# Patient Record
Sex: FEMALE | Race: WHITE | NOT HISPANIC OR LATINO | Employment: FULL TIME | URBAN - METROPOLITAN AREA
[De-identification: names, ages, dates, MRNs, and addresses within clinical notes are randomized per-mention and may not be internally consistent; named-entity substitution may affect disease eponyms.]

---

## 2017-01-20 DIAGNOSIS — C50.411 BREAST CANCER OF UPPER-OUTER QUADRANT OF RIGHT FEMALE BREAST (HCC): ICD-10-CM

## 2017-01-20 DIAGNOSIS — M81.0 OSTEOPOROSIS: Primary | ICD-10-CM

## 2017-01-25 ENCOUNTER — INFUSION (OUTPATIENT)
Dept: ONCOLOGY | Facility: HOSPITAL | Age: 42
End: 2017-01-25

## 2017-01-25 VITALS
BODY MASS INDEX: 23.13 KG/M2 | HEART RATE: 102 BPM | DIASTOLIC BLOOD PRESSURE: 72 MMHG | WEIGHT: 147.4 LBS | SYSTOLIC BLOOD PRESSURE: 106 MMHG | TEMPERATURE: 98.2 F

## 2017-01-25 DIAGNOSIS — C50.411 BREAST CANCER OF UPPER-OUTER QUADRANT OF RIGHT FEMALE BREAST (HCC): ICD-10-CM

## 2017-01-25 DIAGNOSIS — M81.0 OSTEOPOROSIS: Primary | ICD-10-CM

## 2017-01-25 DIAGNOSIS — M81.0 OSTEOPOROSIS: ICD-10-CM

## 2017-01-25 LAB
ALBUMIN SERPL-MCNC: 4 G/DL (ref 3.5–5.2)
ALBUMIN/GLOB SERPL: 1.5 G/DL (ref 1.1–2.4)
ALP SERPL-CCNC: 50 U/L (ref 38–116)
ALT SERPL W P-5'-P-CCNC: 19 U/L (ref 0–33)
ANION GAP SERPL CALCULATED.3IONS-SCNC: 11.8 MMOL/L
AST SERPL-CCNC: 31 U/L (ref 0–32)
BASOPHILS # BLD AUTO: 0.03 10*3/MM3 (ref 0–0.1)
BASOPHILS NFR BLD AUTO: 0.3 % (ref 0–1.1)
BILIRUB SERPL-MCNC: 0.2 MG/DL (ref 0.1–1.2)
BUN BLD-MCNC: 22 MG/DL (ref 6–20)
BUN/CREAT SERPL: 36.1 (ref 7.3–30)
CALCIUM SPEC-SCNC: 9 MG/DL (ref 8.5–10.2)
CHLORIDE SERPL-SCNC: 102 MMOL/L (ref 98–107)
CO2 SERPL-SCNC: 26.2 MMOL/L (ref 22–29)
CREAT BLD-MCNC: 0.61 MG/DL (ref 0.6–1.1)
DEPRECATED RDW RBC AUTO: 39.8 FL (ref 37–49)
EOSINOPHIL # BLD AUTO: 0.11 10*3/MM3 (ref 0–0.36)
EOSINOPHIL NFR BLD AUTO: 1.2 % (ref 1–5)
ERYTHROCYTE [DISTWIDTH] IN BLOOD BY AUTOMATED COUNT: 12.1 % (ref 11.7–14.5)
GFR SERPL CREATININE-BSD FRML MDRD: 107 ML/MIN/1.73
GLOBULIN UR ELPH-MCNC: 2.6 GM/DL (ref 1.8–3.5)
GLUCOSE BLD-MCNC: 94 MG/DL (ref 74–124)
HCT VFR BLD AUTO: 36.3 % (ref 34–45)
HGB BLD-MCNC: 12.4 G/DL (ref 11.5–14.9)
IMM GRANULOCYTES # BLD: 0.03 10*3/MM3 (ref 0–0.03)
IMM GRANULOCYTES NFR BLD: 0.3 % (ref 0–0.5)
LYMPHOCYTES # BLD AUTO: 1.2 10*3/MM3 (ref 1–3.5)
LYMPHOCYTES NFR BLD AUTO: 13.3 % (ref 20–49)
MAGNESIUM SERPL-MCNC: 2 MG/DL (ref 1.8–2.5)
MCH RBC QN AUTO: 30.6 PG (ref 27–33)
MCHC RBC AUTO-ENTMCNC: 34.2 G/DL (ref 32–35)
MCV RBC AUTO: 89.6 FL (ref 83–97)
MONOCYTES # BLD AUTO: 0.58 10*3/MM3 (ref 0.25–0.8)
MONOCYTES NFR BLD AUTO: 6.4 % (ref 4–12)
NEUTROPHILS # BLD AUTO: 7.07 10*3/MM3 (ref 1.5–7)
NEUTROPHILS NFR BLD AUTO: 78.5 % (ref 39–75)
NRBC BLD MANUAL-RTO: 0 /100 WBC (ref 0–0)
PHOSPHATE SERPL-MCNC: 3.1 MG/DL (ref 2.5–4.5)
PLATELET # BLD AUTO: 197 10*3/MM3 (ref 150–375)
PMV BLD AUTO: 9.3 FL (ref 8.9–12.1)
POTASSIUM BLD-SCNC: 4 MMOL/L (ref 3.5–4.7)
PROT SERPL-MCNC: 6.6 G/DL (ref 6.3–8)
RBC # BLD AUTO: 4.05 10*6/MM3 (ref 3.9–5)
SODIUM BLD-SCNC: 140 MMOL/L (ref 134–145)
WBC NRBC COR # BLD: 9.02 10*3/MM3 (ref 4–10)

## 2017-01-25 PROCEDURE — 36415 COLL VENOUS BLD VENIPUNCTURE: CPT | Performed by: INTERNAL MEDICINE

## 2017-01-25 PROCEDURE — 84100 ASSAY OF PHOSPHORUS: CPT | Performed by: INTERNAL MEDICINE

## 2017-01-25 PROCEDURE — 25010000002 ZOLEDRONIC ACID 5 MG/100ML SOLUTION: Performed by: INTERNAL MEDICINE

## 2017-01-25 PROCEDURE — 96365 THER/PROPH/DIAG IV INF INIT: CPT | Performed by: INTERNAL MEDICINE

## 2017-01-25 PROCEDURE — 83735 ASSAY OF MAGNESIUM: CPT | Performed by: INTERNAL MEDICINE

## 2017-01-25 PROCEDURE — 85025 COMPLETE CBC W/AUTO DIFF WBC: CPT | Performed by: INTERNAL MEDICINE

## 2017-01-25 PROCEDURE — 80053 COMPREHEN METABOLIC PANEL: CPT | Performed by: INTERNAL MEDICINE

## 2017-01-25 RX ORDER — SODIUM CHLORIDE 9 MG/ML
250 INJECTION, SOLUTION INTRAVENOUS ONCE
Status: COMPLETED | OUTPATIENT
Start: 2017-01-25 | End: 2017-01-25

## 2017-01-25 RX ORDER — ZOLEDRONIC ACID 5 MG/100ML
5 INJECTION, SOLUTION INTRAVENOUS ONCE
Status: CANCELLED | OUTPATIENT
Start: 2018-01-11

## 2017-01-25 RX ORDER — SODIUM CHLORIDE 9 MG/ML
250 INJECTION, SOLUTION INTRAVENOUS ONCE
Status: CANCELLED | OUTPATIENT
Start: 2018-01-11

## 2017-01-25 RX ORDER — ZOLEDRONIC ACID 5 MG/100ML
5 INJECTION, SOLUTION INTRAVENOUS ONCE
Status: COMPLETED | OUTPATIENT
Start: 2017-01-25 | End: 2017-01-25

## 2017-01-25 RX ADMIN — SODIUM CHLORIDE 250 ML: 900 INJECTION, SOLUTION INTRAVENOUS at 14:14

## 2017-01-25 RX ADMIN — ZOLEDRONIC ACID 5 MG: 0.05 INJECTION, SOLUTION INTRAVENOUS at 14:13

## 2017-01-27 ENCOUNTER — OFFICE VISIT (OUTPATIENT)
Dept: FAMILY MEDICINE CLINIC | Facility: CLINIC | Age: 42
End: 2017-01-27

## 2017-01-27 VITALS
OXYGEN SATURATION: 99 % | HEIGHT: 67 IN | BODY MASS INDEX: 22.66 KG/M2 | TEMPERATURE: 97.6 F | HEART RATE: 84 BPM | DIASTOLIC BLOOD PRESSURE: 70 MMHG | SYSTOLIC BLOOD PRESSURE: 102 MMHG | WEIGHT: 144.4 LBS

## 2017-01-27 DIAGNOSIS — M79.671 RIGHT FOOT PAIN: Primary | ICD-10-CM

## 2017-01-27 PROCEDURE — 99214 OFFICE O/P EST MOD 30 MIN: CPT | Performed by: NURSE PRACTITIONER

## 2017-01-27 PROCEDURE — 73630 X-RAY EXAM OF FOOT: CPT | Performed by: NURSE PRACTITIONER

## 2017-01-27 RX ORDER — VENLAFAXINE HYDROCHLORIDE 37.5 MG/1
37.5 CAPSULE, EXTENDED RELEASE ORAL DAILY
Refills: 2 | COMMUNITY
Start: 2017-01-22 | End: 2020-03-06

## 2017-01-27 RX ORDER — TAMOXIFEN CITRATE 20 MG/1
20 TABLET ORAL DAILY
COMMUNITY
Start: 2017-01-22 | End: 2017-03-13

## 2017-01-27 NOTE — PROGRESS NOTES
Subjective   Diana Higgins is a 41 y.o. female who presents today for:    Foot Pain (No injury. Rt foot pain x 2 weeks that5 is now going up leg)    Lower Extremity Issue   This is a new (she is a patient of Dr. Mays and this is my first time seeing her.) problem. The current episode started 1 to 4 weeks ago (started 2 weeks ago, she denies any injury). The problem has been unchanged. Associated symptoms include arthralgias (Rt foot pain). Pertinent negatives include no joint swelling or numbness. The symptoms are aggravated by walking. She has tried position changes and NSAIDs for the symptoms. The treatment provided no relief.      I have reviewed the patient's medical history in detail and updated the computerized patient record.    Ms. Higgins  reports that she has never smoked. She has never used smokeless tobacco. She reports that she drinks alcohol. She reports that she does not use illicit drugs.         Current Outpatient Prescriptions:   •  BIOTIN PO, Take  by mouth., Disp: , Rfl:   •  MELATONIN PO, Take  by mouth daily., Disp: , Rfl:   •  Probiotic Product ([employer] ADULT PROBIOTIC PO), Take  by mouth daily., Disp: , Rfl:   •  tamoxifen (NOLVADEX) 20 MG chemo tablet, Take 20 mg by mouth Daily., Disp: , Rfl:   •  venlafaxine XR (EFFEXOR-XR) 37.5 MG 24 hr capsule, Take 37.5 mg by mouth Daily., Disp: , Rfl: 2  •  Zoledronic Acid (RECLAST IV), Infuse  into a venous catheter. Once a year, Disp: , Rfl:       The following portions of the patient's history were reviewed and updated as appropriate: allergies, current medications, past social history and problem list.    Review of Systems   Constitutional: Negative.    Respiratory: Negative.    Cardiovascular: Negative.    Musculoskeletal: Positive for arthralgias (Rt foot pain). Negative for joint swelling.   Skin: Negative.    Neurological: Negative.  Negative for numbness.         Objective   Vitals:    01/27/17 1018   BP: 102/70   BP Location: Left arm  "  Patient Position: Sitting   Cuff Size: Adult   Pulse: 84   Temp: 97.6 °F (36.4 °C)   TempSrc: Oral   SpO2: 99%   Weight: 144 lb 6.4 oz (65.5 kg)   Height: 66.93\" (170 cm)     Physical Exam   Constitutional: She is oriented to person, place, and time. She appears well-developed and well-nourished.   Cardiovascular: Normal rate, regular rhythm, normal heart sounds and intact distal pulses.    Pulmonary/Chest: Effort normal and breath sounds normal.   Musculoskeletal:        Right ankle: Normal. Achilles tendon normal.        Right foot: There is tenderness (on the top of her foot). There is normal range of motion, no bony tenderness, no swelling and normal capillary refill.        Left foot: Normal.   Neurological: She is alert and oriented to person, place, and time.   Skin: Skin is warm and dry. No rash noted. No erythema. No pallor.   Vitals reviewed.        Assessment/Plan   Diana was seen today for foot pain.    Diagnoses and all orders for this visit:    Right foot pain  -     XR Foot 3+ View Right (In Office)    1. I have reviewed the films of her right foot and ankle. I do not see any significant abnormalities. I have no other films to compare them to. I have advised her to take Aleve for the pain. She is to follow up if her symptoms do not resolve within the next week.  2. Follow up as needed.  "

## 2017-01-27 NOTE — MR AVS SNAPSHOT
Diana Higgins   1/27/2017 10:20 AM   Office Visit    Dept Phone:  [phone]   Encounter #:  [id number]    Provider:  HERMINIA Boykin   Department:  [facility] INTERNAL MEDICINE                Your Full Care Plan              Today's Medication Changes          These changes are accurate as of: 1/27/17 11:16 AM.  If you have any questions, ask your nurse or doctor.               Medication(s)that have changed:     tamoxifen 20 MG chemo tablet   Commonly known as:  NOLVADEX   Take 20 mg by mouth Daily.   What changed:  Another medication with the same name was removed. Continue taking this medication, and follow the directions you see here.   Changed by:  HERMINIA Boykin       venlafaxine XR 37.5 MG 24 hr capsule   Commonly known as:  EFFEXOR-XR   Take 37.5 mg by mouth Daily.   What changed:  Another medication with the same name was removed. Continue taking this medication, and follow the directions you see here.   Changed by:  HERMINIA Boykin         Stop taking medication(s)listed here:     ALPRAZolam 0.5 MG tablet   Commonly known as:  XANAX   Stopped by:  HERMINIA Boykin           Clobetasol Propionate Emulsion 0.05 % topical foam   Stopped by:  HERMINIA Boykin           DULCOLAX PO   Stopped by:  HERMINIA Boykin           FLEXERIL PO   Stopped by:  HERMINIA Boykin           TYLENOL PO   Stopped by:  HERMINIA Boykin                      Your Updated Medication List          This list is accurate as of: 1/27/17 11:16 AM.  Always use your most recent med list.                BIOTIN PO       CVS ADULT PROBIOTIC PO       MELATONIN PO       RECLAST IV       tamoxifen 20 MG chemo tablet   Commonly known as:  NOLVADEX       venlafaxine XR 37.5 MG 24 hr capsule   Commonly known as:  EFFEXOR-XR               We Performed the Following     XR Foot 3+ View Right (In Office)       You Were Diagnosed With        Codes Comments    Right  foot pain    -  Primary ICD-10-CM: M79.671  ICD-9-CM: 729.5       Instructions     None    Patient Instructions History      Upcoming Appointments     Visit Type Date Time Department    ACUTE           2017 10:20 AM [facility]    PORT FLUSH 3/8/2017  1:30 PM  [facility] OP INFU [facility]    FOLLOW UP 2 UNIT 3/8/2017  2:00 PM [facility] ONC [facility] ART      [facility] Signup     [facility] [employer] allows you to send messages to your doctor, view your test results, renew your prescriptions, schedule appointments, and more. To sign up, go to [employer] and click on the Sign Up Now link in the New User? box. Enter your [employer] Activation Code exactly as it appears below along with the last four digits of your Social Security Number and your Date of Birth () to complete the sign-up process. If you do not sign up before the expiration date, you must request a new code.    [employer] Activation Code: [email or web address]  Expires: 2/10/2017 11:16 AM    If you have questions, you can email [email or web address] or call [phone] to talk to our [employer] staff. Remember, [employer] is NOT to be used for urgent needs. For medical emergencies, dial 911.               Other Info from Your Visit           Your Appointments     Mar 08, 2017  1:30 PM EST   PORT FLUSH with INFU [facility] PORT CHAIR   [facility] ([place])    [address] [address] [phone]            Mar 08, 2017  2:00 PM EST   FOLLOW UP with Alf Hadley MD   [facility]: [facility]SULT[facility]S IN BLOOD DISORDERS AND CANCER ([facility])    [address] [facility] [address]  [facility] [address] [phone]              Allergies     No Known Allergies      Reason for Visit     Foot Pain No injury. Rt foot pain x 2 weeks that5 is now going up leg      Vital Signs     Blood Pressure Pulse Temperature Height Weight Oxygen Saturation    102/70 (BP Location: Left arm,  "Patient Position: Sitting, Cuff Size: Adult) 84 97.6 °F (36.4 °C) (Oral) 66.93\" (170 cm) 144 lb 6.4 oz (65.5 kg) 99%    Breastfeeding? Body Mass Index Smoking Status             No 22.66 kg/m2 Never Smoker         Problems and Diagnoses Noted     Right foot pain    -  Primary        "

## 2017-02-06 ENCOUNTER — OFFICE VISIT (OUTPATIENT)
Dept: FAMILY MEDICINE CLINIC | Facility: CLINIC | Age: 42
End: 2017-02-06

## 2017-02-06 VITALS
HEIGHT: 67 IN | OXYGEN SATURATION: 100 % | DIASTOLIC BLOOD PRESSURE: 74 MMHG | BODY MASS INDEX: 23.23 KG/M2 | WEIGHT: 148 LBS | HEART RATE: 120 BPM | SYSTOLIC BLOOD PRESSURE: 102 MMHG

## 2017-02-06 DIAGNOSIS — R06.02 SHORTNESS OF BREATH: ICD-10-CM

## 2017-02-06 DIAGNOSIS — R06.09 DOE (DYSPNEA ON EXERTION): ICD-10-CM

## 2017-02-06 DIAGNOSIS — C50.411 BREAST CANCER OF UPPER-OUTER QUADRANT OF RIGHT FEMALE BREAST (HCC): ICD-10-CM

## 2017-02-06 DIAGNOSIS — R07.2 PRECORDIAL PAIN: Primary | ICD-10-CM

## 2017-02-06 PROCEDURE — 99215 OFFICE O/P EST HI 40 MIN: CPT | Performed by: INTERNAL MEDICINE

## 2017-02-06 PROCEDURE — 93000 ELECTROCARDIOGRAM COMPLETE: CPT | Performed by: INTERNAL MEDICINE

## 2017-02-06 PROCEDURE — 71020 XR CHEST PA AND LATERAL: CPT | Performed by: INTERNAL MEDICINE

## 2017-02-06 NOTE — PROGRESS NOTES
Subjective   Diana Higgins is a 41 y.o. female who presents today for:    Chest Pain (Off and on for couple weeks/ Pains in center and left of chest)    History of Present Illness      This delightful 41-year-old female has a storied medical history.  She has undergone an ablation for SVT (2003).  Symptoms at that time included shortness of breath, but no chest pain; most significantly, she felt a fluttering in her chest during those episodes.  She has not felt this since her ablation.  She has had episodes of tachycardia, usually at times of increased anxiety, but not to the degree she had them before the ablation.    She also has history of breast cancer on the right (2014), status post bilateral mastectomies, treated with chemotherapy, currently maintained on tamoxifen, and followed by [facility].  She still has a port in the left subclavian area.    Starting last fall, she began a workout regimen.  Since then, she has noticed lightheadedness and palpitations while working out.  More recently, she has noticed episodes of palpitations and lightheadedness while at home, either engaged in very low level activity or at rest.  She's had some substernal and right-sided chest discomfort, usually very sharp, focal, brief, occasionally radiating through to the back.  Again, symptoms have occurred both with exertion and at rest.      Ms. Higgins  reports that she has never smoked. She has never used smokeless tobacco. She reports that she drinks alcohol. She reports that she does not use illicit drugs.         Current Outpatient Prescriptions:   •  BIOTIN PO, Take  by mouth., Disp: , Rfl:   •  MELATONIN PO, Take  by mouth daily., Disp: , Rfl:   •  Probiotic Product ([employer]S ADULT PROBIOTIC PO), Take  by mouth daily., Disp: , Rfl:   •  tamoxifen (NOLVADEX) 20 MG chemo tablet, Take 20 mg by mouth Daily., Disp: , Rfl:   •  venlafaxine XR (EFFEXOR-XR) 37.5 MG 24 hr capsule, Take 37.5 mg by mouth Daily., Disp: , Rfl: 2  •  Zoledronic Acid  "(RECLAST IV), Infuse  into a venous catheter. Once a year, Disp: , Rfl:       The following portions of the patient's history were reviewed and updated as appropriate: allergies, current medications, past social history and problem list.    Review of Systems   Constitutional: Negative for unexpected weight change.   Eyes: Negative for visual disturbance.   Respiratory: Positive for chest tightness and shortness of breath.    Cardiovascular: Positive for chest pain and palpitations. Negative for leg swelling.   Gastrointestinal: Negative for abdominal pain.   Endocrine: Positive for heat intolerance. Negative for cold intolerance.   Hematological: Negative for adenopathy. Does not bruise/bleed easily.        No h/o DVT   Psychiatric/Behavioral: Negative for dysphoric mood. The patient is nervous/anxious.        Objective   Vitals:    02/06/17 1513   BP: 102/74   BP Location: Left arm   Patient Position: Sitting   Cuff Size: Adult   Pulse: 120   SpO2: 100%   Weight: 148 lb (67.1 kg)   Height: 66.93\" (170 cm)     Physical Exam  Well-developed, well-nourished, in no acute distress.  She is anxious, alert, oriented, answers all questions appropriately.  Sclerae are anicteric and the conjunctivae are pink.  There is no periorbital edema.  No cervical, supraclavicular, or axillary lymphadenopathy.  No thyromegaly or masses appreciated.  Regular rate and rhythm, although tachycardic during parts of the exam, with normal S1 and S2, no murmur appreciated.  Clear to auscultation bilaterally with good breath sounds throughout all lung fields and normal respiratory effort.  Abdomen is soft, nondistended, nontender, with normoactive bowel sounds and no evidence of hepatosplenomegaly or mass.  No abdominal bruit is noted.  There is no lower extremity edema.  There is no upper extremity edema.  Radial and posterior tibial pulses are full bilaterally.      Assessment/Plan   Diana was seen today for chest pain.    Diagnoses and all " orders for this visit:    Precordial pain  -     XR Chest PA & Lateral  -     ECG 12 Lead  -     D-dimer, Quantitative    LEON (dyspnea on exertion)  -     XR Chest PA & Lateral  -     ECG 12 Lead  -     D-dimer, Quantitative    Shortness of breath  -     XR Chest PA & Lateral    Breast cancer of upper-outer quadrant of right female breast    With her history of SVT, status post ablation, the differential diagnosis includes recurrence of her SVT.  Today's ECG shows normal sinus rhythm with normal waveforms.  There is no evidence of preexcitation or ischemia.  I have no prior for comparison.  Because of her exertional symptoms and her past history, it may be prudent to do a stress echo on her if the rest of her evaluation is negative.    Chest x-ray was done today; it shows no evidence of active disease in the chest.  Port is present in the left subclavian area with the tip in the SVC.  Other than the port, this is a normal chest x-ray; I have no priors for comparison.    Because of her history of breast cancer, current tamoxifen use, and the presence of the port on the left, DVT/PE is also on the list of possibilities.  We have arranged for her to get blood drawn from her port (venapuncture failed today) through the ambulatory care unit at [facility] in order to check a d-dimer.  Obviously, if it is elevated, she will need a CTA of the chest.  If it is normal, I do not feel the CT scan is indicated.    The patient is most concerned about breast cancer metastatic to the lung as the etiology of her symptoms.  I have tried to reassure her in this regard, but she remains very anxious.  I reviewed the tumor marker levels done by CBC over the past year, pointing out to the patient that they have been on the decline over the last year, not rising.  Therefore, we will not check a CT of the chest unless her dimer is elevated.

## 2017-02-07 ENCOUNTER — HOSPITAL ENCOUNTER (OUTPATIENT)
Dept: INFUSION THERAPY | Facility: HOSPITAL | Age: 42
Discharge: HOME | End: 2017-02-07
Admitting: INTERNAL MEDICINE

## 2017-02-07 VITALS
OXYGEN SATURATION: 95 % | HEIGHT: 67 IN | BODY MASS INDEX: 22.76 KG/M2 | DIASTOLIC BLOOD PRESSURE: 83 MMHG | TEMPERATURE: 97.6 F | RESPIRATION RATE: 16 BRPM | HEART RATE: 88 BPM | SYSTOLIC BLOOD PRESSURE: 124 MMHG | WEIGHT: 145 LBS

## 2017-02-07 DIAGNOSIS — R07.2 PRECORDIAL PAIN: Primary | ICD-10-CM

## 2017-02-07 DIAGNOSIS — R06.09 DOE (DYSPNEA ON EXERTION): ICD-10-CM

## 2017-02-07 LAB — D DIMER PPP FEU-MCNC: <0.27 MCGFEU/ML (ref 0–0.49)

## 2017-02-07 PROCEDURE — 36591 DRAW BLOOD OFF VENOUS DEVICE: CPT

## 2017-02-07 PROCEDURE — 85379 FIBRIN DEGRADATION QUANT: CPT | Performed by: INTERNAL MEDICINE

## 2017-02-07 PROCEDURE — 25010000002 HEPARIN FLUSH (PORCINE) 100 UNIT/ML SOLUTION: Performed by: INTERNAL MEDICINE

## 2017-02-07 RX ADMIN — Medication 500 UNITS: at 15:46

## 2017-02-10 DIAGNOSIS — R07.2 PRECORDIAL PAIN: Primary | ICD-10-CM

## 2017-02-10 DIAGNOSIS — R06.09 DOE (DYSPNEA ON EXERTION): ICD-10-CM

## 2017-02-24 ENCOUNTER — OFFICE VISIT (OUTPATIENT)
Dept: CARDIOLOGY | Facility: CLINIC | Age: 42
End: 2017-02-24

## 2017-02-24 ENCOUNTER — HOSPITAL ENCOUNTER (OUTPATIENT)
Dept: CARDIOLOGY | Facility: HOSPITAL | Age: 42
Discharge: HOME | End: 2017-02-24
Attending: INTERNAL MEDICINE | Admitting: INTERNAL MEDICINE

## 2017-02-24 VITALS
SYSTOLIC BLOOD PRESSURE: 100 MMHG | HEIGHT: 67 IN | BODY MASS INDEX: 22.54 KG/M2 | HEART RATE: 79 BPM | DIASTOLIC BLOOD PRESSURE: 60 MMHG | WEIGHT: 143.6 LBS

## 2017-02-24 VITALS
WEIGHT: 143 LBS | SYSTOLIC BLOOD PRESSURE: 100 MMHG | DIASTOLIC BLOOD PRESSURE: 60 MMHG | BODY MASS INDEX: 22.44 KG/M2 | HEIGHT: 67 IN | HEART RATE: 94 BPM

## 2017-02-24 DIAGNOSIS — R68.89 DECREASED EXERCISE TOLERANCE: ICD-10-CM

## 2017-02-24 DIAGNOSIS — R06.09 DYSPNEA ON EXERTION: ICD-10-CM

## 2017-02-24 DIAGNOSIS — R07.2 PRECORDIAL PAIN: ICD-10-CM

## 2017-02-24 DIAGNOSIS — Z86.79 HISTORY OF PSVT (PAROXYSMAL SUPRAVENTRICULAR TACHYCARDIA): ICD-10-CM

## 2017-02-24 DIAGNOSIS — R07.2 PRECORDIAL PAIN: Primary | ICD-10-CM

## 2017-02-24 DIAGNOSIS — Z92.21 HISTORY OF CANCER CHEMOTHERAPY: ICD-10-CM

## 2017-02-24 DIAGNOSIS — R00.2 PALPITATIONS: ICD-10-CM

## 2017-02-24 LAB
BH CV ECHO MEAS - AO MAX PG: 4.1 MMHG
BH CV ECHO MEAS - AO V2 MAX: 101.8 CM/SEC
BH CV ECHO MEAS - BSA(HAYCOCK): 1.8 M^2
BH CV ECHO MEAS - BSA: 1.8 M^2
BH CV ECHO MEAS - BZI_BMI: 22.4 KILOGRAMS/M^2
BH CV ECHO MEAS - BZI_METRIC_HEIGHT: 170.2 CM
BH CV ECHO MEAS - BZI_METRIC_WEIGHT: 64.9 KG
BH CV ECHO MEAS - CONTRAST EF 4CH: 67.3 ML/M^2
BH CV ECHO MEAS - EDV(MOD-SP4): 55 ML
BH CV ECHO MEAS - EDV(TEICH): 52.1 ML
BH CV ECHO MEAS - EF(CUBED): 77.4 %
BH CV ECHO MEAS - EF(MOD-SP4): 67.3 %
BH CV ECHO MEAS - EF(TEICH): 70.6 %
BH CV ECHO MEAS - ESV(MOD-SP4): 18 ML
BH CV ECHO MEAS - ESV(TEICH): 15.3 ML
BH CV ECHO MEAS - FS: 39.1 %
BH CV ECHO MEAS - IVS/LVPW: 1.1
BH CV ECHO MEAS - IVSD: 0.83 CM
BH CV ECHO MEAS - LAT PEAK E' VEL: 14 CM/SEC
BH CV ECHO MEAS - LV DIASTOLIC VOL/BSA (35-75): 31.4 ML/M^2
BH CV ECHO MEAS - LV MASS(C)D: 76.3 GRAMS
BH CV ECHO MEAS - LV MASS(C)DI: 43.5 GRAMS/M^2
BH CV ECHO MEAS - LV SYSTOLIC VOL/BSA (12-30): 10.3 ML/M^2
BH CV ECHO MEAS - LVIDD: 3.5 CM
BH CV ECHO MEAS - LVIDS: 2.2 CM
BH CV ECHO MEAS - LVLD AP4: 6.1 CM
BH CV ECHO MEAS - LVLS AP4: 4.5 CM
BH CV ECHO MEAS - LVPWD: 0.77 CM
BH CV ECHO MEAS - MED PEAK E' VEL: 8 CM/SEC
BH CV ECHO MEAS - MV A DUR: 0.1 SEC
BH CV ECHO MEAS - MV A MAX VEL: 80.5 CM/SEC
BH CV ECHO MEAS - MV DEC SLOPE: 523.5 CM/SEC^2
BH CV ECHO MEAS - MV DEC TIME: 0.16 SEC
BH CV ECHO MEAS - MV E MAX VEL: 82 CM/SEC
BH CV ECHO MEAS - MV E/A: 1
BH CV ECHO MEAS - MV P1/2T MAX VEL: 82.9 CM/SEC
BH CV ECHO MEAS - MV P1/2T: 46.4 MSEC
BH CV ECHO MEAS - MVA P1/2T LCG: 2.7 CM^2
BH CV ECHO MEAS - MVA(P1/2T): 4.7 CM^2
BH CV ECHO MEAS - PULM A REVS DUR: 0.1 SEC
BH CV ECHO MEAS - PULM A REVS VEL: 31 CM/SEC
BH CV ECHO MEAS - PULM DIAS VEL: 42.7 CM/SEC
BH CV ECHO MEAS - PULM S/D: 1.4
BH CV ECHO MEAS - PULM SYS VEL: 61.1 CM/SEC
BH CV ECHO MEAS - SI(CUBED): 19.5 ML/M^2
BH CV ECHO MEAS - SI(MOD-SP4): 21.1 ML/M^2
BH CV ECHO MEAS - SI(TEICH): 21 ML/M^2
BH CV ECHO MEAS - SV(CUBED): 34.2 ML
BH CV ECHO MEAS - SV(MOD-SP4): 37 ML
BH CV ECHO MEAS - SV(TEICH): 36.8 ML
BH CV ECHO MEAS - TR MAX VEL: 225.3 CM/SEC
BH CV STRESS BP STAGE 1: NORMAL
BH CV STRESS BP STAGE 2: NORMAL
BH CV STRESS DURATION MIN STAGE 1: 3
BH CV STRESS DURATION MIN STAGE 2: 3
BH CV STRESS DURATION SEC STAGE 1: 0
BH CV STRESS DURATION SEC STAGE 2: 0
BH CV STRESS ECHO POST STRESS EJECTION FRACTION EF: 75 %
BH CV STRESS GRADE STAGE 1: 10
BH CV STRESS GRADE STAGE 2: 12
BH CV STRESS HR STAGE 1: 126
BH CV STRESS HR STAGE 2: 158
BH CV STRESS METS STAGE 1: 5
BH CV STRESS METS STAGE 2: 7.5
BH CV STRESS PROTOCOL 1: NORMAL
BH CV STRESS SPEED STAGE 1: 1.7
BH CV STRESS SPEED STAGE 2: 2.5
BH CV STRESS STAGE 1: 1
BH CV STRESS STAGE 2: 2
E/E' RATIO: 8.5
LEFT ATRIUM VOLUME INDEX: 8.5 ML/M2
LV EF 2D ECHO EST: 67 %
MAXIMAL PREDICTED HEART RATE: 177 BPM
PERCENT MAX PREDICTED HR: 89.27 %
STRESS BASELINE BP: NORMAL MMHG
STRESS BASELINE HR: 95 BPM
STRESS PERCENT HR: 105 %
STRESS POST ESTIMATED WORKLOAD: 7.5 METS
STRESS POST EXERCISE DUR MIN: 6 MIN
STRESS POST EXERCISE DUR SEC: 0 SEC
STRESS POST PEAK BP: NORMAL MMHG
STRESS POST PEAK HR: 158 BPM
STRESS TARGET HR: 150 BPM

## 2017-02-24 PROCEDURE — 99244 OFF/OP CNSLTJ NEW/EST MOD 40: CPT | Performed by: INTERNAL MEDICINE

## 2017-02-24 PROCEDURE — 93325 DOPPLER ECHO COLOR FLOW MAPG: CPT | Performed by: INTERNAL MEDICINE

## 2017-02-24 PROCEDURE — 93016 CV STRESS TEST SUPVJ ONLY: CPT | Performed by: INTERNAL MEDICINE

## 2017-02-24 PROCEDURE — 93000 ELECTROCARDIOGRAM COMPLETE: CPT | Performed by: INTERNAL MEDICINE

## 2017-02-24 PROCEDURE — 93352 ADMIN ECG CONTRAST AGENT: CPT | Performed by: INTERNAL MEDICINE

## 2017-02-24 PROCEDURE — 93017 CV STRESS TEST TRACING ONLY: CPT

## 2017-02-24 PROCEDURE — 93018 CV STRESS TEST I&R ONLY: CPT | Performed by: INTERNAL MEDICINE

## 2017-02-24 PROCEDURE — 93320 DOPPLER ECHO COMPLETE: CPT | Performed by: INTERNAL MEDICINE

## 2017-02-24 PROCEDURE — 93325 DOPPLER ECHO COLOR FLOW MAPG: CPT

## 2017-02-24 PROCEDURE — 93350 STRESS TTE ONLY: CPT | Performed by: INTERNAL MEDICINE

## 2017-02-24 PROCEDURE — C8928 TTE W OR W/O FOL W/CON,STRES: HCPCS

## 2017-02-24 PROCEDURE — 93320 DOPPLER ECHO COMPLETE: CPT

## 2017-02-24 PROCEDURE — 25010000002 PERFLUTREN (DEFINITY) 8.476 MG IN SODIUM CHLORIDE 10 ML INJECTION: Performed by: INTERNAL MEDICINE

## 2017-02-24 RX ORDER — OMEGA-3/DHA/EPA/FISH OIL 60 MG-90MG
CAPSULE ORAL DAILY
COMMUNITY
End: 2018-01-08

## 2017-02-24 RX ORDER — CHOLECALCIFEROL (VITAMIN D3) 25 MCG
1000 CAPSULE ORAL DAILY
COMMUNITY

## 2017-02-24 RX ADMIN — PERFLUTREN 3 ML: 6.52 INJECTION, SUSPENSION INTRAVENOUS at 11:30

## 2017-02-24 NOTE — PROGRESS NOTES
"Date of Office Visit: 2017  Encounter Provider: Ildefonso Davies MD  Place of Service: [facility] CARDIOLOGY  Patient Name: Diana Higgins  :1975    Chief Complaint   Patient presents with   • Shortness of Breath     x 2 months   :     HPI: Diana Higgins is a 41 y.o. female who presents today at the request of Dr. Mays for dyspnea and chest pain.  She was diagnosed with SVT in , and underwent an EP study which showed that she had an atrial tachycardia originating from the ostium of the coronary sinus.  This was successfully ablated, and she has not had recurrence.  In  she was evaluated for chest pain and had a normal echocardiogram and normal Cardiolite stress test.      In  she was diagnosed with breast cancer and underwent bilateral mastectomies and treatment with chemotherapy and radiation.  She is doing well from that standpoint; she still has a Port.    She has been trying to exercise, but she notes that she becomes very winded with cardio exercises (walking on the tread-climber, for examples).  She has a mildly uncomfortable sensation in the chest when it happens, and she feels like she can't take a deep breath.  If she keeps exercising, she'll \"see spots.\"  She denies syncope, and she denies episodes during low or medium levels of activity.  She denies edema, orthopnea, or lightheadedness.    She has not had any recurrent SVT symptoms, but she occasionally has a \"flip flop\" in her chest, at rest.  There are no other symptoms with those.      She is a non-smoker.  She does have a history of CAD in her mother, in her 60's.        Past Medical History   Diagnosis Date   • Breast cancer      stage IIA, s/p bilateral mastectomy, XRT, and chemotherapy, followed by Dr. Hadley   • Osteoporosis    • Paroxysmal supraventricular tachycardia      CS ostium atrial tachycardia, ablated in  by Dr. Garzon   • Sinus headache      But no history of migranes "   • Skin cancer    • Wears glasses        Past Surgical History   Procedure Laterality Date   • Endometrial ablation  03/2013     Dr. Young   • Cardiac ablation  2002     Dr. Garzon 11 years ago tachycardia   • Knee surgery Right      arthroscopy as a teenager   • Tonsillectomy     • Mastectomy with immediate reconstruction Bilateral    • Colonoscopy         Social History     Social History   • Marital status:      Spouse name: Misha   • Number of children: N/A   • Years of education: College     Occupational History   •  [employer]     Social History Main Topics   • Smoking status: Never Smoker   • Smokeless tobacco: Never Used   • Alcohol use Yes      Comment: Rare   • Drug use: No   • Sexual activity: Defer     Other Topics Concern   • Not on file     Social History Narrative       Family History   Problem Relation Age of Onset   • Coronary artery disease Mother    • Heart disease Mother    • Thrombophlebitis Mother      2016   • Heart attack Mother    • Hepatitis Father    • Cancer Father    • Crohn's disease Sister    • Drug abuse Brother      Drug overdose   • Cancer Maternal Grandmother 42     Breast   • Thyroid disease Sister    • Other Sister      kidney problems       Review of Systems   Cardiovascular: Positive for dyspnea on exertion.   Psychiatric/Behavioral: The patient is nervous/anxious.    All other systems reviewed and are negative.      No Known Allergies      Current Outpatient Prescriptions:   •  BIOTIN PO, Take  by mouth., Disp: , Rfl:   •  Cholecalciferol (VITAMIN D-3) 1000 UNITS capsule, Take  by mouth Daily., Disp: , Rfl:   •  MELATONIN PO, Take 3 mg by mouth Daily., Disp: , Rfl:   •  Omega-3 Fatty Acids (FISH OIL) 500 MG capsule, Take  by mouth Daily., Disp: , Rfl:   •  PHYTOSTEROLS PO, Take 1,000 mg by mouth Daily., Disp: , Rfl:   •  Probiotic Product ([employer]S ADULT PROBIOTIC PO), Take  by mouth daily., Disp: , Rfl:   •   "tamoxifen (NOLVADEX) 20 MG chemo tablet, Take 20 mg by mouth Daily., Disp: , Rfl:   •  Unable to find, 1 each 1 (One) Time. Med Name:   Cell wise, Disp: , Rfl:   •  venlafaxine XR (EFFEXOR-XR) 37.5 MG 24 hr capsule, Take 37.5 mg by mouth Daily., Disp: , Rfl: 2  •  Zoledronic Acid (RECLAST IV), Infuse  into a venous catheter. Once a year, Disp: , Rfl:   No current facility-administered medications for this visit.      Objective:     Vitals:    02/24/17 0952   BP: 100/60   Pulse: 79   Weight: 143 lb 9.6 oz (65.1 kg)   Height: 67\" (170.2 cm)     Body mass index is 22.49 kg/(m^2).    Physical Exam   Constitutional: She is oriented to person, place, and time. She appears well-developed and well-nourished.   HENT:   Head: Normocephalic.   Nose: Nose normal.   Mouth/Throat: Oropharynx is clear and moist.   Eyes: Conjunctivae and EOM are normal. Pupils are equal, round, and reactive to light.   Neck: Normal range of motion. No JVD present.   Cardiovascular: Normal rate, regular rhythm, normal heart sounds and intact distal pulses.    No murmur heard.  Pulmonary/Chest: Effort normal and breath sounds normal.   Abdominal: Soft. She exhibits no mass. There is no tenderness.   Musculoskeletal: Normal range of motion. She exhibits no edema.   Lymphadenopathy:     She has no cervical adenopathy.   Neurological: She is alert and oriented to person, place, and time. No cranial nerve deficit.   Skin: Skin is warm and dry. No rash noted.   Psychiatric: She has a normal mood and affect. Her behavior is normal. Judgment and thought content normal.   Vitals reviewed.        ECG 12 Lead  Date/Time: 2/24/2017 3:24 PM  Performed by: JESSICA MARKS  Authorized by: JESSICA MARSK   Comparison: compared with previous ECG   Similar to previous ECG  Rhythm: sinus rhythm  ST Segments: ST segments normal  T Waves: T waves normal  Other: no other findings  Clinical impression: normal ECG              Assessment:       Diagnosis Plan   1. Precordial " pain     2. Decreased exercise tolerance     3. History of cancer chemotherapy     4. Palpitations     5. History of PSVT (paroxysmal supraventricular tachycardia)            Plan:       Ms. Higgins is noted significant dyspnea with exercise and the inability to complete much in the way of aerobic exercise.  She had a d-dimer checked recently which was normal, and that's very reassuring.  I set her up for a stress echo today; her baseline echo was normal, and her stress EKG and stress echo were normal despite developing significant dyspnea while on the treadmill.  I do not think her symptoms are cardiac in nature.  I'm going to check PFT's for further evaluation.    She has mild, infrequent palpitations.  These sound like PVC's.  There's no evidence that she's had any recurrent SVT.  She doesn't need a beta blocker.    As always, it has been a pleasure to participate in your patient's care.      Sincerely,       Ildefonso Davies MD

## 2017-03-02 ENCOUNTER — HOSPITAL ENCOUNTER (OUTPATIENT)
Dept: RESPIRATORY THERAPY | Facility: HOSPITAL | Age: 42
Discharge: HOME | End: 2017-03-02
Attending: INTERNAL MEDICINE | Admitting: INTERNAL MEDICINE

## 2017-03-02 DIAGNOSIS — R06.09 DYSPNEA ON EXERTION: ICD-10-CM

## 2017-03-02 PROCEDURE — 94060 EVALUATION OF WHEEZING: CPT

## 2017-03-02 PROCEDURE — 94726 PLETHYSMOGRAPHY LUNG VOLUMES: CPT

## 2017-03-02 PROCEDURE — 94729 DIFFUSING CAPACITY: CPT

## 2017-03-02 RX ORDER — ALBUTEROL SULFATE 2.5 MG/3ML
2.5 SOLUTION RESPIRATORY (INHALATION) ONCE AS NEEDED
Status: COMPLETED | OUTPATIENT
Start: 2017-03-02 | End: 2017-03-02

## 2017-03-02 RX ADMIN — ALBUTEROL SULFATE 2.5 MG: 2.5 SOLUTION RESPIRATORY (INHALATION) at 08:12

## 2017-03-09 PROBLEM — Z45.2 FITTING AND ADJUSTMENT OF VASCULAR CATHETER: Status: ACTIVE | Noted: 2017-03-09

## 2017-03-13 ENCOUNTER — INFUSION (OUTPATIENT)
Dept: ONCOLOGY | Facility: HOSPITAL | Age: 42
End: 2017-03-13

## 2017-03-13 ENCOUNTER — OFFICE VISIT (OUTPATIENT)
Dept: ONCOLOGY | Facility: CLINIC | Age: 42
End: 2017-03-13

## 2017-03-13 VITALS
TEMPERATURE: 98.4 F | DIASTOLIC BLOOD PRESSURE: 68 MMHG | RESPIRATION RATE: 14 BRPM | BODY MASS INDEX: 22.76 KG/M2 | HEIGHT: 67 IN | HEART RATE: 70 BPM | SYSTOLIC BLOOD PRESSURE: 100 MMHG | WEIGHT: 145 LBS | OXYGEN SATURATION: 100 %

## 2017-03-13 DIAGNOSIS — C50.411 BREAST CANCER OF UPPER-OUTER QUADRANT OF RIGHT FEMALE BREAST (HCC): Primary | ICD-10-CM

## 2017-03-13 DIAGNOSIS — Z78.0 MENOPAUSE: ICD-10-CM

## 2017-03-13 DIAGNOSIS — Z45.2 FITTING AND ADJUSTMENT OF VASCULAR CATHETER: ICD-10-CM

## 2017-03-13 DIAGNOSIS — M81.0 OSTEOPOROSIS: ICD-10-CM

## 2017-03-13 LAB
ALBUMIN SERPL-MCNC: 4.2 G/DL (ref 3.5–5.2)
ALBUMIN/GLOB SERPL: 1.8 G/DL (ref 1.1–2.4)
ALP SERPL-CCNC: 49 U/L (ref 38–116)
ALT SERPL W P-5'-P-CCNC: 20 U/L (ref 0–33)
ANION GAP SERPL CALCULATED.3IONS-SCNC: 11.6 MMOL/L
AST SERPL-CCNC: 29 U/L (ref 0–32)
BASOPHILS # BLD AUTO: 0.02 10*3/MM3 (ref 0–0.1)
BASOPHILS NFR BLD AUTO: 0.3 % (ref 0–1.1)
BILIRUB SERPL-MCNC: 0.2 MG/DL (ref 0.1–1.2)
BUN BLD-MCNC: 13 MG/DL (ref 6–20)
BUN/CREAT SERPL: 20.3 (ref 7.3–30)
CALCIUM SPEC-SCNC: 8.6 MG/DL (ref 8.5–10.2)
CANCER AG15-3 SERPL-ACNC: 19.6 U/ML
CHLORIDE SERPL-SCNC: 103 MMOL/L (ref 98–107)
CO2 SERPL-SCNC: 26.4 MMOL/L (ref 22–29)
CREAT BLD-MCNC: 0.64 MG/DL (ref 0.6–1.1)
DEPRECATED RDW RBC AUTO: 39.3 FL (ref 37–49)
EOSINOPHIL # BLD AUTO: 0.1 10*3/MM3 (ref 0–0.36)
EOSINOPHIL NFR BLD AUTO: 1.6 % (ref 1–5)
ERYTHROCYTE [DISTWIDTH] IN BLOOD BY AUTOMATED COUNT: 11.9 % (ref 11.7–14.5)
FSH SERPL-ACNC: 28.65 MIU/ML
GFR SERPL CREATININE-BSD FRML MDRD: 101 ML/MIN/1.73
GLOBULIN UR ELPH-MCNC: 2.4 GM/DL (ref 1.8–3.5)
GLUCOSE BLD-MCNC: 101 MG/DL (ref 74–124)
HCT VFR BLD AUTO: 37.4 % (ref 34–45)
HGB BLD-MCNC: 12.7 G/DL (ref 11.5–14.9)
HOLD SPECIMEN: NORMAL
IMM GRANULOCYTES # BLD: 0.02 10*3/MM3 (ref 0–0.03)
IMM GRANULOCYTES NFR BLD: 0.3 % (ref 0–0.5)
LH SERPL-ACNC: 24.38 MIU/ML
LYMPHOCYTES # BLD AUTO: 1.16 10*3/MM3 (ref 1–3.5)
LYMPHOCYTES NFR BLD AUTO: 18.5 % (ref 20–49)
MCH RBC QN AUTO: 30.7 PG (ref 27–33)
MCHC RBC AUTO-ENTMCNC: 34 G/DL (ref 32–35)
MCV RBC AUTO: 90.3 FL (ref 83–97)
MONOCYTES # BLD AUTO: 0.54 10*3/MM3 (ref 0.25–0.8)
MONOCYTES NFR BLD AUTO: 8.6 % (ref 4–12)
NEUTROPHILS # BLD AUTO: 4.42 10*3/MM3 (ref 1.5–7)
NEUTROPHILS NFR BLD AUTO: 70.7 % (ref 39–75)
NRBC BLD MANUAL-RTO: 0 /100 WBC (ref 0–0)
PLATELET # BLD AUTO: 227 10*3/MM3 (ref 150–375)
PMV BLD AUTO: 9.1 FL (ref 8.9–12.1)
POTASSIUM BLD-SCNC: 4 MMOL/L (ref 3.5–4.7)
PROT SERPL-MCNC: 6.6 G/DL (ref 6.3–8)
RBC # BLD AUTO: 4.14 10*6/MM3 (ref 3.9–5)
SODIUM BLD-SCNC: 141 MMOL/L (ref 134–145)
WBC NRBC COR # BLD: 6.26 10*3/MM3 (ref 4–10)

## 2017-03-13 PROCEDURE — 36415 COLL VENOUS BLD VENIPUNCTURE: CPT | Performed by: INTERNAL MEDICINE

## 2017-03-13 PROCEDURE — 86300 IMMUNOASSAY TUMOR CA 15-3: CPT | Performed by: INTERNAL MEDICINE

## 2017-03-13 PROCEDURE — 80053 COMPREHEN METABOLIC PANEL: CPT | Performed by: INTERNAL MEDICINE

## 2017-03-13 PROCEDURE — 25010000002 HEPARIN FLUSH (PORCINE) 100 UNIT/ML SOLUTION: Performed by: INTERNAL MEDICINE

## 2017-03-13 PROCEDURE — 99213 OFFICE O/P EST LOW 20 MIN: CPT | Performed by: INTERNAL MEDICINE

## 2017-03-13 PROCEDURE — 96523 IRRIG DRUG DELIVERY DEVICE: CPT | Performed by: INTERNAL MEDICINE

## 2017-03-13 PROCEDURE — 83002 ASSAY OF GONADOTROPIN (LH): CPT | Performed by: INTERNAL MEDICINE

## 2017-03-13 PROCEDURE — 85025 COMPLETE CBC W/AUTO DIFF WBC: CPT | Performed by: INTERNAL MEDICINE

## 2017-03-13 PROCEDURE — 83001 ASSAY OF GONADOTROPIN (FSH): CPT | Performed by: INTERNAL MEDICINE

## 2017-03-13 RX ORDER — LETROZOLE 2.5 MG/1
2.5 TABLET, FILM COATED ORAL DAILY
Qty: 30 TABLET | Refills: 6 | Status: SHIPPED | OUTPATIENT
Start: 2017-03-13 | End: 2017-09-18 | Stop reason: SDUPTHER

## 2017-03-13 RX ADMIN — SODIUM CHLORIDE, PRESERVATIVE FREE 500 UNITS: 5 INJECTION INTRAVENOUS at 11:33

## 2017-03-13 NOTE — PROGRESS NOTES
Subjective   REASONS FOR FOLLOWUP:    1. History of stage IIA, [id number], ER weakly-positive, CT negative, HER2-negative breast cancer, status post bilateral mastectomies. The patient completed her adjuvant chemotherapy under NSABP B49 clinical trial.  On 12/14/2015 she has no clinical evidence of recurrence of breast cancer and she remains on Tamoxifen for the time being. She also has completed her breast reconstruction with bilateral implants and she is very satisfied in regard to the cosmetic result of this. She will remain on Tamoxifen for the time being. She receiving at this time Effexor 37.5 mg a day for hot flashes and anxiety.   2. Bone health. The patient has osteoporosis. She  required a new Reclast infusion in Jan 2017, and on yearly bases             History of Present Illness  This patient is here today for her regular checkup having no symptomatology related to her previous history of breast cancer. Specifically, she has not had any cough, sputum production, shortness of breath, alterations in her appetite or weight, normal bowel activity and normal urination. She has significant hot flashes with the use of tamoxifen and now that she is in menopause.  The patient has not had any alterations in her port. Her breast implants remain about the same, cosmetically pleasing to her.  She remains on tamoxifen with no difficulties with the medication besides the hot flashes. She remains on Effexor.  Now she has joined a gym and she feels dramatically better now that she has time and location to herself.  The depression, mood swings and her self-esteem have substantially improved. In regard to her bone health, the patient took IV Reclast  in January 2017.  Given the fact that she was almost passing out on a machine at her gym, she was seen by cardiology who on a stress echocardiogram and pulmonary function and evaluation of pulmonary pressure found no abnormalities. The patient has no difficulties on a regular  treadmill machine.  She denies any other new issues.             Past Medical History.   Past Medical History   Diagnosis Date   • Breast cancer      stage IIA, s/p bilateral mastectomy, XRT, and chemotherapy, followed by Dr. Hadley   • Osteoporosis    • Paroxysmal supraventricular tachycardia      CS ostium atrial tachycardia, ablated in 2002 by Dr. Garzon   • Sinus headache      But no history of migranes   • Skin cancer    • Wears glasses      Social History     Social History   • Marital status:      Spouse name: Misha   • Number of children: N/A   • Years of education: College     Occupational History   •  [employer]     Social History Main Topics   • Smoking status: Never Smoker   • Smokeless tobacco: Never Used   • Alcohol use Yes      Comment: Rare   • Drug use: No   • Sexual activity: Defer     Other Topics Concern   • Not on file     Social History Narrative     Family History   Problem Relation Age of Onset   • Coronary artery disease Mother    • Heart disease Mother    • Thrombophlebitis Mother      2016   • Heart attack Mother    • Hepatitis Father    • Cancer Father    • Crohn's disease Sister    • Drug abuse Brother      Drug overdose   • Cancer Maternal Grandmother 42     Breast   • Thyroid disease Sister    • Other Sister      kidney problems     :ONCOLOGIC/HEMATOLOGIC HISTORY     The patient was diagnosed with stage II breast cancer with the tumor located in the anterior axillary fold in the tail of the breast. She underwent bilateral mastectomies and she had bilateral expanders placed by Dr. MURPHY Aguilar in preparation for further reconstruction in the future.  The patient’s primary tumor was 1.6 cm in size.  Margins of resection were negative, grade 3, with no lymphovascular invasion. Two sentinel lymph nodes contain metastasis, one of them with macrometastasis that were not spilling out of the lymph node.  Circulating cancer cells  were negative.  CA 15-3 was negative, normal.  The patient had a CT scan of the brain, chest, abdomen and pelvis that disclose no distant metastasis at any level.  The radiologist questioned an internal mammary node on the right side that does not make any clinical sense given the primary location of the tumor in the right breast.  Her bone scan was negative.  Her MUGA ejection fraction was normal.  With all of this in mind we advised the patient to either participate in the NSABP B47 clinical trial or to consider chemotherapy in the standard regimen dose dense AC followed by Taxol followed by hormonal anti-estrogen therapy given the finding in the pathological specimen of the mastectomy that her tumor was weakly ER positive, OR negative, HER-2 negative by FISH.      Our research coordinator had the opportunity to interview the patient today to go through the explanation of participation in the clinical trial.      Patient seen 07/24/2014 for a second cycle of AC chemotherapy. Adjustments were made for her antinausea treatments then post chemotherapy to try to reduce insomnia and further nausea.     On 08/07/2014 the patient has encountered size effects of the treatment including nausea and vomiting on days 3, 4 and 5 after chemotherapy related to a continuous bitter metallic taste in her mouth.  She also has experienced a tremendous degree of skeletal pain induced by the Neulasta to the point that she surrendered to bed.  Under these circumstances we advised her to use some mint or crystalized kimberly for the taste in her mouth and she will take Aleve on a regular schedule on days 2, 3, 4 and 5 to minimize the bone pain induced by the Neulasta.  On the other hand she has not developed any mucositis.  She has not developed any infections, abnormal bleeding or significant hematological toxicity.    On 08/21/2014 the patient proceeded with her 4th cycle of Adriamycin/Cytoxan supported with Neulasta. Pepcid AC b.i.d.  was initiated at a standing dose to control heartburn. The patient also was advised to continue using mint and kimberly to minimize the taste in her mouth induced by chemicals in her chemotherapy treatment. She was scheduled to receive her next cycle of chemotherapy and at that point she will initiate Taxol on a weekly basis.     She was seen back in the office 09/04/2014 initiating treatment with Taxol given every week, x12.     On 09/18/14, the patient was undergoing therapy with Taxol and having less fatigue, no nausea, occasional tingling in her fingers but it was very transitory with no discomfort in her feet or toes.  She was still very sensitive, feeling her expanders in the chest wall, and we advised her to initiate a program of water therapy.  Her Taxol treatment was continued.  We also advised her to use massage therapy.     On 09/25/2014, the patient was still experiencing a grade 1 peripheral neuropathy of hands, none in feet, associated with Taxol use.  Otherwise she was feeling fine.   She still had a lot of sensitivity in the chest wall, in areas of breast expanders, and in the shoulders and scapular areas.  We have not been able to get her water therapy classes.   We advised her to continue her care plan in the same way and continue Prilosec on a daily basis to avoid reflux esophagitis that is very well controlled at this time.  Her chemotherapy treatment was continued and hematological parameters were stable and her physical exam was otherwise unremarkable.      The patient was seen back in the office on 10/16/2014, tolerating Taxol well. Plans are made to continue the same.     The patient was seen on 11/06/2014, referral was made to radiation oncology to see if the patient will benefit from radiation therapy since she had involvement of two sentinel lymph nodes with metastatic carcinoma.     On 12/04/2014 the patient had a discussion with us in regard plans for any other form of hormonal adjuvant  therapy.  Given the fact that her tumor was weakly estrogen receptor positive, we advised her at her age to initiate therapy with tamoxifen.  I asked her to take this medicine at least for the next 10 years.  I asked her to consider eventually whenever we fulfill the criteria for being post-menopausal to switch this to an aromatase inhibitor.      We advised her to initiate a program of exercise to relieve the pain and discomfort that she has in her upper body due to the expanders and her surgical sites.     We advised her to proceed with bone density test. We need to check this in consideration of the utilization of bisphosphonate either by mouth or IV, not only for keeping her bone health now that she is post-menopausal but also in consideration of prevention of bone metastasis.      We had an extensive discussion with her and her sister about sexual issues that she has encountered during the treatment for her disease and try to relieve her from the stressors of this at this time.  Her  was not present unfortunately during this discussion.      On 01/02/2015 we documented that the patient was tolerating her tamoxifen well with minimal hot flashes and she was still undergoing her radiation treatment to the site of her disease.  She was also documented to have osteoporosis in her bone density and we suggested for her to receive IV Reclast that will have a dual function of treatment of osteoporosis as well as prevention of bone metastasis.      In regard to her conjugal and personal life, the patient has very poor self-esteem.  She finds no one in her life that will be supportive to her and the relationships with her  are very minimal and just in a very low level of activity.  I asked her to bring her  for the next visit to have a conversation one on one, face to face with him and see if he can become a partner for her to help her to heal emotionally and to be supportive for her all the time.   Eventually the patient will require some counseling as well.    We did not recommend any antianxiety or antidepressant medication for her at this point.  The patient was not willing to take anything anyway.      On 01/14/2015, she was handling her tamoxifen well. She was advised to continue the medicine. Five more sessions of radiation therapy to complete her treatment.     On 02/25/2015, the patient has no symptoms related to her cancer with exception of the hot flashes induced by the tamoxifen. Physical activity has improved dramatically, her ability in regards to movement in her upper extremities and pain that she was experiencing before. She will have her expanders removed in the summer and she will have permanent implants then. In regards to her Reclast infusion, she had a reaction to the medicine including fevers and skeletal pain that was short lasting. She will require premedication with dexamethasone for the next infusion in 1 year.     In regards to her relationship with her , this situation has substantially improved and they are back together, not completely back to normal, but in a much better situation. Obviously, this closes the loop of her life and self-esteem that has substantially improved.     On 05/15/2015 the patient was feeling terrific.  She had minimal discomfort in her shoulders and she is ready to have her expanders removed and have her implants.  Otherwise tolerance to tamoxifen was excellent. She was not having any vaginal bleeding and she was not having any menstrual flow.  Her personal life and her marriage life have further improved and she feels satisfied.  We advised her to continue tamoxifen and keeping her port flushed every couple of months.     Her physical exam was unrevealing at this time.    On 09/14/2015 she had a complete 12 point review of system, having excellent level of energy, normal appetite, normal bowel function, normal urination, no vaginal bleeding,  tolerance to tamoxifen was appropriate, hot flashes were ongoing, no cardiorespiratory  or neurological or skeletal symptomatology.  Her physical exam was unrevealing.  Under these circumstances we advised her to remain on tamoxifen 20 mg a day.  She will be informed about her estradiol level, FSH and LH.  Otherwise, she will have her port flushed today and this will be done every 6 weeks with MD visit again in 3 months. She will be due to have a new Reclast infusion in January 2016 and on that occasion she will be pre-medicated with dexamethasone to avoid fever and achiness in the skeleton induced by the first one.     On 12/14/2015 Tamoxifen was continued. The patient had no clinical indications of breast cancer recurrence. She completed her reconstruction of her breasts with bilateral implants, looking cosmetically very pleasing to her.       Review of Systems   General: no fever, chills, fatigue, weight changes, or lack of appetite.  Eyes: no epiphora, xerophthalmia,conjunctivitis, pain, glaucoma, blurred vision, blindness, secretion, photophobia, proptosis, diplopia.  Ears: no otorrhea, tinnitus, otorrhagia, deafness, pain, vertigo.  Nose: no rhinorrhea, epistaxis, alteration in perception of odors, sinuses pressure.  Mouth: no alteration in gums or teeth,  ulcers, no difficulty with mastication or deglut ion, no odynophagia.  Neck: no masses or pain, no thyroid alterations, no pain in muscles or arteries, no carotid odynia, no crepitation.  Respiratory: no cough, sputum production, dyspnea, trepopnea, pleuritic pain, hemoptysis.  Heart: no syncope, irregularity, palpitations, angina, orthopnea, paroxysmal nocturnal dyspnea.  Vascular Venous: no tenderness,edema, palpable cords, postphlebitic syndrome, skin changes or ulcerations.  Vascular Arterial: no distal ischemia, claudication, gangrene, neuropathic ischemic pain, skin ulcers, paleness or cyanosis.  GI: no dysphagia, odynophagia, no regurgitation, no  "heartburn,no indigestion,no nausea,no vomiting,no hematemesis ,no melena,no jaundice,no distention, no obstipation,no enterorrhagia,no proctalgia,no anal  lesions, nochanges in bowel habits.  : no frequency, hesitancy, hematuria, discharge, pain.  Musculoskeletal: no muscle or tendon pain or inflammation, joint pain, edema, functional limitation, fasciculations, mass.  Neurologic: no headache, seizures, alterations on Craneal nerves, no motor or senssory deficit, normal coordination, no alteration in memory, orientation, calculation,writting, verbal or written language.  Skin: no rashes, pruritus or localized lesions.  Psychiatric: no anxiety, depression, agitation, delusions, proper insight.  A comprehensive 14 point review of systems was performed and was negative except as mentioned.    Medications:  The current medication list was reviewed in the EMR    ALLERGIES:  No Known Allergies    Objective      Vitals:    03/13/17 1140   BP: 100/68   Pulse: 70   Resp: 14   Temp: 98.4 °F (36.9 °C)   TempSrc: Oral   SpO2: 100%   Weight: 145 lb (65.8 kg)   Height: 66.93\" (170 cm)   PainSc: 0-No pain     Current Status 3/13/2017   ECOG score 0       Physical Exam   GENERAL:  Well-developed, well-nourished  Patient  in no acute distress.   SKIN:  Warm, dry without rashes, purpura or petechiae.Psoriais in sclap  HEENT:  Pupils were equal and reactive to light and accomodation, conjunctivas non injected, no pterigion, normal extraocular movements, normal visual acuity.   Mouth mucosa was moist, no exudates in oropharynx, normal gum line, normal roof of the mouth and pillars, normal papillations of the tongue.Ear canals were normal, as well tympanic membranes, normal hearing acuity.No pain in mastoid area or erythema.  NECK:  Supple with good range of motion; no thyromegaly or masses, no JVD or bruits, no cervical adenopathies.No carotid arteries pain, no carotid abnormal pulsation or arterial dance.  LYMPHATICS:  No cervical, " supraclavicular, axillary, epitrochlear or inguinal adenopathy.  CHEST:  Normal excursion of both adonis thoraces, normal voice fremitus, no subcutaneous emphysema, normal axillas, no rashes or acanthosis nigricans. Lungs clear to percussion and auscultation, normal breath sounds bilaterally, no wheezing, crackles or ronchi, no stridor, no rubs.  CARDIAC AND VASCULAR: PMI not displaced,no thrills, normal rate and regular rhythm, without murmurs, rubs or S3 or S4 right or left sided gallops. Normal femoral, popliteal, pedis, brachial and carotid pulses.  INSPECTION of both reconstructed breasts  Implants documented symmetry of the tissue per se and location and size of the nipples,no retractions or inversion of the nipples, normal skin without lesions, no erythema or nodules,no paud'orange, no prominence of superficial veins or chest wall collateral circulation.PALPATION of the breasts documented normal skin turgor, no induration, alteration in local temperature, or pain, no palpable masses or nodules, normal mobility of the tissues,no fixation of the tissue or parenchyma to the chest wall, no alteration at the tail of the breasts or axillas, no adenopathies. Surgical sites was well healed.No lymphedema in either extremity.  ABDOMEN:  Soft, nontender with no organomegaly or masses, no ascites, no collateral circulation,no distention,no Joe sign, no abdominal pain, no inguinal hernias,no umbilical hernias, no abdominal bruits. Normal bowel sounds.  GENITAL: Not  Performed.  EXTREMITIES  AND SPINE:  No clubbing, cyanosis or edema, no deformities or pain .No kyphosis, scoliosis, deformities or pain in spine, ribs or pelvic bone.  NEUROLOGICAL:  Patient was awake, alert, oriented to time, person and place,normal gait and coordination,    RECENT LABS:  Hematology WBC   Date Value Ref Range Status   03/13/2017 6.26 4.00 - 10.00 10*3/mm3 Final   06/22/2015 4.95 4.50 - 10.70 K/Cumm Final     RBC   Date Value Ref Range  Status   03/13/2017 4.14 3.90 - 5.00 10*6/mm3 Final   06/22/2015 4.60 3.90 - 5.20 Million Final     HEMOGLOBIN   Date Value Ref Range Status   03/13/2017 12.7 11.5 - 14.9 g/dL Final   06/22/2015 14.0 11.9 - 15.5 g/dL Final     HEMATOCRIT   Date Value Ref Range Status   03/13/2017 37.4 34.0 - 45.0 % Final   06/22/2015 41.7 35.6 - 45.5 % Final     PLATELETS   Date Value Ref Range Status   03/13/2017 227 150 - 375 10*3/mm3 Final   06/22/2015 211 140 - 500 K/Cumm Final          Assessment/Plan  1History of stage IIA, [id number], ER weakly-positive, IN negative, HER2-negative right upper outer quadrant breast cancer, status post bilateral mastectomies. The patient completed her adjuvant chemotherapy under NSABP B49 clinical trial.  Today she has no clinical evidence of recurrence of breast cancer . We discussed today the fact that the patient is in menopause; never-the-less, I have requested an estradiol, FSH and LH again today.  I do believe that the patient will be able to make the transition from tamoxifen to Femara and I have suggested with the 2 weeks left of tamoxifen that she has to complete this and thereafter move to Femara 2.5 mg a day.  I advised her in regard to Femara side effects including joint pain that is minimal and transitory and antagonized by physical activity.  I also advised her in regard to osteopenia and osteoporosis associated with Femara; she is receiving Reclast anyway and she will be due for a new bone density test this year.      The patient is willing to proceed with this change.  I think that in the long run it will be more beneficial for her to be on Femara than on tamoxifen.  Hopefully the hot flashes will decrease. She takes her Effexor for anxiety and insomnia and this medicine will remain ongoing.      From the point of view of her port, this has been flushed today with no difficulties and will remain being flushed every 6 weeks.      I will review her back in 3 months and see she how  she is doing at that time. She will have a CBC, CMP and CA 15-3 at that time as well. I find no need for any radiological assessment on her at this time.                               3/13/2017      CC:

## 2017-03-14 LAB — ESTRADIOL SERPL HS-MCNC: <5 PG/ML

## 2017-03-21 RX ORDER — TAMOXIFEN CITRATE 20 MG/1
TABLET ORAL
Qty: 30 TABLET | Refills: 2 | OUTPATIENT
Start: 2017-03-21

## 2017-03-21 NOTE — TELEPHONE ENCOUNTER
Script refill for two month supply denied as per dr mcconnell' note, pt was to transition to femara.

## 2017-04-18 RX ORDER — SODIUM CHLORIDE 0.9 % (FLUSH) 0.9 %
10 SYRINGE (ML) INJECTION AS NEEDED
Status: CANCELLED | OUTPATIENT
Start: 2017-04-24

## 2017-04-19 ENCOUNTER — INFUSION (OUTPATIENT)
Dept: ONCOLOGY | Facility: HOSPITAL | Age: 42
End: 2017-04-19

## 2017-04-19 DIAGNOSIS — Z45.2 FITTING AND ADJUSTMENT OF VASCULAR CATHETER: ICD-10-CM

## 2017-04-19 DIAGNOSIS — C50.411 BREAST CANCER OF UPPER-OUTER QUADRANT OF RIGHT FEMALE BREAST (HCC): Primary | ICD-10-CM

## 2017-04-19 PROCEDURE — 25010000002 HEPARIN FLUSH (PORCINE) 100 UNIT/ML SOLUTION: Performed by: INTERNAL MEDICINE

## 2017-04-19 PROCEDURE — 96523 IRRIG DRUG DELIVERY DEVICE: CPT | Performed by: INTERNAL MEDICINE

## 2017-04-19 RX ORDER — SODIUM CHLORIDE 0.9 % (FLUSH) 0.9 %
10 SYRINGE (ML) INJECTION AS NEEDED
Status: CANCELLED | OUTPATIENT
Start: 2017-04-19

## 2017-04-19 RX ORDER — SODIUM CHLORIDE 0.9 % (FLUSH) 0.9 %
10 SYRINGE (ML) INJECTION AS NEEDED
Status: DISCONTINUED | OUTPATIENT
Start: 2017-04-19 | End: 2017-04-19 | Stop reason: HOSPADM

## 2017-04-19 RX ADMIN — SODIUM CHLORIDE, PRESERVATIVE FREE 500 UNITS: 5 INJECTION INTRAVENOUS at 14:29

## 2017-04-19 RX ADMIN — Medication 10 ML: at 14:28

## 2017-04-24 ENCOUNTER — APPOINTMENT (OUTPATIENT)
Dept: ONCOLOGY | Facility: HOSPITAL | Age: 42
End: 2017-04-24

## 2017-06-05 ENCOUNTER — OFFICE VISIT (OUTPATIENT)
Dept: ONCOLOGY | Facility: CLINIC | Age: 42
End: 2017-06-05

## 2017-06-05 ENCOUNTER — TELEPHONE (OUTPATIENT)
Dept: ONCOLOGY | Facility: CLINIC | Age: 42
End: 2017-06-05

## 2017-06-05 ENCOUNTER — INFUSION (OUTPATIENT)
Dept: ONCOLOGY | Facility: HOSPITAL | Age: 42
End: 2017-06-05

## 2017-06-05 VITALS
BODY MASS INDEX: 22.03 KG/M2 | WEIGHT: 140.4 LBS | RESPIRATION RATE: 16 BRPM | HEART RATE: 72 BPM | HEIGHT: 67 IN | DIASTOLIC BLOOD PRESSURE: 70 MMHG | SYSTOLIC BLOOD PRESSURE: 110 MMHG | TEMPERATURE: 99 F

## 2017-06-05 DIAGNOSIS — Z45.2 FITTING AND ADJUSTMENT OF VASCULAR CATHETER: ICD-10-CM

## 2017-06-05 DIAGNOSIS — C50.411 BREAST CANCER OF UPPER-OUTER QUADRANT OF RIGHT FEMALE BREAST (HCC): Primary | ICD-10-CM

## 2017-06-05 DIAGNOSIS — M81.0 OSTEOPOROSIS: ICD-10-CM

## 2017-06-05 DIAGNOSIS — Z78.0 POST-MENOPAUSAL: ICD-10-CM

## 2017-06-05 LAB
ALBUMIN SERPL-MCNC: 4.1 G/DL (ref 3.5–5.2)
ALBUMIN/GLOB SERPL: 1.5 G/DL (ref 1.1–2.4)
ALP SERPL-CCNC: 49 U/L (ref 38–116)
ALT SERPL W P-5'-P-CCNC: 17 U/L (ref 0–33)
ANION GAP SERPL CALCULATED.3IONS-SCNC: 12.2 MMOL/L
AST SERPL-CCNC: 24 U/L (ref 0–32)
BASOPHILS # BLD AUTO: 0.04 10*3/MM3 (ref 0–0.1)
BASOPHILS NFR BLD AUTO: 0.8 % (ref 0–1.1)
BILIRUB SERPL-MCNC: 0.3 MG/DL (ref 0.1–1.2)
BUN BLD-MCNC: 10 MG/DL (ref 6–20)
BUN/CREAT SERPL: 14.9 (ref 7.3–30)
CALCIUM SPEC-SCNC: 8.9 MG/DL (ref 8.5–10.2)
CANCER AG15-3 SERPL-ACNC: 19.2 U/ML
CHLORIDE SERPL-SCNC: 103 MMOL/L (ref 98–107)
CO2 SERPL-SCNC: 26.8 MMOL/L (ref 22–29)
CREAT BLD-MCNC: 0.67 MG/DL (ref 0.6–1.1)
DEPRECATED RDW RBC AUTO: 39.3 FL (ref 37–49)
EOSINOPHIL # BLD AUTO: 0.12 10*3/MM3 (ref 0–0.36)
EOSINOPHIL NFR BLD AUTO: 2.3 % (ref 1–5)
ERYTHROCYTE [DISTWIDTH] IN BLOOD BY AUTOMATED COUNT: 11.6 % (ref 11.7–14.5)
GFR SERPL CREATININE-BSD FRML MDRD: 96 ML/MIN/1.73
GLOBULIN UR ELPH-MCNC: 2.7 GM/DL (ref 1.8–3.5)
GLUCOSE BLD-MCNC: 97 MG/DL (ref 74–124)
HCT VFR BLD AUTO: 40.2 % (ref 34–45)
HGB BLD-MCNC: 13.7 G/DL (ref 11.5–14.9)
IMM GRANULOCYTES # BLD: 0.02 10*3/MM3 (ref 0–0.03)
IMM GRANULOCYTES NFR BLD: 0.4 % (ref 0–0.5)
LYMPHOCYTES # BLD AUTO: 1.12 10*3/MM3 (ref 1–3.5)
LYMPHOCYTES NFR BLD AUTO: 21.7 % (ref 20–49)
MCH RBC QN AUTO: 31.5 PG (ref 27–33)
MCHC RBC AUTO-ENTMCNC: 34.1 G/DL (ref 32–35)
MCV RBC AUTO: 92.4 FL (ref 83–97)
MONOCYTES # BLD AUTO: 0.54 10*3/MM3 (ref 0.25–0.8)
MONOCYTES NFR BLD AUTO: 10.4 % (ref 4–12)
NEUTROPHILS # BLD AUTO: 3.33 10*3/MM3 (ref 1.5–7)
NEUTROPHILS NFR BLD AUTO: 64.4 % (ref 39–75)
NRBC BLD MANUAL-RTO: 0 /100 WBC (ref 0–0)
PLATELET # BLD AUTO: 228 10*3/MM3 (ref 150–375)
PMV BLD AUTO: 9.1 FL (ref 8.9–12.1)
POTASSIUM BLD-SCNC: 4.2 MMOL/L (ref 3.5–4.7)
PROT SERPL-MCNC: 6.8 G/DL (ref 6.3–8)
RBC # BLD AUTO: 4.35 10*6/MM3 (ref 3.9–5)
SODIUM BLD-SCNC: 142 MMOL/L (ref 134–145)
WBC NRBC COR # BLD: 5.17 10*3/MM3 (ref 4–10)

## 2017-06-05 PROCEDURE — 99214 OFFICE O/P EST MOD 30 MIN: CPT | Performed by: INTERNAL MEDICINE

## 2017-06-05 PROCEDURE — 96523 IRRIG DRUG DELIVERY DEVICE: CPT | Performed by: INTERNAL MEDICINE

## 2017-06-05 PROCEDURE — 85025 COMPLETE CBC W/AUTO DIFF WBC: CPT

## 2017-06-05 PROCEDURE — 86300 IMMUNOASSAY TUMOR CA 15-3: CPT

## 2017-06-05 PROCEDURE — 25010000002 HEPARIN FLUSH (PORCINE) 100 UNIT/ML SOLUTION: Performed by: INTERNAL MEDICINE

## 2017-06-05 PROCEDURE — 80053 COMPREHEN METABOLIC PANEL: CPT

## 2017-06-05 PROCEDURE — 36415 COLL VENOUS BLD VENIPUNCTURE: CPT

## 2017-06-05 RX ORDER — SODIUM CHLORIDE 0.9 % (FLUSH) 0.9 %
10 SYRINGE (ML) INJECTION AS NEEDED
Status: CANCELLED | OUTPATIENT
Start: 2017-06-05

## 2017-06-05 RX ORDER — SODIUM CHLORIDE 0.9 % (FLUSH) 0.9 %
10 SYRINGE (ML) INJECTION AS NEEDED
Status: DISCONTINUED | OUTPATIENT
Start: 2017-06-05 | End: 2017-06-05 | Stop reason: HOSPADM

## 2017-06-05 RX ORDER — CLOTRIMAZOLE AND BETAMETHASONE DIPROPIONATE 10; .64 MG/G; MG/G
CREAM TOPICAL
Refills: 0 | COMMUNITY
Start: 2017-05-04 | End: 2018-01-08

## 2017-06-05 RX ORDER — FLUCONAZOLE 150 MG/1
TABLET ORAL
Refills: 1 | COMMUNITY
Start: 2017-05-10 | End: 2018-01-08

## 2017-06-05 RX ADMIN — Medication 10 ML: at 10:57

## 2017-06-05 RX ADMIN — SODIUM CHLORIDE, PRESERVATIVE FREE 500 UNITS: 5 INJECTION INTRAVENOUS at 10:58

## 2017-06-05 NOTE — PROGRESS NOTES
Subjective   REASONS FOR FOLLOWUP:    1. History of stage IIA, [id number], ER weakly-positive, LA negative, HER2-negative breast cancer, status post bilateral mastectomies. The patient completed her adjuvant chemotherapy under NSABP B49 clinical trial.  On 12/14/2015 she has no clinical evidence of recurrence of breast cancer and she remains on Tamoxifen for the time being. She also has completed her breast reconstruction with bilateral implants and she is very satisfied in regard to the cosmetic result of this. She will remain on Tamoxifen for the time being. She receiving at this time Effexor 37.5 mg a day for hot flashes and anxiety.   2. Bone health. The patient has osteoporosis. She  required a new Reclast infusion in Jan 2017, and on yearly bases             History of Present Illness  This patient is here today for her regular checkup having no symptomatology related to her previous history of breast cancer. Specifically, she has not had any cough, sputum production, shortness of breath, alterations in her appetite or weight, normal bowel activity and normal urination. She has significant hot flashes  now that she is in menopause.  The patient has not had any alterations in her port. Her breast implants remain about the same, cosmetically pleasing to her.  . She remains on Effexor.  Now she has joined a gym and she feels dramatically better now that she has time and location to herself.  The depression, mood swings and her self-esteem have substantially improved. In regard to her bone health, the patient took IV Reclast  in January 2017.  Given the fact that she was almost passing out on a machine at her gym, she was seen by cardiology who on a stress echocardiogram and pulmonary function and evaluation of pulmonary pressure found no abnormalities. The patient has no difficulties on a regular treadmill machine.  She has had some intermittent pain in the sacroiliac joint on the right side and also in the  greater trochanter on the right side in absence of any trauma and that happens sporadically. It is not severe enough to keep her from doing anything that she wishes.  On the other hand it comes and goes. Anti-inflammatory medication makes her to feel better and she is wondering about the nature of this. There is no other site of bone or joint pain that she can tell.                  Past Medical History.   Past Medical History:   Diagnosis Date   • Breast cancer     stage IIA, s/p bilateral mastectomy, XRT, and chemotherapy, followed by Dr. Hadley   • Osteoporosis    • Paroxysmal supraventricular tachycardia     CS ostium atrial tachycardia, ablated in 2002 by Dr. Garzon   • Sinus headache     But no history of migranes   • Skin cancer    • Wears glasses      Social History     Social History   • Marital status:      Spouse name: Misha   • Number of children: N/A   • Years of education: College     Occupational History   •  [employer]     [place] [employer]     Social History Main Topics   • Smoking status: Never Smoker   • Smokeless tobacco: Never Used   • Alcohol use Yes      Comment: Rare   • Drug use: No   • Sexual activity: Defer     Other Topics Concern   • Not on file     Social History Narrative     Family History   Problem Relation Age of Onset   • Coronary artery disease Mother    • Heart disease Mother    • Thrombophlebitis Mother      2016   • Heart attack Mother    • Hepatitis Father    • Cancer Father    • Crohn's disease Sister    • Drug abuse Brother      Drug overdose   • Cancer Maternal Grandmother 42     Breast   • Thyroid disease Sister    • Other Sister      kidney problems     :ONCOLOGIC/HEMATOLOGIC HISTORY     The patient was diagnosed with stage II breast cancer with the tumor located in the anterior axillary fold in the tail of the breast. She underwent bilateral mastectomies and she had bilateral expanders placed by Dr. MURPHY Aguilar in preparation for  further reconstruction in the future.  The patient’s primary tumor was 1.6 cm in size.  Margins of resection were negative, grade 3, with no lymphovascular invasion. Two sentinel lymph nodes contain metastasis, one of them with macrometastasis that were not spilling out of the lymph node.  Circulating cancer cells were negative.  CA 15-3 was negative, normal.  The patient had a CT scan of the brain, chest, abdomen and pelvis that disclose no distant metastasis at any level.  The radiologist questioned an internal mammary node on the right side that does not make any clinical sense given the primary location of the tumor in the right breast.  Her bone scan was negative.  Her MUGA ejection fraction was normal.  With all of this in mind we advised the patient to either participate in the NSABP B47 clinical trial or to consider chemotherapy in the standard regimen dose dense AC followed by Taxol followed by hormonal anti-estrogen therapy given the finding in the pathological specimen of the mastectomy that her tumor was weakly ER positive, NM negative, HER-2 negative by FISH.      Our research coordinator had the opportunity to interview the patient today to go through the explanation of participation in the clinical trial.      Patient seen 07/24/2014 for a second cycle of AC chemotherapy. Adjustments were made for her antinausea treatments then post chemotherapy to try to reduce insomnia and further nausea.     On 08/07/2014 the patient has encountered size effects of the treatment including nausea and vomiting on days 3, 4 and 5 after chemotherapy related to a continuous bitter metallic taste in her mouth.  She also has experienced a tremendous degree of skeletal pain induced by the Neulasta to the point that she surrendered to bed.  Under these circumstances we advised her to use some mint or crystalized kimberly for the taste in her mouth and she will take Aleve on a regular schedule on days 2, 3, 4 and 5 to minimize  the bone pain induced by the Neulasta.  On the other hand she has not developed any mucositis.  She has not developed any infections, abnormal bleeding or significant hematological toxicity.    On 08/21/2014 the patient proceeded with her 4th cycle of Adriamycin/Cytoxan supported with Neulasta. Pepcid AC b.i.d. was initiated at a standing dose to control heartburn. The patient also was advised to continue using mint and kimberly to minimize the taste in her mouth induced by chemicals in her chemotherapy treatment. She was scheduled to receive her next cycle of chemotherapy and at that point she will initiate Taxol on a weekly basis.     She was seen back in the office 09/04/2014 initiating treatment with Taxol given every week, x12.     On 09/18/14, the patient was undergoing therapy with Taxol and having less fatigue, no nausea, occasional tingling in her fingers but it was very transitory with no discomfort in her feet or toes.  She was still very sensitive, feeling her expanders in the chest wall, and we advised her to initiate a program of water therapy.  Her Taxol treatment was continued.  We also advised her to use massage therapy.     On 09/25/2014, the patient was still experiencing a grade 1 peripheral neuropathy of hands, none in feet, associated with Taxol use.  Otherwise she was feeling fine.   She still had a lot of sensitivity in the chest wall, in areas of breast expanders, and in the shoulders and scapular areas.  We have not been able to get her water therapy classes.   We advised her to continue her care plan in the same way and continue Prilosec on a daily basis to avoid reflux esophagitis that is very well controlled at this time.  Her chemotherapy treatment was continued and hematological parameters were stable and her physical exam was otherwise unremarkable.      The patient was seen back in the office on 10/16/2014, tolerating Taxol well. Plans are made to continue the same.     The patient was  seen on 11/06/2014, referral was made to radiation oncology to see if the patient will benefit from radiation therapy since she had involvement of two sentinel lymph nodes with metastatic carcinoma.     On 12/04/2014 the patient had a discussion with us in regard plans for any other form of hormonal adjuvant therapy.  Given the fact that her tumor was weakly estrogen receptor positive, we advised her at her age to initiate therapy with tamoxifen.  I asked her to take this medicine at least for the next 10 years.  I asked her to consider eventually whenever we fulfill the criteria for being post-menopausal to switch this to an aromatase inhibitor.      We advised her to initiate a program of exercise to relieve the pain and discomfort that she has in her upper body due to the expanders and her surgical sites.     We advised her to proceed with bone density test. We need to check this in consideration of the utilization of bisphosphonate either by mouth or IV, not only for keeping her bone health now that she is post-menopausal but also in consideration of prevention of bone metastasis.      We had an extensive discussion with her and her sister about sexual issues that she has encountered during the treatment for her disease and try to relieve her from the stressors of this at this time.  Her  was not present unfortunately during this discussion.      On 01/02/2015 we documented that the patient was tolerating her tamoxifen well with minimal hot flashes and she was still undergoing her radiation treatment to the site of her disease.  She was also documented to have osteoporosis in her bone density and we suggested for her to receive IV Reclast that will have a dual function of treatment of osteoporosis as well as prevention of bone metastasis.      In regard to her conjugal and personal life, the patient has very poor self-esteem.  She finds no one in her life that will be supportive to her and the  relationships with her  are very minimal and just in a very low level of activity.  I asked her to bring her  for the next visit to have a conversation one on one, face to face with him and see if he can become a partner for her to help her to heal emotionally and to be supportive for her all the time.  Eventually the patient will require some counseling as well.    We did not recommend any antianxiety or antidepressant medication for her at this point.  The patient was not willing to take anything anyway.      On 01/14/2015, she was handling her tamoxifen well. She was advised to continue the medicine. Five more sessions of radiation therapy to complete her treatment.     On 02/25/2015, the patient has no symptoms related to her cancer with exception of the hot flashes induced by the tamoxifen. Physical activity has improved dramatically, her ability in regards to movement in her upper extremities and pain that she was experiencing before. She will have her expanders removed in the summer and she will have permanent implants then. In regards to her Reclast infusion, she had a reaction to the medicine including fevers and skeletal pain that was short lasting. She will require premedication with dexamethasone for the next infusion in 1 year.     In regards to her relationship with her , this situation has substantially improved and they are back together, not completely back to normal, but in a much better situation. Obviously, this closes the loop of her life and self-esteem that has substantially improved.     On 05/15/2015 the patient was feeling terrific.  She had minimal discomfort in her shoulders and she is ready to have her expanders removed and have her implants.  Otherwise tolerance to tamoxifen was excellent. She was not having any vaginal bleeding and she was not having any menstrual flow.  Her personal life and her marriage life have further improved and she feels satisfied.  We  advised her to continue tamoxifen and keeping her port flushed every couple of months.     Her physical exam was unrevealing at this time.    On 09/14/2015 she had a complete 12 point review of system, having excellent level of energy, normal appetite, normal bowel function, normal urination, no vaginal bleeding, tolerance to tamoxifen was appropriate, hot flashes were ongoing, no cardiorespiratory  or neurological or skeletal symptomatology.  Her physical exam was unrevealing.  Under these circumstances we advised her to remain on tamoxifen 20 mg a day.  She will be informed about her estradiol level, FSH and LH.  Otherwise, she will have her port flushed today and this will be done every 6 weeks with MD visit again in 3 months. She will be due to have a new Reclast infusion in January 2016 and on that occasion she will be pre-medicated with dexamethasone to avoid fever and achiness in the skeleton induced by the first one.     On 12/14/2015 Tamoxifen was continued. The patient had no clinical indications of breast cancer recurrence. She completed her reconstruction of her breasts with bilateral implants, looking cosmetically very pleasing to her.       Review of Systems   General: no fever, chills, fatigue, weight changes, or lack of appetite.  Eyes: no epiphora, xerophthalmia,conjunctivitis, pain, glaucoma, blurred vision, blindness, secretion, photophobia, proptosis, diplopia.  Ears: no otorrhea, tinnitus, otorrhagia, deafness, pain, vertigo.  Nose: no rhinorrhea, epistaxis, alteration in perception of odors, sinuses pressure.  Mouth: no alteration in gums or teeth,  ulcers, no difficulty with mastication or deglut ion, no odynophagia.  Neck: no masses or pain, no thyroid alterations, no pain in muscles or arteries, no carotid odynia, no crepitation.  Respiratory: no cough, sputum production, dyspnea, trepopnea, pleuritic pain, hemoptysis.  Heart: no syncope, irregularity, palpitations, angina, orthopnea,  "paroxysmal nocturnal dyspnea.  Vascular Venous: no tenderness,edema, palpable cords, postphlebitic syndrome, skin changes or ulcerations.  Vascular Arterial: no distal ischemia, claudication, gangrene, neuropathic ischemic pain, skin ulcers, paleness or cyanosis.  GI: no dysphagia, odynophagia, no regurgitation, no heartburn,no indigestion,no nausea,no vomiting,no hematemesis ,no melena,no jaundice,no distention, no obstipation,no enterorrhagia,no proctalgia,no anal  lesions, nochanges in bowel habits.  : no frequency, hesitancy, hematuria, discharge, pain.  Musculoskeletal: no muscle or tendon pain or inflammation, joint pain, edema, functional limitation, fasciculations, mass.  Neurologic: no headache, seizures, alterations on Craneal nerves, no motor or senssory deficit, normal coordination, no alteration in memory, orientation, calculation,writting, verbal or written language.  Skin: no rashes, pruritus or localized lesions.  Psychiatric: no anxiety, depression, agitation, delusions, proper insight.  A comprehensive 14 point review of systems was performed and was negative except as mentioned.    Medications:  The current medication list was reviewed in the EMR    ALLERGIES:  No Known Allergies    Objective      Vitals:    06/05/17 1105   BP: 110/70   Pulse: 72   Resp: 16   Temp: 99 °F (37.2 °C)   Weight: 140 lb 6.4 oz (63.7 kg)   Height: 66.93\" (170 cm)   PainSc: 0-No pain     Current Status 6/5/2017   ECOG score 0       Physical Exam   GENERAL:  Well-developed, well-nourished  Patient  in no acute distress.   SKIN:  Warm, dry without rashes, purpura or petechiae.Psoriais in sclap  HEENT:  Pupils were equal and reactive to light and accomodation, conjunctivas non injected, no pterigion, normal extraocular movements, normal visual acuity.   Mouth mucosa was moist, no exudates in oropharynx, normal gum line, normal roof of the mouth and pillars, normal papillations of the tongue.Ear canals were normal, as well " tympanic membranes, normal hearing acuity.No pain in mastoid area or erythema.  NECK:  Supple with good range of motion; no thyromegaly or masses, no JVD or bruits, no cervical adenopathies.No carotid arteries pain, no carotid abnormal pulsation or arterial dance.  LYMPHATICS:  No cervical, supraclavicular, axillary, epitrochlear or inguinal adenopathy.  CHEST:  Normal excursion of both adonis thoraces, normal voice fremitus, no subcutaneous emphysema, normal axillas, no rashes or acanthosis nigricans. Lungs clear to percussion and auscultation, normal breath sounds bilaterally, no wheezing, crackles or ronchi, no stridor, no rubs.  CARDIAC AND VASCULAR: PMI not displaced,no thrills, normal rate and regular rhythm, without murmurs, rubs or S3 or S4 right or left sided gallops. Normal femoral, popliteal, pedis, brachial and carotid pulses.  INSPECTION of both reconstructed breasts  Implants documented symmetry of the tissue per se and location and size of the nipples,no retractions or inversion of the nipples, normal skin without lesions, no erythema or nodules,no paud'orange, no prominence of superficial veins or chest wall collateral circulation.PALPATION of the breasts documented normal skin turgor, no induration, alteration in local temperature, or pain, no palpable masses or nodules, normal mobility of the tissues,no fixation of the tissue or parenchyma to the chest wall, no alteration at the tail of the breasts or axillas, no adenopathies. Surgical sites was well healed.No lymphedema in either extremity.  ABDOMEN:  Soft, nontender with no organomegaly or masses, no ascites, no collateral circulation,no distention,no Aurora sign, no abdominal pain, no inguinal hernias,no umbilical hernias, no abdominal bruits. Normal bowel sounds.  GENITAL: Not  Performed.  EXTREMITIES  AND SPINE:  No clubbing, cyanosis or edema, no deformities or pain .No kyphosis, scoliosis, deformities or pain in spine, ribs or pelvic bone. She  has tenderness in the right sacroiliac joint and also she has tenderness in the greater trochanter in the right hip. Range of motion for the hip was very much unremarkable, no crepitation.  She had no alterations in the iliac spine, pubis, sacrum or any others in the spine, rib cage or upper extremities.       NEUROLOGICAL:  Patient was awake, alert, oriented to time, person and place,normal gait and coordination,    RECENT LABS:  Hematology WBC   Date Value Ref Range Status   2017 5.17 4.00 - 10.00 10*3/mm3 Final     RBC   Date Value Ref Range Status   2017 4.35 3.90 - 5.00 10*6/mm3 Final     Hemoglobin   Date Value Ref Range Status   2017 13.7 11.5 - 14.9 g/dL Final     Hematocrit   Date Value Ref Range Status   2017 40.2 34.0 - 45.0 % Final     Platelets   Date Value Ref Range Status   2017 228 150 - 375 10*3/mm3 Final          Assessment/Plan  1History of stage IIA, [id number], ER weakly-positive, MT negative, HER2-negative right upper outer quadrant breast cancer, status post bilateral mastectomies. The patient completed her adjuvant chemotherapy under NSABP B49 clinical trial.  Today she has no clinical evidence of recurrence of breast cancer . We discussed today the fact that the patient is in menopause and now she is on Femara 2.5 mg a day. She has not encountered any side effects of Femara.  Today the patient has tenderness in the right sacroiliac joint and the greater trochanter on the right side.  This raises the question if she has bursitis and sacroiliitis. She has not had any trauma and anti-inflammatory medication makes her feel a little bit better.  The pain is very occasional, happening once a week and no limping and no other alterations otherwise. No other sites of bone or joint pain.  Under this premises we advise the followin.  I asked her to remain on Femara 2.5 mg a day.  2.  Her port has been flushed today.  3.  She will return in 4 months with a CBC, CMP and CA  15-3.   4.  She will proceed with a bone scan and a pelvic x-ray in a few days and once the report becomes available I will further discuss this with her.   5.  I asked her to take Aleve to take care of the pain in these areas if she has a lot of discomfort. Depending on what the x-ray shows maybe she will be a candidate to have local steroid injection in either site.    6.  Patient otherwise has no other new issues and she will be called at home with the report of her tumor markers and her chemistry profile.                             6/5/2017      CC:

## 2017-06-07 ENCOUNTER — HOSPITAL ENCOUNTER (OUTPATIENT)
Dept: NUCLEAR MEDICINE | Facility: HOSPITAL | Age: 42
Discharge: HOME | End: 2017-06-07
Attending: INTERNAL MEDICINE

## 2017-06-07 DIAGNOSIS — C50.411 BREAST CANCER OF UPPER-OUTER QUADRANT OF RIGHT FEMALE BREAST (HCC): ICD-10-CM

## 2017-06-07 DIAGNOSIS — Z78.0 POST-MENOPAUSAL: ICD-10-CM

## 2017-06-07 DIAGNOSIS — M81.0 OSTEOPOROSIS: ICD-10-CM

## 2017-06-07 PROCEDURE — 0 TECHNETIUM MEDRONATE KIT: Performed by: INTERNAL MEDICINE

## 2017-06-07 PROCEDURE — 78306 BONE IMAGING WHOLE BODY: CPT

## 2017-06-07 PROCEDURE — A9503 TC99M MEDRONATE: HCPCS | Performed by: INTERNAL MEDICINE

## 2017-06-07 RX ORDER — TC 99M MEDRONATE 20 MG/10ML
23 INJECTION, POWDER, LYOPHILIZED, FOR SOLUTION INTRAVENOUS
Status: COMPLETED | OUTPATIENT
Start: 2017-06-07 | End: 2017-06-07

## 2017-06-07 RX ADMIN — Medication 23 MILLICURIE: at 11:00

## 2017-06-07 NOTE — NURSING NOTE
Patient was brought to radiology for port access.  Patient had been stuck for an IV x 1 without success and requested that her port be accessed.  This was done, injection was given by Pablo in [place] and then port was flushed with Normal Saline and Heparin and then deaccessed.  Good blood return was noted and port site was satisfactory.

## 2017-06-08 ENCOUNTER — TELEPHONE (OUTPATIENT)
Dept: ONCOLOGY | Facility: CLINIC | Age: 42
End: 2017-06-08

## 2017-06-08 NOTE — TELEPHONE ENCOUNTER
PT CALLING FOR RESULTS OF BONE SCAN THAT WAS PERFORMED YESTERDAY.    RESULTS NOT UPLOADED YET, I CALLED AND INFORMED PT OF THIS.  I TOLD HER IF RESULTS SHOULD COME THROUGH TODAY I WOULD HAVE MARTINEZ REVIEW THEM AND THEN GIVE HER A CALL BACK. SHE V/U

## 2017-06-09 ENCOUNTER — TELEPHONE (OUTPATIENT)
Dept: ONCOLOGY | Facility: CLINIC | Age: 42
End: 2017-06-09

## 2017-06-09 NOTE — TELEPHONE ENCOUNTER
"Patient calling for bone scan results again.  Still \"in process\" and we are not able to see them.  She requested that I send a message to Dr. Hadley because she says she can't wait all weekend for results.  I sent him a message.   "

## 2017-06-09 NOTE — TELEPHONE ENCOUNTER
No report of bone scan  Picture barely visible in pacs, i called radiology nobody MD available for reading, to my eyes it looks normal, i called pt and notified her of this, if any abnormalities i will call her Monday, she agrees

## 2017-06-16 ENCOUNTER — TELEPHONE (OUTPATIENT)
Dept: ONCOLOGY | Facility: CLINIC | Age: 42
End: 2017-06-16

## 2017-06-16 NOTE — TELEPHONE ENCOUNTER
Pt calling to confirm that her bone scan was ok.  She said Dr. Hadley called her the other day and told her the images looked ok, but he did not have the final report.  The report findings were normal. No further questions.

## 2017-07-24 ENCOUNTER — INFUSION (OUTPATIENT)
Dept: ONCOLOGY | Facility: HOSPITAL | Age: 42
End: 2017-07-24

## 2017-07-24 DIAGNOSIS — Z45.2 FITTING AND ADJUSTMENT OF VASCULAR CATHETER: Primary | ICD-10-CM

## 2017-07-24 PROCEDURE — 96523 IRRIG DRUG DELIVERY DEVICE: CPT | Performed by: INTERNAL MEDICINE

## 2017-07-24 PROCEDURE — 25010000002 HEPARIN FLUSH (PORCINE) 100 UNIT/ML SOLUTION: Performed by: INTERNAL MEDICINE

## 2017-07-24 RX ORDER — SODIUM CHLORIDE 0.9 % (FLUSH) 0.9 %
10 SYRINGE (ML) INJECTION AS NEEDED
Status: DISCONTINUED | OUTPATIENT
Start: 2017-07-24 | End: 2017-07-24 | Stop reason: HOSPADM

## 2017-07-24 RX ORDER — SODIUM CHLORIDE 0.9 % (FLUSH) 0.9 %
10 SYRINGE (ML) INJECTION AS NEEDED
Status: CANCELLED | OUTPATIENT
Start: 2017-07-24

## 2017-07-24 RX ADMIN — Medication 10 ML: at 09:55

## 2017-07-24 RX ADMIN — SODIUM CHLORIDE, PRESERVATIVE FREE 500 UNITS: 5 INJECTION INTRAVENOUS at 09:55

## 2017-09-18 RX ORDER — LETROZOLE 2.5 MG/1
TABLET, FILM COATED ORAL
Qty: 30 TABLET | Refills: 6 | Status: SHIPPED | OUTPATIENT
Start: 2017-09-18 | End: 2018-04-01 | Stop reason: SDUPTHER

## 2017-10-11 ENCOUNTER — OFFICE VISIT (OUTPATIENT)
Dept: ONCOLOGY | Facility: CLINIC | Age: 42
End: 2017-10-11

## 2017-10-11 ENCOUNTER — INFUSION (OUTPATIENT)
Dept: ONCOLOGY | Facility: HOSPITAL | Age: 42
End: 2017-10-11

## 2017-10-11 VITALS
HEART RATE: 71 BPM | BODY MASS INDEX: 22.44 KG/M2 | HEIGHT: 67 IN | DIASTOLIC BLOOD PRESSURE: 62 MMHG | OXYGEN SATURATION: 100 % | RESPIRATION RATE: 12 BRPM | TEMPERATURE: 98.2 F | SYSTOLIC BLOOD PRESSURE: 98 MMHG | WEIGHT: 143 LBS

## 2017-10-11 DIAGNOSIS — Z78.0 POST-MENOPAUSAL: ICD-10-CM

## 2017-10-11 DIAGNOSIS — C50.411 MALIGNANT NEOPLASM OF UPPER-OUTER QUADRANT OF RIGHT BREAST IN FEMALE, ESTROGEN RECEPTOR POSITIVE (HCC): Primary | ICD-10-CM

## 2017-10-11 DIAGNOSIS — C50.411 BREAST CANCER OF UPPER-OUTER QUADRANT OF RIGHT FEMALE BREAST (HCC): Primary | ICD-10-CM

## 2017-10-11 DIAGNOSIS — M81.0 AGE-RELATED OSTEOPOROSIS WITHOUT CURRENT PATHOLOGICAL FRACTURE: ICD-10-CM

## 2017-10-11 DIAGNOSIS — Z17.0 MALIGNANT NEOPLASM OF UPPER-OUTER QUADRANT OF RIGHT BREAST IN FEMALE, ESTROGEN RECEPTOR POSITIVE (HCC): ICD-10-CM

## 2017-10-11 DIAGNOSIS — Z45.2 FITTING AND ADJUSTMENT OF VASCULAR CATHETER: ICD-10-CM

## 2017-10-11 DIAGNOSIS — M81.0 OSTEOPOROSIS: ICD-10-CM

## 2017-10-11 DIAGNOSIS — Z17.0 MALIGNANT NEOPLASM OF UPPER-OUTER QUADRANT OF RIGHT BREAST IN FEMALE, ESTROGEN RECEPTOR POSITIVE (HCC): Primary | ICD-10-CM

## 2017-10-11 DIAGNOSIS — C50.411 MALIGNANT NEOPLASM OF UPPER-OUTER QUADRANT OF RIGHT BREAST IN FEMALE, ESTROGEN RECEPTOR POSITIVE (HCC): ICD-10-CM

## 2017-10-11 LAB
ALBUMIN SERPL-MCNC: 4.4 G/DL (ref 3.5–5.2)
ALBUMIN/GLOB SERPL: 1.5 G/DL (ref 1.1–2.4)
ALP SERPL-CCNC: 63 U/L (ref 38–116)
ALT SERPL W P-5'-P-CCNC: 17 U/L (ref 0–33)
ANION GAP SERPL CALCULATED.3IONS-SCNC: 12.3 MMOL/L
AST SERPL-CCNC: 26 U/L (ref 0–32)
BASOPHILS # BLD AUTO: 0.03 10*3/MM3 (ref 0–0.1)
BASOPHILS NFR BLD AUTO: 0.5 % (ref 0–1.1)
BILIRUB SERPL-MCNC: 0.3 MG/DL (ref 0.1–1.2)
BUN BLD-MCNC: 14 MG/DL (ref 6–20)
BUN/CREAT SERPL: 22.6 (ref 7.3–30)
CALCIUM SPEC-SCNC: 9.8 MG/DL (ref 8.5–10.2)
CHLORIDE SERPL-SCNC: 103 MMOL/L (ref 98–107)
CO2 SERPL-SCNC: 25.7 MMOL/L (ref 22–29)
CREAT BLD-MCNC: 0.62 MG/DL (ref 0.6–1.1)
DEPRECATED RDW RBC AUTO: 39.7 FL (ref 37–49)
EOSINOPHIL # BLD AUTO: 0.16 10*3/MM3 (ref 0–0.36)
EOSINOPHIL NFR BLD AUTO: 2.7 % (ref 1–5)
ERYTHROCYTE [DISTWIDTH] IN BLOOD BY AUTOMATED COUNT: 11.7 % (ref 11.7–14.5)
GFR SERPL CREATININE-BSD FRML MDRD: 105 ML/MIN/1.73
GLOBULIN UR ELPH-MCNC: 2.9 GM/DL (ref 1.8–3.5)
GLUCOSE BLD-MCNC: 82 MG/DL (ref 74–124)
HCT VFR BLD AUTO: 40.5 % (ref 34–45)
HGB BLD-MCNC: 13.6 G/DL (ref 11.5–14.9)
IMM GRANULOCYTES # BLD: 0.04 10*3/MM3 (ref 0–0.03)
IMM GRANULOCYTES NFR BLD: 0.7 % (ref 0–0.5)
LYMPHOCYTES # BLD AUTO: 1.27 10*3/MM3 (ref 1–3.5)
LYMPHOCYTES NFR BLD AUTO: 21.2 % (ref 20–49)
MCH RBC QN AUTO: 30.8 PG (ref 27–33)
MCHC RBC AUTO-ENTMCNC: 33.6 G/DL (ref 32–35)
MCV RBC AUTO: 91.8 FL (ref 83–97)
MONOCYTES # BLD AUTO: 0.57 10*3/MM3 (ref 0.25–0.8)
MONOCYTES NFR BLD AUTO: 9.5 % (ref 4–12)
NEUTROPHILS # BLD AUTO: 3.92 10*3/MM3 (ref 1.5–7)
NEUTROPHILS NFR BLD AUTO: 65.4 % (ref 39–75)
NRBC BLD MANUAL-RTO: 0 /100 WBC (ref 0–0)
PLATELET # BLD AUTO: 272 10*3/MM3 (ref 150–375)
PMV BLD AUTO: 9 FL (ref 8.9–12.1)
POTASSIUM BLD-SCNC: 4.3 MMOL/L (ref 3.5–4.7)
PROT SERPL-MCNC: 7.3 G/DL (ref 6.3–8)
RBC # BLD AUTO: 4.41 10*6/MM3 (ref 3.9–5)
SODIUM BLD-SCNC: 141 MMOL/L (ref 134–145)
WBC NRBC COR # BLD: 5.99 10*3/MM3 (ref 4–10)

## 2017-10-11 PROCEDURE — 99213 OFFICE O/P EST LOW 20 MIN: CPT | Performed by: INTERNAL MEDICINE

## 2017-10-11 PROCEDURE — 80053 COMPREHEN METABOLIC PANEL: CPT | Performed by: INTERNAL MEDICINE

## 2017-10-11 PROCEDURE — 96523 IRRIG DRUG DELIVERY DEVICE: CPT | Performed by: INTERNAL MEDICINE

## 2017-10-11 PROCEDURE — 36415 COLL VENOUS BLD VENIPUNCTURE: CPT | Performed by: INTERNAL MEDICINE

## 2017-10-11 PROCEDURE — 25010000002 HEPARIN FLUSH (PORCINE) 100 UNIT/ML SOLUTION: Performed by: INTERNAL MEDICINE

## 2017-10-11 PROCEDURE — 85025 COMPLETE CBC W/AUTO DIFF WBC: CPT | Performed by: INTERNAL MEDICINE

## 2017-10-11 RX ORDER — SODIUM CHLORIDE 0.9 % (FLUSH) 0.9 %
10 SYRINGE (ML) INJECTION AS NEEDED
Status: DISCONTINUED | OUTPATIENT
Start: 2017-10-11 | End: 2017-10-11 | Stop reason: HOSPADM

## 2017-10-11 RX ORDER — SODIUM CHLORIDE 0.9 % (FLUSH) 0.9 %
10 SYRINGE (ML) INJECTION AS NEEDED
Status: CANCELLED | OUTPATIENT
Start: 2017-10-11

## 2017-10-11 RX ADMIN — Medication 10 ML: at 10:12

## 2017-10-11 RX ADMIN — SODIUM CHLORIDE, PRESERVATIVE FREE 500 UNITS: 5 INJECTION INTRAVENOUS at 10:12

## 2017-10-11 NOTE — PROGRESS NOTES
Subjective   REASONS FOR FOLLOWUP:    1. History of stage IIA, W3N0pC0, ER weakly-positive, TX negative, HER2-negative breast cancer, status post bilateral mastectomies. The patient completed her adjuvant chemotherapy under NSABP B49 clinical trial.  On 12/14/2015 she has no clinical evidence of recurrence of breast cancer and she remains on Tamoxifen for the time being. She also has completed her breast reconstruction with bilateral implants and she is very satisfied in regard to the cosmetic result of this. She will remain on Tamoxifen for the time being. She receiving at this time Effexor 37.5 mg a day for hot flashes and anxiety.   2. Bone health. The patient has osteoporosis. She  required a new Reclast infusion in Jan 2017, and on yearly bases             History of Present Illness  This patient is here today for her regular checkup having no symptomatology related to her previous history of breast cancer. Specifically, she has not had any cough, sputum production, shortness of breath, alterations in her appetite or weight, normal bowel activity and normal urination. She has significant hot flashes  now that she is in menopause.  The patient has not had any alterations in her port. Her breast implants remain about the same, cosmetically pleasing to her.  . She remains on Effexor.  Now she has joined a gym and she feels dramatically better now that she has time and location to herself.  The depression, mood swings and her self-esteem have substantially improved. In regard to her bone health, the patient took IV Reclast  in January 2017.  Given the fact that she was almost passing out on a machine at her gym, she was seen by cardiology who on a stress echocardiogram and pulmonary function and evaluation of pulmonary pressure found no abnormalities. The patient has no difficulties on a regular treadmill machine.              Past Medical History.   Past Medical History:   Diagnosis Date   • Breast cancer      stage IIA, s/p bilateral mastectomy, XRT, and chemotherapy, followed by Dr. Hadley   • Osteoporosis    • Paroxysmal supraventricular tachycardia     CS ostium atrial tachycardia, ablated in 2002 by Dr. Garzon   • Sinus headache     But no history of migranes   • Skin cancer    • Wears glasses      Social History     Social History   • Marital status:      Spouse name: Misha   • Number of children: N/A   • Years of education: College     Occupational History   •  [employer]     Social History Main Topics   • Smoking status: Never Smoker   • Smokeless tobacco: Never Used   • Alcohol use Yes      Comment: Rare   • Drug use: No   • Sexual activity: Defer     Other Topics Concern   • Not on file     Social History Narrative     Family History   Problem Relation Age of Onset   • Coronary artery disease Mother    • Heart disease Mother    • Thrombophlebitis Mother      2016   • Heart attack Mother    • Hepatitis Father    • Cancer Father    • Crohn's disease Sister    • Drug abuse Brother      Drug overdose   • Cancer Maternal Grandmother 42     Breast   • Thyroid disease Sister    • Other Sister      kidney problems     :ONCOLOGIC/HEMATOLOGIC HISTORY     The patient was diagnosed with stage II breast cancer with the tumor located in the anterior axillary fold in the tail of the breast. She underwent bilateral mastectomies and she had bilateral expanders placed by Dr. MURPHY Aguilar in preparation for further reconstruction in the future.  The patient’s primary tumor was 1.6 cm in size.  Margins of resection were negative, grade 3, with no lymphovascular invasion. Two sentinel lymph nodes contain metastasis, one of them with macrometastasis that were not spilling out of the lymph node.  Circulating cancer cells were negative.  CA 15-3 was negative, normal.  The patient had a CT scan of the brain, chest, abdomen and pelvis that disclose no distant metastasis at any  level.  The radiologist questioned an internal mammary node on the right side that does not make any clinical sense given the primary location of the tumor in the right breast.  Her bone scan was negative.  Her MUGA ejection fraction was normal.  With all of this in mind we advised the patient to either participate in the NSABP B47 clinical trial or to consider chemotherapy in the standard regimen dose dense AC followed by Taxol followed by hormonal anti-estrogen therapy given the finding in the pathological specimen of the mastectomy that her tumor was weakly ER positive, PA negative, HER-2 negative by FISH.      Our research coordinator had the opportunity to interview the patient today to go through the explanation of participation in the clinical trial.      Patient seen 07/24/2014 for a second cycle of AC chemotherapy. Adjustments were made for her antinausea treatments then post chemotherapy to try to reduce insomnia and further nausea.     On 08/07/2014 the patient has encountered size effects of the treatment including nausea and vomiting on days 3, 4 and 5 after chemotherapy related to a continuous bitter metallic taste in her mouth.  She also has experienced a tremendous degree of skeletal pain induced by the Neulasta to the point that she surrendered to bed.  Under these circumstances we advised her to use some mint or crystalized kimberly for the taste in her mouth and she will take Aleve on a regular schedule on days 2, 3, 4 and 5 to minimize the bone pain induced by the Neulasta.  On the other hand she has not developed any mucositis.  She has not developed any infections, abnormal bleeding or significant hematological toxicity.    On 08/21/2014 the patient proceeded with her 4th cycle of Adriamycin/Cytoxan supported with Neulasta. Pepcid AC b.i.d. was initiated at a standing dose to control heartburn. The patient also was advised to continue using mint and kimberly to minimize the taste in her mouth  induced by chemicals in her chemotherapy treatment. She was scheduled to receive her next cycle of chemotherapy and at that point she will initiate Taxol on a weekly basis.     She was seen back in the office 09/04/2014 initiating treatment with Taxol given every week, x12.     On 09/18/14, the patient was undergoing therapy with Taxol and having less fatigue, no nausea, occasional tingling in her fingers but it was very transitory with no discomfort in her feet or toes.  She was still very sensitive, feeling her expanders in the chest wall, and we advised her to initiate a program of water therapy.  Her Taxol treatment was continued.  We also advised her to use massage therapy.     On 09/25/2014, the patient was still experiencing a grade 1 peripheral neuropathy of hands, none in feet, associated with Taxol use.  Otherwise she was feeling fine.   She still had a lot of sensitivity in the chest wall, in areas of breast expanders, and in the shoulders and scapular areas.  We have not been able to get her water therapy classes.   We advised her to continue her care plan in the same way and continue Prilosec on a daily basis to avoid reflux esophagitis that is very well controlled at this time.  Her chemotherapy treatment was continued and hematological parameters were stable and her physical exam was otherwise unremarkable.      The patient was seen back in the office on 10/16/2014, tolerating Taxol well. Plans are made to continue the same.     The patient was seen on 11/06/2014, referral was made to radiation oncology to see if the patient will benefit from radiation therapy since she had involvement of two sentinel lymph nodes with metastatic carcinoma.     On 12/04/2014 the patient had a discussion with us in regard plans for any other form of hormonal adjuvant therapy.  Given the fact that her tumor was weakly estrogen receptor positive, we advised her at her age to initiate therapy with tamoxifen.  I asked her  to take this medicine at least for the next 10 years.  I asked her to consider eventually whenever we fulfill the criteria for being post-menopausal to switch this to an aromatase inhibitor.      We advised her to initiate a program of exercise to relieve the pain and discomfort that she has in her upper body due to the expanders and her surgical sites.     We advised her to proceed with bone density test. We need to check this in consideration of the utilization of bisphosphonate either by mouth or IV, not only for keeping her bone health now that she is post-menopausal but also in consideration of prevention of bone metastasis.      We had an extensive discussion with her and her sister about sexual issues that she has encountered during the treatment for her disease and try to relieve her from the stressors of this at this time.  Her  was not present unfortunately during this discussion.      On 01/02/2015 we documented that the patient was tolerating her tamoxifen well with minimal hot flashes and she was still undergoing her radiation treatment to the site of her disease.  She was also documented to have osteoporosis in her bone density and we suggested for her to receive IV Reclast that will have a dual function of treatment of osteoporosis as well as prevention of bone metastasis.      In regard to her conjugal and personal life, the patient has very poor self-esteem.  She finds no one in her life that will be supportive to her and the relationships with her  are very minimal and just in a very low level of activity.  I asked her to bring her  for the next visit to have a conversation one on one, face to face with him and see if he can become a partner for her to help her to heal emotionally and to be supportive for her all the time.  Eventually the patient will require some counseling as well.    We did not recommend any antianxiety or antidepressant medication for her at this point.   The patient was not willing to take anything anyway.      On 01/14/2015, she was handling her tamoxifen well. She was advised to continue the medicine. Five more sessions of radiation therapy to complete her treatment.     On 02/25/2015, the patient has no symptoms related to her cancer with exception of the hot flashes induced by the tamoxifen. Physical activity has improved dramatically, her ability in regards to movement in her upper extremities and pain that she was experiencing before. She will have her expanders removed in the summer and she will have permanent implants then. In regards to her Reclast infusion, she had a reaction to the medicine including fevers and skeletal pain that was short lasting. She will require premedication with dexamethasone for the next infusion in 1 year.     In regards to her relationship with her , this situation has substantially improved and they are back together, not completely back to normal, but in a much better situation. Obviously, this closes the loop of her life and self-esteem that has substantially improved.     On 05/15/2015 the patient was feeling terrific.  She had minimal discomfort in her shoulders and she is ready to have her expanders removed and have her implants.  Otherwise tolerance to tamoxifen was excellent. She was not having any vaginal bleeding and she was not having any menstrual flow.  Her personal life and her marriage life have further improved and she feels satisfied.  We advised her to continue tamoxifen and keeping her port flushed every couple of months.     Her physical exam was unrevealing at this time.    On 09/14/2015 she had a complete 12 point review of system, having excellent level of energy, normal appetite, normal bowel function, normal urination, no vaginal bleeding, tolerance to tamoxifen was appropriate, hot flashes were ongoing, no cardiorespiratory  or neurological or skeletal symptomatology.  Her physical exam was  unrevealing.  Under these circumstances we advised her to remain on tamoxifen 20 mg a day.  She will be informed about her estradiol level, FSH and LH.  Otherwise, she will have her port flushed today and this will be done every 6 weeks with MD visit again in 3 months. She will be due to have a new Reclast infusion in January 2016 and on that occasion she will be pre-medicated with dexamethasone to avoid fever and achiness in the skeleton induced by the first one.     On 12/14/2015 Tamoxifen was continued. The patient had no clinical indications of breast cancer recurrence. She completed her reconstruction of her breasts with bilateral implants, looking cosmetically very pleasing to her.       Review of Systems   General: no fever, chills, fatigue, weight changes, or lack of appetite.  Eyes: no epiphora, xerophthalmia,conjunctivitis, pain, glaucoma, blurred vision, blindness, secretion, photophobia, proptosis, diplopia.  Ears: no otorrhea, tinnitus, otorrhagia, deafness, pain, vertigo.  Nose: no rhinorrhea, epistaxis, alteration in perception of odors, sinuses pressure.  Mouth: no alteration in gums or teeth,  ulcers, no difficulty with mastication or deglut ion, no odynophagia.  Neck: no masses or pain, no thyroid alterations, no pain in muscles or arteries, no carotid odynia, no crepitation.  Respiratory: no cough, sputum production, dyspnea, trepopnea, pleuritic pain, hemoptysis.  Heart: no syncope, irregularity, palpitations, angina, orthopnea, paroxysmal nocturnal dyspnea.  Vascular Venous: no tenderness,edema, palpable cords, postphlebitic syndrome, skin changes or ulcerations.  Vascular Arterial: no distal ischemia, claudication, gangrene, neuropathic ischemic pain, skin ulcers, paleness or cyanosis.  GI: no dysphagia, odynophagia, no regurgitation, no heartburn,no indigestion,no nausea,no vomiting,no hematemesis ,no melena,no jaundice,no distention, no obstipation,no enterorrhagia,no proctalgia,no anal   "lesions, nochanges in bowel habits.  : no frequency, hesitancy, hematuria, discharge, pain.  Musculoskeletal: no muscle or tendon pain or inflammation, joint pain, edema, functional limitation, fasciculations, mass.  Neurologic: no headache, seizures, alterations on Craneal nerves, no motor or senssory deficit, normal coordination, no alteration in memory, orientation, calculation,writting, verbal or written language.  Skin: no rashes, pruritus or localized lesions.  Psychiatric: no anxiety, depression, agitation, delusions, proper insight.  A comprehensive 14 point review of systems was performed and was negative except as mentioned.    Medications:  The current medication list was reviewed in the EMR    ALLERGIES:  No Known Allergies    Objective      Vitals:    10/11/17 1027   BP: 98/62   Pulse: 71   Resp: 12   Temp: 98.2 °F (36.8 °C)   TempSrc: Oral   SpO2: 100%   Weight: 143 lb (64.9 kg)   Height: 66.73\" (169.5 cm)  Comment: new height   PainSc: 0-No pain     Current Status 10/11/2017   ECOG score 0       Physical Exam   GENERAL:  Well-developed, well-nourished  Patient  in no acute distress.   SKIN:  Warm, dry without rashes, purpura or petechiae.Psoriais in sclap  HEENT:  Pupils were equal and reactive to light and accomodation, conjunctivas non injected, no pterigion, normal extraocular movements, normal visual acuity.   Mouth mucosa was moist, no exudates in oropharynx, normal gum line, normal roof of the mouth and pillars, normal papillations of the tongue.Ear canals were normal, as well tympanic membranes, normal hearing acuity.No pain in mastoid area or erythema.  NECK:  Supple with good range of motion; no thyromegaly or masses, no JVD or bruits, no cervical adenopathies.No carotid arteries pain, no carotid abnormal pulsation or arterial dance.  LYMPHATICS:  No cervical, supraclavicular, axillary, epitrochlear or inguinal adenopathy.  CHEST:  Normal excursion of both adonis thoraces, normal voice " fremitus, no subcutaneous emphysema, normal axillas, no rashes or acanthosis nigricans. Lungs clear to percussion and auscultation, normal breath sounds bilaterally, no wheezing, crackles or ronchi, no stridor, no rubs.  CARDIAC AND VASCULAR: PMI not displaced,no thrills, normal rate and regular rhythm, without murmurs, rubs or S3 or S4 right or left sided gallops. Normal femoral, popliteal, pedis, brachial and carotid pulses.  INSPECTION of both reconstructed breasts  Implants documented symmetry of the tissue per se and location and size of the nipples,no retractions or inversion of the nipples, normal skin without lesions, no erythema or nodules,no paud'orange, no prominence of superficial veins or chest wall collateral circulation.PALPATION of the breasts documented normal skin turgor, no induration, alteration in local temperature, or pain, no palpable masses or nodules, normal mobility of the tissues,no fixation of the tissue or parenchyma to the chest wall, no alteration at the tail of the breasts or axillas, no adenopathies. Surgical sites was well healed.No lymphedema in either extremity.  ABDOMEN:  Soft, nontender with no organomegaly or masses, no ascites, no collateral circulation,no distention,no Taberg sign, no abdominal pain, no inguinal hernias,no umbilical hernias, no abdominal bruits. Normal bowel sounds.  GENITAL: Not  Performed.  EXTREMITIES  AND SPINE:  No clubbing, cyanosis or edema, no deformities or pain .No kyphosis, scoliosis, deformities or pain in spine, ribs or pelvic bone.  She had no alterations in the iliac spine, pubis, sacrum or any others in the spine, rib cage or upper extremities.       NEUROLOGICAL:  Patient was awake, alert, oriented to time, person and place,normal gait and coordination,    RECENT LABS:  Hematology WBC   Date Value Ref Range Status   10/11/2017 5.99 4.00 - 10.00 10*3/mm3 Final     RBC   Date Value Ref Range Status   10/11/2017 4.41 3.90 - 5.00 10*6/mm3 Final      Hemoglobin   Date Value Ref Range Status   10/11/2017 13.6 11.5 - 14.9 g/dL Final     Hematocrit   Date Value Ref Range Status   10/11/2017 40.5 34.0 - 45.0 % Final     Platelets   Date Value Ref Range Status   10/11/2017 272 150 - 375 10*3/mm3 Final          Assessment/Plan  1History of stage IIA, [id number], ER weakly-positive, CA negative, HER2-negative right upper outer quadrant breast cancer, status post bilateral mastectomies. The patient completed her adjuvant chemotherapy under NSABP B49 clinical trial.  Today she has no clinical evidence of recurrence of breast cancer . We discussed today the fact that the patient is in menopause and now she is on Femara 2.5 mg a day. She has not encountered any side effects of Femara.  1.  I asked her to remain on Femara 2.5 mg a day.  2.  Her port has been flushed today.  3.  She will return in 4 months with a CBC, CMP and CA 15-3.                          10/11/2017      CC:

## 2017-10-12 ENCOUNTER — TELEPHONE (OUTPATIENT)
Dept: ONCOLOGY | Facility: CLINIC | Age: 42
End: 2017-10-12

## 2017-10-12 LAB — CANCER AG15-3 SERPL-ACNC: 19.7 U/ML (ref 0–25)

## 2017-10-12 NOTE — TELEPHONE ENCOUNTER
Pt. Calling to get her labs that where done yesterday.  Saw dr. Hadley.  Informed pt. Her cmp was normal.   Informed pt. Her ca 15-3 was 19.7.  Pt. Voiced happiness! No further questions or concerns at this time.

## 2017-10-13 ENCOUNTER — TELEPHONE (OUTPATIENT)
Dept: ONCOLOGY | Facility: CLINIC | Age: 42
End: 2017-10-13

## 2017-12-07 ENCOUNTER — INFUSION (OUTPATIENT)
Dept: ONCOLOGY | Facility: HOSPITAL | Age: 42
End: 2017-12-07

## 2017-12-07 DIAGNOSIS — Z45.2 FITTING AND ADJUSTMENT OF VASCULAR CATHETER: Primary | ICD-10-CM

## 2017-12-07 PROCEDURE — 25010000002 HEPARIN FLUSH (PORCINE) 100 UNIT/ML SOLUTION: Performed by: INTERNAL MEDICINE

## 2017-12-07 PROCEDURE — 96523 IRRIG DRUG DELIVERY DEVICE: CPT | Performed by: INTERNAL MEDICINE

## 2017-12-07 RX ORDER — SODIUM CHLORIDE 0.9 % (FLUSH) 0.9 %
10 SYRINGE (ML) INJECTION AS NEEDED
Status: CANCELLED | OUTPATIENT
Start: 2017-12-07

## 2017-12-07 RX ORDER — SODIUM CHLORIDE 0.9 % (FLUSH) 0.9 %
10 SYRINGE (ML) INJECTION AS NEEDED
Status: DISCONTINUED | OUTPATIENT
Start: 2017-12-07 | End: 2017-12-07 | Stop reason: HOSPADM

## 2017-12-07 RX ADMIN — Medication 10 ML: at 14:54

## 2017-12-07 RX ADMIN — SODIUM CHLORIDE, PRESERVATIVE FREE 500 UNITS: 5 INJECTION INTRAVENOUS at 14:55

## 2018-01-08 ENCOUNTER — OFFICE VISIT (OUTPATIENT)
Dept: FAMILY MEDICINE CLINIC | Facility: CLINIC | Age: 43
End: 2018-01-08

## 2018-01-08 VITALS
BODY MASS INDEX: 22.24 KG/M2 | HEART RATE: 115 BPM | SYSTOLIC BLOOD PRESSURE: 102 MMHG | DIASTOLIC BLOOD PRESSURE: 62 MMHG | TEMPERATURE: 98.2 F | OXYGEN SATURATION: 98 % | HEIGHT: 67 IN | WEIGHT: 141.7 LBS

## 2018-01-08 DIAGNOSIS — R07.9 CHEST PAIN, UNSPECIFIED TYPE: ICD-10-CM

## 2018-01-08 DIAGNOSIS — J11.1 INFLUENZA-LIKE ILLNESS: Primary | ICD-10-CM

## 2018-01-08 LAB
EXPIRATION DATE: NORMAL
FLUAV AG NPH QL: NEGATIVE
FLUBV AG NPH QL: NEGATIVE
INTERNAL CONTROL: NORMAL
Lab: NORMAL

## 2018-01-08 PROCEDURE — 99213 OFFICE O/P EST LOW 20 MIN: CPT | Performed by: INTERNAL MEDICINE

## 2018-01-08 PROCEDURE — 87804 INFLUENZA ASSAY W/OPTIC: CPT | Performed by: INTERNAL MEDICINE

## 2018-01-08 NOTE — PROGRESS NOTES
Subjective   Diana Higgins is a 42 y.o. female who presents today for:    Influenza (symptoms started today) and Chest Pain    History of Present Illness   Cough for a week with no other symptoms.  Awoke at 0300 with diffuse myalgias, chills without fever, and sharp, left-sided chest wall pain.  Left-sided chest wall pain has been intermittent since its onset this morning.  It is not associated with deep inspiration or with coughing.    Some of her symptoms, including the chest wall pain have improved since taking Tylenol around 8:30 this morning.    She has a history of breast cancer, currently in remission.    Ms. Higgins  reports that she has never smoked. She has never used smokeless tobacco. She reports that she drinks alcohol. She reports that she does not use illicit drugs.     No Known Allergies    Current Outpatient Prescriptions:   •  BIOTIN PO, Take  by mouth., Disp: , Rfl:   •  Cholecalciferol (VITAMIN D-3) 1000 UNITS capsule, Take  by mouth Daily., Disp: , Rfl:   •  letrozole (FEMARA) 2.5 MG tablet, TAKE 1 TABLET BY MOUTH DAILY., Disp: 30 tablet, Rfl: 6  •  MELATONIN PO, Take 3 mg by mouth Daily., Disp: , Rfl:   •  Probiotic Product ([employer] ADULT PROBIOTIC PO), Take  by mouth daily., Disp: , Rfl:   •  venlafaxine XR (EFFEXOR-XR) 37.5 MG 24 hr capsule, Take 37.5 mg by mouth Daily., Disp: , Rfl: 2  •  Zoledronic Acid (RECLAST IV), Infuse  into a venous catheter. Once a year, Disp: , Rfl:       Review of Systems   Constitutional: Positive for chills and fatigue. Negative for fever and unexpected weight change.   HENT: Positive for congestion, postnasal drip, rhinorrhea, sinus pressure and sore throat.    Eyes: Negative for discharge and visual disturbance.   Respiratory: Positive for cough. Negative for shortness of breath.    Cardiovascular: Negative for chest pain.   Gastrointestinal: Negative for diarrhea, nausea and vomiting.   Musculoskeletal: Positive for myalgias.   Neurological: Positive for  "headaches.         Objective   Vitals:    01/08/18 1143   BP: 102/62   BP Location: Left arm   Patient Position: Sitting   Cuff Size: Adult   Pulse: 115   Temp: 98.2 °F (36.8 °C)   TempSrc: Oral   SpO2: 98%   Weight: 64.3 kg (141 lb 11.2 oz)   Height: 169.5 cm (66.75\")     Physical Exam    Thin female who appears ill but is in no acute distress.  Sclerae are anicteric and the drug for pink.  No conjunctival injection or discharge.  No cervical or supraclavicular lymphadenopathy appreciated.  Clear to auscultation bilaterally with good breath sounds throughout all lung fields.  Tympanic membranes are mildly erythematous, but no effusion appreciated.  Turbinates are boggy with erythema and only a small amount of clear discharge.  Oropharynx shows mild erythema and cobblestoning.  No exudates appreciated.  Regular but tachycardic.  No murmur.        Assessment/Plan   Diana was seen today for influenza and chest pain.    Diagnoses and all orders for this visit:    Influenza-like illness  -     POC Influenza A / B    Chest pain, unspecified type    Likely an influenza-like illness that should run its course with conservative management.  Fluids, rest, analgesics like Tylenol alternating with ibuprofen were discussed.  She should return if her symptoms worsen or if new symptoms develop.  "

## 2018-01-16 ENCOUNTER — TELEPHONE (OUTPATIENT)
Dept: ONCOLOGY | Facility: HOSPITAL | Age: 43
End: 2018-01-16

## 2018-01-16 NOTE — TELEPHONE ENCOUNTER
Patient called and asked if she could get her yearly reclast on Feb 2nd after she sees Dr. Hadley, message sent to Dr. Hadley to ask if ok and to [place] in case it needs approval.  Also sent message to appt desk to go ahead and add on for after MD visit just in case.

## 2018-01-31 DIAGNOSIS — M81.0 AGE-RELATED OSTEOPOROSIS WITHOUT CURRENT PATHOLOGICAL FRACTURE: ICD-10-CM

## 2018-01-31 DIAGNOSIS — C50.411 MALIGNANT NEOPLASM OF UPPER-OUTER QUADRANT OF RIGHT BREAST IN FEMALE, ESTROGEN RECEPTOR POSITIVE (HCC): Primary | ICD-10-CM

## 2018-01-31 DIAGNOSIS — Z17.0 MALIGNANT NEOPLASM OF UPPER-OUTER QUADRANT OF RIGHT BREAST IN FEMALE, ESTROGEN RECEPTOR POSITIVE (HCC): Primary | ICD-10-CM

## 2018-01-31 RX ORDER — ZOLEDRONIC ACID 5 MG/100ML
5 INJECTION, SOLUTION INTRAVENOUS ONCE
Status: CANCELLED | OUTPATIENT
Start: 2018-02-02

## 2018-01-31 RX ORDER — SODIUM CHLORIDE 9 MG/ML
250 INJECTION, SOLUTION INTRAVENOUS ONCE
Status: CANCELLED | OUTPATIENT
Start: 2018-02-02

## 2018-02-02 ENCOUNTER — OFFICE VISIT (OUTPATIENT)
Dept: ONCOLOGY | Facility: CLINIC | Age: 43
End: 2018-02-02

## 2018-02-02 ENCOUNTER — INFUSION (OUTPATIENT)
Dept: ONCOLOGY | Facility: HOSPITAL | Age: 43
End: 2018-02-02

## 2018-02-02 VITALS
DIASTOLIC BLOOD PRESSURE: 74 MMHG | RESPIRATION RATE: 14 BRPM | OXYGEN SATURATION: 100 % | HEART RATE: 95 BPM | SYSTOLIC BLOOD PRESSURE: 108 MMHG | WEIGHT: 142 LBS | TEMPERATURE: 98 F | HEIGHT: 67 IN | BODY MASS INDEX: 22.29 KG/M2

## 2018-02-02 DIAGNOSIS — C50.411 MALIGNANT NEOPLASM OF UPPER-OUTER QUADRANT OF RIGHT BREAST IN FEMALE, ESTROGEN RECEPTOR POSITIVE (HCC): Primary | ICD-10-CM

## 2018-02-02 DIAGNOSIS — Z45.2 FITTING AND ADJUSTMENT OF VASCULAR CATHETER: ICD-10-CM

## 2018-02-02 DIAGNOSIS — Z17.0 MALIGNANT NEOPLASM OF UPPER-OUTER QUADRANT OF RIGHT BREAST IN FEMALE, ESTROGEN RECEPTOR POSITIVE (HCC): ICD-10-CM

## 2018-02-02 DIAGNOSIS — M81.0 AGE-RELATED OSTEOPOROSIS WITHOUT CURRENT PATHOLOGICAL FRACTURE: ICD-10-CM

## 2018-02-02 DIAGNOSIS — Z78.0 POST-MENOPAUSAL: ICD-10-CM

## 2018-02-02 DIAGNOSIS — C50.411 MALIGNANT NEOPLASM OF UPPER-OUTER QUADRANT OF RIGHT BREAST IN FEMALE, ESTROGEN RECEPTOR POSITIVE (HCC): ICD-10-CM

## 2018-02-02 DIAGNOSIS — Z17.0 MALIGNANT NEOPLASM OF UPPER-OUTER QUADRANT OF RIGHT BREAST IN FEMALE, ESTROGEN RECEPTOR POSITIVE (HCC): Primary | ICD-10-CM

## 2018-02-02 DIAGNOSIS — M81.0 AGE-RELATED OSTEOPOROSIS WITHOUT CURRENT PATHOLOGICAL FRACTURE: Primary | ICD-10-CM

## 2018-02-02 LAB
ALBUMIN SERPL-MCNC: 4.3 G/DL (ref 3.5–5.2)
ALBUMIN/GLOB SERPL: 1.5 G/DL (ref 1.1–2.4)
ALP SERPL-CCNC: 65 U/L (ref 38–116)
ALT SERPL W P-5'-P-CCNC: 14 U/L (ref 0–33)
ANION GAP SERPL CALCULATED.3IONS-SCNC: 10.6 MMOL/L
AST SERPL-CCNC: 24 U/L (ref 0–32)
BASOPHILS # BLD AUTO: 0.02 10*3/MM3 (ref 0–0.1)
BASOPHILS NFR BLD AUTO: 0.3 % (ref 0–1.1)
BILIRUB SERPL-MCNC: 0.3 MG/DL (ref 0.1–1.2)
BUN BLD-MCNC: 17 MG/DL (ref 6–20)
BUN/CREAT SERPL: 26.2 (ref 7.3–30)
CALCIUM SPEC-SCNC: 9.4 MG/DL (ref 8.5–10.2)
CHLORIDE SERPL-SCNC: 101 MMOL/L (ref 98–107)
CO2 SERPL-SCNC: 28.4 MMOL/L (ref 22–29)
CREAT BLD-MCNC: 0.65 MG/DL (ref 0.6–1.1)
DEPRECATED RDW RBC AUTO: 39.1 FL (ref 37–49)
EOSINOPHIL # BLD AUTO: 0.15 10*3/MM3 (ref 0–0.36)
EOSINOPHIL NFR BLD AUTO: 2.3 % (ref 1–5)
ERYTHROCYTE [DISTWIDTH] IN BLOOD BY AUTOMATED COUNT: 11.9 % (ref 11.7–14.5)
GFR SERPL CREATININE-BSD FRML MDRD: 99 ML/MIN/1.73
GLOBULIN UR ELPH-MCNC: 2.9 GM/DL (ref 1.8–3.5)
GLUCOSE BLD-MCNC: 185 MG/DL (ref 74–124)
HCT VFR BLD AUTO: 40 % (ref 34–45)
HGB BLD-MCNC: 13.4 G/DL (ref 11.5–14.9)
IMM GRANULOCYTES # BLD: 0.03 10*3/MM3 (ref 0–0.03)
IMM GRANULOCYTES NFR BLD: 0.5 % (ref 0–0.5)
LYMPHOCYTES # BLD AUTO: 1.32 10*3/MM3 (ref 1–3.5)
LYMPHOCYTES NFR BLD AUTO: 20.5 % (ref 20–49)
MAGNESIUM SERPL-MCNC: 1.7 MG/DL (ref 1.8–2.5)
MCH RBC QN AUTO: 30.2 PG (ref 27–33)
MCHC RBC AUTO-ENTMCNC: 33.5 G/DL (ref 32–35)
MCV RBC AUTO: 90.1 FL (ref 83–97)
MONOCYTES # BLD AUTO: 0.5 10*3/MM3 (ref 0.25–0.8)
MONOCYTES NFR BLD AUTO: 7.8 % (ref 4–12)
NEUTROPHILS # BLD AUTO: 4.42 10*3/MM3 (ref 1.5–7)
NEUTROPHILS NFR BLD AUTO: 68.6 % (ref 39–75)
NRBC BLD MANUAL-RTO: 0 /100 WBC (ref 0–0)
PHOSPHATE SERPL-MCNC: 3.6 MG/DL (ref 2.5–4.5)
PLATELET # BLD AUTO: 250 10*3/MM3 (ref 150–375)
PMV BLD AUTO: 9.3 FL (ref 8.9–12.1)
POTASSIUM BLD-SCNC: 4 MMOL/L (ref 3.5–4.7)
PROT SERPL-MCNC: 7.2 G/DL (ref 6.3–8)
RBC # BLD AUTO: 4.44 10*6/MM3 (ref 3.9–5)
SODIUM BLD-SCNC: 140 MMOL/L (ref 134–145)
WBC NRBC COR # BLD: 6.44 10*3/MM3 (ref 4–10)

## 2018-02-02 PROCEDURE — 83735 ASSAY OF MAGNESIUM: CPT | Performed by: INTERNAL MEDICINE

## 2018-02-02 PROCEDURE — 96413 CHEMO IV INFUSION 1 HR: CPT | Performed by: INTERNAL MEDICINE

## 2018-02-02 PROCEDURE — 80053 COMPREHEN METABOLIC PANEL: CPT | Performed by: INTERNAL MEDICINE

## 2018-02-02 PROCEDURE — 85025 COMPLETE CBC W/AUTO DIFF WBC: CPT | Performed by: INTERNAL MEDICINE

## 2018-02-02 PROCEDURE — 25010000002 ZOLEDRONIC ACID 5 MG/100ML SOLUTION: Performed by: INTERNAL MEDICINE

## 2018-02-02 PROCEDURE — 99214 OFFICE O/P EST MOD 30 MIN: CPT | Performed by: INTERNAL MEDICINE

## 2018-02-02 PROCEDURE — 84100 ASSAY OF PHOSPHORUS: CPT | Performed by: INTERNAL MEDICINE

## 2018-02-02 PROCEDURE — 36415 COLL VENOUS BLD VENIPUNCTURE: CPT | Performed by: INTERNAL MEDICINE

## 2018-02-02 RX ORDER — SODIUM CHLORIDE 9 MG/ML
250 INJECTION, SOLUTION INTRAVENOUS ONCE
Status: COMPLETED | OUTPATIENT
Start: 2018-02-02 | End: 2018-02-02

## 2018-02-02 RX ORDER — SODIUM CHLORIDE 9 MG/ML
250 INJECTION, SOLUTION INTRAVENOUS ONCE
Status: CANCELLED | OUTPATIENT
Start: 2018-02-02

## 2018-02-02 RX ORDER — ZOLEDRONIC ACID 5 MG/100ML
5 INJECTION, SOLUTION INTRAVENOUS ONCE
Status: CANCELLED | OUTPATIENT
Start: 2018-02-02

## 2018-02-02 RX ORDER — ZOLEDRONIC ACID 5 MG/100ML
5 INJECTION, SOLUTION INTRAVENOUS ONCE
Status: COMPLETED | OUTPATIENT
Start: 2018-02-02 | End: 2018-02-02

## 2018-02-02 RX ADMIN — SODIUM CHLORIDE 250 ML: 900 INJECTION, SOLUTION INTRAVENOUS at 14:24

## 2018-02-02 RX ADMIN — ZOLEDRONIC ACID 5 MG: 5 INJECTION, SOLUTION INTRAVENOUS at 14:24

## 2018-02-02 NOTE — PROGRESS NOTES
Subjective   REASONS FOR FOLLOWUP:    1. History of stage IIA, [id number], ER weakly-positive, IA negative, HER2-negative breast cancer, status post bilateral mastectomies. The patient completed her adjuvant chemotherapy under NSABP B49 clinical trial.  On 12/14/2015 she has no clinical evidence of recurrence of breast cancer and she remains on Tamoxifen for the time being. She also has completed her breast reconstruction with bilateral implants and she is very satisfied in regard to the cosmetic result of this. She will remain on Tamoxifen for the time being. She receiving at this time Effexor 37.5 mg a day for hot flashes and anxiety.   2. Bone health. The patient has osteoporosis. She  required a new Reclast infusion in Jan 2017, and on yearly bases             History of Present Illness  This patient is here today for her regular checkup having no symptomatology related to her previous history of breast cancer;  She had a viral respiratory infection in 12/2017 and she still has minimal residual cough related to this but no hemoptysis, wheezing or purulent sputum, chills or fever. Her appetite has remained excellent. She has normal urination. She has been bloated because she is using a lot of fiber in her diet and fiber supplements and we suggested for her to modify this. She is also experiencing some pain in the right sacroiliac joint. This is the same pain she had years back. The patient has seen her gynecologist. A small cyst was documented in the right ovary and she will follow up with an ultrasound of the pelvis in the next few days. She continues taking her Femara with no difficulties with 100% compliance. The patient has no other new issues. She is due for Reclast today.            Past Medical History.   Past Medical History:   Diagnosis Date   • Breast cancer     stage IIA, s/p bilateral mastectomy, XRT, and chemotherapy, followed by Dr. Hadley   • Osteoporosis    • Paroxysmal supraventricular tachycardia      CS ostium atrial tachycardia, ablated in 2002 by Dr. Garzon   • Sinus headache     But no history of migranes   • Skin cancer    • Wears glasses      Social History     Social History   • Marital status:      Spouse name: Misha   • Number of children: N/A   • Years of education: College     Occupational History   • [employer]     Social History Main Topics   • Smoking status: Never Smoker   • Smokeless tobacco: Never Used   • Alcohol use Yes      Comment: Rare   • Drug use: No   • Sexual activity: Defer     Other Topics Concern   • Not on file     Social History Narrative     Family History   Problem Relation Age of Onset   • Coronary artery disease Mother    • Heart disease Mother    • Thrombophlebitis Mother      2016   • Heart attack Mother    • Hepatitis Father    • Cancer Father    • Crohn's disease Sister    • Drug abuse Brother      Drug overdose   • Cancer Maternal Grandmother 42     Breast   • Thyroid disease Sister    • Other Sister      kidney problems     :ONCOLOGIC/HEMATOLOGIC HISTORY     The patient was diagnosed with stage II breast cancer with the tumor located in the anterior axillary fold in the tail of the breast. She underwent bilateral mastectomies and she had bilateral expanders placed by Dr. MURPHY Aguilar in preparation for further reconstruction in the future.  The patient’s primary tumor was 1.6 cm in size.  Margins of resection were negative, grade 3, with no lymphovascular invasion. Two sentinel lymph nodes contain metastasis, one of them with macrometastasis that were not spilling out of the lymph node.  Circulating cancer cells were negative.  CA 15-3 was negative, normal.  The patient had a CT scan of the brain, chest, abdomen and pelvis that disclose no distant metastasis at any level.  The radiologist questioned an internal mammary node on the right side that does not make any clinical sense given the primary location of  the tumor in the right breast.  Her bone scan was negative.  Her MUGA ejection fraction was normal.  With all of this in mind we advised the patient to either participate in the NSABP B47 clinical trial or to consider chemotherapy in the standard regimen dose dense AC followed by Taxol followed by hormonal anti-estrogen therapy given the finding in the pathological specimen of the mastectomy that her tumor was weakly ER positive, KY negative, HER-2 negative by FISH.      Our research coordinator had the opportunity to interview the patient today to go through the explanation of participation in the clinical trial.      Patient seen 07/24/2014 for a second cycle of AC chemotherapy. Adjustments were made for her antinausea treatments then post chemotherapy to try to reduce insomnia and further nausea.     On 08/07/2014 the patient has encountered size effects of the treatment including nausea and vomiting on days 3, 4 and 5 after chemotherapy related to a continuous bitter metallic taste in her mouth.  She also has experienced a tremendous degree of skeletal pain induced by the Neulasta to the point that she surrendered to bed.  Under these circumstances we advised her to use some mint or crystalized kimberly for the taste in her mouth and she will take Aleve on a regular schedule on days 2, 3, 4 and 5 to minimize the bone pain induced by the Neulasta.  On the other hand she has not developed any mucositis.  She has not developed any infections, abnormal bleeding or significant hematological toxicity.    On 08/21/2014 the patient proceeded with her 4th cycle of Adriamycin/Cytoxan supported with Neulasta. Pepcid AC b.i.d. was initiated at a standing dose to control heartburn. The patient also was advised to continue using mint and kimberly to minimize the taste in her mouth induced by chemicals in her chemotherapy treatment. She was scheduled to receive her next cycle of chemotherapy and at that point she will initiate  Taxol on a weekly basis.     She was seen back in the office 09/04/2014 initiating treatment with Taxol given every week, x12.     On 09/18/14, the patient was undergoing therapy with Taxol and having less fatigue, no nausea, occasional tingling in her fingers but it was very transitory with no discomfort in her feet or toes.  She was still very sensitive, feeling her expanders in the chest wall, and we advised her to initiate a program of water therapy.  Her Taxol treatment was continued.  We also advised her to use massage therapy.     On 09/25/2014, the patient was still experiencing a grade 1 peripheral neuropathy of hands, none in feet, associated with Taxol use.  Otherwise she was feeling fine.   She still had a lot of sensitivity in the chest wall, in areas of breast expanders, and in the shoulders and scapular areas.  We have not been able to get her water therapy classes.   We advised her to continue her care plan in the same way and continue Prilosec on a daily basis to avoid reflux esophagitis that is very well controlled at this time.  Her chemotherapy treatment was continued and hematological parameters were stable and her physical exam was otherwise unremarkable.      The patient was seen back in the office on 10/16/2014, tolerating Taxol well. Plans are made to continue the same.     The patient was seen on 11/06/2014, referral was made to radiation oncology to see if the patient will benefit from radiation therapy since she had involvement of two sentinel lymph nodes with metastatic carcinoma.     On 12/04/2014 the patient had a discussion with us in regard plans for any other form of hormonal adjuvant therapy.  Given the fact that her tumor was weakly estrogen receptor positive, we advised her at her age to initiate therapy with tamoxifen.  I asked her to take this medicine at least for the next 10 years.  I asked her to consider eventually whenever we fulfill the criteria for being post-menopausal  to switch this to an aromatase inhibitor.      We advised her to initiate a program of exercise to relieve the pain and discomfort that she has in her upper body due to the expanders and her surgical sites.     We advised her to proceed with bone density test. We need to check this in consideration of the utilization of bisphosphonate either by mouth or IV, not only for keeping her bone health now that she is post-menopausal but also in consideration of prevention of bone metastasis.      We had an extensive discussion with her and her sister about sexual issues that she has encountered during the treatment for her disease and try to relieve her from the stressors of this at this time.  Her  was not present unfortunately during this discussion.      On 01/02/2015 we documented that the patient was tolerating her tamoxifen well with minimal hot flashes and she was still undergoing her radiation treatment to the site of her disease.  She was also documented to have osteoporosis in her bone density and we suggested for her to receive IV Reclast that will have a dual function of treatment of osteoporosis as well as prevention of bone metastasis.      In regard to her conjugal and personal life, the patient has very poor self-esteem.  She finds no one in her life that will be supportive to her and the relationships with her  are very minimal and just in a very low level of activity.  I asked her to bring her  for the next visit to have a conversation one on one, face to face with him and see if he can become a partner for her to help her to heal emotionally and to be supportive for her all the time.  Eventually the patient will require some counseling as well.    We did not recommend any antianxiety or antidepressant medication for her at this point.  The patient was not willing to take anything anyway.      On 01/14/2015, she was handling her tamoxifen well. She was advised to continue the medicine.  Five more sessions of radiation therapy to complete her treatment.     On 02/25/2015, the patient has no symptoms related to her cancer with exception of the hot flashes induced by the tamoxifen. Physical activity has improved dramatically, her ability in regards to movement in her upper extremities and pain that she was experiencing before. She will have her expanders removed in the summer and she will have permanent implants then. In regards to her Reclast infusion, she had a reaction to the medicine including fevers and skeletal pain that was short lasting. She will require premedication with dexamethasone for the next infusion in 1 year.     In regards to her relationship with her , this situation has substantially improved and they are back together, not completely back to normal, but in a much better situation. Obviously, this closes the loop of her life and self-esteem that has substantially improved.     On 05/15/2015 the patient was feeling terrific.  She had minimal discomfort in her shoulders and she is ready to have her expanders removed and have her implants.  Otherwise tolerance to tamoxifen was excellent. She was not having any vaginal bleeding and she was not having any menstrual flow.  Her personal life and her marriage life have further improved and she feels satisfied.  We advised her to continue tamoxifen and keeping her port flushed every couple of months.     Her physical exam was unrevealing at this time.    On 09/14/2015 she had a complete 12 point review of system, having excellent level of energy, normal appetite, normal bowel function, normal urination, no vaginal bleeding, tolerance to tamoxifen was appropriate, hot flashes were ongoing, no cardiorespiratory  or neurological or skeletal symptomatology.  Her physical exam was unrevealing.  Under these circumstances we advised her to remain on tamoxifen 20 mg a day.  She will be informed about her estradiol level, FSH and LH.   Otherwise, she will have her port flushed today and this will be done every 6 weeks with MD visit again in 3 months. She will be due to have a new Reclast infusion in January 2016 and on that occasion she will be pre-medicated with dexamethasone to avoid fever and achiness in the skeleton induced by the first one.     On 12/14/2015 Tamoxifen was continued. The patient had no clinical indications of breast cancer recurrence. She completed her reconstruction of her breasts with bilateral implants, looking cosmetically very pleasing to her.       Review of Systems   General: no fever, chills, fatigue, weight changes, or lack of appetite.  Eyes: no epiphora, xerophthalmia,conjunctivitis, pain, glaucoma, blurred vision, blindness, secretion, photophobia, proptosis, diplopia.  Ears: no otorrhea, tinnitus, otorrhagia, deafness, pain, vertigo.  Nose: no rhinorrhea, epistaxis, alteration in perception of odors, sinuses pressure.  Mouth: no alteration in gums or teeth,  ulcers, no difficulty with mastication or deglut ion, no odynophagia.  Neck: no masses or pain, no thyroid alterations, no pain in muscles or arteries, no carotid odynia, no crepitation.  Respiratory: no cough, sputum production, dyspnea, trepopnea, pleuritic pain, hemoptysis.  Heart: no syncope, irregularity, palpitations, angina, orthopnea, paroxysmal nocturnal dyspnea.  Vascular Venous: no tenderness,edema, palpable cords, postphlebitic syndrome, skin changes or ulcerations.  Vascular Arterial: no distal ischemia, claudication, gangrene, neuropathic ischemic pain, skin ulcers, paleness or cyanosis.  GI: no dysphagia, odynophagia, no regurgitation, no heartburn,no indigestion,no nausea,no vomiting,no hematemesis ,no melena,no jaundice,no distention, no obstipation,no enterorrhagia,no proctalgia,no anal  lesions, nochanges in bowel habits.  : no frequency, hesitancy, hematuria, discharge, pain.ovarian cyst found as above.  Musculoskeletal: no muscle or  "tendon pain or inflammation, joint pain, edema, functional limitation, fasciculations, mass.  Neurologic: no headache, seizures, alterations on Craneal nerves, no motor or senssory deficit, normal coordination, no alteration in memory, orientation, calculation,writting, verbal or written language.  Skin: no rashes, pruritus or localized lesions.  Psychiatric: no anxiety, depression, agitation, delusions, proper insight.      Medications:  The current medication list was reviewed in the EMR    ALLERGIES:  No Known Allergies    Objective      Vitals:    02/02/18 1307   BP: 108/74   Pulse: 95   Resp: 14   Temp: 98 °F (36.7 °C)   TempSrc: Oral   SpO2: 100%   Weight: 64.4 kg (142 lb)   Height: 169.5 cm (66.73\")   PainSc: 0-No pain     Current Status 2/2/2018   ECOG score 0       Physical Exam   GENERAL:  Well-developed, well-nourished  Patient  in no acute distress.   SKIN:  Warm, dry without rashes, purpura or petechiae.  HEENT:  Pupils were equal and reactive to light and accomodation, conjunctivas non injected, no pterigion, normal extraocular movements, normal visual acuity.   Mouth mucosa was moist, no exudates in oropharynx, normal gum line, normal roof of the mouth and pillars, normal papillations of the tongue.Ear canals were normal, as well tympanic membranes, normal hearing acuity.No pain in mastoid area or erythema.  NECK:  Supple with good range of motion; no thyromegaly or masses, no JVD or bruits, no cervical adenopathies.No carotid arteries pain, no carotid abnormal pulsation or arterial dance.  LYMPHATICS:  No cervical, supraclavicular, axillary, epitrochlear or inguinal adenopathy.  CHEST:  Normal excursion of both adonis thoraces, normal voice fremitus, no subcutaneous emphysema, normal axillas, no rashes or acanthosis nigricans. Lungs clear to percussion and auscultation, normal breath sounds bilaterally, no wheezing, crackles or ronchi, no stridor, no rubs.  CARDIAC AND VASCULAR: PMI not displaced,no " thrills, normal rate and regular rhythm, without murmurs, rubs or S3 or S4 right or left sided gallops. Normal femoral, popliteal, pedis, brachial and carotid pulses.  INSPECTION of both breasts documented symmetry of the tissue per se and location and size of the nipples,no retractions or inversion of the nipples, normal skin without lesions, no erythema or nodules,no paud'orange, no prominence of superficial veins or chest wall collateral circulation.PALPATION of the breasts documented normal skin turgor, no induration, alteration in local temperature, or pain, no palpable masses or nodules, normal mobility of the tissues,no fixation of the tissue or parenchyma to the chest wall, no alteration at the tail of the breasts or axillas, no adenopathies.bilateral implants Surgical sites were well healed.No lymphedema in either extremity.  ABDOMEN:  Soft, nontender with no organomegaly or masses, no ascites, no collateral circulation,no distention,no Joe sign, no abdominal pain, no inguinal hernias,no umbilical hernias, no abdominal bruits. Normal bowel sounds.  GENITAL: Not  Performed.  EXTREMITIES  AND SPINE:  No clubbing, cyanosis or edema, no deformities or pain .No kyphosis, scoliosis, deformities or pain in spine, ribs or pelvic bone.tender r si joint negative sle rle, no pain in bursas  NEUROLOGICAL:  Patient was awake, alert, oriented to time, person and place,normal gait and coordination.          RECENT LABS:  Hematology WBC   Date Value Ref Range Status   02/02/2018 6.44 4.00 - 10.00 10*3/mm3 Final     RBC   Date Value Ref Range Status   02/02/2018 4.44 3.90 - 5.00 10*6/mm3 Final     Hemoglobin   Date Value Ref Range Status   02/02/2018 13.4 11.5 - 14.9 g/dL Final     Hematocrit   Date Value Ref Range Status   02/02/2018 40.0 34.0 - 45.0 % Final     Platelets   Date Value Ref Range Status   02/02/2018 250 150 - 375 10*3/mm3 Final        Component      Latest Ref Rng & Units 3/14/2016 9/27/2016 12/21/2016  3/13/2017           3:14 PM 11:38 AM  8:39 AM 11:17 AM   CA 15-3      0.0 - 25.0 U/mL 20.4 18.5 18.3 19.6     Component      Latest Ref Rng & Units 6/5/2017 10/11/2017          10:31 AM 10:16 AM   CA 15-3      0.0 - 25.0 U/mL 19.2 19.7       Assessment/Plan  1History of stage IIA, [id number], ER weakly-positive, NH negative, HER2-negative right upper outer quadrant breast cancer, status post bilateral mastectomies. The patient completed her adjuvant chemotherapy under NSABP B49 clinical trial.The patient today looks terrific and she has no other issues. Her cough is improving from the previous respiratory infection. She has right sacroiliac joint pain that could be treated locally with just local heat and physical activity that I suggested. She will follow up with her gynecologist in regard to the small cyst that was found in the right ovary and with a new ultrasound in a few days. She is not experiencing any side effects of Femara. She has not had any difficulties with her port. She is due for her Reclast today, anniversary of the 1-year infusion of Reclast.     RECOMMENDATIONS:   1. She will continue her Femara 2.5 mg a day.   2. She will proceed with her Reclast today, 5 mg IV.   3. The patient will use physical activity to try to counteract the pain in sacroiliac joint.   4. She will follow up with her gynecologist in regard to the right ovarian cyst.   5. She will be called at home with the report of her laboratory parameters.   6. Her tumor markers have remained stable. A new one pending today will be discussed with her on the telephone.   7. I find no need for any radiological assessment at this time.   8. I would like to see a bone density test done in 11 weeks before her appointment in 12 weeks with me. She will have her port flushed in 6 weeks.                           2/2/2018      CC:

## 2018-02-03 LAB — CANCER AG15-3 SERPL-ACNC: 20.9 U/ML (ref 0–25)

## 2018-02-05 ENCOUNTER — TELEPHONE (OUTPATIENT)
Dept: ONCOLOGY | Facility: CLINIC | Age: 43
End: 2018-02-05

## 2018-03-06 ENCOUNTER — INFUSION (OUTPATIENT)
Dept: ONCOLOGY | Facility: HOSPITAL | Age: 43
End: 2018-03-06

## 2018-03-06 DIAGNOSIS — Z45.2 FITTING AND ADJUSTMENT OF VASCULAR CATHETER: Primary | ICD-10-CM

## 2018-03-06 PROCEDURE — 96523 IRRIG DRUG DELIVERY DEVICE: CPT | Performed by: INTERNAL MEDICINE

## 2018-03-06 PROCEDURE — 25010000002 HEPARIN FLUSH (PORCINE) 100 UNIT/ML SOLUTION: Performed by: INTERNAL MEDICINE

## 2018-03-06 RX ORDER — SODIUM CHLORIDE 0.9 % (FLUSH) 0.9 %
10 SYRINGE (ML) INJECTION AS NEEDED
Status: DISCONTINUED | OUTPATIENT
Start: 2018-03-06 | End: 2018-03-06 | Stop reason: HOSPADM

## 2018-03-06 RX ORDER — SODIUM CHLORIDE 0.9 % (FLUSH) 0.9 %
10 SYRINGE (ML) INJECTION AS NEEDED
Status: CANCELLED | OUTPATIENT
Start: 2018-03-06

## 2018-03-06 RX ADMIN — SODIUM CHLORIDE, PRESERVATIVE FREE 500 UNITS: 5 INJECTION INTRAVENOUS at 11:08

## 2018-03-06 RX ADMIN — Medication 10 ML: at 11:08

## 2018-03-09 ENCOUNTER — OFFICE VISIT (OUTPATIENT)
Dept: FAMILY MEDICINE CLINIC | Facility: CLINIC | Age: 43
End: 2018-03-09

## 2018-03-09 VITALS
HEART RATE: 77 BPM | OXYGEN SATURATION: 99 % | WEIGHT: 142 LBS | SYSTOLIC BLOOD PRESSURE: 100 MMHG | BODY MASS INDEX: 22.29 KG/M2 | DIASTOLIC BLOOD PRESSURE: 58 MMHG | HEIGHT: 67 IN

## 2018-03-09 DIAGNOSIS — G44.219 EPISODIC TENSION-TYPE HEADACHE, NOT INTRACTABLE: Primary | ICD-10-CM

## 2018-03-09 DIAGNOSIS — M54.81 BILATERAL OCCIPITAL NEURALGIA: ICD-10-CM

## 2018-03-09 PROCEDURE — 99213 OFFICE O/P EST LOW 20 MIN: CPT | Performed by: INTERNAL MEDICINE

## 2018-03-09 RX ORDER — CYCLOBENZAPRINE HCL 5 MG
5 TABLET ORAL 3 TIMES DAILY PRN
Qty: 30 TABLET | Refills: 0 | Status: SHIPPED | OUTPATIENT
Start: 2018-03-09 | End: 2018-04-24

## 2018-03-09 NOTE — PROGRESS NOTES
"Subjective   Diana Higgins is a 42 y.o. female who presents today for:    Headache (x several days) and Sinus Problem    History of Present Illness   Onset earlier this week of fleeting, recurrent, shooting pains in the back of the skull.  Pain escalated yesterday, causing severe pain radiating to the retro-orbital areas (throbbing) with associated nausea last night.      No h/o migraines, she was treated multiple times per year for \"recurrent sinusitis\" despite not having any rhinorrhea or significant head congestion.  She reports having had repeat sinus x-rays that showed opacification without the classic symptoms.       Ms. Higgins  reports that she has never smoked. She has never used smokeless tobacco. She reports that she drinks alcohol. She reports that she does not use illicit drugs.     No Known Allergies    Current Outpatient Prescriptions:   •  BIOTIN PO, Take  by mouth., Disp: , Rfl:   •  Cholecalciferol (VITAMIN D-3) 1000 UNITS capsule, Take  by mouth Daily., Disp: , Rfl:   •  letrozole (FEMARA) 2.5 MG tablet, TAKE 1 TABLET BY MOUTH DAILY., Disp: 30 tablet, Rfl: 6  •  MELATONIN PO, Take 3 mg by mouth Daily., Disp: , Rfl:   •  Probiotic Product ([employer] ADULT PROBIOTIC PO), Take  by mouth daily., Disp: , Rfl:   •  venlafaxine XR (EFFEXOR-XR) 37.5 MG 24 hr capsule, Take 37.5 mg by mouth Daily., Disp: , Rfl: 2  •  Zoledronic Acid (RECLAST IV), Infuse  into a venous catheter. Once a year, Disp: , Rfl:       Review of Systems   Eyes: Negative for visual disturbance.   Cardiovascular: Negative for chest pain and palpitations.   Gastrointestinal: Nausea: per HPI.   Neurological: Positive for headaches. Negative for syncope, weakness, light-headedness and numbness.         Objective   Vitals:    03/09/18 1121   BP: 100/58   BP Location: Left arm   Patient Position: Sitting   Cuff Size: Adult   Pulse: 77   SpO2: 99%   Weight: 64.4 kg (142 lb)   Height: 169.5 cm (66.75\")     Physical Exam  Well-developed, " well-nourished, in good spirits.  Full range of motion at the neck with discomfort opposite the direction of side bending.  Nontender to palpation over the spinous processes of the cervical spine.  Tender over the occipital notches bilaterally.  No overt neck muscle spasm appreciated.  EOMI; PERRLA; no RAPD; no periorbital edema and no exophthalmos.  Sclerae are clear and the conjunctivae are pink.  Turbinates are not boggy; there is scant clear discharge in both nares.  There is no erythema or significant edema.  Tympanic membranes are normal bilaterally.  No thyromegaly or mass.  No cervical or supraclavicular lymphadenopathy.  Station, gait, and coordination are normal.  She was able to descend from the exam table fluidly and without assistance.      Assessment/Plan   Diana was seen today for headache and sinus problem.    Diagnoses and all orders for this visit:    Episodic tension-type headache, not intractable    Bilateral occipital neuralgia  Comments:  Ice, stretches as demonstrated, Flexeril TID as needed; you may add ibuprofen to the regimen (3-4 times per day) iif needed in addition to the Flexeril.    Other orders  -     cyclobenzaprine (FLEXERIL) 5 MG tablet; Take 1 tablet by mouth 3 (Three) Times a Day As Needed for Muscle Spasms (headache).    We also discussed the likelihood that there is no tumor or aneurysm at play here.  She will of course contact us if symptoms escalate or do not resolve with the treatment noted above.

## 2018-03-12 ENCOUNTER — TELEPHONE (OUTPATIENT)
Dept: ONCOLOGY | Facility: HOSPITAL | Age: 43
End: 2018-03-12

## 2018-03-12 DIAGNOSIS — C50.411 MALIGNANT NEOPLASM OF UPPER-OUTER QUADRANT OF RIGHT BREAST IN FEMALE, ESTROGEN RECEPTOR POSITIVE (HCC): Primary | ICD-10-CM

## 2018-03-12 DIAGNOSIS — Z17.0 MALIGNANT NEOPLASM OF UPPER-OUTER QUADRANT OF RIGHT BREAST IN FEMALE, ESTROGEN RECEPTOR POSITIVE (HCC): Primary | ICD-10-CM

## 2018-03-12 NOTE — TELEPHONE ENCOUNTER
Pt called stating she developed severe sharp pains in the back of her head last Wednesday. She describes them as intermittent but that at night time they are worse. She states she saw her PCP last week and he thought maybe they were tension headaches. He asked her to do exercises he gave her and take flexeril along with ibuprofen. She has been doing that and still no relief. She called her PCP and he is out of the office until next Thursday. Pt states she can not sleep with these pains and can not wait until next week.    Reviewed with Dr. Hadley. He wants to have pt come in for eval. He is at [place] tomorrow so I will send scheduling a message to add to [place] NP schedule. Pt v/u

## 2018-03-13 ENCOUNTER — OFFICE VISIT (OUTPATIENT)
Dept: ONCOLOGY | Facility: CLINIC | Age: 43
End: 2018-03-13

## 2018-03-13 ENCOUNTER — LAB (OUTPATIENT)
Dept: OTHER | Facility: HOSPITAL | Age: 43
End: 2018-03-13

## 2018-03-13 VITALS
WEIGHT: 146.5 LBS | DIASTOLIC BLOOD PRESSURE: 68 MMHG | OXYGEN SATURATION: 99 % | RESPIRATION RATE: 16 BRPM | TEMPERATURE: 98.4 F | HEIGHT: 67 IN | HEART RATE: 79 BPM | BODY MASS INDEX: 22.99 KG/M2 | SYSTOLIC BLOOD PRESSURE: 114 MMHG

## 2018-03-13 DIAGNOSIS — Z17.0 MALIGNANT NEOPLASM OF UPPER-OUTER QUADRANT OF RIGHT BREAST IN FEMALE, ESTROGEN RECEPTOR POSITIVE (HCC): ICD-10-CM

## 2018-03-13 DIAGNOSIS — G44.89 OTHER HEADACHE SYNDROME: Primary | ICD-10-CM

## 2018-03-13 DIAGNOSIS — C50.411 MALIGNANT NEOPLASM OF UPPER-OUTER QUADRANT OF RIGHT BREAST IN FEMALE, ESTROGEN RECEPTOR POSITIVE (HCC): ICD-10-CM

## 2018-03-13 DIAGNOSIS — M79.2 NEURALGIA: ICD-10-CM

## 2018-03-13 LAB
BASOPHILS # BLD AUTO: 0.04 10*3/MM3 (ref 0–0.2)
BASOPHILS NFR BLD AUTO: 0.6 % (ref 0–1.5)
DEPRECATED RDW RBC AUTO: 40.1 FL (ref 37–54)
EOSINOPHIL # BLD AUTO: 0.21 10*3/MM3 (ref 0–0.7)
EOSINOPHIL NFR BLD AUTO: 3.2 % (ref 0.3–6.2)
ERYTHROCYTE [DISTWIDTH] IN BLOOD BY AUTOMATED COUNT: 12.2 % (ref 11.7–13)
HCT VFR BLD AUTO: 38.3 % (ref 35.6–45.5)
HGB BLD-MCNC: 13.2 G/DL (ref 11.9–15.5)
IMM GRANULOCYTES # BLD: 0.02 10*3/MM3 (ref 0–0.03)
IMM GRANULOCYTES NFR BLD: 0.3 % (ref 0–0.5)
LYMPHOCYTES # BLD AUTO: 1.51 10*3/MM3 (ref 0.9–4.8)
LYMPHOCYTES NFR BLD AUTO: 23.3 % (ref 19.6–45.3)
MCH RBC QN AUTO: 30.3 PG (ref 26.9–32)
MCHC RBC AUTO-ENTMCNC: 34.5 G/DL (ref 32.4–36.3)
MCV RBC AUTO: 88 FL (ref 80.5–98.2)
MONOCYTES # BLD AUTO: 0.62 10*3/MM3 (ref 0.2–1.2)
MONOCYTES NFR BLD AUTO: 9.6 % (ref 5–12)
NEUTROPHILS # BLD AUTO: 4.07 10*3/MM3 (ref 1.9–8.1)
NEUTROPHILS NFR BLD AUTO: 63 % (ref 42.7–76)
NRBC BLD MANUAL-RTO: 0 /100 WBC (ref 0–0)
PLATELET # BLD AUTO: 254 10*3/MM3 (ref 140–500)
PMV BLD AUTO: 9.4 FL (ref 6–12)
RBC # BLD AUTO: 4.35 10*6/MM3 (ref 3.9–5.2)
WBC NRBC COR # BLD: 6.47 10*3/MM3 (ref 4.5–10.7)

## 2018-03-13 PROCEDURE — 85025 COMPLETE CBC W/AUTO DIFF WBC: CPT | Performed by: INTERNAL MEDICINE

## 2018-03-13 PROCEDURE — 36415 COLL VENOUS BLD VENIPUNCTURE: CPT

## 2018-03-13 PROCEDURE — 99214 OFFICE O/P EST MOD 30 MIN: CPT | Performed by: NURSE PRACTITIONER

## 2018-03-13 RX ORDER — GABAPENTIN 300 MG/1
300 CAPSULE ORAL
Qty: 30 CAPSULE | Refills: 1 | Status: SHIPPED | OUTPATIENT
Start: 2018-03-13 | End: 2018-04-24

## 2018-03-14 ENCOUNTER — APPOINTMENT (OUTPATIENT)
Dept: ONCOLOGY | Facility: HOSPITAL | Age: 43
End: 2018-03-14

## 2018-03-15 ENCOUNTER — HOSPITAL ENCOUNTER (OUTPATIENT)
Dept: MRI IMAGING | Facility: HOSPITAL | Age: 43
Discharge: HOME | End: 2018-03-15
Attending: INTERNAL MEDICINE | Admitting: INTERNAL MEDICINE

## 2018-03-15 DIAGNOSIS — G44.89 OTHER HEADACHE SYNDROME: ICD-10-CM

## 2018-03-15 DIAGNOSIS — Z17.0 MALIGNANT NEOPLASM OF UPPER-OUTER QUADRANT OF RIGHT BREAST IN FEMALE, ESTROGEN RECEPTOR POSITIVE (HCC): ICD-10-CM

## 2018-03-15 DIAGNOSIS — C50.411 MALIGNANT NEOPLASM OF UPPER-OUTER QUADRANT OF RIGHT BREAST IN FEMALE, ESTROGEN RECEPTOR POSITIVE (HCC): ICD-10-CM

## 2018-03-15 DIAGNOSIS — M79.2 NEURALGIA: ICD-10-CM

## 2018-03-15 LAB — CREAT BLDA-MCNC: 0.7 MG/DL (ref 0.6–1.3)

## 2018-03-15 PROCEDURE — 70553 MRI BRAIN STEM W/O & W/DYE: CPT

## 2018-03-15 PROCEDURE — 82565 ASSAY OF CREATININE: CPT

## 2018-03-15 PROCEDURE — 0 GADOBENATE DIMEGLUMINE 529 MG/ML SOLUTION: Performed by: INTERNAL MEDICINE

## 2018-03-15 PROCEDURE — A9577 INJ MULTIHANCE: HCPCS | Performed by: INTERNAL MEDICINE

## 2018-03-15 RX ADMIN — GADOBENATE DIMEGLUMINE 12 ML: 529 INJECTION, SOLUTION INTRAVENOUS at 16:51

## 2018-03-16 ENCOUNTER — TELEPHONE (OUTPATIENT)
Dept: FAMILY MEDICINE CLINIC | Facility: CLINIC | Age: 43
End: 2018-03-16

## 2018-03-16 ENCOUNTER — TELEPHONE (OUTPATIENT)
Dept: ONCOLOGY | Facility: HOSPITAL | Age: 43
End: 2018-03-16

## 2018-03-16 NOTE — TELEPHONE ENCOUNTER
PT CALLING FOR MRI RESULTS FROM YESTERDAY. RESULTS ARE NOT BACK YET. ALSO SUGGESTED PT TRY LOOKING ON [employer]. PT V/U.

## 2018-03-16 NOTE — PROGRESS NOTES
Subjective       REASONS FOR FOLLOWUP:    1. History of stage IIA, [id number], ER weakly-positive, TX negative, HER2-negative breast cancer, status post bilateral mastectomies. The patient completed her adjuvant chemotherapy under NSABP B49 clinical trial.  On 12/14/2015 she has no clinical evidence of recurrence of breast cancer and she remains on Tamoxifen for the time being. She also has completed her breast reconstruction with bilateral implants and she is very satisfied in regard to the cosmetic result of this. She will remain on Tamoxifen for the time being. She receiving at this time Effexor 37.5 mg a day for hot flashes and anxiety.   2. Bone health. The patient has osteoporosis. She  required a new Reclast infusion in Jan 2017, and on yearly bases     History of Present Illness  The patient is a 42-year-old female with the above-mentioned history who is here today with complaints of headaches.  She states the pain started on Wednesday of last week.  She states that she is having intermittent very sharp shooting pains in the back of her head.  They last only seconds.  He blurred vision or double vision.  She did see her primary care provider recently because of this complaint.  He started her on Flexeril.  She states that this has not improved her symptoms at all.  She denies nausea or vomiting.  She denies fevers or chills.  She does continue on Femara.    Past Medical History.   Past Medical History:   Diagnosis Date   • Breast cancer     stage IIA, s/p bilateral mastectomy, XRT, and chemotherapy, followed by Dr. Hadley   • Osteoporosis    • Paroxysmal supraventricular tachycardia     CS ostium atrial tachycardia, ablated in 2002 by Dr. Garzon   • Sinus headache     But no history of migranes   • Skin cancer    • Wears glasses      Social History     Social History   • Marital status:      Spouse name: Misha   • Number of children: N/A   • Years of education: College     Occupational History   •  [employer]     Social History Main Topics   • Smoking status: Never Smoker   • Smokeless tobacco: Never Used   • Alcohol use Yes      Comment: Rare   • Drug use: No   • Sexual activity: Defer     Other Topics Concern   • Not on file     Social History Narrative   • No narrative on file     Family History   Problem Relation Age of Onset   • Coronary artery disease Mother    • Heart disease Mother    • Thrombophlebitis Mother      2016   • Heart attack Mother    • Hepatitis Father    • Cancer Father    • Crohn's disease Sister    • Drug abuse Brother      Drug overdose   • Cancer Maternal Grandmother 42     Breast   • Thyroid disease Sister    • Other Sister      kidney problems     ONCOLOGIC/HEMATOLOGIC HISTORY   The patient was diagnosed with stage II breast cancer with the tumor located in the anterior axillary fold in the tail of the breast. She underwent bilateral mastectomies and she had bilateral expanders placed by Dr. MURPHY Aguilar in preparation for further reconstruction in the future.  The patient’s primary tumor was 1.6 cm in size.  Margins of resection were negative, grade 3, with no lymphovascular invasion. Two sentinel lymph nodes contain metastasis, one of them with macrometastasis that were not spilling out of the lymph node.  Circulating cancer cells were negative.  CA 15-3 was negative, normal.  The patient had a CT scan of the brain, chest, abdomen and pelvis that disclose no distant metastasis at any level.  The radiologist questioned an internal mammary node on the right side that does not make any clinical sense given the primary location of the tumor in the right breast.  Her bone scan was negative.  Her MUGA ejection fraction was normal.  With all of this in mind we advised the patient to either participate in the NSABP B47 clinical trial or to consider chemotherapy in the standard regimen dose dense AC followed by Taxol followed by hormonal  anti-estrogen therapy given the finding in the pathological specimen of the mastectomy that her tumor was weakly ER positive, WA negative, HER-2 negative by FISH.      Our research coordinator had the opportunity to interview the patient today to go through the explanation of participation in the clinical trial.      Patient seen 07/24/2014 for a second cycle of AC chemotherapy. Adjustments were made for her antinausea treatments then post chemotherapy to try to reduce insomnia and further nausea.     On 08/07/2014 the patient has encountered size effects of the treatment including nausea and vomiting on days 3, 4 and 5 after chemotherapy related to a continuous bitter metallic taste in her mouth.  She also has experienced a tremendous degree of skeletal pain induced by the Neulasta to the point that she surrendered to bed.  Under these circumstances we advised her to use some mint or crystalized kimberly for the taste in her mouth and she will take Aleve on a regular schedule on days 2, 3, 4 and 5 to minimize the bone pain induced by the Neulasta.  On the other hand she has not developed any mucositis.  She has not developed any infections, abnormal bleeding or significant hematological toxicity.    On 08/21/2014 the patient proceeded with her 4th cycle of Adriamycin/Cytoxan supported with Neulasta. Pepcid AC b.i.d. was initiated at a standing dose to control heartburn. The patient also was advised to continue using mint and kimberly to minimize the taste in her mouth induced by chemicals in her chemotherapy treatment. She was scheduled to receive her next cycle of chemotherapy and at that point she will initiate Taxol on a weekly basis.     She was seen back in the office 09/04/2014 initiating treatment with Taxol given every week, x12.     On 09/18/14, the patient was undergoing therapy with Taxol and having less fatigue, no nausea, occasional tingling in her fingers but it was very transitory with no discomfort in  her feet or toes.  She was still very sensitive, feeling her expanders in the chest wall, and we advised her to initiate a program of water therapy.  Her Taxol treatment was continued.  We also advised her to use massage therapy.     On 09/25/2014, the patient was still experiencing a grade 1 peripheral neuropathy of hands, none in feet, associated with Taxol use.  Otherwise she was feeling fine.   She still had a lot of sensitivity in the chest wall, in areas of breast expanders, and in the shoulders and scapular areas.  We have not been able to get her water therapy classes.   We advised her to continue her care plan in the same way and continue Prilosec on a daily basis to avoid reflux esophagitis that is very well controlled at this time.  Her chemotherapy treatment was continued and hematological parameters were stable and her physical exam was otherwise unremarkable.      The patient was seen back in the office on 10/16/2014, tolerating Taxol well. Plans are made to continue the same.     The patient was seen on 11/06/2014, referral was made to radiation oncology to see if the patient will benefit from radiation therapy since she had involvement of two sentinel lymph nodes with metastatic carcinoma.     On 12/04/2014 the patient had a discussion with us in regard plans for any other form of hormonal adjuvant therapy.  Given the fact that her tumor was weakly estrogen receptor positive, we advised her at her age to initiate therapy with tamoxifen.  I asked her to take this medicine at least for the next 10 years.  I asked her to consider eventually whenever we fulfill the criteria for being post-menopausal to switch this to an aromatase inhibitor.      We advised her to initiate a program of exercise to relieve the pain and discomfort that she has in her upper body due to the expanders and her surgical sites.     We advised her to proceed with bone density test. We need to check this in consideration of the  utilization of bisphosphonate either by mouth or IV, not only for keeping her bone health now that she is post-menopausal but also in consideration of prevention of bone metastasis.      We had an extensive discussion with her and her sister about sexual issues that she has encountered during the treatment for her disease and try to relieve her from the stressors of this at this time.  Her  was not present unfortunately during this discussion.      On 01/02/2015 we documented that the patient was tolerating her tamoxifen well with minimal hot flashes and she was still undergoing her radiation treatment to the site of her disease.  She was also documented to have osteoporosis in her bone density and we suggested for her to receive IV Reclast that will have a dual function of treatment of osteoporosis as well as prevention of bone metastasis.      In regard to her conjugal and personal life, the patient has very poor self-esteem.  She finds no one in her life that will be supportive to her and the relationships with her  are very minimal and just in a very low level of activity.  I asked her to bring her  for the next visit to have a conversation one on one, face to face with him and see if he can become a partner for her to help her to heal emotionally and to be supportive for her all the time.  Eventually the patient will require some counseling as well.    We did not recommend any antianxiety or antidepressant medication for her at this point.  The patient was not willing to take anything anyway.      On 01/14/2015, she was handling her tamoxifen well. She was advised to continue the medicine. Five more sessions of radiation therapy to complete her treatment.     On 02/25/2015, the patient has no symptoms related to her cancer with exception of the hot flashes induced by the tamoxifen. Physical activity has improved dramatically, her ability in regards to movement in her upper extremities and  pain that she was experiencing before. She will have her expanders removed in the summer and she will have permanent implants then. In regards to her Reclast infusion, she had a reaction to the medicine including fevers and skeletal pain that was short lasting. She will require premedication with dexamethasone for the next infusion in 1 year.     In regards to her relationship with her , this situation has substantially improved and they are back together, not completely back to normal, but in a much better situation. Obviously, this closes the loop of her life and self-esteem that has substantially improved.     On 05/15/2015 the patient was feeling terrific.  She had minimal discomfort in her shoulders and she is ready to have her expanders removed and have her implants.  Otherwise tolerance to tamoxifen was excellent. She was not having any vaginal bleeding and she was not having any menstrual flow.  Her personal life and her marriage life have further improved and she feels satisfied.  We advised her to continue tamoxifen and keeping her port flushed every couple of months.     Her physical exam was unrevealing at this time.    On 09/14/2015 she had a complete 12 point review of system, having excellent level of energy, normal appetite, normal bowel function, normal urination, no vaginal bleeding, tolerance to tamoxifen was appropriate, hot flashes were ongoing, no cardiorespiratory  or neurological or skeletal symptomatology.  Her physical exam was unrevealing.  Under these circumstances we advised her to remain on tamoxifen 20 mg a day.  She will be informed about her estradiol level, FSH and LH.  Otherwise, she will have her port flushed today and this will be done every 6 weeks with MD visit again in 3 months. She will be due to have a new Reclast infusion in January 2016 and on that occasion she will be pre-medicated with dexamethasone to avoid fever and achiness in the skeleton induced by the first  "one.     On 12/14/2015 Tamoxifen was continued. The patient had no clinical indications of breast cancer recurrence. She completed her reconstruction of her breasts with bilateral implants, looking cosmetically very pleasing to her.       Review of Systems   Constitutional: Negative.  Negative for activity change, appetite change, chills, fatigue and fever.   HENT: Negative for mouth sores, nosebleeds and trouble swallowing.    Eyes: Negative for visual disturbance.   Respiratory: Negative.  Negative for cough and shortness of breath.    Cardiovascular: Negative.  Negative for chest pain and leg swelling.   Gastrointestinal: Negative.  Negative for abdominal pain, constipation, diarrhea, nausea and vomiting.   Genitourinary: Negative.  Negative for difficulty urinating.   Musculoskeletal: Negative.    Skin: Negative.  Negative for rash.   Neurological: Positive for headaches. Negative for dizziness, weakness and numbness.   Hematological: Negative.  Negative for adenopathy. Does not bruise/bleed easily.   Psychiatric/Behavioral: Negative.  Negative for sleep disturbance.      Medications:  The current medication list was reviewed in the EMR    ALLERGIES:  No Known Allergies    Objective      Vitals:    03/13/18 1429   BP: 114/68   Pulse: 79   Resp: 16   Temp: 98.4 °F (36.9 °C)   TempSrc: Oral   SpO2: 99%   Weight: 66.5 kg (146 lb 8 oz)   Height: 169.5 cm (66.73\")   PainSc:   5   PainLoc: Head  Comment: back of head     Current Status 3/13/2018   ECOG score 0       Physical Exam   Constitutional: She is oriented to person, place, and time. She appears well-developed and well-nourished.   HENT:   Head: Normocephalic and atraumatic.   Nose: Nose normal.   Mouth/Throat: Oropharynx is clear and moist and mucous membranes are normal. No oropharyngeal exudate.   Eyes: EOM are normal. Pupils are equal, round, and reactive to light.   Neck: Normal range of motion. Neck supple.   Cardiovascular: Normal rate, regular rhythm " and normal heart sounds.    Pulmonary/Chest: Effort normal and breath sounds normal. No respiratory distress. She has no wheezes. She has no rhonchi. She has no rales.   Abdominal: Soft. Normal appearance and bowel sounds are normal. She exhibits no distension. There is no hepatosplenomegaly. There is no tenderness.   Musculoskeletal: Normal range of motion. She exhibits no edema.   Lymphadenopathy:     She has no cervical adenopathy.        Right: No supraclavicular adenopathy present.        Left: No supraclavicular adenopathy present.   Neurological: She is alert and oriented to person, place, and time. No cranial nerve deficit. Coordination normal.   Skin: Skin is warm and dry.   Psychiatric: She has a normal mood and affect. Her behavior is normal.   Nursing note and vitals reviewed.     RECENT LABS:  Hematology WBC   Date Value Ref Range Status   03/13/2018 6.47 4.50 - 10.70 10*3/mm3 Final     RBC   Date Value Ref Range Status   03/13/2018 4.35 3.90 - 5.20 10*6/mm3 Final     Hemoglobin   Date Value Ref Range Status   03/13/2018 13.2 11.9 - 15.5 g/dL Final     Hematocrit   Date Value Ref Range Status   03/13/2018 38.3 35.6 - 45.5 % Final     Platelets   Date Value Ref Range Status   03/13/2018 254 140 - 500 10*3/mm3 Final        Component      Latest Ref Rng & Units 3/14/2016 9/27/2016 12/21/2016 3/13/2017           3:14 PM 11:38 AM  8:39 AM 11:17 AM   CA 15-3      0.0 - 25.0 U/mL 20.4 18.5 18.3 19.6     Component      Latest Ref Rng & Units 6/5/2017 10/11/2017          10:31 AM 10:16 AM   CA 15-3      0.0 - 25.0 U/mL 19.2 19.7       Assessment/Plan    1.  History of stage IIA, [id number], ER weakly-positive, MS negative, HER2-negative right upper outer quadrant breast cancer, status post bilateral mastectomies. The patient completed her adjuvant chemotherapy under NSABP B49 clinical trial.T She is now on Femara and tolerating it well.    Last dose of Reclast  2/2/2018.     2. Headaches: Patient is having sharp  intermittent shooting pains in the back of her scalp.  She has no neuro deficits on exam.  We will start her on gabapentin 300 mg at bedtime.  We'll also glad and check an MRI of the brain.    RECOMMENDATIONS:   1.  Gabapentin 300 mg at bedtime  2.  MRI of the brain  3.  Follow-up with Dr. Hadley 4/24/2018 for regularly scheduled follow-up.  4.  Patient also scheduled to have a DEXA scan April 2017  5.  Continue Femara 2.5 mg per day.                         3/16/2018      CC:

## 2018-03-16 NOTE — TELEPHONE ENCOUNTER
----- Message from Radha Schmitt MA sent at 3/12/2018  9:14 AM EDT -----  Regarding: FW: Patient Appt follow up question   Contact: [phone]  Please advise    ----- Message -----  From: Cherrie Briggs  Sent: 3/12/2018   8:33 AM  To: Radha Schmitt MA  Subject: Patient Appt follow up question                  Patient called stating at night her throbbing her in head is not going away. This is keeping her up at night. She has been getting up and putting an ice pack on, with relief. Patient states the muscle relaxer is not working and she is getting concerned. She states she is having pressure on the left side in the back of her head. She does have mild pressure behind both eyes.     Please advise  Thank you.

## 2018-03-19 ENCOUNTER — TELEPHONE (OUTPATIENT)
Dept: ONCOLOGY | Facility: CLINIC | Age: 43
End: 2018-03-19

## 2018-03-19 NOTE — TELEPHONE ENCOUNTER
PHONE WITH PT: PAIN IS MUCH BETTER ALMOST TO NONE, I ASKED HER TO TAKE THE NEURONTIN EVERY OTHER NIGHT, IF THE PAIN COMES BACK WE WILL NEED TO REDUCE THE DOSE, SHE WILL CALL THIS WEEK AND LET US KNOW, THE MRI WAS NORMAL.

## 2018-04-02 RX ORDER — LETROZOLE 2.5 MG/1
TABLET, FILM COATED ORAL
Qty: 30 TABLET | Refills: 6 | Status: SHIPPED | OUTPATIENT
Start: 2018-04-02 | End: 2018-10-15 | Stop reason: SDUPTHER

## 2018-04-18 ENCOUNTER — HOSPITAL ENCOUNTER (OUTPATIENT)
Dept: BONE DENSITY | Facility: HOSPITAL | Age: 43
Discharge: HOME | End: 2018-04-18
Attending: INTERNAL MEDICINE | Admitting: INTERNAL MEDICINE

## 2018-04-18 DIAGNOSIS — M81.0 AGE-RELATED OSTEOPOROSIS WITHOUT CURRENT PATHOLOGICAL FRACTURE: ICD-10-CM

## 2018-04-18 DIAGNOSIS — C50.411 MALIGNANT NEOPLASM OF UPPER-OUTER QUADRANT OF RIGHT BREAST IN FEMALE, ESTROGEN RECEPTOR POSITIVE (HCC): ICD-10-CM

## 2018-04-18 DIAGNOSIS — Z17.0 MALIGNANT NEOPLASM OF UPPER-OUTER QUADRANT OF RIGHT BREAST IN FEMALE, ESTROGEN RECEPTOR POSITIVE (HCC): ICD-10-CM

## 2018-04-18 DIAGNOSIS — Z78.0 POST-MENOPAUSAL: ICD-10-CM

## 2018-04-18 PROCEDURE — 77080 DXA BONE DENSITY AXIAL: CPT

## 2018-04-19 RX ORDER — SODIUM CHLORIDE 0.9 % (FLUSH) 0.9 %
10 SYRINGE (ML) INJECTION AS NEEDED
Status: CANCELLED | OUTPATIENT
Start: 2018-04-24

## 2018-04-24 ENCOUNTER — TELEPHONE (OUTPATIENT)
Dept: ONCOLOGY | Facility: CLINIC | Age: 43
End: 2018-04-24

## 2018-04-24 ENCOUNTER — INFUSION (OUTPATIENT)
Dept: ONCOLOGY | Facility: HOSPITAL | Age: 43
End: 2018-04-24

## 2018-04-24 ENCOUNTER — OFFICE VISIT (OUTPATIENT)
Dept: ONCOLOGY | Facility: CLINIC | Age: 43
End: 2018-04-24

## 2018-04-24 VITALS
HEART RATE: 82 BPM | TEMPERATURE: 98.6 F | HEIGHT: 67 IN | OXYGEN SATURATION: 99 % | DIASTOLIC BLOOD PRESSURE: 68 MMHG | BODY MASS INDEX: 22.3 KG/M2 | SYSTOLIC BLOOD PRESSURE: 100 MMHG | WEIGHT: 142.1 LBS | RESPIRATION RATE: 16 BRPM

## 2018-04-24 DIAGNOSIS — Z17.0 MALIGNANT NEOPLASM OF UPPER-OUTER QUADRANT OF RIGHT BREAST IN FEMALE, ESTROGEN RECEPTOR POSITIVE (HCC): Primary | ICD-10-CM

## 2018-04-24 DIAGNOSIS — Z78.0 POST-MENOPAUSAL: ICD-10-CM

## 2018-04-24 DIAGNOSIS — C50.411 MALIGNANT NEOPLASM OF UPPER-OUTER QUADRANT OF RIGHT BREAST IN FEMALE, ESTROGEN RECEPTOR POSITIVE (HCC): Primary | ICD-10-CM

## 2018-04-24 DIAGNOSIS — M81.0 AGE-RELATED OSTEOPOROSIS WITHOUT CURRENT PATHOLOGICAL FRACTURE: ICD-10-CM

## 2018-04-24 DIAGNOSIS — Z45.2 FITTING AND ADJUSTMENT OF VASCULAR CATHETER: ICD-10-CM

## 2018-04-24 LAB
ALBUMIN SERPL-MCNC: 4.3 G/DL (ref 3.5–5.2)
ALBUMIN/GLOB SERPL: 1.5 G/DL
ALP SERPL-CCNC: 56 U/L (ref 39–117)
ALT SERPL W P-5'-P-CCNC: 17 U/L (ref 1–33)
ANION GAP SERPL CALCULATED.3IONS-SCNC: 11.6 MMOL/L
AST SERPL-CCNC: 25 U/L (ref 1–32)
BASOPHILS # BLD AUTO: 0.04 10*3/MM3 (ref 0–0.2)
BASOPHILS NFR BLD AUTO: 0.6 % (ref 0–1.5)
BILIRUB SERPL-MCNC: 0.3 MG/DL (ref 0.1–1.2)
BUN BLD-MCNC: 18 MG/DL (ref 6–20)
BUN/CREAT SERPL: 29 (ref 7–25)
CALCIUM SPEC-SCNC: 9.2 MG/DL (ref 8.6–10.5)
CANCER AG15-3 SERPL-ACNC: 18.5 U/ML
CHLORIDE SERPL-SCNC: 102 MMOL/L (ref 98–107)
CO2 SERPL-SCNC: 26.4 MMOL/L (ref 22–29)
CREAT BLD-MCNC: 0.62 MG/DL (ref 0.57–1)
DEPRECATED RDW RBC AUTO: 39.1 FL (ref 37–54)
EOSINOPHIL # BLD AUTO: 0.17 10*3/MM3 (ref 0–0.7)
EOSINOPHIL NFR BLD AUTO: 2.7 % (ref 0.3–6.2)
ERYTHROCYTE [DISTWIDTH] IN BLOOD BY AUTOMATED COUNT: 12 % (ref 11.7–13)
GFR SERPL CREATININE-BSD FRML MDRD: 105 ML/MIN/1.73
GLOBULIN UR ELPH-MCNC: 2.9 GM/DL
GLUCOSE BLD-MCNC: 93 MG/DL (ref 65–99)
HCT VFR BLD AUTO: 37.5 % (ref 35.6–45.5)
HGB BLD-MCNC: 12.9 G/DL (ref 11.9–15.5)
IMM GRANULOCYTES # BLD: 0.02 10*3/MM3 (ref 0–0.03)
IMM GRANULOCYTES NFR BLD: 0.3 % (ref 0–0.5)
LYMPHOCYTES # BLD AUTO: 1.3 10*3/MM3 (ref 0.9–4.8)
LYMPHOCYTES NFR BLD AUTO: 20.3 % (ref 19.6–45.3)
MCH RBC QN AUTO: 30.8 PG (ref 26.9–32)
MCHC RBC AUTO-ENTMCNC: 34.4 G/DL (ref 32.4–36.3)
MCV RBC AUTO: 89.5 FL (ref 80.5–98.2)
MONOCYTES # BLD AUTO: 0.59 10*3/MM3 (ref 0.2–1.2)
MONOCYTES NFR BLD AUTO: 9.2 % (ref 5–12)
NEUTROPHILS # BLD AUTO: 4.28 10*3/MM3 (ref 1.9–8.1)
NEUTROPHILS NFR BLD AUTO: 66.9 % (ref 42.7–76)
NRBC BLD MANUAL-RTO: 0 /100 WBC (ref 0–0)
PLATELET # BLD AUTO: 263 10*3/MM3 (ref 140–500)
PMV BLD AUTO: 8.9 FL (ref 6–12)
POTASSIUM BLD-SCNC: 4.1 MMOL/L (ref 3.5–5.2)
PROT SERPL-MCNC: 7.2 G/DL (ref 6–8.5)
RBC # BLD AUTO: 4.19 10*6/MM3 (ref 3.9–5.2)
SODIUM BLD-SCNC: 140 MMOL/L (ref 136–145)
WBC NRBC COR # BLD: 6.4 10*3/MM3 (ref 4.5–10.7)

## 2018-04-24 PROCEDURE — 99214 OFFICE O/P EST MOD 30 MIN: CPT | Performed by: INTERNAL MEDICINE

## 2018-04-24 PROCEDURE — 80053 COMPREHEN METABOLIC PANEL: CPT | Performed by: INTERNAL MEDICINE

## 2018-04-24 PROCEDURE — 86300 IMMUNOASSAY TUMOR CA 15-3: CPT | Performed by: INTERNAL MEDICINE

## 2018-04-24 PROCEDURE — 85025 COMPLETE CBC W/AUTO DIFF WBC: CPT | Performed by: INTERNAL MEDICINE

## 2018-04-24 PROCEDURE — 25010000002 HEPARIN FLUSH (PORCINE) 100 UNIT/ML SOLUTION: Performed by: INTERNAL MEDICINE

## 2018-04-24 PROCEDURE — 96523 IRRIG DRUG DELIVERY DEVICE: CPT | Performed by: INTERNAL MEDICINE

## 2018-04-24 RX ORDER — SODIUM CHLORIDE 0.9 % (FLUSH) 0.9 %
10 SYRINGE (ML) INJECTION AS NEEDED
Status: CANCELLED | OUTPATIENT
Start: 2018-04-24

## 2018-04-24 RX ORDER — SODIUM CHLORIDE 0.9 % (FLUSH) 0.9 %
10 SYRINGE (ML) INJECTION AS NEEDED
Status: DISCONTINUED | OUTPATIENT
Start: 2018-04-24 | End: 2018-04-24 | Stop reason: HOSPADM

## 2018-04-24 RX ADMIN — Medication 10 ML: at 12:42

## 2018-04-24 RX ADMIN — SODIUM CHLORIDE, PRESERVATIVE FREE 500 UNITS: 5 INJECTION INTRAVENOUS at 12:42

## 2018-04-24 NOTE — PROGRESS NOTES
Subjective   REASONS FOR FOLLOWUP:    1. History of stage IIA, [id number], ER weakly-positive, UT negative, HER2-negative breast cancer, status post bilateral mastectomies. The patient completed her adjuvant chemotherapy under NSABP B49 clinical trial.  On 12/14/2015 she has no clinical evidence of recurrence of breast cancer and she remains on Tamoxifen for the time being. She also has completed her breast reconstruction with bilateral implants and she is very satisfied in regard to the cosmetic result of this. She will remain on Tamoxifen for the time being. She receiving at this time Effexor 37.5 mg a day for hot flashes and anxiety.   2. Bone health. The patient has osteoporosis. She  required a new Reclast infusion in Jan 2017, and on yearly bases             History of Present Illness  This patient returns today to the office for followup. She is here today complaining of significant constipation no matter what she drinks or eats, keeps going in the same direction to the point where she needed to use a fleet enema a few days ago. She had a lot of relief. She is not having any passage of blood in the stool. The patient is not taking any medicines that could be producing constipation. She has not had any nausea, vomiting, abdominal distention or jaundice. Urination is normal. No hot flashes. No cough, sputum production or shortness of breath. She has had resolution of her neck pain. She has not had any skeletal pain or any other neurological symptomatology. Very much under distress because of issues in regard to her teenage daughter. She is able to handle the situation as far as she can tell.            Past Medical History.   Past Medical History:   Diagnosis Date   • Breast cancer     stage IIA, s/p bilateral mastectomy, XRT, and chemotherapy, followed by Dr. Hadley   • Osteoporosis    • Paroxysmal supraventricular tachycardia     CS ostium atrial tachycardia, ablated in 2002 by Dr. Garzon   • Sinus headache      But no history of migranes   • Skin cancer    • Wears glasses      Social History     Social History   • Marital status:      Spouse name: Misha   • Number of children: N/A   • Years of education: College     Occupational History   •  [employer]     Social History Main Topics   • Smoking status: Never Smoker   • Smokeless tobacco: Never Used   • Alcohol use Yes      Comment: Rare   • Drug use: No   • Sexual activity: Defer     Other Topics Concern   • Not on file     Social History Narrative   • No narrative on file     Family History   Problem Relation Age of Onset   • Coronary artery disease Mother    • Heart disease Mother    • Thrombophlebitis Mother      2016   • Heart attack Mother    • Hepatitis Father    • Cancer Father    • Crohn's disease Sister    • Drug abuse Brother      Drug overdose   • Cancer Maternal Grandmother 42     Breast   • Thyroid disease Sister    • Other Sister      kidney problems     :ONCOLOGIC/HEMATOLOGIC HISTORY     The patient was diagnosed with stage II breast cancer with the tumor located in the anterior axillary fold in the tail of the breast. She underwent bilateral mastectomies and she had bilateral expanders placed by Dr. MURPHY Aguilar in preparation for further reconstruction in the future.  The patient’s primary tumor was 1.6 cm in size.  Margins of resection were negative, grade 3, with no lymphovascular invasion. Two sentinel lymph nodes contain metastasis, one of them with macrometastasis that were not spilling out of the lymph node.  Circulating cancer cells were negative.  CA 15-3 was negative, normal.  The patient had a CT scan of the brain, chest, abdomen and pelvis that disclose no distant metastasis at any level.  The radiologist questioned an internal mammary node on the right side that does not make any clinical sense given the primary location of the tumor in the right breast.  Her bone scan was negative.   Her MUGA ejection fraction was normal.  With all of this in mind we advised the patient to either participate in the NSABP B47 clinical trial or to consider chemotherapy in the standard regimen dose dense AC followed by Taxol followed by hormonal anti-estrogen therapy given the finding in the pathological specimen of the mastectomy that her tumor was weakly ER positive, ND negative, HER-2 negative by FISH.      Our research coordinator had the opportunity to interview the patient today to go through the explanation of participation in the clinical trial.      Patient seen 07/24/2014 for a second cycle of AC chemotherapy. Adjustments were made for her antinausea treatments then post chemotherapy to try to reduce insomnia and further nausea.     On 08/07/2014 the patient has encountered size effects of the treatment including nausea and vomiting on days 3, 4 and 5 after chemotherapy related to a continuous bitter metallic taste in her mouth.  She also has experienced a tremendous degree of skeletal pain induced by the Neulasta to the point that she surrendered to bed.  Under these circumstances we advised her to use some mint or crystalized kimberly for the taste in her mouth and she will take Aleve on a regular schedule on days 2, 3, 4 and 5 to minimize the bone pain induced by the Neulasta.  On the other hand she has not developed any mucositis.  She has not developed any infections, abnormal bleeding or significant hematological toxicity.    On 08/21/2014 the patient proceeded with her 4th cycle of Adriamycin/Cytoxan supported with Neulasta. Pepcid AC b.i.d. was initiated at a standing dose to control heartburn. The patient also was advised to continue using mint and kimberly to minimize the taste in her mouth induced by chemicals in her chemotherapy treatment. She was scheduled to receive her next cycle of chemotherapy and at that point she will initiate Taxol on a weekly basis.     She was seen back in the office  09/04/2014 initiating treatment with Taxol given every week, x12.     On 09/18/14, the patient was undergoing therapy with Taxol and having less fatigue, no nausea, occasional tingling in her fingers but it was very transitory with no discomfort in her feet or toes.  She was still very sensitive, feeling her expanders in the chest wall, and we advised her to initiate a program of water therapy.  Her Taxol treatment was continued.  We also advised her to use massage therapy.     On 09/25/2014, the patient was still experiencing a grade 1 peripheral neuropathy of hands, none in feet, associated with Taxol use.  Otherwise she was feeling fine.   She still had a lot of sensitivity in the chest wall, in areas of breast expanders, and in the shoulders and scapular areas.  We have not been able to get her water therapy classes.   We advised her to continue her care plan in the same way and continue Prilosec on a daily basis to avoid reflux esophagitis that is very well controlled at this time.  Her chemotherapy treatment was continued and hematological parameters were stable and her physical exam was otherwise unremarkable.      The patient was seen back in the office on 10/16/2014, tolerating Taxol well. Plans are made to continue the same.     The patient was seen on 11/06/2014, referral was made to radiation oncology to see if the patient will benefit from radiation therapy since she had involvement of two sentinel lymph nodes with metastatic carcinoma.     On 12/04/2014 the patient had a discussion with us in regard plans for any other form of hormonal adjuvant therapy.  Given the fact that her tumor was weakly estrogen receptor positive, we advised her at her age to initiate therapy with tamoxifen.  I asked her to take this medicine at least for the next 10 years.  I asked her to consider eventually whenever we fulfill the criteria for being post-menopausal to switch this to an aromatase inhibitor.      We advised her  to initiate a program of exercise to relieve the pain and discomfort that she has in her upper body due to the expanders and her surgical sites.     We advised her to proceed with bone density test. We need to check this in consideration of the utilization of bisphosphonate either by mouth or IV, not only for keeping her bone health now that she is post-menopausal but also in consideration of prevention of bone metastasis.      We had an extensive discussion with her and her sister about sexual issues that she has encountered during the treatment for her disease and try to relieve her from the stressors of this at this time.  Her  was not present unfortunately during this discussion.      On 01/02/2015 we documented that the patient was tolerating her tamoxifen well with minimal hot flashes and she was still undergoing her radiation treatment to the site of her disease.  She was also documented to have osteoporosis in her bone density and we suggested for her to receive IV Reclast that will have a dual function of treatment of osteoporosis as well as prevention of bone metastasis.      In regard to her conjugal and personal life, the patient has very poor self-esteem.  She finds no one in her life that will be supportive to her and the relationships with her  are very minimal and just in a very low level of activity.  I asked her to bring her  for the next visit to have a conversation one on one, face to face with him and see if he can become a partner for her to help her to heal emotionally and to be supportive for her all the time.  Eventually the patient will require some counseling as well.    We did not recommend any antianxiety or antidepressant medication for her at this point.  The patient was not willing to take anything anyway.      On 01/14/2015, she was handling her tamoxifen well. She was advised to continue the medicine. Five more sessions of radiation therapy to complete her  treatment.     On 02/25/2015, the patient has no symptoms related to her cancer with exception of the hot flashes induced by the tamoxifen. Physical activity has improved dramatically, her ability in regards to movement in her upper extremities and pain that she was experiencing before. She will have her expanders removed in the summer and she will have permanent implants then. In regards to her Reclast infusion, she had a reaction to the medicine including fevers and skeletal pain that was short lasting. She will require premedication with dexamethasone for the next infusion in 1 year.     In regards to her relationship with her , this situation has substantially improved and they are back together, not completely back to normal, but in a much better situation. Obviously, this closes the loop of her life and self-esteem that has substantially improved.     On 05/15/2015 the patient was feeling terrific.  She had minimal discomfort in her shoulders and she is ready to have her expanders removed and have her implants.  Otherwise tolerance to tamoxifen was excellent. She was not having any vaginal bleeding and she was not having any menstrual flow.  Her personal life and her marriage life have further improved and she feels satisfied.  We advised her to continue tamoxifen and keeping her port flushed every couple of months.     Her physical exam was unrevealing at this time.    On 09/14/2015 she had a complete 12 point review of system, having excellent level of energy, normal appetite, normal bowel function, normal urination, no vaginal bleeding, tolerance to tamoxifen was appropriate, hot flashes were ongoing, no cardiorespiratory  or neurological or skeletal symptomatology.  Her physical exam was unrevealing.  Under these circumstances we advised her to remain on tamoxifen 20 mg a day.  She will be informed about her estradiol level, FSH and LH.  Otherwise, she will have her port flushed today and this will  be done every 6 weeks with MD visit again in 3 months. She will be due to have a new Reclast infusion in January 2016 and on that occasion she will be pre-medicated with dexamethasone to avoid fever and achiness in the skeleton induced by the first one.     On 12/14/2015 Tamoxifen was continued. The patient had no clinical indications of breast cancer recurrence. She completed her reconstruction of her breasts with bilateral implants, looking cosmetically very pleasing to her.       Review of Systems   General: no fever, chills, fatigue, weight changes, or lack of appetite.  Eyes: no epiphora, xerophthalmia,conjunctivitis, pain, glaucoma, blurred vision, blindness, secretion, photophobia, proptosis, diplopia.  Ears: no otorrhea, tinnitus, otorrhagia, deafness, pain, vertigo.  Nose: no rhinorrhea, epistaxis, alteration in perception of odors, sinuses pressure.  Mouth: no alteration in gums or teeth,  ulcers, no difficulty with mastication or deglut ion, no odynophagia.  Neck: no masses or pain, no thyroid alterations, no pain in muscles or arteries, no carotid odynia, no crepitation.  Respiratory: no cough, sputum production, dyspnea, trepopnea, pleuritic pain, hemoptysis.  Heart: no syncope, irregularity, palpitations, angina, orthopnea, paroxysmal nocturnal dyspnea.  Vascular Venous: no tenderness,edema, palpable cords, postphlebitic syndrome, skin changes or ulcerations.  Vascular Arterial: no distal ischemia, claudication, gangrene, neuropathic ischemic pain, skin ulcers, paleness or cyanosis.  GI: no dysphagia, odynophagia, no regurgitation, no heartburn,no indigestion,no nausea,no vomiting,no hematemesis ,no melena,no jaundice,no distention, no obstipation,no enterorrhagia,no proctalgia,no anal  lesions, STATED changes in bowel habits.  : no frequency, hesitancy, hematuria, discharge, pain.  Musculoskeletal: no muscle or tendon pain or inflammation, joint pain, edema, functional limitation, fasciculations,  "mass.  Neurologic: no headache, seizures, alterations on Craneal nerves, no motor or senssory deficit, normal coordination, no alteration in memory, orientation, calculation,writting, verbal or written language.  Skin: no rashes, pruritus or localized lesions.  Psychiatric: no anxiety, depression, agitation, delusions, proper insight.        Medications:  The current medication list was reviewed in the EMR    ALLERGIES:  No Known Allergies    Objective      Vitals:    04/24/18 1250   BP: 100/68   Pulse: 82   Resp: 16   Temp: 98.6 °F (37 °C)   TempSrc: Oral   SpO2: 99%   Weight: 64.5 kg (142 lb 1.6 oz)   Height: 169.5 cm (66.73\")   PainSc: 0-No pain     Current Status 4/24/2018   ECOG score 0       Physical Exam   GENERAL:  Well-developed, well-nourished  Patient  in no acute distress.   SKIN:  Warm, dry without rashes, purpura or petechiae.  HEENT:  Pupils were equal and reactive to light and accomodation, conjunctivas non injected, no pterigion, normal extraocular movements, normal visual acuity.   Mouth mucosa was moist, no exudates in oropharynx, normal gum line, normal roof of the mouth and pillars, normal papillations of the tongue  NECK:  Supple with good range of motion; no thyromegaly or masses, no JVD or bruits, no cervical adenopathies.No carotid arteries pain, no carotid abnormal pulsation or arterial dance.  LYMPHATICS:  No cervical, supraclavicular, axillary, epitrochlear or inguinal adenopathy.  CHEST:  Normal excursion of both adonis thoraces, normal voice fremitus, no subcutaneous emphysema, normal axillas, no rashes or acanthosis nigricans. Lungs clear to percussion and auscultation, normal breath sounds bilaterally, no wheezing, crackles or ronchi, no stridor, no rubs.  CARDIAC AND VASCULAR: normal rate and regular rhythm, without murmurs, rubs or S3 or S4 right or left sided gallops. Normal femoral, popliteal, pedis, brachial and carotid pulses.  ABDOMEN:  Soft, nontender with no organomegaly or " masses, no ascites, no collateral circulation,no distention,no [place] sign, no abdominal pain, no inguinal hernias,no umbilical hernias, no abdominal bruits. Normal bowel sounds.  GENITAL: Not  Performed.  EXTREMITIES  AND SPINE:  No clubbing, cyanosis or edema, no deformities or pain .No kyphosis, scoliosis, deformities or pain in spine, ribs or pelvic bone.  NEUROLOGICAL:  Patient was awake, alert, oriented to time, person and place,normal gait and coordination          RECENT LABS:  Hematology WBC   Date Value Ref Range Status   04/24/2018 6.40 4.50 - 10.70 10*3/mm3 Final     RBC   Date Value Ref Range Status   04/24/2018 4.19 3.90 - 5.20 10*6/mm3 Final     Hemoglobin   Date Value Ref Range Status   04/24/2018 12.9 11.9 - 15.5 g/dL Final     Hematocrit   Date Value Ref Range Status   04/24/2018 37.5 35.6 - 45.5 % Final     Platelets   Date Value Ref Range Status   04/24/2018 263 140 - 500 10*3/mm3 Final        LUMBAR SPINE AND LEFT HIP BONE DENSITOMETRY     HISTORY: This is a 42-year-old female who is postmenopausal. She has  previous bone density measurement on 12/23/2014 demonstrating mild  osteopenia in the lumbar spine and moderately severe osteopenia in the  left femoral neck.     TECHNIQUE: The T score compares the patient's bone mineral density with  the peak bone mass of young normal patients. Patients with T-scores  between 1.0 and 2.5 standard deviations below the mean are osteopenic.  Patients with T-scores greater than 2.5 standard deviation below the  mean are osteoporotic.     FINDINGS: The bone mineral density in the lumbar spine has a T value of  -1.2 representing mild osteopenia that shows improvement of 2.9%. The  bone mineral density in the left femoral neck has a T value of -1.6  representing mild-to-moderate osteopenia showing improvement of 4.6%.     This report was finalized on 4/19/2018 8:42 AM by Dr. George Jacques MD.           Assessment/Plan  1History of stage IIA, [id number], ER  weakly-positive, SC negative, HER2-negative right upper outer quadrant breast cancer, status post bilateral mastectomies. The patient completed her adjuvant chemotherapy under NSABP B49 clinical trial.The patient today looks terrific and she has no other issues. She is not experiencing any side effects of Femara. She has not had any difficulties with her port.1. As far as I can tell on clinical grounds the patient remains free of recurrent disease or breast cancer. Her axillary examination, her lung examination, heart and so forth remains very much negative normal with a complete review of systems that is negative with the exception of her constipation.   2. Bone health. The patient has had Reclast in February of this year. She is due for another one in February 2019. Bone density has improved including documentation of improving the lumbar spine 2.9% and improving in the left hip 4.9%. This is very encouraging.     RECOMMENDATIONS:  1. The patient has been advised to remain on Femara 2.5 mg a day.  2. She will remain on her vitamin D supplement once a day.  3. I asked her to synchronize her steps with her telephone and count steps and at least try to have 50,000 steps in a week.  4. I encouraged her to have another Reclast infusion in February 2019.  5. I encouraged her to have a smoothie with flack seed to see if this will help her in the long run to have easier bowel activity. I encouraged her to remain on her active diet full of fruit and water  6. The patient will be called at home with a report of her tumor markers.  7. Her port has been flushed today. It will be flushed in 6 and 12 weeks.   8. Doctor appointment in 3 months with CBC, CMP, .        RECOMMENDATIONS:                        4/24/2018      CC:

## 2018-04-26 ENCOUNTER — CLINICAL SUPPORT (OUTPATIENT)
Dept: FAMILY MEDICINE CLINIC | Facility: CLINIC | Age: 43
End: 2018-04-26

## 2018-04-26 DIAGNOSIS — Z23 NEED FOR HEPATITIS A VACCINATION: Primary | ICD-10-CM

## 2018-04-26 PROCEDURE — 90471 IMMUNIZATION ADMIN: CPT | Performed by: INTERNAL MEDICINE

## 2018-04-26 PROCEDURE — 90632 HEPA VACCINE ADULT IM: CPT | Performed by: INTERNAL MEDICINE

## 2018-06-05 ENCOUNTER — APPOINTMENT (OUTPATIENT)
Dept: ONCOLOGY | Facility: HOSPITAL | Age: 43
End: 2018-06-05

## 2018-06-05 ENCOUNTER — INFUSION (OUTPATIENT)
Dept: ONCOLOGY | Facility: HOSPITAL | Age: 43
End: 2018-06-05

## 2018-06-05 DIAGNOSIS — Z45.2 FITTING AND ADJUSTMENT OF VASCULAR CATHETER: Primary | ICD-10-CM

## 2018-06-05 PROCEDURE — 25010000002 HEPARIN FLUSH (PORCINE) 100 UNIT/ML SOLUTION: Performed by: INTERNAL MEDICINE

## 2018-06-05 PROCEDURE — 96523 IRRIG DRUG DELIVERY DEVICE: CPT | Performed by: INTERNAL MEDICINE

## 2018-06-05 RX ORDER — SODIUM CHLORIDE 0.9 % (FLUSH) 0.9 %
10 SYRINGE (ML) INJECTION AS NEEDED
Status: DISCONTINUED | OUTPATIENT
Start: 2018-06-05 | End: 2018-06-05 | Stop reason: HOSPADM

## 2018-06-05 RX ORDER — SODIUM CHLORIDE 0.9 % (FLUSH) 0.9 %
10 SYRINGE (ML) INJECTION AS NEEDED
Status: CANCELLED | OUTPATIENT
Start: 2018-06-05

## 2018-06-05 RX ADMIN — SODIUM CHLORIDE, PRESERVATIVE FREE 500 UNITS: 5 INJECTION INTRAVENOUS at 10:24

## 2018-06-05 RX ADMIN — Medication 10 ML: at 10:24

## 2018-07-17 ENCOUNTER — OFFICE VISIT (OUTPATIENT)
Dept: FAMILY MEDICINE CLINIC | Facility: CLINIC | Age: 43
End: 2018-07-17

## 2018-07-17 VITALS
DIASTOLIC BLOOD PRESSURE: 62 MMHG | TEMPERATURE: 98.1 F | HEART RATE: 87 BPM | OXYGEN SATURATION: 99 % | HEIGHT: 67 IN | WEIGHT: 142.2 LBS | SYSTOLIC BLOOD PRESSURE: 94 MMHG | BODY MASS INDEX: 22.32 KG/M2

## 2018-07-17 DIAGNOSIS — H10.11 ALLERGIC CONJUNCTIVITIS OF RIGHT EYE: Primary | ICD-10-CM

## 2018-07-17 PROCEDURE — 99213 OFFICE O/P EST LOW 20 MIN: CPT | Performed by: NURSE PRACTITIONER

## 2018-07-17 NOTE — PROGRESS NOTES
"Subjective   Diana Higgins is a 42 y.o. female who presents today for:    Eye Burn (Rt eye itching and burning)    Ms. Higgins presents today with complaints of right eye redness, itching and burning since yesterday. She denies drainage and did not wake up with her eyes matted or crusted. She states she was sent home from work and can not return until she is seen to rule out pink eye.      I have reviewed the patient's medical history in detail and updated the computerized patient record.      Ms. Higgins  reports that she has never smoked. She has never used smokeless tobacco. She reports that she drinks alcohol. She reports that she does not use drugs.     No Known Allergies    Current Outpatient Prescriptions:   •  BIOTIN PO, Take  by mouth., Disp: , Rfl:   •  Cholecalciferol (VITAMIN D-3) 1000 UNITS capsule, Take  by mouth Daily., Disp: , Rfl:   •  letrozole (FEMARA) 2.5 MG tablet, TAKE 1 TABLET BY MOUTH DAILY., Disp: 30 tablet, Rfl: 6  •  MELATONIN PO, Take 3 mg by mouth Daily., Disp: , Rfl:   •  Probiotic Product ([employer] ADULT PROBIOTIC PO), Take  by mouth daily., Disp: , Rfl:   •  venlafaxine XR (EFFEXOR-XR) 37.5 MG 24 hr capsule, Take 37.5 mg by mouth Daily., Disp: , Rfl: 2  •  Zoledronic Acid (RECLAST IV), Infuse  into a venous catheter. Once a year, Disp: , Rfl:       Review of Systems   Constitutional: Negative.    HENT: Negative.    Eyes: Positive for redness and itching. Negative for photophobia, pain, discharge and visual disturbance.         Objective   Vitals:    07/17/18 0917   BP: 94/62   BP Location: Left arm   Patient Position: Sitting   Cuff Size: Adult   Pulse: 87   Temp: 98.1 °F (36.7 °C)   TempSrc: Oral   SpO2: 99%   Weight: 64.5 kg (142 lb 3.2 oz)   Height: 169.5 cm (66.73\")     Physical Exam   Constitutional: She is oriented to person, place, and time. She appears well-developed and well-nourished.   Eyes: EOM are normal. Pupils are equal, round, and reactive to light. Right eye exhibits no " discharge. Left eye exhibits no discharge.   Right eye is injected   Neurological: She is alert and oriented to person, place, and time.   Skin: Skin is warm and dry.   Psychiatric:   No acute distress   Vitals reviewed.            Diana was seen today for eye burn.    Diagnoses and all orders for this visit:    Allergic conjunctivitis of right eye    1. She has allergic conjunctivitis of the right eye. She has been instructed to use Zaditor 0.025% twice daily for allergy symptoms.   2. She can return to work immediately without restrictions.  3. Follow up as needed.

## 2018-07-18 ENCOUNTER — OFFICE VISIT (OUTPATIENT)
Dept: ONCOLOGY | Facility: CLINIC | Age: 43
End: 2018-07-18

## 2018-07-18 ENCOUNTER — INFUSION (OUTPATIENT)
Dept: ONCOLOGY | Facility: HOSPITAL | Age: 43
End: 2018-07-18

## 2018-07-18 VITALS
HEART RATE: 90 BPM | WEIGHT: 143 LBS | SYSTOLIC BLOOD PRESSURE: 110 MMHG | TEMPERATURE: 98.7 F | DIASTOLIC BLOOD PRESSURE: 60 MMHG | HEIGHT: 67 IN | OXYGEN SATURATION: 99 % | RESPIRATION RATE: 16 BRPM | BODY MASS INDEX: 22.44 KG/M2

## 2018-07-18 DIAGNOSIS — C50.411 MALIGNANT NEOPLASM OF UPPER-OUTER QUADRANT OF RIGHT BREAST IN FEMALE, ESTROGEN RECEPTOR POSITIVE (HCC): ICD-10-CM

## 2018-07-18 DIAGNOSIS — C50.411 MALIGNANT NEOPLASM OF UPPER-OUTER QUADRANT OF RIGHT BREAST IN FEMALE, ESTROGEN RECEPTOR POSITIVE (HCC): Primary | ICD-10-CM

## 2018-07-18 DIAGNOSIS — Z45.2 FITTING AND ADJUSTMENT OF VASCULAR CATHETER: ICD-10-CM

## 2018-07-18 DIAGNOSIS — Z17.0 MALIGNANT NEOPLASM OF UPPER-OUTER QUADRANT OF RIGHT BREAST IN FEMALE, ESTROGEN RECEPTOR POSITIVE (HCC): Primary | ICD-10-CM

## 2018-07-18 DIAGNOSIS — Z78.0 POST-MENOPAUSAL: ICD-10-CM

## 2018-07-18 DIAGNOSIS — Z17.0 MALIGNANT NEOPLASM OF UPPER-OUTER QUADRANT OF RIGHT BREAST IN FEMALE, ESTROGEN RECEPTOR POSITIVE (HCC): ICD-10-CM

## 2018-07-18 DIAGNOSIS — Z45.2 FITTING AND ADJUSTMENT OF VASCULAR CATHETER: Primary | ICD-10-CM

## 2018-07-18 DIAGNOSIS — M81.0 AGE-RELATED OSTEOPOROSIS WITHOUT CURRENT PATHOLOGICAL FRACTURE: ICD-10-CM

## 2018-07-18 DIAGNOSIS — IMO0001 OTHER COMPLICATION DUE TO VENOUS ACCESS DEVICE, SUBSEQUENT ENCOUNTER: ICD-10-CM

## 2018-07-18 LAB
ALBUMIN SERPL-MCNC: 4 G/DL (ref 3.5–5.2)
ALBUMIN/GLOB SERPL: 1.3 G/DL (ref 1.1–2.4)
ALP SERPL-CCNC: 56 U/L (ref 38–116)
ALT SERPL W P-5'-P-CCNC: 15 U/L (ref 0–33)
ANION GAP SERPL CALCULATED.3IONS-SCNC: 9.2 MMOL/L
AST SERPL-CCNC: 22 U/L (ref 0–32)
BASOPHILS # BLD AUTO: 0.04 10*3/MM3 (ref 0–0.1)
BASOPHILS NFR BLD AUTO: 0.7 % (ref 0–1.1)
BILIRUB SERPL-MCNC: 0.2 MG/DL (ref 0.1–1.2)
BUN BLD-MCNC: 20 MG/DL (ref 6–20)
BUN/CREAT SERPL: 25.3 (ref 7.3–30)
CALCIUM SPEC-SCNC: 9.3 MG/DL (ref 8.5–10.2)
CANCER AG15-3 SERPL-ACNC: 17.7 U/ML
CHLORIDE SERPL-SCNC: 103 MMOL/L (ref 98–107)
CO2 SERPL-SCNC: 26.8 MMOL/L (ref 22–29)
CREAT BLD-MCNC: 0.79 MG/DL (ref 0.6–1.1)
DEPRECATED RDW RBC AUTO: 38 FL (ref 37–49)
EOSINOPHIL # BLD AUTO: 0.16 10*3/MM3 (ref 0–0.36)
EOSINOPHIL NFR BLD AUTO: 2.6 % (ref 1–5)
ERYTHROCYTE [DISTWIDTH] IN BLOOD BY AUTOMATED COUNT: 11.8 % (ref 11.7–14.5)
GFR SERPL CREATININE-BSD FRML MDRD: 79 ML/MIN/1.73
GLOBULIN UR ELPH-MCNC: 3 GM/DL (ref 1.8–3.5)
GLUCOSE BLD-MCNC: 96 MG/DL (ref 74–124)
HCT VFR BLD AUTO: 38 % (ref 34–45)
HGB BLD-MCNC: 13.1 G/DL (ref 11.5–14.9)
IMM GRANULOCYTES # BLD: 0.02 10*3/MM3 (ref 0–0.03)
IMM GRANULOCYTES NFR BLD: 0.3 % (ref 0–0.5)
LYMPHOCYTES # BLD AUTO: 1.3 10*3/MM3 (ref 1–3.5)
LYMPHOCYTES NFR BLD AUTO: 21.2 % (ref 20–49)
MCH RBC QN AUTO: 30.3 PG (ref 27–33)
MCHC RBC AUTO-ENTMCNC: 34.5 G/DL (ref 32–35)
MCV RBC AUTO: 88 FL (ref 83–97)
MONOCYTES # BLD AUTO: 0.64 10*3/MM3 (ref 0.25–0.8)
MONOCYTES NFR BLD AUTO: 10.4 % (ref 4–12)
NEUTROPHILS # BLD AUTO: 3.97 10*3/MM3 (ref 1.5–7)
NEUTROPHILS NFR BLD AUTO: 64.8 % (ref 39–75)
NRBC BLD MANUAL-RTO: 0 /100 WBC (ref 0–0)
PLATELET # BLD AUTO: 250 10*3/MM3 (ref 150–375)
PMV BLD AUTO: 9.2 FL (ref 8.9–12.1)
POTASSIUM BLD-SCNC: 3.9 MMOL/L (ref 3.5–4.7)
PROT SERPL-MCNC: 7 G/DL (ref 6.3–8)
RBC # BLD AUTO: 4.32 10*6/MM3 (ref 3.9–5)
SODIUM BLD-SCNC: 139 MMOL/L (ref 134–145)
WBC NRBC COR # BLD: 6.13 10*3/MM3 (ref 4–10)

## 2018-07-18 PROCEDURE — 36593 DECLOT VASCULAR DEVICE: CPT

## 2018-07-18 PROCEDURE — 85025 COMPLETE CBC W/AUTO DIFF WBC: CPT

## 2018-07-18 PROCEDURE — 80053 COMPREHEN METABOLIC PANEL: CPT

## 2018-07-18 PROCEDURE — 86300 IMMUNOASSAY TUMOR CA 15-3: CPT | Performed by: INTERNAL MEDICINE

## 2018-07-18 PROCEDURE — 99214 OFFICE O/P EST MOD 30 MIN: CPT | Performed by: INTERNAL MEDICINE

## 2018-07-18 PROCEDURE — 25010000002 ALTEPLASE 2 MG RECONSTITUTED SOLUTION: Performed by: INTERNAL MEDICINE

## 2018-07-18 RX ORDER — SODIUM CHLORIDE 0.9 % (FLUSH) 0.9 %
10 SYRINGE (ML) INJECTION AS NEEDED
Status: CANCELLED | OUTPATIENT
Start: 2018-07-18

## 2018-07-18 RX ORDER — SODIUM CHLORIDE 0.9 % (FLUSH) 0.9 %
10 SYRINGE (ML) INJECTION AS NEEDED
Status: DISCONTINUED | OUTPATIENT
Start: 2018-07-18 | End: 2018-07-18 | Stop reason: HOSPADM

## 2018-07-18 RX ADMIN — ALTEPLASE 2 MG: 2.2 INJECTION, POWDER, LYOPHILIZED, FOR SOLUTION INTRAVENOUS at 14:44

## 2018-07-18 RX ADMIN — Medication 10 ML: at 15:25

## 2018-07-18 NOTE — PROGRESS NOTES
Unable to get BR through port. Activase instilled @ 1444 and left to dwell while pt seeing MD. Pt returned at 1513 +BR but still sluggish. Left to dwell until 1523. +BR with labs Drawn. Port Flushed and needle D/C'd.

## 2018-07-18 NOTE — PROGRESS NOTES
Subjective   REASONS FOR FOLLOWUP:    1. History of stage IIA, [id number], ER weakly-positive, KY negative, HER2-negative breast cancer, status post bilateral mastectomies. The patient completed her adjuvant chemotherapy under NSABP B49 clinical trial.  On 12/14/2015 she has no clinical evidence of recurrence of breast cancer and she remains on Tamoxifen for the time being. She also has completed her breast reconstruction with bilateral implants and she is very satisfied in regard to the cosmetic result of this. She will remain on Tamoxifen for the time being. She receiving at this time Effexor 37.5 mg a day for hot flashes and anxiety.   2. Bone health. The patient has osteoporosis. She  required a new Reclast infusion in Jan 2017, and on yearly bases             History of Present Illness This patient returns today to the office for followup in regard to her previous history of breast cancer on the right side. The only symptom that she has at this time is occasional nausea in the morning without any heartburn or indigestion. No headache. No distention in her abdomen. No lack of appetite. Normal bowel activity. Normal urination. She has not had any cardiovascular or respiratory issues. No skeletal pain. No neurological symptomatology. She feels perfectly fine. She has had some upper respiratory allergies controlled with medication.                Past Medical History.   Past Medical History:   Diagnosis Date   • Breast cancer (CMS/HCC)     stage IIA, s/p bilateral mastectomy, XRT, and chemotherapy, followed by Dr. Hadley   • Osteoporosis    • Paroxysmal supraventricular tachycardia (CMS/HCC)     CS ostium atrial tachycardia, ablated in 2002 by Dr. Garzon   • Sinus headache     But no history of migranes   • Skin cancer    • Wears glasses      Social History     Social History   • Marital status:      Spouse name: Misha   • Number of children: N/A   • Years of education: College     Occupational History    • [employer]     Social History Main Topics   • Smoking status: Never Smoker   • Smokeless tobacco: Never Used   • Alcohol use Yes      Comment: Rare   • Drug use: No   • Sexual activity: Defer     Other Topics Concern   • Not on file     Social History Narrative   • No narrative on file     Family History   Problem Relation Age of Onset   • Coronary artery disease Mother    • Heart disease Mother    • Thrombophlebitis Mother         2016   • Heart attack Mother    • Hepatitis Father    • Cancer Father    • Crohn's disease Sister    • Drug abuse Brother         Drug overdose   • Cancer Maternal Grandmother 42        Breast   • Thyroid disease Sister    • Other Sister         kidney problems     :ONCOLOGIC/HEMATOLOGIC HISTORY     The patient was diagnosed with stage II breast cancer with the tumor located in the anterior axillary fold in the tail of the breast. She underwent bilateral mastectomies and she had bilateral expanders placed by Dr. MURPHY Aguilar in preparation for further reconstruction in the future.  The patient’s primary tumor was 1.6 cm in size.  Margins of resection were negative, grade 3, with no lymphovascular invasion. Two sentinel lymph nodes contain metastasis, one of them with macrometastasis that were not spilling out of the lymph node.  Circulating cancer cells were negative.  CA 15-3 was negative, normal.  The patient had a CT scan of the brain, chest, abdomen and pelvis that disclose no distant metastasis at any level.  The radiologist questioned an internal mammary node on the right side that does not make any clinical sense given the primary location of the tumor in the right breast.  Her bone scan was negative.  Her MUGA ejection fraction was normal.  With all of this in mind we advised the patient to either participate in the NSABP B47 clinical trial or to consider chemotherapy in the standard regimen dose dense AC followed by Taxol  followed by hormonal anti-estrogen therapy given the finding in the pathological specimen of the mastectomy that her tumor was weakly ER positive, KS negative, HER-2 negative by FISH.      Our research coordinator had the opportunity to interview the patient today to go through the explanation of participation in the clinical trial.      Patient seen 07/24/2014 for a second cycle of AC chemotherapy. Adjustments were made for her antinausea treatments then post chemotherapy to try to reduce insomnia and further nausea.     On 08/07/2014 the patient has encountered size effects of the treatment including nausea and vomiting on days 3, 4 and 5 after chemotherapy related to a continuous bitter metallic taste in her mouth.  She also has experienced a tremendous degree of skeletal pain induced by the Neulasta to the point that she surrendered to bed.  Under these circumstances we advised her to use some mint or crystalized kimberly for the taste in her mouth and she will take Aleve on a regular schedule on days 2, 3, 4 and 5 to minimize the bone pain induced by the Neulasta.  On the other hand she has not developed any mucositis.  She has not developed any infections, abnormal bleeding or significant hematological toxicity.    On 08/21/2014 the patient proceeded with her 4th cycle of Adriamycin/Cytoxan supported with Neulasta. Pepcid AC b.i.d. was initiated at a standing dose to control heartburn. The patient also was advised to continue using mint and kimberly to minimize the taste in her mouth induced by chemicals in her chemotherapy treatment. She was scheduled to receive her next cycle of chemotherapy and at that point she will initiate Taxol on a weekly basis.     She was seen back in the office 09/04/2014 initiating treatment with Taxol given every week, x12.     On 09/18/14, the patient was undergoing therapy with Taxol and having less fatigue, no nausea, occasional tingling in her fingers but it was very transitory  with no discomfort in her feet or toes.  She was still very sensitive, feeling her expanders in the chest wall, and we advised her to initiate a program of water therapy.  Her Taxol treatment was continued.  We also advised her to use massage therapy.     On 09/25/2014, the patient was still experiencing a grade 1 peripheral neuropathy of hands, none in feet, associated with Taxol use.  Otherwise she was feeling fine.   She still had a lot of sensitivity in the chest wall, in areas of breast expanders, and in the shoulders and scapular areas.  We have not been able to get her water therapy classes.   We advised her to continue her care plan in the same way and continue Prilosec on a daily basis to avoid reflux esophagitis that is very well controlled at this time.  Her chemotherapy treatment was continued and hematological parameters were stable and her physical exam was otherwise unremarkable.      The patient was seen back in the office on 10/16/2014, tolerating Taxol well. Plans are made to continue the same.     The patient was seen on 11/06/2014, referral was made to radiation oncology to see if the patient will benefit from radiation therapy since she had involvement of two sentinel lymph nodes with metastatic carcinoma.     On 12/04/2014 the patient had a discussion with us in regard plans for any other form of hormonal adjuvant therapy.  Given the fact that her tumor was weakly estrogen receptor positive, we advised her at her age to initiate therapy with tamoxifen.  I asked her to take this medicine at least for the next 10 years.  I asked her to consider eventually whenever we fulfill the criteria for being post-menopausal to switch this to an aromatase inhibitor.      We advised her to initiate a program of exercise to relieve the pain and discomfort that she has in her upper body due to the expanders and her surgical sites.     We advised her to proceed with bone density test. We need to check this in  consideration of the utilization of bisphosphonate either by mouth or IV, not only for keeping her bone health now that she is post-menopausal but also in consideration of prevention of bone metastasis.      We had an extensive discussion with her and her sister about sexual issues that she has encountered during the treatment for her disease and try to relieve her from the stressors of this at this time.  Her  was not present unfortunately during this discussion.      On 01/02/2015 we documented that the patient was tolerating her tamoxifen well with minimal hot flashes and she was still undergoing her radiation treatment to the site of her disease.  She was also documented to have osteoporosis in her bone density and we suggested for her to receive IV Reclast that will have a dual function of treatment of osteoporosis as well as prevention of bone metastasis.      In regard to her conjugal and personal life, the patient has very poor self-esteem.  She finds no one in her life that will be supportive to her and the relationships with her  are very minimal and just in a very low level of activity.  I asked her to bring her  for the next visit to have a conversation one on one, face to face with him and see if he can become a partner for her to help her to heal emotionally and to be supportive for her all the time.  Eventually the patient will require some counseling as well.    We did not recommend any antianxiety or antidepressant medication for her at this point.  The patient was not willing to take anything anyway.      On 01/14/2015, she was handling her tamoxifen well. She was advised to continue the medicine. Five more sessions of radiation therapy to complete her treatment.     On 02/25/2015, the patient has no symptoms related to her cancer with exception of the hot flashes induced by the tamoxifen. Physical activity has improved dramatically, her ability in regards to movement in her  upper extremities and pain that she was experiencing before. She will have her expanders removed in the summer and she will have permanent implants then. In regards to her Reclast infusion, she had a reaction to the medicine including fevers and skeletal pain that was short lasting. She will require premedication with dexamethasone for the next infusion in 1 year.     In regards to her relationship with her , this situation has substantially improved and they are back together, not completely back to normal, but in a much better situation. Obviously, this closes the loop of her life and self-esteem that has substantially improved.     On 05/15/2015 the patient was feeling terrific.  She had minimal discomfort in her shoulders and she is ready to have her expanders removed and have her implants.  Otherwise tolerance to tamoxifen was excellent. She was not having any vaginal bleeding and she was not having any menstrual flow.  Her personal life and her marriage life have further improved and she feels satisfied.  We advised her to continue tamoxifen and keeping her port flushed every couple of months.     Her physical exam was unrevealing at this time.    On 09/14/2015 she had a complete 12 point review of system, having excellent level of energy, normal appetite, normal bowel function, normal urination, no vaginal bleeding, tolerance to tamoxifen was appropriate, hot flashes were ongoing, no cardiorespiratory  or neurological or skeletal symptomatology.  Her physical exam was unrevealing.  Under these circumstances we advised her to remain on tamoxifen 20 mg a day.  She will be informed about her estradiol level, FSH and LH.  Otherwise, she will have her port flushed today and this will be done every 6 weeks with MD visit again in 3 months. She will be due to have a new Reclast infusion in January 2016 and on that occasion she will be pre-medicated with dexamethasone to avoid fever and achiness in the skeleton  induced by the first one.     On 12/14/2015 Tamoxifen was continued. The patient had no clinical indications of breast cancer recurrence. She completed her reconstruction of her breasts with bilateral implants, looking cosmetically very pleasing to her.       Review of Systems   General: no fever, chills, fatigue, weight changes, or lack of appetite.  Eyes: no epiphora, xerophthalmia,conjunctivitis, pain, glaucoma, blurred vision, blindness, secretion, photophobia, proptosis, diplopia.  Ears: no otorrhea, tinnitus, otorrhagia, deafness, pain, vertigo.  Nose: no rhinorrhea, epistaxis, alteration in perception of odors, sinuses pressure.  Mouth: no alteration in gums or teeth,  ulcers, no difficulty with mastication or deglut ion, no odynophagia.  Neck: no masses or pain, no thyroid alterations, no pain in muscles or arteries, no carotid odynia, no crepitation.  Respiratory: no cough, sputum production, dyspnea, trepopnea, pleuritic pain, hemoptysis.  Heart: no syncope, irregularity, palpitations, angina, orthopnea, paroxysmal nocturnal dyspnea.  Vascular Venous: no tenderness,edema, palpable cords, postphlebitic syndrome, skin changes or ulcerations.  Vascular Arterial: no distal ischemia, claudication, gangrene, neuropathic ischemic pain, skin ulcers, paleness or cyanosis.  GI: no dysphagia, odynophagia, no regurgitation, no heartburn,no indigestion,STATED nausea,no vomiting,no hematemesis ,no melena,no jaundice,no distention, no obstipation,no enterorrhagia,no proctalgia,no anal  lesions, nochanges in bowel habits.  : no frequency, hesitancy, hematuria, discharge, pain.  Musculoskeletal: no muscle or tendon pain or inflammation, joint pain, edema, functional limitation, fasciculations, mass.  Neurologic: no headache, seizures, alterations on Craneal nerves, no motor or senssory deficit, normal coordination, no alteration in memory, orientation, calculation,writting, verbal or written language.  Skin: no rashes,  "pruritus or localized lesions.  Psychiatric: no anxiety, depression, agitation, delusions, proper insight.        Medications:  The current medication list was reviewed in the EMR    ALLERGIES:  No Known Allergies    Objective      Vitals:    07/18/18 1448   BP: 110/60   Pulse: 90   Resp: 16   Temp: 98.7 °F (37.1 °C)   SpO2: 99%  Comment: at rest   Weight: 64.9 kg (143 lb)   Height: 169.5 cm (66.73\")   PainSc: 0-No pain     Current Status 7/18/2018   ECOG score 0       Physical Exam   GENERAL:  Well-developed, well-nourished  Patient  in no acute distress.   SKIN:  Warm, dry without rashes, purpura or petechiae.  HEENT:  Pupils were equal and reactive to light and accomodation, conjunctivas non injected, no pterigion, normal extraocular movements, normal visual acuity.   Mouth mucosa was moist, no exudates in oropharynx, normal gum line, normal roof of the mouth and pillars, normal papillations of the tongue  NECK:  Supple with good range of motion; no thyromegaly or masses, no JVD or bruits, no cervical adenopathies.No carotid arteries pain, no carotid abnormal pulsation or arterial dance.  LYMPHATICS:  No cervical, supraclavicular, axillary, epitrochlear or inguinal adenopathy.  CHEST:  Normal excursion of both adonis thoraces, normal voice fremitus, no subcutaneous emphysema, normal axillas, no rashes or acanthosis nigricans. Lungs clear to percussion and auscultation, normal breath sounds bilaterally, no wheezing, crackles or ronchi, no stridor, no rubs.BILATERAL BREAST IMPLANTS ARE FINE NO MASSES OR NODULES IN CHEST WALL OR AXILLAS  CARDIAC AND VASCULAR: normal rate and regular rhythm, without murmurs, rubs or S3 or S4 right or left sided gallops. Normal femoral, popliteal, pedis, brachial and carotid pulses.  ABDOMEN:  Soft, nontender with no organomegaly or masses, no ascites, no collateral circulation,no distention,no Felda sign, no abdominal pain, no inguinal hernias,no umbilical hernias, no abdominal " bruits. Normal bowel sounds.  GENITAL: Not  Performed.  EXTREMITIES  AND SPINE:  No clubbing, cyanosis or edema, no deformities or pain .No kyphosis, scoliosis, deformities or pain in spine, ribs or pelvic bone.  NEUROLOGICAL:  Patient was awake, alert, oriented to time, person and place            RECENT LABS:  Hematology WBC   Date Value Ref Range Status   07/18/2018 6.13 4.00 - 10.00 10*3/mm3 Final     RBC   Date Value Ref Range Status   07/18/2018 4.32 3.90 - 5.00 10*6/mm3 Final     Hemoglobin   Date Value Ref Range Status   07/18/2018 13.1 11.5 - 14.9 g/dL Final     Hematocrit   Date Value Ref Range Status   07/18/2018 38.0 34.0 - 45.0 % Final     Platelets   Date Value Ref Range Status   07/18/2018 250 150 - 375 10*3/mm3 Final              Assessment/Plan  1History of stage IIA, [id number], ER weakly-positive, [place] negative, HER2-negative right upper outer quadrant breast cancer, status post bilateral mastectomies. The patient completed her adjuvant chemotherapy under NSABP B49 clinical trial.The patient today looks terrific and she has no other issues. She is not experiencing any side effects of Femara.It is difficult to interpret her nausea symptomatology. It may be related to the Effexor and I asked her to split her medications, take the Femara in the morning and the Effexor in the evening to see if this makes any difference. If that does not make any difference, I would like for her to stop the Effexor for a few days and see how things evolve. If that does not solve the problem, will need to have further investigation and maybe she will need to be placed on Pepcid or something of this nature.     In regard to her port, she had problem flushing today and Activase was placed in the system. Hopefully this will remain functional.     Her laboratory parameters including white count, hemoglobin and platelets remain normal. The chemistry profile, the CA 15-3 remain pending. She will be informed about the reports of  this once they become available.     I find no need for radiological assessment on her at this time besides the continuation of the plan of care like stated. Her Femara will remain ongoing 2.5 mg a day. She will have her port flushed in 6 weeks and 3 months; doctor visit in 3 months with similar laboratory parameters as done today. If the nausea symptoms get worse, she probably will require further analysis including a CT scan of the abdomen.                                 7/18/2018      CC:

## 2018-07-19 ENCOUNTER — TELEPHONE (OUTPATIENT)
Dept: ONCOLOGY | Facility: CLINIC | Age: 43
End: 2018-07-19

## 2018-07-19 ENCOUNTER — TELEPHONE (OUTPATIENT)
Dept: ONCOLOGY | Facility: HOSPITAL | Age: 43
End: 2018-07-19

## 2018-07-19 NOTE — RESEARCH
NICOLE 45 -LM to call Research @[phone] concerning questioniarre completion.      Subject called back. Medical conditions and lifestyle questionnaire completed per study protocol.

## 2018-08-28 DIAGNOSIS — Z17.0 MALIGNANT NEOPLASM OF UPPER-OUTER QUADRANT OF RIGHT BREAST IN FEMALE, ESTROGEN RECEPTOR POSITIVE (HCC): Primary | ICD-10-CM

## 2018-08-28 DIAGNOSIS — C50.411 MALIGNANT NEOPLASM OF UPPER-OUTER QUADRANT OF RIGHT BREAST IN FEMALE, ESTROGEN RECEPTOR POSITIVE (HCC): Primary | ICD-10-CM

## 2018-08-29 ENCOUNTER — INFUSION (OUTPATIENT)
Dept: ONCOLOGY | Facility: HOSPITAL | Age: 43
End: 2018-08-29

## 2018-08-29 DIAGNOSIS — Z45.2 FITTING AND ADJUSTMENT OF VASCULAR CATHETER: Primary | ICD-10-CM

## 2018-08-29 PROCEDURE — 25010000002 HEPARIN FLUSH (PORCINE) 100 UNIT/ML SOLUTION: Performed by: INTERNAL MEDICINE

## 2018-08-29 PROCEDURE — 96523 IRRIG DRUG DELIVERY DEVICE: CPT | Performed by: INTERNAL MEDICINE

## 2018-08-29 RX ORDER — SODIUM CHLORIDE 0.9 % (FLUSH) 0.9 %
10 SYRINGE (ML) INJECTION AS NEEDED
Status: DISCONTINUED | OUTPATIENT
Start: 2018-08-29 | End: 2018-08-29 | Stop reason: HOSPADM

## 2018-08-29 RX ORDER — SODIUM CHLORIDE 0.9 % (FLUSH) 0.9 %
10 SYRINGE (ML) INJECTION AS NEEDED
Status: CANCELLED | OUTPATIENT
Start: 2018-08-29

## 2018-08-29 RX ADMIN — Medication 10 ML: at 10:55

## 2018-08-29 RX ADMIN — SODIUM CHLORIDE, PRESERVATIVE FREE 500 UNITS: 5 INJECTION INTRAVENOUS at 10:55

## 2018-10-05 ENCOUNTER — APPOINTMENT (OUTPATIENT)
Dept: ONCOLOGY | Facility: CLINIC | Age: 43
End: 2018-10-05

## 2018-10-05 ENCOUNTER — INFUSION (OUTPATIENT)
Dept: ONCOLOGY | Facility: HOSPITAL | Age: 43
End: 2018-10-05

## 2018-10-05 ENCOUNTER — OFFICE VISIT (OUTPATIENT)
Dept: ONCOLOGY | Facility: CLINIC | Age: 43
End: 2018-10-05

## 2018-10-05 ENCOUNTER — APPOINTMENT (OUTPATIENT)
Dept: ONCOLOGY | Facility: HOSPITAL | Age: 43
End: 2018-10-05

## 2018-10-05 VITALS
BODY MASS INDEX: 22.82 KG/M2 | TEMPERATURE: 98.6 F | WEIGHT: 145.4 LBS | HEART RATE: 86 BPM | OXYGEN SATURATION: 100 % | HEIGHT: 67 IN | RESPIRATION RATE: 12 BRPM | DIASTOLIC BLOOD PRESSURE: 64 MMHG | SYSTOLIC BLOOD PRESSURE: 106 MMHG

## 2018-10-05 DIAGNOSIS — Z17.0 MALIGNANT NEOPLASM OF UPPER-OUTER QUADRANT OF RIGHT BREAST IN FEMALE, ESTROGEN RECEPTOR POSITIVE (HCC): Primary | ICD-10-CM

## 2018-10-05 DIAGNOSIS — K59.04 CHRONIC IDIOPATHIC CONSTIPATION: ICD-10-CM

## 2018-10-05 DIAGNOSIS — F41.9 ANXIETY: ICD-10-CM

## 2018-10-05 DIAGNOSIS — M81.0 AGE-RELATED OSTEOPOROSIS WITHOUT CURRENT PATHOLOGICAL FRACTURE: ICD-10-CM

## 2018-10-05 DIAGNOSIS — C50.411 MALIGNANT NEOPLASM OF UPPER-OUTER QUADRANT OF RIGHT BREAST IN FEMALE, ESTROGEN RECEPTOR POSITIVE (HCC): Primary | ICD-10-CM

## 2018-10-05 DIAGNOSIS — Z45.2 FITTING AND ADJUSTMENT OF VASCULAR CATHETER: ICD-10-CM

## 2018-10-05 DIAGNOSIS — Z78.0 POST-MENOPAUSAL: ICD-10-CM

## 2018-10-05 LAB
ALBUMIN SERPL-MCNC: 3.9 G/DL (ref 3.5–5.2)
ALBUMIN/GLOB SERPL: 1.6 G/DL (ref 1.1–2.4)
ALP SERPL-CCNC: 59 U/L (ref 38–116)
ALT SERPL W P-5'-P-CCNC: 14 U/L (ref 0–33)
ANION GAP SERPL CALCULATED.3IONS-SCNC: 10.3 MMOL/L
AST SERPL-CCNC: 23 U/L (ref 0–32)
BASOPHILS # BLD AUTO: 0.02 10*3/MM3 (ref 0–0.1)
BASOPHILS NFR BLD AUTO: 0.4 % (ref 0–1.1)
BILIRUB SERPL-MCNC: 0.2 MG/DL (ref 0.1–1.2)
BUN BLD-MCNC: 14 MG/DL (ref 6–20)
BUN/CREAT SERPL: 20.3 (ref 7.3–30)
CALCIUM SPEC-SCNC: 8.6 MG/DL (ref 8.5–10.2)
CANCER AG15-3 SERPL-ACNC: 16.3 U/ML
CHLORIDE SERPL-SCNC: 103 MMOL/L (ref 98–107)
CO2 SERPL-SCNC: 26.7 MMOL/L (ref 22–29)
CREAT BLD-MCNC: 0.69 MG/DL (ref 0.6–1.1)
DEPRECATED RDW RBC AUTO: 39.3 FL (ref 37–49)
EOSINOPHIL # BLD AUTO: 0.15 10*3/MM3 (ref 0–0.36)
EOSINOPHIL NFR BLD AUTO: 2.8 % (ref 1–5)
ERYTHROCYTE [DISTWIDTH] IN BLOOD BY AUTOMATED COUNT: 11.8 % (ref 11.7–14.5)
GFR SERPL CREATININE-BSD FRML MDRD: 92 ML/MIN/1.73
GLOBULIN UR ELPH-MCNC: 2.5 GM/DL (ref 1.8–3.5)
GLUCOSE BLD-MCNC: 125 MG/DL (ref 74–124)
HCT VFR BLD AUTO: 36.9 % (ref 34–45)
HGB BLD-MCNC: 12.4 G/DL (ref 11.5–14.9)
IMM GRANULOCYTES # BLD: 0.03 10*3/MM3 (ref 0–0.03)
IMM GRANULOCYTES NFR BLD: 0.6 % (ref 0–0.5)
LYMPHOCYTES # BLD AUTO: 1.23 10*3/MM3 (ref 1–3.5)
LYMPHOCYTES NFR BLD AUTO: 22.8 % (ref 20–49)
MCH RBC QN AUTO: 30.5 PG (ref 27–33)
MCHC RBC AUTO-ENTMCNC: 33.6 G/DL (ref 32–35)
MCV RBC AUTO: 90.9 FL (ref 83–97)
MONOCYTES # BLD AUTO: 0.58 10*3/MM3 (ref 0.25–0.8)
MONOCYTES NFR BLD AUTO: 10.7 % (ref 4–12)
NEUTROPHILS # BLD AUTO: 3.39 10*3/MM3 (ref 1.5–7)
NEUTROPHILS NFR BLD AUTO: 62.7 % (ref 39–75)
NRBC BLD MANUAL-RTO: 0 /100 WBC (ref 0–0)
PLATELET # BLD AUTO: 255 10*3/MM3 (ref 150–375)
PMV BLD AUTO: 8.9 FL (ref 8.9–12.1)
POTASSIUM BLD-SCNC: 4 MMOL/L (ref 3.5–4.7)
PROT SERPL-MCNC: 6.4 G/DL (ref 6.3–8)
RBC # BLD AUTO: 4.06 10*6/MM3 (ref 3.9–5)
SODIUM BLD-SCNC: 140 MMOL/L (ref 134–145)
WBC NRBC COR # BLD: 5.4 10*3/MM3 (ref 4–10)

## 2018-10-05 PROCEDURE — 80053 COMPREHEN METABOLIC PANEL: CPT | Performed by: INTERNAL MEDICINE

## 2018-10-05 PROCEDURE — 99214 OFFICE O/P EST MOD 30 MIN: CPT | Performed by: INTERNAL MEDICINE

## 2018-10-05 PROCEDURE — 96523 IRRIG DRUG DELIVERY DEVICE: CPT | Performed by: INTERNAL MEDICINE

## 2018-10-05 PROCEDURE — 86300 IMMUNOASSAY TUMOR CA 15-3: CPT | Performed by: INTERNAL MEDICINE

## 2018-10-05 PROCEDURE — 85025 COMPLETE CBC W/AUTO DIFF WBC: CPT | Performed by: INTERNAL MEDICINE

## 2018-10-05 RX ORDER — ALPRAZOLAM 0.5 MG/1
0.5 TABLET ORAL DAILY PRN
Qty: 10 TABLET | Refills: 0 | Status: SHIPPED | OUTPATIENT
Start: 2018-10-05 | End: 2019-01-11

## 2018-10-05 RX ORDER — SODIUM CHLORIDE 0.9 % (FLUSH) 0.9 %
10 SYRINGE (ML) INJECTION AS NEEDED
Status: DISCONTINUED | OUTPATIENT
Start: 2018-10-05 | End: 2018-10-05 | Stop reason: HOSPADM

## 2018-10-05 RX ORDER — SODIUM CHLORIDE 0.9 % (FLUSH) 0.9 %
10 SYRINGE (ML) INJECTION AS NEEDED
Status: CANCELLED | OUTPATIENT
Start: 2018-10-05

## 2018-10-05 RX ADMIN — Medication 10 ML: at 15:01

## 2018-10-05 RX ADMIN — SODIUM CHLORIDE, PRESERVATIVE FREE 500 UNITS: 5 INJECTION INTRAVENOUS at 15:01

## 2018-10-05 NOTE — PROGRESS NOTES
Subjective   REASONS FOR FOLLOWUP:    1. History of stage IIA, C2V3eL0, ER weakly-positive, VT negative, HER2-negative breast cancer, status post bilateral mastectomies. The patient completed her adjuvant chemotherapy under NSABP B49 clinical trial.  On 12/14/2015 she has no clinical evidence of recurrence of breast cancer and she remains on Tamoxifen for the time being. She also has completed her breast reconstruction with bilateral implants and she is very satisfied in regard to the cosmetic result of this. She will remain on Tamoxifen for the time being. She receiving at this time Effexor 37.5 mg a day for hot flashes and anxiety.   2. Bone health. The patient has osteoporosis. She  required a new Reclast infusion in Jan 2017, and on yearly bases             History of Present Illness   This patient returns today to the office with 2 issues; one is significant anxiety upon flying in an airplane and she is requiring some Xanax for this and the 2nd is significant constipation. She has taken MiraLAX, modified her diet, drinking plenty of liquids and still with this, she probably has chronic idiopathic constipation. This was present on her even before her diagnosis of breast cancer. Other than that, she has no skeletal pain, no cardiorespiratory symptomatology, stable appetite and weight, normal bowels, normal urination. Her breast implants have not given her any difficulties. Her port is functioning perfectly fine. She has no difficulties with the Femara, minimal if any hot flashes. Otherwise no other psychiatric problems at this time. She is sleeping well.              Past Medical History.   Past Medical History:   Diagnosis Date   • Breast cancer (CMS/HCC)     stage IIA, s/p bilateral mastectomy, XRT, and chemotherapy, followed by Dr. Hadley   • Osteoporosis    • Paroxysmal supraventricular tachycardia (CMS/HCC)     CS ostium atrial tachycardia, ablated in 2002 by Dr. Garzon   • Sinus headache     But no  history of migranes   • Skin cancer    • Wears glasses      Social History     Social History   • Marital status:      Spouse name: Misha   • Number of children: N/A   • Years of education: College     Occupational History   •  [employer]     Social History Main Topics   • Smoking status: Never Smoker   • Smokeless tobacco: Never Used   • Alcohol use Yes      Comment: Rare   • Drug use: No   • Sexual activity: Defer     Other Topics Concern   • Not on file     Social History Narrative   • No narrative on file     Family History   Problem Relation Age of Onset   • Coronary artery disease Mother    • Heart disease Mother    • Thrombophlebitis Mother         2016   • Heart attack Mother    • Hepatitis Father    • Cancer Father    • Crohn's disease Sister    • Drug abuse Brother         Drug overdose   • Cancer Maternal Grandmother 42        Breast   • Thyroid disease Sister    • Other Sister         kidney problems   • Breast cancer Maternal Aunt      :ONCOLOGIC/HEMATOLOGIC HISTORY     The patient was diagnosed with stage II breast cancer with the tumor located in the anterior axillary fold in the tail of the breast. She underwent bilateral mastectomies and she had bilateral expanders placed by Dr. MURPHY Aguilar in preparation for further reconstruction in the future.  The patient’s primary tumor was 1.6 cm in size.  Margins of resection were negative, grade 3, with no lymphovascular invasion. Two sentinel lymph nodes contain metastasis, one of them with macrometastasis that were not spilling out of the lymph node.  Circulating cancer cells were negative.  CA 15-3 was negative, normal.  The patient had a CT scan of the brain, chest, abdomen and pelvis that disclose no distant metastasis at any level.  The radiologist questioned an internal mammary node on the right side that does not make any clinical sense given the primary location of the tumor in the right  breast.  Her bone scan was negative.  Her MUGA ejection fraction was normal.  With all of this in mind we advised the patient to either participate in the NSABP B47 clinical trial or to consider chemotherapy in the standard regimen dose dense AC followed by Taxol followed by hormonal anti-estrogen therapy given the finding in the pathological specimen of the mastectomy that her tumor was weakly ER positive, UT negative, HER-2 negative by FISH.      Our research coordinator had the opportunity to interview the patient today to go through the explanation of participation in the clinical trial.      Patient seen 07/24/2014 for a second cycle of AC chemotherapy. Adjustments were made for her antinausea treatments then post chemotherapy to try to reduce insomnia and further nausea.     On 08/07/2014 the patient has encountered size effects of the treatment including nausea and vomiting on days 3, 4 and 5 after chemotherapy related to a continuous bitter metallic taste in her mouth.  She also has experienced a tremendous degree of skeletal pain induced by the Neulasta to the point that she surrendered to bed.  Under these circumstances we advised her to use some mint or crystalized kimberly for the taste in her mouth and she will take Aleve on a regular schedule on days 2, 3, 4 and 5 to minimize the bone pain induced by the Neulasta.  On the other hand she has not developed any mucositis.  She has not developed any infections, abnormal bleeding or significant hematological toxicity.    On 08/21/2014 the patient proceeded with her 4th cycle of Adriamycin/Cytoxan supported with Neulasta. Pepcid AC b.i.d. was initiated at a standing dose to control heartburn. The patient also was advised to continue using mint and kimberly to minimize the taste in her mouth induced by chemicals in her chemotherapy treatment. She was scheduled to receive her next cycle of chemotherapy and at that point she will initiate Taxol on a weekly basis.      She was seen back in the office 09/04/2014 initiating treatment with Taxol given every week, x12.     On 09/18/14, the patient was undergoing therapy with Taxol and having less fatigue, no nausea, occasional tingling in her fingers but it was very transitory with no discomfort in her feet or toes.  She was still very sensitive, feeling her expanders in the chest wall, and we advised her to initiate a program of water therapy.  Her Taxol treatment was continued.  We also advised her to use massage therapy.     On 09/25/2014, the patient was still experiencing a grade 1 peripheral neuropathy of hands, none in feet, associated with Taxol use.  Otherwise she was feeling fine.   She still had a lot of sensitivity in the chest wall, in areas of breast expanders, and in the shoulders and scapular areas.  We have not been able to get her water therapy classes.   We advised her to continue her care plan in the same way and continue Prilosec on a daily basis to avoid reflux esophagitis that is very well controlled at this time.  Her chemotherapy treatment was continued and hematological parameters were stable and her physical exam was otherwise unremarkable.      The patient was seen back in the office on 10/16/2014, tolerating Taxol well. Plans are made to continue the same.     The patient was seen on 11/06/2014, referral was made to radiation oncology to see if the patient will benefit from radiation therapy since she had involvement of two sentinel lymph nodes with metastatic carcinoma.     On 12/04/2014 the patient had a discussion with us in regard plans for any other form of hormonal adjuvant therapy.  Given the fact that her tumor was weakly estrogen receptor positive, we advised her at her age to initiate therapy with tamoxifen.  I asked her to take this medicine at least for the next 10 years.  I asked her to consider eventually whenever we fulfill the criteria for being post-menopausal to switch this to an  aromatase inhibitor.      We advised her to initiate a program of exercise to relieve the pain and discomfort that she has in her upper body due to the expanders and her surgical sites.     We advised her to proceed with bone density test. We need to check this in consideration of the utilization of bisphosphonate either by mouth or IV, not only for keeping her bone health now that she is post-menopausal but also in consideration of prevention of bone metastasis.      We had an extensive discussion with her and her sister about sexual issues that she has encountered during the treatment for her disease and try to relieve her from the stressors of this at this time.  Her  was not present unfortunately during this discussion.      On 01/02/2015 we documented that the patient was tolerating her tamoxifen well with minimal hot flashes and she was still undergoing her radiation treatment to the site of her disease.  She was also documented to have osteoporosis in her bone density and we suggested for her to receive IV Reclast that will have a dual function of treatment of osteoporosis as well as prevention of bone metastasis.      In regard to her conjugal and personal life, the patient has very poor self-esteem.  She finds no one in her life that will be supportive to her and the relationships with her  are very minimal and just in a very low level of activity.  I asked her to bring her  for the next visit to have a conversation one on one, face to face with him and see if he can become a partner for her to help her to heal emotionally and to be supportive for her all the time.  Eventually the patient will require some counseling as well.    We did not recommend any antianxiety or antidepressant medication for her at this point.  The patient was not willing to take anything anyway.      On 01/14/2015, she was handling her tamoxifen well. She was advised to continue the medicine. Five more sessions  of radiation therapy to complete her treatment.     On 02/25/2015, the patient has no symptoms related to her cancer with exception of the hot flashes induced by the tamoxifen. Physical activity has improved dramatically, her ability in regards to movement in her upper extremities and pain that she was experiencing before. She will have her expanders removed in the summer and she will have permanent implants then. In regards to her Reclast infusion, she had a reaction to the medicine including fevers and skeletal pain that was short lasting. She will require premedication with dexamethasone for the next infusion in 1 year.     In regards to her relationship with her , this situation has substantially improved and they are back together, not completely back to normal, but in a much better situation. Obviously, this closes the loop of her life and self-esteem that has substantially improved.     On 05/15/2015 the patient was feeling terrific.  She had minimal discomfort in her shoulders and she is ready to have her expanders removed and have her implants.  Otherwise tolerance to tamoxifen was excellent. She was not having any vaginal bleeding and she was not having any menstrual flow.  Her personal life and her marriage life have further improved and she feels satisfied.  We advised her to continue tamoxifen and keeping her port flushed every couple of months.     Her physical exam was unrevealing at this time.    On 09/14/2015 she had a complete 12 point review of system, having excellent level of energy, normal appetite, normal bowel function, normal urination, no vaginal bleeding, tolerance to tamoxifen was appropriate, hot flashes were ongoing, no cardiorespiratory  or neurological or skeletal symptomatology.  Her physical exam was unrevealing.  Under these circumstances we advised her to remain on tamoxifen 20 mg a day.  She will be informed about her estradiol level, FSH and LH.  Otherwise, she will have  her port flushed today and this will be done every 6 weeks with MD visit again in 3 months. She will be due to have a new Reclast infusion in January 2016 and on that occasion she will be pre-medicated with dexamethasone to avoid fever and achiness in the skeleton induced by the first one.     On 12/14/2015 Tamoxifen was continued. The patient had no clinical indications of breast cancer recurrence. She completed her reconstruction of her breasts with bilateral implants, looking cosmetically very pleasing to her.       Review of Systems         General: no fever, no chills, no fatigue,no weight changes, no lack of appetite.  Eyes: no epiphora, xerophthalmia,conjunctivitis, pain, glaucoma, blurred vision, blindness, secretion, photophobia, proptosis, diplopia.  Ears: no otorrhea, tinnitus, otorrhagia, deafness, pain, vertigo.  Nose: no rhinorrhea, no epistaxis, no alteration in perception of odors, no sinuses pressure.  Mouth: no alteration in gums or teeth,  No ulcers, no difficulty with mastication or deglut ion, no odynophagia.  Neck: no masses or pain, no thyroid alterations, no pain in muscles or arteries, no carotid odynia, no crepitation.  Respiratory: no cough, no sputum production,no dyspnea,no trepopnea, no pleuritic pain,no hemoptysis.  Heart: no syncope, no irregularity, no palpitations, no angina,no orthopnea,no paroxysmal nocturnal dyspnea.  Vascular Venous: no tenderness,no edema,no palpable cords,no postphlebitic syndrome, no skin changes no ulcerations.  Vascular Arterial: no distal ischemia, noclaudication, no gangrene, no neuropathic ischemic pain, no skin ulcers, no paleness no cyanosis.  GI: no dysphagia, no odynophagia, no regurgitation, no heartburn,no indigestion,no nausea,no vomiting,no hematemesis ,no melena,no jaundice,no distention, no obstipation,no enterorrhagia,no proctalgia,no anal  lesions, no changes in bowel habits.  : no frequency, no hesitancy, no hematuria, no discharge,no   "pain.  Musculoskeletal: no muscle or tendon pain or inflammation,no  joint pain, no edema, no functional limitation,no fasciculations, no mass.  Neurologic: no headache, no seizures, noalterations on Craneal nerves, no motor deficit, no sensory deficit, normal coordination, no alteration in memory,normal orientation, calculation,normal writting, verbal and written language.  Skin: no rashes,no pruritus no localized lesions.  Psychiatric: no anxiety, no depression,no agitation, no delusions, proper insight.    Medications:  The current medication list was reviewed in the EMR    ALLERGIES:  No Known Allergies    Objective      Vitals:    10/05/18 1512   BP: 106/64   Pulse: 86   Resp: 12   Temp: 98.6 °F (37 °C)   TempSrc: Oral   SpO2: 100%   Weight: 66 kg (145 lb 6.4 oz)   Height: 169.5 cm (66.73\")   PainSc: 0-No pain     Current Status 10/5/2018   ECOG score 0       Physical Exam       GENERAL:  Well-developed, well-nourished  Patient  in no acute distress.   SKIN:  Warm, dry ,NO rashes,NO purpura ,NO petechiae.  HEENT:  Pupils were equal and reactive to light and accomodation, conjunctivas non injected, no pterigion, normal extraocular movements, normal visual acuity.   Mouth mucosa was moist, no exudates in oropharynx, normal gum line, normal roof of the mouth and pillars, normal papillations of the tongue.  NECK:  Supple with good range of motion; no thyromegaly or masses, no JVD or bruits, no cervical adenopathies.No carotid arteries pain, no carotid abnormal pulsation , NO arterial dance.  LYMPHATICS:  No cervical, NO supraclavicular, NO axillary,NO epitrochlear , NO inguinal adenopathy.  CHEST:  Normal excursion of both adonis thoraces, normal voice fremitus, no subcutaneous emphysema, normal axillas, no rashes or acanthosis nigricans. Lungs clear to percussion and auscultation, normal breath sounds bilaterally, no wheezing,NO crackles NO ronchi, NO stridor, NO rubs.  CARDIAC AND VASCULAR:  normal rate and regular " rhythm, without murmurs,NO rubs NO S3 NO S4 right or left . Normal femoral, popliteal, pedis, brachial and carotid pulses.  INSPECTION of  breast documented symmetry of the tissue per se and location and size of the nipple,no retractions or inversion of the nipple, normal skin without lesions, no erythema or nodules,no paud'orange, no prominence of superficial veins or chest wall collateral circulation.PALPATION of the breast documented normal skin turgor, no induration, alteration in local temperature, or pain, no palpable masses or nodules, normal mobility of the tissues,no fixation of the tissue or parenchyma to the chest wall, no alteration at the tail of the breasts or axillas, no adenopathies. Bilateral breast implants in placeSurgical site was well healed.No lymphedema in either extremity.  ABDOMEN:  Soft, nontender with no organomegaly or masses, no ascites, no collateral circulation,no distention,no [place] sign, no abdominal pain, no inguinal hernias,no umbilical hernia, no abdominal bruits. Normal bowel sounds.  GENITAL: Not  Performed.  EXTREMITIES  AND SPINE:  No clubbing, cyanosis or edema, no deformities or pain .No kyphosis, scoliosis, deformities or pain in spine, ribs or pelvic bone.  NEUROLOGICAL:  Patient was awake, alert, oriented to time, person and place.              RECENT LABS:  Hematology WBC   Date Value Ref Range Status   10/05/2018 5.40 4.00 - 10.00 10*3/mm3 Final     RBC   Date Value Ref Range Status   10/05/2018 4.06 3.90 - 5.00 10*6/mm3 Final     Hemoglobin   Date Value Ref Range Status   10/05/2018 12.4 11.5 - 14.9 g/dL Final     Hematocrit   Date Value Ref Range Status   10/05/2018 36.9 34.0 - 45.0 % Final     Platelets   Date Value Ref Range Status   10/05/2018 255 150 - 375 10*3/mm3 Final              Assessment/Plan  1History of stage IIA, [id number], ER weakly-positive, [place] negative, HER2-negative right upper outer quadrant breast cancer, status post bilateral mastectomies. The  patient completed her adjuvant chemotherapy under NSABP B49 clinical trial.The patient today looks terrific and she has no other issues. She is not experiencing any side effects of Femara  Today the patient feels terrific. Her physical examination is normal. Her breast implants are not changing and her white count, hemoglobin and platelet count are normal. The tolerance to Femara is excellent. Therefore under these premises, I advised her to remain on her Femara 2.5 mg a day and continue having her port flushed every 6 weeks.     In regard to anxiety associated with flying in an airplane, I have sent a prescription for Xanax to use this on p.r.n. basis.     In regard to chronic idiopathic constipation, I have advised her to take Linzess at a dose of 72 mcg every other day. I asked her to try this medicine first when she is at home to be sure that she is not going to have excessive amount of bowel activity. Otherwise, I will review her back in 3 months. I find no need for any other radiological assessment at this time.                                   10/5/2018      CC:

## 2018-10-08 ENCOUNTER — TELEPHONE (OUTPATIENT)
Dept: ONCOLOGY | Facility: HOSPITAL | Age: 43
End: 2018-10-08

## 2018-10-15 RX ORDER — LETROZOLE 2.5 MG/1
TABLET, FILM COATED ORAL
Qty: 30 TABLET | Refills: 3 | Status: SHIPPED | OUTPATIENT
Start: 2018-10-15 | End: 2019-02-05 | Stop reason: SDUPTHER

## 2018-10-30 ENCOUNTER — CLINICAL SUPPORT (OUTPATIENT)
Dept: FAMILY MEDICINE CLINIC | Facility: CLINIC | Age: 43
End: 2018-10-30

## 2018-10-30 DIAGNOSIS — Z23 NEED FOR HEPATITIS A VACCINATION: Primary | ICD-10-CM

## 2018-10-30 PROCEDURE — 90632 HEPA VACCINE ADULT IM: CPT | Performed by: INTERNAL MEDICINE

## 2018-10-30 PROCEDURE — 90471 IMMUNIZATION ADMIN: CPT | Performed by: INTERNAL MEDICINE

## 2018-11-14 ENCOUNTER — INFUSION (OUTPATIENT)
Dept: ONCOLOGY | Facility: HOSPITAL | Age: 43
End: 2018-11-14

## 2018-11-14 DIAGNOSIS — Z45.2 FITTING AND ADJUSTMENT OF VASCULAR CATHETER: Primary | ICD-10-CM

## 2018-11-14 PROCEDURE — 96523 IRRIG DRUG DELIVERY DEVICE: CPT | Performed by: INTERNAL MEDICINE

## 2018-11-14 RX ORDER — SODIUM CHLORIDE 0.9 % (FLUSH) 0.9 %
10 SYRINGE (ML) INJECTION AS NEEDED
Status: CANCELLED | OUTPATIENT
Start: 2018-11-14

## 2018-11-14 RX ORDER — SODIUM CHLORIDE 0.9 % (FLUSH) 0.9 %
10 SYRINGE (ML) INJECTION AS NEEDED
Status: DISCONTINUED | OUTPATIENT
Start: 2018-11-14 | End: 2018-11-14 | Stop reason: HOSPADM

## 2018-11-14 RX ADMIN — SODIUM CHLORIDE, PRESERVATIVE FREE 500 UNITS: 5 INJECTION INTRAVENOUS at 09:39

## 2018-11-14 RX ADMIN — Medication 10 ML: at 09:39

## 2018-11-15 ENCOUNTER — APPOINTMENT (OUTPATIENT)
Dept: ONCOLOGY | Facility: HOSPITAL | Age: 43
End: 2018-11-15

## 2018-12-26 ENCOUNTER — INFUSION (OUTPATIENT)
Dept: ONCOLOGY | Facility: HOSPITAL | Age: 43
End: 2018-12-26

## 2018-12-26 ENCOUNTER — OFFICE VISIT (OUTPATIENT)
Dept: ONCOLOGY | Facility: CLINIC | Age: 43
End: 2018-12-26

## 2018-12-26 VITALS
WEIGHT: 143.2 LBS | SYSTOLIC BLOOD PRESSURE: 104 MMHG | RESPIRATION RATE: 12 BRPM | TEMPERATURE: 98.5 F | DIASTOLIC BLOOD PRESSURE: 69 MMHG | HEIGHT: 67 IN | HEART RATE: 67 BPM | OXYGEN SATURATION: 99 % | BODY MASS INDEX: 22.47 KG/M2

## 2018-12-26 DIAGNOSIS — C50.411 MALIGNANT NEOPLASM OF UPPER-OUTER QUADRANT OF RIGHT BREAST IN FEMALE, ESTROGEN RECEPTOR POSITIVE (HCC): Primary | ICD-10-CM

## 2018-12-26 DIAGNOSIS — Z45.2 FITTING AND ADJUSTMENT OF VASCULAR CATHETER: ICD-10-CM

## 2018-12-26 DIAGNOSIS — M81.0 AGE-RELATED OSTEOPOROSIS WITHOUT CURRENT PATHOLOGICAL FRACTURE: ICD-10-CM

## 2018-12-26 DIAGNOSIS — T82.898A OTHER COMPLICATION OF VASCULAR DEVICE, INITIAL ENCOUNTER (HCC): ICD-10-CM

## 2018-12-26 DIAGNOSIS — Z17.0 MALIGNANT NEOPLASM OF UPPER-OUTER QUADRANT OF RIGHT BREAST IN FEMALE, ESTROGEN RECEPTOR POSITIVE (HCC): Primary | ICD-10-CM

## 2018-12-26 LAB
ALBUMIN SERPL-MCNC: 4.3 G/DL (ref 3.5–5.2)
ALBUMIN/GLOB SERPL: 1.6 G/DL (ref 1.1–2.4)
ALP SERPL-CCNC: 63 U/L (ref 38–116)
ALT SERPL W P-5'-P-CCNC: 21 U/L (ref 0–33)
ANION GAP SERPL CALCULATED.3IONS-SCNC: 11.7 MMOL/L
AST SERPL-CCNC: 27 U/L (ref 0–32)
BASOPHILS # BLD AUTO: 0.03 10*3/MM3 (ref 0–0.1)
BASOPHILS NFR BLD AUTO: 0.5 % (ref 0–1.1)
BILIRUB SERPL-MCNC: 0.3 MG/DL (ref 0.1–1.2)
BUN BLD-MCNC: 17 MG/DL (ref 6–20)
BUN/CREAT SERPL: 24.6 (ref 7.3–30)
CALCIUM SPEC-SCNC: 9.2 MG/DL (ref 8.5–10.2)
CANCER AG15-3 SERPL-ACNC: 18.9 U/ML
CHLORIDE SERPL-SCNC: 103 MMOL/L (ref 98–107)
CO2 SERPL-SCNC: 25.3 MMOL/L (ref 22–29)
CREAT BLD-MCNC: 0.69 MG/DL (ref 0.6–1.1)
DEPRECATED RDW RBC AUTO: 38.4 FL (ref 37–49)
EOSINOPHIL # BLD AUTO: 0.15 10*3/MM3 (ref 0–0.36)
EOSINOPHIL NFR BLD AUTO: 2.4 % (ref 1–5)
ERYTHROCYTE [DISTWIDTH] IN BLOOD BY AUTOMATED COUNT: 11.7 % (ref 11.7–14.5)
GFR SERPL CREATININE-BSD FRML MDRD: 92 ML/MIN/1.73
GLOBULIN UR ELPH-MCNC: 2.7 GM/DL (ref 1.8–3.5)
GLUCOSE BLD-MCNC: 97 MG/DL (ref 74–124)
HCT VFR BLD AUTO: 38.9 % (ref 34–45)
HGB BLD-MCNC: 13.2 G/DL (ref 11.5–14.9)
IMM GRANULOCYTES # BLD AUTO: 0.02 10*3/MM3 (ref 0–0.03)
IMM GRANULOCYTES NFR BLD AUTO: 0.3 % (ref 0–0.5)
LYMPHOCYTES # BLD AUTO: 1.28 10*3/MM3 (ref 1–3.5)
LYMPHOCYTES NFR BLD AUTO: 20.1 % (ref 20–49)
MCH RBC QN AUTO: 30.2 PG (ref 27–33)
MCHC RBC AUTO-ENTMCNC: 33.9 G/DL (ref 32–35)
MCV RBC AUTO: 89 FL (ref 83–97)
MONOCYTES # BLD AUTO: 0.75 10*3/MM3 (ref 0.25–0.8)
MONOCYTES NFR BLD AUTO: 11.8 % (ref 4–12)
NEUTROPHILS # BLD AUTO: 4.14 10*3/MM3 (ref 1.5–7)
NEUTROPHILS NFR BLD AUTO: 64.9 % (ref 39–75)
NRBC BLD AUTO-RTO: 0 /100 WBC (ref 0–0)
PLATELET # BLD AUTO: 275 10*3/MM3 (ref 150–375)
PMV BLD AUTO: 9.7 FL (ref 8.9–12.1)
POTASSIUM BLD-SCNC: 3.9 MMOL/L (ref 3.5–4.7)
PROT SERPL-MCNC: 7 G/DL (ref 6.3–8)
RBC # BLD AUTO: 4.37 10*6/MM3 (ref 3.9–5)
SODIUM BLD-SCNC: 140 MMOL/L (ref 134–145)
WBC NRBC COR # BLD: 6.37 10*3/MM3 (ref 4–10)

## 2018-12-26 PROCEDURE — 99214 OFFICE O/P EST MOD 30 MIN: CPT | Performed by: INTERNAL MEDICINE

## 2018-12-26 PROCEDURE — 86300 IMMUNOASSAY TUMOR CA 15-3: CPT | Performed by: INTERNAL MEDICINE

## 2018-12-26 PROCEDURE — 36593 DECLOT VASCULAR DEVICE: CPT

## 2018-12-26 PROCEDURE — 25010000002 ALTEPLASE 2 MG RECONSTITUTED SOLUTION: Performed by: INTERNAL MEDICINE

## 2018-12-26 PROCEDURE — 80053 COMPREHEN METABOLIC PANEL: CPT

## 2018-12-26 PROCEDURE — 85025 COMPLETE CBC W/AUTO DIFF WBC: CPT

## 2018-12-26 RX ORDER — SODIUM CHLORIDE 0.9 % (FLUSH) 0.9 %
10 SYRINGE (ML) INJECTION AS NEEDED
Status: CANCELLED | OUTPATIENT
Start: 2018-12-26

## 2018-12-26 RX ORDER — SODIUM CHLORIDE 0.9 % (FLUSH) 0.9 %
10 SYRINGE (ML) INJECTION AS NEEDED
Status: DISCONTINUED | OUTPATIENT
Start: 2018-12-26 | End: 2018-12-26 | Stop reason: HOSPADM

## 2018-12-26 RX ADMIN — SODIUM CHLORIDE, PRESERVATIVE FREE 500 UNITS: 5 INJECTION INTRAVENOUS at 14:38

## 2018-12-26 RX ADMIN — SODIUM CHLORIDE, PRESERVATIVE FREE 10 ML: 5 INJECTION INTRAVENOUS at 14:38

## 2018-12-26 RX ADMIN — ALTEPLASE: 2.2 INJECTION, POWDER, LYOPHILIZED, FOR SOLUTION INTRAVENOUS at 13:46

## 2018-12-26 NOTE — PROGRESS NOTES
Subjective   REASONS FOR FOLLOWUP:    1. History of stage IIA, [id number], ER weakly-positive, [place] negative, HER2-negative breast cancer, status post bilateral mastectomies. The patient completed her adjuvant chemotherapy under NSABP B49 clinical trial.  On 12/14/2015 she has no clinical evidence of recurrence of breast cancer and she remains on Tamoxifen for the time being. She also has completed her breast reconstruction with bilateral implants and she is very satisfied in regard to the cosmetic result of this. She will remain on Tamoxifen for the time being. She receiving at this time Effexor 37.5 mg a day for hot flashes and anxiety.   2. Bone health. The patient has osteoporosis. She  required a new Reclast infusion in Jan 2017, and on yearly bases             History of Present Illness     This patient returns today to the office for followup. She is here today with no specific symptomatology besides a tremendous degree of stress because of new job and she is in the same position, working on the computer for many hours with occasional discomfort in the neck muscles. Other than that, she has not had any infections of any nature. Occasional sinus pressure but no nasal discharge. Normal bowel activity. Normal urination. No skeletal pain. No neurological or cardiopulmonary symptomatology. She has been told by her gynecologist that she has a new ovarian cyst and she is going to have a Doppler of the pelvis on 01/11/2019. The patient is not having any other new issues or difficulties. ECOG performance status is 0 and she has no cancer-related pain. She remains on letrozole.              Past Medical History.   Past Medical History:   Diagnosis Date   • Breast cancer (CMS/HCC)     stage IIA, s/p bilateral mastectomy, XRT, and chemotherapy, followed by Dr. Hadley   • Osteoporosis    • Paroxysmal supraventricular tachycardia (CMS/HCC)     CS ostium atrial tachycardia, ablated in 2002 by Dr. Garzon   • Sinus headache      But no history of migranes   • Skin cancer    • Wears glasses      Social History     Socioeconomic History   • Marital status:      Spouse name: Misha   • Number of children: Not on file   • Years of education: College   • Highest education level: Not on file   Social Needs   • Financial resource strain: Not on file   • Food insecurity - worry: Not on file   • Food insecurity - inability: Not on file   • Transportation needs - medical: Not on file   • Transportation needs - non-medical: Not on file   Occupational History   • Occupation:      Employer: [employer]     Comment: [employer]   Tobacco Use   • Smoking status: Never Smoker   • Smokeless tobacco: Never Used   Substance and Sexual Activity   • Alcohol use: Yes     Comment: Rare   • Drug use: No   • Sexual activity: Defer   Other Topics Concern   • Not on file   Social History Narrative   • Not on file     Family History   Problem Relation Age of Onset   • Coronary artery disease Mother    • Heart disease Mother    • Thrombophlebitis Mother         2016   • Heart attack Mother    • Hepatitis Father    • Cancer Father    • Crohn's disease Sister    • Drug abuse Brother         Drug overdose   • Cancer Maternal Grandmother 42        Breast   • Thyroid disease Sister    • Other Sister         kidney problems   • Breast cancer Maternal Aunt      :ONCOLOGIC/HEMATOLOGIC HISTORY     The patient was diagnosed with stage II breast cancer with the tumor located in the anterior axillary fold in the tail of the breast. She underwent bilateral mastectomies and she had bilateral expanders placed by Dr. MURPHY Aguilar in preparation for further reconstruction in the future.  The patient’s primary tumor was 1.6 cm in size.  Margins of resection were negative, grade 3, with no lymphovascular invasion. Two sentinel lymph nodes contain metastasis, one of them with macrometastasis that were not spilling out of the lymph node.   Circulating cancer cells were negative.  CA 15-3 was negative, normal.  The patient had a CT scan of the brain, chest, abdomen and pelvis that disclose no distant metastasis at any level.  The radiologist questioned an internal mammary node on the right side that does not make any clinical sense given the primary location of the tumor in the right breast.  Her bone scan was negative.  Her MUGA ejection fraction was normal.  With all of this in mind we advised the patient to either participate in the NSABP B47 clinical trial or to consider chemotherapy in the standard regimen dose dense AC followed by Taxol followed by hormonal anti-estrogen therapy given the finding in the pathological specimen of the mastectomy that her tumor was weakly ER positive, OR negative, HER-2 negative by FISH.      Our research coordinator had the opportunity to interview the patient today to go through the explanation of participation in the clinical trial.      Patient seen 07/24/2014 for a second cycle of AC chemotherapy. Adjustments were made for her antinausea treatments then post chemotherapy to try to reduce insomnia and further nausea.     On 08/07/2014 the patient has encountered size effects of the treatment including nausea and vomiting on days 3, 4 and 5 after chemotherapy related to a continuous bitter metallic taste in her mouth.  She also has experienced a tremendous degree of skeletal pain induced by the Neulasta to the point that she surrendered to bed.  Under these circumstances we advised her to use some mint or crystalized kimberly for the taste in her mouth and she will take Aleve on a regular schedule on days 2, 3, 4 and 5 to minimize the bone pain induced by the Neulasta.  On the other hand she has not developed any mucositis.  She has not developed any infections, abnormal bleeding or significant hematological toxicity.    On 08/21/2014 the patient proceeded with her 4th cycle of Adriamycin/Cytoxan supported with  Neulasta. Pepcid AC b.i.d. was initiated at a standing dose to control heartburn. The patient also was advised to continue using mint and kimberly to minimize the taste in her mouth induced by chemicals in her chemotherapy treatment. She was scheduled to receive her next cycle of chemotherapy and at that point she will initiate Taxol on a weekly basis.     She was seen back in the office 09/04/2014 initiating treatment with Taxol given every week, x12.     On 09/18/14, the patient was undergoing therapy with Taxol and having less fatigue, no nausea, occasional tingling in her fingers but it was very transitory with no discomfort in her feet or toes.  She was still very sensitive, feeling her expanders in the chest wall, and we advised her to initiate a program of water therapy.  Her Taxol treatment was continued.  We also advised her to use massage therapy.     On 09/25/2014, the patient was still experiencing a grade 1 peripheral neuropathy of hands, none in feet, associated with Taxol use.  Otherwise she was feeling fine.   She still had a lot of sensitivity in the chest wall, in areas of breast expanders, and in the shoulders and scapular areas.  We have not been able to get her water therapy classes.   We advised her to continue her care plan in the same way and continue Prilosec on a daily basis to avoid reflux esophagitis that is very well controlled at this time.  Her chemotherapy treatment was continued and hematological parameters were stable and her physical exam was otherwise unremarkable.      The patient was seen back in the office on 10/16/2014, tolerating Taxol well. Plans are made to continue the same.     The patient was seen on 11/06/2014, referral was made to radiation oncology to see if the patient will benefit from radiation therapy since she had involvement of two sentinel lymph nodes with metastatic carcinoma.     On 12/04/2014 the patient had a discussion with us in regard plans for any other  form of hormonal adjuvant therapy.  Given the fact that her tumor was weakly estrogen receptor positive, we advised her at her age to initiate therapy with tamoxifen.  I asked her to take this medicine at least for the next 10 years.  I asked her to consider eventually whenever we fulfill the criteria for being post-menopausal to switch this to an aromatase inhibitor.      We advised her to initiate a program of exercise to relieve the pain and discomfort that she has in her upper body due to the expanders and her surgical sites.     We advised her to proceed with bone density test. We need to check this in consideration of the utilization of bisphosphonate either by mouth or IV, not only for keeping her bone health now that she is post-menopausal but also in consideration of prevention of bone metastasis.      We had an extensive discussion with her and her sister about sexual issues that she has encountered during the treatment for her disease and try to relieve her from the stressors of this at this time.  Her  was not present unfortunately during this discussion.      On 01/02/2015 we documented that the patient was tolerating her tamoxifen well with minimal hot flashes and she was still undergoing her radiation treatment to the site of her disease.  She was also documented to have osteoporosis in her bone density and we suggested for her to receive IV Reclast that will have a dual function of treatment of osteoporosis as well as prevention of bone metastasis.      In regard to her conjugal and personal life, the patient has very poor self-esteem.  She finds no one in her life that will be supportive to her and the relationships with her  are very minimal and just in a very low level of activity.  I asked her to bring her  for the next visit to have a conversation one on one, face to face with him and see if he can become a partner for her to help her to heal emotionally and to be supportive  for her all the time.  Eventually the patient will require some counseling as well.    We did not recommend any antianxiety or antidepressant medication for her at this point.  The patient was not willing to take anything anyway.      On 01/14/2015, she was handling her tamoxifen well. She was advised to continue the medicine. Five more sessions of radiation therapy to complete her treatment.     On 02/25/2015, the patient has no symptoms related to her cancer with exception of the hot flashes induced by the tamoxifen. Physical activity has improved dramatically, her ability in regards to movement in her upper extremities and pain that she was experiencing before. She will have her expanders removed in the summer and she will have permanent implants then. In regards to her Reclast infusion, she had a reaction to the medicine including fevers and skeletal pain that was short lasting. She will require premedication with dexamethasone for the next infusion in 1 year.     In regards to her relationship with her , this situation has substantially improved and they are back together, not completely back to normal, but in a much better situation. Obviously, this closes the loop of her life and self-esteem that has substantially improved.     On 05/15/2015 the patient was feeling terrific.  She had minimal discomfort in her shoulders and she is ready to have her expanders removed and have her implants.  Otherwise tolerance to tamoxifen was excellent. She was not having any vaginal bleeding and she was not having any menstrual flow.  Her personal life and her marriage life have further improved and she feels satisfied.  We advised her to continue tamoxifen and keeping her port flushed every couple of months.     Her physical exam was unrevealing at this time.    On 09/14/2015 she had a complete 12 point review of system, having excellent level of energy, normal appetite, normal bowel function, normal urination, no  vaginal bleeding, tolerance to tamoxifen was appropriate, hot flashes were ongoing, no cardiorespiratory  or neurological or skeletal symptomatology.  Her physical exam was unrevealing.  Under these circumstances we advised her to remain on tamoxifen 20 mg a day.  She will be informed about her estradiol level, FSH and LH.  Otherwise, she will have her port flushed today and this will be done every 6 weeks with MD visit again in 3 months. She will be due to have a new Reclast infusion in January 2016 and on that occasion she will be pre-medicated with dexamethasone to avoid fever and achiness in the skeleton induced by the first one.     On 12/14/2015 Tamoxifen was continued. The patient had no clinical indications of breast cancer recurrence. She completed her reconstruction of her breasts with bilateral implants, looking cosmetically very pleasing to her.       Review of Systems             General: no fever, no chills, no fatigue,no weight changes, no lack of appetite.  Eyes: no epiphora, xerophthalmia,conjunctivitis, pain, glaucoma, blurred vision, blindness, secretion, photophobia, proptosis, diplopia.  Ears: no otorrhea, tinnitus, otorrhagia, deafness, pain, vertigo.  Nose: no rhinorrhea, no epistaxis, no alteration in perception of odors, no sinuses pressure.  Mouth: no alteration in gums or teeth,  No ulcers, no difficulty with mastication or deglut ion, no odynophagia.  Neck: no masses or pain, no thyroid alterations, no pain in muscles or arteries, no carotid odynia, no crepitation.  Respiratory: no cough, no sputum production,no dyspnea,no trepopnea, no pleuritic pain,no hemoptysis.  Heart: no syncope, no irregularity, no palpitations, no angina,no orthopnea,no paroxysmal nocturnal dyspnea.  Vascular Venous: no tenderness,no edema,no palpable cords,no postphlebitic syndrome, no skin changes no ulcerations.  Vascular Arterial: no distal ischemia, noclaudication, no gangrene, no neuropathic ischemic pain,  "no skin ulcers, no paleness no cyanosis.  GI: no dysphagia, no odynophagia, no regurgitation, no heartburn,no indigestion,no nausea,no vomiting,no hematemesis ,no melena,no jaundice,no distention, no obstipation,no enterorrhagia,no proctalgia,no anal  lesions, no changes in bowel habits.  : no frequency, no hesitancy, no hematuria, no discharge,no  pain.  Musculoskeletal: stated muscle or tendon pain or inflammation,no  joint pain, no edema, no functional limitation,no fasciculations, no mass.  Neurologic: no headache, no seizures, noalterations on Craneal nerves, no motor deficit, no sensory deficit, normal coordination, no alteration in memory,normal orientation, calculation,normal writting, verbal and written language.  Skin: no rashes,no pruritus no localized lesions.  Psychiatric: no anxiety, no depression,no agitation, no delusions, proper insight.    Medications:  The current medication list was reviewed in the EMR    ALLERGIES:  No Known Allergies    Objective      Vitals:    12/26/18 1336   BP: 104/69   Pulse: 67   Resp: 12   Temp: 98.5 °F (36.9 °C)   TempSrc: Oral   SpO2: 99%   Weight: 65 kg (143 lb 3.2 oz)   Height: 169.5 cm (66.73\")   PainSc: 0-No pain     Current Status 12/26/2018   ECOG score 0       Physical Exam           GENERAL:  Well-developed, well-nourished  Patient  in no acute distress.   SKIN:  Warm, dry ,NO rashes,NO purpura ,NO petechiae.  HEENT:  Pupils were equal and reactive to light and accomodation, conjunctivas non injected, no pterigion, normal extraocular movements, normal visual acuity.   Mouth mucosa was moist, no exudates in oropharynx, normal gum line, normal roof of the mouth and pillars, normal papillations of the tongue.  NECK:  Supple with good range of motion; no thyromegaly or masses, no JVD or bruits, no cervical adenopathies.No carotid arteries pain, no carotid abnormal pulsation , NO arterial dance.  LYMPHATICS:  No cervical, NO supraclavicular, NO axillary,NO " epitrochlear , NO inguinal adenopathy.  CHEST:  Normal excursion of both adonis thoraces, normal voice fremitus, no subcutaneous emphysema, normal axillas, no rashes or acanthosis nigricans. Lungs clear to percussion and auscultation, normal breath sounds bilaterally, no wheezing,NO crackles NO ronchi, NO stridor, NO rubs.  CARDIAC AND VASCULAR:  normal rate and regular rhythm, without murmurs,NO rubs NO S3 NO S4 right or left . Normal femoral, popliteal, pedis, brachial and carotid pulses.INSPECTION of  breast documented symmetry of the tissue per se and location and size of the nipple,no retractions or inversion of the nipple, normal skin without lesions, no erythema or nodules,no paud'orange, no prominence of superficial veins or chest wall collateral circulation.PALPATION of the breast documented normal skin turgor, no induration, alteration in local temperature, or pain, no palpable masses or nodules, normal mobility of the tissues,no fixation of the tissue or parenchyma to the chest wall, no alteration at the tail of the breasts or axillas, no adenopathies. Bilateral Surgical implants site was well healed.No lymphedema in either extremity.  ABDOMEN:  Soft, nontender with no organomegaly or masses, no ascites, no collateral circulation,no distention,no Joe sign, no abdominal pain, no inguinal hernias,no umbilical hernia, no abdominal bruits. Normal bowel sounds.  GENITAL: Not  Performed.  EXTREMITIES  AND SPINE:  No clubbing, cyanosis or edema, no deformities or pain .No kyphosis, scoliosis, deformities or pain in spine, ribs or pelvic bone.  NEUROLOGICAL:  Patient was awake, alert, oriented to time, person and place.              RECENT LABS:  Hematology WBC   Date Value Ref Range Status   12/26/2018 6.37 4.00 - 10.00 10*3/mm3 Final     RBC   Date Value Ref Range Status   12/26/2018 4.37 3.90 - 5.00 10*6/mm3 Final     Hemoglobin   Date Value Ref Range Status   12/26/2018 13.2 11.5 - 14.9 g/dL Final      Hematocrit   Date Value Ref Range Status   12/26/2018 38.9 34.0 - 45.0 % Final     Platelets   Date Value Ref Range Status   12/26/2018 275 150 - 375 10*3/mm3 Final              Assessment/Plan  1History of stage IIA, [id number], ER weakly-positive, [place] negative, HER2-negative right upper outer quadrant breast cancer, status post bilateral mastectomies. The patient completed her adjuvant chemotherapy under NSABP B49 clinical trial.The patient today looks terrific and she has no other issues. She is not experiencing any side effects of Femara. Her physical examination is normal. Her breast implants are not changing and her white count, hemoglobin and platelet count are normal. The tolerance to Femara is excellent. Therefore under these premises, I advised her to remain on her Femara 2.5 mg a day and continue having her port flushed every 6 weeks.             The patient is due for her 2nd dose of Reclast in 02/2019 and it will be okay to postpone this until we see her in 03/2019.     The patient will have Alteplase placed today in her port catheter because it is not flushing correctly. This will be repeated if necessary in 6 weeks. So far, the patient does not want and neither myself, to remove the port at this time.     I discussed all these facts with the patient and she was ready to proceed. She will be called at home with the report of the rest of her laboratory parameters.                                   12/26/2018      CC:

## 2018-12-27 ENCOUNTER — TELEPHONE (OUTPATIENT)
Dept: ONCOLOGY | Facility: CLINIC | Age: 43
End: 2018-12-27

## 2019-01-11 ENCOUNTER — OFFICE VISIT (OUTPATIENT)
Dept: RETAIL CLINIC | Facility: CLINIC | Age: 44
End: 2019-01-11

## 2019-01-11 VITALS
OXYGEN SATURATION: 98 % | TEMPERATURE: 97.9 F | DIASTOLIC BLOOD PRESSURE: 62 MMHG | RESPIRATION RATE: 16 BRPM | SYSTOLIC BLOOD PRESSURE: 114 MMHG | HEART RATE: 77 BPM

## 2019-01-11 DIAGNOSIS — J32.0 MAXILLARY SINUSITIS, UNSPECIFIED CHRONICITY: ICD-10-CM

## 2019-01-11 DIAGNOSIS — H66.93 BILATERAL OTITIS MEDIA, UNSPECIFIED OTITIS MEDIA TYPE: Primary | ICD-10-CM

## 2019-01-11 PROCEDURE — 99213 OFFICE O/P EST LOW 20 MIN: CPT | Performed by: NURSE PRACTITIONER

## 2019-01-11 RX ORDER — AMOXICILLIN AND CLAVULANATE POTASSIUM 875; 125 MG/1; MG/1
1 TABLET, FILM COATED ORAL 2 TIMES DAILY
Qty: 20 TABLET | Refills: 0 | Status: SHIPPED | OUTPATIENT
Start: 2019-01-11 | End: 2019-01-21

## 2019-01-11 RX ORDER — FLUTICASONE PROPIONATE 50 MCG
2 SPRAY, SUSPENSION (ML) NASAL DAILY
Qty: 1 BOTTLE | Refills: 0 | Status: SHIPPED | OUTPATIENT
Start: 2019-01-11 | End: 2019-02-10

## 2019-01-11 RX ORDER — FLUCONAZOLE 150 MG/1
150 TABLET ORAL ONCE
Qty: 1 TABLET | Refills: 0 | Status: SHIPPED | OUTPATIENT
Start: 2019-01-11 | End: 2019-01-11

## 2019-01-11 NOTE — PROGRESS NOTES
Subjective:     Diana Higgins is a 43 y.o.     Sinusitis   This is a new problem. The current episode started in the past 7 days. The problem has been rapidly worsening since onset. There has been no fever. The pain is severe. Associated symptoms include ear pain, headaches and sinus pressure. Past treatments include acetaminophen and oral decongestants. The treatment provided mild relief.         The following portions of the patient's history were reviewed and updated as appropriate: allergies, current medications, past family history, past medical history, past social history, past surgical history and problem list.      Review of Systems   Constitutional: Negative.    HENT: Positive for ear pain and sinus pressure.    Respiratory: Negative.    Cardiovascular: Negative.    Neurological: Positive for headaches.         Objective:    Vitals:    01/11/19 1708   BP: 114/62   BP Location: Right arm   Patient Position: Sitting   Cuff Size: Adult   Pulse: 77   Resp: 16   Temp: 97.9 °F (36.6 °C)   SpO2: 98%       Physical Exam   Constitutional: She is oriented to person, place, and time. She appears well-developed and well-nourished.   HENT:   Head: Normocephalic and atraumatic.   Right Ear: There is tenderness. Tympanic membrane is erythematous and bulging.   Left Ear: There is tenderness. Tympanic membrane is erythematous and bulging.   Nose: Right sinus exhibits maxillary sinus tenderness. Left sinus exhibits maxillary sinus tenderness.   Mouth/Throat: Oropharynx is clear and moist and mucous membranes are normal.   Patient has skin crusting at opening of ear canal that she reports started about a month ago.   Eyes: Conjunctivae are normal.   Cardiovascular: Normal rate, regular rhythm and normal heart sounds.   Pulmonary/Chest: Effort normal and breath sounds normal.   Neurological: She is alert and oriented to person, place, and time.   Skin: Skin is warm and dry.   Psychiatric: She has a normal mood and affect.  Her behavior is normal. Judgment and thought content normal.   Vitals reviewed.        Diana was seen today for sinusitis.    Diagnoses and all orders for this visit:    Bilateral otitis media, unspecified otitis media type    Maxillary sinusitis, unspecified chronicity    Other orders  -     amoxicillin-clavulanate (AUGMENTIN) 875-125 MG per tablet; Take 1 tablet by mouth 2 (Two) Times a Day for 10 days.  -     fluticasone (FLONASE) 50 MCG/ACT nasal spray; 2 sprays into the nostril(s) as directed by provider Daily for 30 days. Administer 2 sprays in each nostril for each dose.  -     fluconazole (DIFLUCAN) 150 MG tablet; Take 1 tablet by mouth 1 (One) Time for 1 dose.  -     triamcinolone (KENALOG) 0.1 % ointment; Apply  topically to the appropriate area as directed 2 (Two) Times a Day. Apply to skin at ear canal opening

## 2019-01-11 NOTE — PATIENT INSTRUCTIONS
Sinusitis, Adult  Sinusitis is soreness and inflammation of your sinuses. Sinuses are hollow spaces in the bones around your face. Your sinuses are located:  · Around your eyes.  · In the middle of your forehead.  · Behind your nose.  · In your cheekbones.    Your sinuses and nasal passages are lined with a stringy fluid (mucus). Mucus normally drains out of your sinuses. When your nasal tissues become inflamed or swollen, the mucus can become trapped or blocked so air cannot flow through your sinuses. This allows bacteria, viruses, and funguses to grow, which leads to infection.  Sinusitis can develop quickly and last for 7?10 days (acute) or for more than 12 weeks (chronic). Sinusitis often develops after a cold.  What are the causes?  This condition is caused by anything that creates swelling in the sinuses or stops mucus from draining, including:  · Allergies.  · Asthma.  · Bacterial or viral infection.  · Abnormally shaped bones between the nasal passages.  · Nasal growths that contain mucus (nasal polyps).  · Narrow sinus openings.  · Pollutants, such as chemicals or irritants in the air.  · A foreign object stuck in the nose.  · A fungal infection. This is rare.    What increases the risk?  The following factors may make you more likely to develop this condition:  · Having allergies or asthma.  · Having had a recent cold or respiratory tract infection.  · Having structural deformities or blockages in your nose or sinuses.  · Having a weak immune system.  · Doing a lot of swimming or diving.  · Overusing nasal sprays.  · Smoking.    What are the signs or symptoms?  The main symptoms of this condition are pain and a feeling of pressure around the affected sinuses. Other symptoms include:  · Upper toothache.  · Earache.  · Headache.  · Bad breath.  · Decreased sense of smell and taste.  · A cough that may get worse at night.  · Fatigue.  · Fever.  · Thick drainage from your nose. The drainage is often green and  it may contain pus (purulent).  · Stuffy nose or congestion.  · Postnasal drip. This is when extra mucus collects in the throat or back of the nose.  · Swelling and warmth over the affected sinuses.  · Sore throat.  · Sensitivity to light.    How is this diagnosed?  This condition is diagnosed based on symptoms, a medical history, and a physical exam. To find out if your condition is acute or chronic, your health care provider may:  · Look in your nose for signs of nasal polyps.  · Tap over the affected sinus to check for signs of infection.  · View the inside of your sinuses using an imaging device that has a light attached (endoscope).    If your health care provider suspects that you have chronic sinusitis, you may also:  · Be tested for allergies.  · Have a sample of mucus taken from your nose (nasal culture) and checked for bacteria.  · Have a mucus sample examined to see if your sinusitis is related to an allergy.    If your sinusitis does not respond to treatment and it lasts longer than 8 weeks, you may have an MRI or CT scan to check your sinuses. These scans also help to determine how severe your infection is.  In rare cases, a bone biopsy may be done to rule out more serious types of fungal sinus disease.  How is this treated?  Treatment for sinusitis depends on the cause and whether your condition is chronic or acute. If a virus is causing your sinusitis, your symptoms will go away on their own within 10 days. You may be given medicines to relieve your symptoms, including:  · Topical nasal decongestants. They shrink swollen nasal passages and let mucus drain from your sinuses.  · Antihistamines. These drugs block inflammation that is triggered by allergies. This can help to ease swelling in your nose and sinuses.  · Topical nasal corticosteroids. These are nasal sprays that ease inflammation and swelling in your nose and sinuses.  · Nasal saline washes. These rinses can help to get rid of thick mucus in  your nose.    If your condition is caused by bacteria, you will be given an antibiotic medicine. If your condition is caused by a fungus, you will be given an antifungal medicine.  Surgery may be needed to correct underlying conditions, such as narrow nasal passages. Surgery may also be needed to remove polyps.  Follow these instructions at home:  Medicines  · Take, use, or apply over-the-counter and prescription medicines only as told by your health care provider. These may include nasal sprays.  · If you were prescribed an antibiotic medicine, take it as told by your health care provider. Do not stop taking the antibiotic even if you start to feel better.  Hydrate and Humidify  · Drink enough water to keep your urine clear or pale yellow. Staying hydrated will help to thin your mucus.  · Use a cool mist humidifier to keep the humidity level in your home above 50%.  · Inhale steam for 10-15 minutes, 3-4 times a day or as told by your health care provider. You can do this in the bathroom while a hot shower is running.  · Limit your exposure to cool or dry air.  Rest  · Rest as much as possible.  · Sleep with your head raised (elevated).  · Make sure to get enough sleep each night.  General instructions  · Apply a warm, moist washcloth to your face 3-4 times a day or as told by your health care provider. This will help with discomfort.  · Wash your hands often with soap and water to reduce your exposure to viruses and other germs. If soap and water are not available, use hand .  · Do not smoke. Avoid being around people who are smoking (secondhand smoke).  · Keep all follow-up visits as told by your health care provider. This is important.  Contact a health care provider if:  · You have a fever.  · Your symptoms get worse.  · Your symptoms do not improve within 10 days.  Get help right away if:  · You have a severe headache.  · You have persistent vomiting.  · You have pain or swelling around your face or  eyes.  · You have vision problems.  · You develop confusion.  · Your neck is stiff.  · You have trouble breathing.  This information is not intended to replace advice given to you by your health care provider. Make sure you discuss any questions you have with your health care provider.  Document Released: 12/18/2006 Document Revised: 08/13/2017 Document Reviewed: 10/12/2016  [employer] Patient Education © 2018 [employer]  Otitis Media, Adult  Otitis media occurs when there is inflammation and fluid in the middle ear. Your middle ear is a part of the ear that contains bones for hearing as well as air that helps send sounds to your brain.  What are the causes?  This condition is caused by a blockage in the eustachian tube. This tube drains fluid from the ear to the back of the nose (nasopharynx). A blockage in this tube can be caused by an object or by swelling (edema) in the tube. Problems that can cause a blockage include:  · A cold or other upper respiratory infection.  · Allergies.  · An irritant, such as tobacco smoke.  · Enlarged adenoids. The adenoids are areas of soft tissue located high in the back of the throat, behind the nose and the roof of the mouth.  · A mass in the nasopharynx.  · Damage to the ear caused by pressure changes (barotrauma).    What are the signs or symptoms?  Symptoms of this condition include:  · Ear pain.  · A fever.  · Decreased hearing.  · A headache.  · Tiredness (lethargy).  · Fluid leaking from the ear.  · Ringing in the ear.    How is this diagnosed?  This condition is diagnosed with a physical exam. During the exam your health care provider will use an instrument called an otoscope to look into your ear and check for redness, swelling, and fluid. He or she will also ask about your symptoms.  Your health care provider may also order tests, such as:  · A test to check the movement of the eardrum (pneumatic otoscopy). This test is done by squeezing a small amount of  air into the ear.  · A test that changes air pressure in the middle ear to check how well the eardrum moves and whether the eustachian tube is working (tympanogram).    How is this treated?  This condition usually goes away on its own within 3-5 days. But if the condition is caused by a bacteria infection and does not go away own its own, or keeps coming back, your health care provider may:  · Prescribe antibiotic medicines to treat the infection.  · Prescribe or recommend medicines to control pain.    Follow these instructions at home:  · Take over-the-counter and prescription medicines only as told by your health care provider.  · If you were prescribed an antibiotic medicine, take it as told by your health care provider. Do not stop taking the antibiotic even if you start to feel better.  · Keep all follow-up visits as told by your health care provider. This is important.  Contact a health care provider if:  · You have bleeding from your nose.  · There is a lump on your neck.  · You are not getting better in 5 days.  · You feel worse instead of better.  Get help right away if:  · You have severe pain that is not controlled with medicine.  · You have swelling, redness, or pain around your ear.  · You have stiffness in your neck.  · A part of your face is paralyzed.  · The bone behind your ear (mastoid) is tender when you touch it.  · You develop a severe headache.  Summary  · Otitis media is redness, soreness, and swelling of the middle ear.  · This condition usually goes away on its own within 3-5 days.  · If the problem does not go away in 3-5 days, your health care provider may prescribe or recommend medicines to treat your symptoms.  · If you were prescribed an antibiotic medicine, take it as told by your health care provider.  This information is not intended to replace advice given to you by your health care provider. Make sure you discuss any questions you have with your health care provider.  Document  Released: 09/22/2005 Document Revised: 12/08/2017 Document Reviewed: 12/08/2017  [employer] Interactive Patient Education © 2018 [employer]

## 2019-01-21 ENCOUNTER — APPOINTMENT (OUTPATIENT)
Dept: ONCOLOGY | Facility: HOSPITAL | Age: 44
End: 2019-01-21

## 2019-01-21 ENCOUNTER — INFUSION (OUTPATIENT)
Dept: ONCOLOGY | Facility: HOSPITAL | Age: 44
End: 2019-01-21

## 2019-01-21 DIAGNOSIS — Z45.2 FITTING AND ADJUSTMENT OF VASCULAR CATHETER: Primary | ICD-10-CM

## 2019-01-21 PROCEDURE — 96523 IRRIG DRUG DELIVERY DEVICE: CPT | Performed by: INTERNAL MEDICINE

## 2019-01-21 RX ORDER — SODIUM CHLORIDE 0.9 % (FLUSH) 0.9 %
10 SYRINGE (ML) INJECTION AS NEEDED
Status: CANCELLED | OUTPATIENT
Start: 2019-01-21

## 2019-01-21 RX ORDER — SODIUM CHLORIDE 0.9 % (FLUSH) 0.9 %
10 SYRINGE (ML) INJECTION AS NEEDED
Status: DISCONTINUED | OUTPATIENT
Start: 2019-01-21 | End: 2019-01-21 | Stop reason: HOSPADM

## 2019-01-21 RX ADMIN — SODIUM CHLORIDE, PRESERVATIVE FREE 10 ML: 5 INJECTION INTRAVENOUS at 09:57

## 2019-01-21 RX ADMIN — Medication 500 UNITS: at 09:57

## 2019-02-05 RX ORDER — LETROZOLE 2.5 MG/1
TABLET, FILM COATED ORAL
Qty: 30 TABLET | Refills: 3 | Status: SHIPPED | OUTPATIENT
Start: 2019-02-05 | End: 2019-06-01 | Stop reason: SDUPTHER

## 2019-03-18 RX ORDER — ZOLEDRONIC ACID 5 MG/100ML
5 INJECTION, SOLUTION INTRAVENOUS ONCE
Status: CANCELLED | OUTPATIENT
Start: 2019-03-19

## 2019-03-18 RX ORDER — SODIUM CHLORIDE 9 MG/ML
250 INJECTION, SOLUTION INTRAVENOUS ONCE
Status: CANCELLED | OUTPATIENT
Start: 2019-03-19

## 2019-03-19 ENCOUNTER — INFUSION (OUTPATIENT)
Dept: ONCOLOGY | Facility: HOSPITAL | Age: 44
End: 2019-03-19

## 2019-03-19 ENCOUNTER — OFFICE VISIT (OUTPATIENT)
Dept: ONCOLOGY | Facility: CLINIC | Age: 44
End: 2019-03-19

## 2019-03-19 ENCOUNTER — TELEPHONE (OUTPATIENT)
Dept: ONCOLOGY | Facility: CLINIC | Age: 44
End: 2019-03-19

## 2019-03-19 VITALS
HEIGHT: 67 IN | TEMPERATURE: 98.4 F | DIASTOLIC BLOOD PRESSURE: 65 MMHG | WEIGHT: 143.6 LBS | SYSTOLIC BLOOD PRESSURE: 100 MMHG | RESPIRATION RATE: 12 BRPM | BODY MASS INDEX: 22.54 KG/M2 | OXYGEN SATURATION: 99 % | HEART RATE: 76 BPM

## 2019-03-19 DIAGNOSIS — Z45.2 FITTING AND ADJUSTMENT OF VASCULAR CATHETER: ICD-10-CM

## 2019-03-19 DIAGNOSIS — C50.411 MALIGNANT NEOPLASM OF UPPER-OUTER QUADRANT OF RIGHT BREAST IN FEMALE, ESTROGEN RECEPTOR POSITIVE (HCC): ICD-10-CM

## 2019-03-19 DIAGNOSIS — M81.0 AGE-RELATED OSTEOPOROSIS WITHOUT CURRENT PATHOLOGICAL FRACTURE: ICD-10-CM

## 2019-03-19 DIAGNOSIS — Z17.0 MALIGNANT NEOPLASM OF UPPER-OUTER QUADRANT OF RIGHT BREAST IN FEMALE, ESTROGEN RECEPTOR POSITIVE (HCC): ICD-10-CM

## 2019-03-19 DIAGNOSIS — Z17.0 MALIGNANT NEOPLASM OF UPPER-OUTER QUADRANT OF RIGHT BREAST IN FEMALE, ESTROGEN RECEPTOR POSITIVE (HCC): Primary | ICD-10-CM

## 2019-03-19 DIAGNOSIS — F41.9 ANXIETY: ICD-10-CM

## 2019-03-19 DIAGNOSIS — M81.0 AGE-RELATED OSTEOPOROSIS WITHOUT CURRENT PATHOLOGICAL FRACTURE: Primary | ICD-10-CM

## 2019-03-19 DIAGNOSIS — C50.411 MALIGNANT NEOPLASM OF UPPER-OUTER QUADRANT OF RIGHT BREAST IN FEMALE, ESTROGEN RECEPTOR POSITIVE (HCC): Primary | ICD-10-CM

## 2019-03-19 DIAGNOSIS — Z78.0 POST-MENOPAUSAL: ICD-10-CM

## 2019-03-19 LAB
ALBUMIN SERPL-MCNC: 4.3 G/DL (ref 3.5–5.2)
ALBUMIN/GLOB SERPL: 1.5 G/DL (ref 1.1–2.4)
ALP SERPL-CCNC: 66 U/L (ref 38–116)
ALT SERPL W P-5'-P-CCNC: 16 U/L (ref 0–33)
ANION GAP SERPL CALCULATED.3IONS-SCNC: 10.7 MMOL/L
AST SERPL-CCNC: 24 U/L (ref 0–32)
BASOPHILS # BLD AUTO: 0.03 10*3/MM3 (ref 0–0.2)
BASOPHILS NFR BLD AUTO: 0.6 % (ref 0–1.5)
BILIRUB SERPL-MCNC: 0.3 MG/DL (ref 0.2–1.2)
BUN BLD-MCNC: 14 MG/DL (ref 6–20)
BUN/CREAT SERPL: 19.7 (ref 7.3–30)
CALCIUM SPEC-SCNC: 9.5 MG/DL (ref 8.5–10.2)
CHLORIDE SERPL-SCNC: 102 MMOL/L (ref 98–107)
CO2 SERPL-SCNC: 26.3 MMOL/L (ref 22–29)
CREAT BLD-MCNC: 0.71 MG/DL (ref 0.6–1.1)
DEPRECATED RDW RBC AUTO: 39.7 FL (ref 37–54)
EOSINOPHIL # BLD AUTO: 0.14 10*3/MM3 (ref 0–0.4)
EOSINOPHIL NFR BLD AUTO: 2.7 % (ref 0.3–6.2)
ERYTHROCYTE [DISTWIDTH] IN BLOOD BY AUTOMATED COUNT: 12 % (ref 12.3–15.4)
GFR SERPL CREATININE-BSD FRML MDRD: 89 ML/MIN/1.73
GLOBULIN UR ELPH-MCNC: 2.8 GM/DL (ref 1.8–3.5)
GLUCOSE BLD-MCNC: 98 MG/DL (ref 74–124)
HCT VFR BLD AUTO: 40.7 % (ref 34–46.6)
HGB BLD-MCNC: 13.8 G/DL (ref 12–15.9)
IMM GRANULOCYTES # BLD AUTO: 0.02 10*3/MM3 (ref 0–0.05)
IMM GRANULOCYTES NFR BLD AUTO: 0.4 % (ref 0–0.5)
LYMPHOCYTES # BLD AUTO: 1.21 10*3/MM3 (ref 0.7–3.1)
LYMPHOCYTES NFR BLD AUTO: 23.4 % (ref 19.6–45.3)
MAGNESIUM SERPL-MCNC: 1.9 MG/DL (ref 1.8–2.5)
MCH RBC QN AUTO: 30.6 PG (ref 26.6–33)
MCHC RBC AUTO-ENTMCNC: 33.9 G/DL (ref 31.5–35.7)
MCV RBC AUTO: 90.2 FL (ref 79–97)
MONOCYTES # BLD AUTO: 0.6 10*3/MM3 (ref 0.1–0.9)
MONOCYTES NFR BLD AUTO: 11.6 % (ref 5–12)
NEUTROPHILS # BLD AUTO: 3.18 10*3/MM3 (ref 1.4–7)
NEUTROPHILS NFR BLD AUTO: 61.3 % (ref 42.7–76)
NRBC BLD AUTO-RTO: 0 /100 WBC (ref 0–0)
PHOSPHATE SERPL-MCNC: 3.5 MG/DL (ref 2.5–4.5)
PLATELET # BLD AUTO: 263 10*3/MM3 (ref 140–450)
PMV BLD AUTO: 9.1 FL (ref 6–12)
POTASSIUM BLD-SCNC: 4 MMOL/L (ref 3.5–4.7)
PROT SERPL-MCNC: 7.1 G/DL (ref 6.3–8)
RBC # BLD AUTO: 4.51 10*6/MM3 (ref 3.77–5.28)
SODIUM BLD-SCNC: 139 MMOL/L (ref 134–145)
WBC NRBC COR # BLD: 5.18 10*3/MM3 (ref 3.4–10.8)

## 2019-03-19 PROCEDURE — 83735 ASSAY OF MAGNESIUM: CPT

## 2019-03-19 PROCEDURE — 25010000002 ZOLEDRONIC ACID 5 MG/100ML SOLUTION: Performed by: INTERNAL MEDICINE

## 2019-03-19 PROCEDURE — 96365 THER/PROPH/DIAG IV INF INIT: CPT | Performed by: INTERNAL MEDICINE

## 2019-03-19 PROCEDURE — 99215 OFFICE O/P EST HI 40 MIN: CPT | Performed by: INTERNAL MEDICINE

## 2019-03-19 PROCEDURE — 84100 ASSAY OF PHOSPHORUS: CPT

## 2019-03-19 PROCEDURE — 85025 COMPLETE CBC W/AUTO DIFF WBC: CPT

## 2019-03-19 PROCEDURE — 80053 COMPREHEN METABOLIC PANEL: CPT

## 2019-03-19 RX ORDER — SODIUM CHLORIDE 0.9 % (FLUSH) 0.9 %
10 SYRINGE (ML) INJECTION AS NEEDED
Status: DISCONTINUED | OUTPATIENT
Start: 2019-03-19 | End: 2019-03-19 | Stop reason: HOSPADM

## 2019-03-19 RX ORDER — SODIUM CHLORIDE 9 MG/ML
250 INJECTION, SOLUTION INTRAVENOUS ONCE
Status: CANCELLED | OUTPATIENT
Start: 2019-03-19

## 2019-03-19 RX ORDER — SODIUM CHLORIDE 0.9 % (FLUSH) 0.9 %
10 SYRINGE (ML) INJECTION AS NEEDED
Status: CANCELLED | OUTPATIENT
Start: 2019-03-19

## 2019-03-19 RX ORDER — ZOLEDRONIC ACID 5 MG/100ML
5 INJECTION, SOLUTION INTRAVENOUS ONCE
Status: COMPLETED | OUTPATIENT
Start: 2019-03-19 | End: 2019-03-19

## 2019-03-19 RX ORDER — ZOLEDRONIC ACID 5 MG/100ML
5 INJECTION, SOLUTION INTRAVENOUS ONCE
Status: CANCELLED | OUTPATIENT
Start: 2019-03-19

## 2019-03-19 RX ORDER — SODIUM CHLORIDE 9 MG/ML
250 INJECTION, SOLUTION INTRAVENOUS ONCE
Status: COMPLETED | OUTPATIENT
Start: 2019-03-19 | End: 2019-03-19

## 2019-03-19 RX ADMIN — Medication 500 UNITS: at 10:10

## 2019-03-19 RX ADMIN — SODIUM CHLORIDE 250 ML: 9 INJECTION, SOLUTION INTRAVENOUS at 09:44

## 2019-03-19 RX ADMIN — SODIUM CHLORIDE, PRESERVATIVE FREE 10 ML: 5 INJECTION INTRAVENOUS at 10:10

## 2019-03-19 RX ADMIN — ZOLEDRONIC ACID 5 MG: 5 INJECTION, SOLUTION INTRAVENOUS at 09:44

## 2019-03-19 NOTE — PROGRESS NOTES
Subjective   REASONS FOR FOLLOWUP:    1. History of stage IIA, [id number], ER weakly-positive, MD negative, HER2-negative breast cancer, status post bilateral mastectomies. The patient completed her adjuvant chemotherapy under NSABP B49 clinical trial.  On 12/14/2015 she has no clinical evidence of recurrence of breast cancer and she remains on Tamoxifen for the time being. She also has completed her breast reconstruction with bilateral implants and she is very satisfied in regard to the cosmetic result of this. She will remain on Tamoxifen for the time being. She receiving at this time Effexor 37.5 mg a day for hot flashes and anxiety.   2. Bone health. The patient has osteoporosis. She  required a new Reclast infusion in Jan 2017, and on yearly bases             History of Present Illness This patient returns today to the office for followup stating that she has not had any recent new problems besides minimal irritation of the anal canal when she defecates, still being constipated and minimal local bleeding with no pain. She has not felt any abnormalities in this anatomical site. She has not had any vaginal bleeding. Otherwise, tolerance to her Femara is excellent. She has dryness in the vagina as well. Her gynecologist has suggested some testosterone pellets but she is afraid of utilizing them because of the fear and concern of breast cancer recurrence. The patient has no neurological symptomatology. She is less anxious. She feels well in her work environment. Her appetite is good. Her weight is stable and her bowel function as stated with some constipation. She is taking linseed for this. She has not had any other new problems.                      Past Medical History.   Past Medical History:   Diagnosis Date   • Breast cancer (CMS/HCC)     Right, stage IIA, s/p bilateral mastectomy, XRT, and chemotherapy, followed by Dr. Hadley   • Osteoporosis    • Paroxysmal supraventricular tachycardia (CMS/HCC)     CS  ostium atrial tachycardia, ablated in 2002 by Dr. Garzon   • Sinus headache     But no history of migranes   • Skin cancer    • Wears glasses      Social History     Socioeconomic History   • Marital status:      Spouse name: Misha   • Number of children: Not on file   • Years of education: College   • Highest education level: Not on file   Occupational History   • Occupation:      Employer: [employer]     Comment: [employer]   Tobacco Use   • Smoking status: Never Smoker   • Smokeless tobacco: Never Used   Substance and Sexual Activity   • Alcohol use: Yes     Comment: Rare   • Drug use: No   • Sexual activity: Defer     Family History   Problem Relation Age of Onset   • Coronary artery disease Mother    • Heart disease Mother    • Thrombophlebitis Mother         2016   • Heart attack Mother    • Hepatitis Father    • Cancer Father    • Crohn's disease Sister    • Drug abuse Brother         Drug overdose   • Cancer Maternal Grandmother 42        Breast   • Thyroid disease Sister    • Other Sister         kidney problems   • Breast cancer Maternal Aunt      :ONCOLOGIC/HEMATOLOGIC HISTORY     The patient was diagnosed with stage II breast cancer with the tumor located in the anterior axillary fold in the tail of the breast. She underwent bilateral mastectomies and she had bilateral expanders placed by Dr. MURPHY Aguilar in preparation for further reconstruction in the future.  The patient’s primary tumor was 1.6 cm in size.  Margins of resection were negative, grade 3, with no lymphovascular invasion. Two sentinel lymph nodes contain metastasis, one of them with macrometastasis that were not spilling out of the lymph node.  Circulating cancer cells were negative.  CA 15-3 was negative, normal.  The patient had a CT scan of the brain, chest, abdomen and pelvis that disclose no distant metastasis at any level.  The radiologist questioned an internal mammary node on the right  side that does not make any clinical sense given the primary location of the tumor in the right breast.  Her bone scan was negative.  Her MUGA ejection fraction was normal.  With all of this in mind we advised the patient to either participate in the NSABP B47 clinical trial or to consider chemotherapy in the standard regimen dose dense AC followed by Taxol followed by hormonal anti-estrogen therapy given the finding in the pathological specimen of the mastectomy that her tumor was weakly ER positive, MI negative, HER-2 negative by FISH.      Our research coordinator had the opportunity to interview the patient today to go through the explanation of participation in the clinical trial.      Patient seen 07/24/2014 for a second cycle of AC chemotherapy. Adjustments were made for her antinausea treatments then post chemotherapy to try to reduce insomnia and further nausea.     On 08/07/2014 the patient has encountered size effects of the treatment including nausea and vomiting on days 3, 4 and 5 after chemotherapy related to a continuous bitter metallic taste in her mouth.  She also has experienced a tremendous degree of skeletal pain induced by the Neulasta to the point that she surrendered to bed.  Under these circumstances we advised her to use some mint or crystalized kimberly for the taste in her mouth and she will take Aleve on a regular schedule on days 2, 3, 4 and 5 to minimize the bone pain induced by the Neulasta.  On the other hand she has not developed any mucositis.  She has not developed any infections, abnormal bleeding or significant hematological toxicity.    On 08/21/2014 the patient proceeded with her 4th cycle of Adriamycin/Cytoxan supported with Neulasta. Pepcid AC b.i.d. was initiated at a standing dose to control heartburn. The patient also was advised to continue using mint and kimberly to minimize the taste in her mouth induced by chemicals in her chemotherapy treatment. She was scheduled to  receive her next cycle of chemotherapy and at that point she will initiate Taxol on a weekly basis.     She was seen back in the office 09/04/2014 initiating treatment with Taxol given every week, x12.     On 09/18/14, the patient was undergoing therapy with Taxol and having less fatigue, no nausea, occasional tingling in her fingers but it was very transitory with no discomfort in her feet or toes.  She was still very sensitive, feeling her expanders in the chest wall, and we advised her to initiate a program of water therapy.  Her Taxol treatment was continued.  We also advised her to use massage therapy.     On 09/25/2014, the patient was still experiencing a grade 1 peripheral neuropathy of hands, none in feet, associated with Taxol use.  Otherwise she was feeling fine.   She still had a lot of sensitivity in the chest wall, in areas of breast expanders, and in the shoulders and scapular areas.  We have not been able to get her water therapy classes.   We advised her to continue her care plan in the same way and continue Prilosec on a daily basis to avoid reflux esophagitis that is very well controlled at this time.  Her chemotherapy treatment was continued and hematological parameters were stable and her physical exam was otherwise unremarkable.      The patient was seen back in the office on 10/16/2014, tolerating Taxol well. Plans are made to continue the same.     The patient was seen on 11/06/2014, referral was made to radiation oncology to see if the patient will benefit from radiation therapy since she had involvement of two sentinel lymph nodes with metastatic carcinoma.     On 12/04/2014 the patient had a discussion with us in regard plans for any other form of hormonal adjuvant therapy.  Given the fact that her tumor was weakly estrogen receptor positive, we advised her at her age to initiate therapy with tamoxifen.  I asked her to take this medicine at least for the next 10 years.  I asked her to  consider eventually whenever we fulfill the criteria for being post-menopausal to switch this to an aromatase inhibitor.      We advised her to initiate a program of exercise to relieve the pain and discomfort that she has in her upper body due to the expanders and her surgical sites.     We advised her to proceed with bone density test. We need to check this in consideration of the utilization of bisphosphonate either by mouth or IV, not only for keeping her bone health now that she is post-menopausal but also in consideration of prevention of bone metastasis.      We had an extensive discussion with her and her sister about sexual issues that she has encountered during the treatment for her disease and try to relieve her from the stressors of this at this time.  Her  was not present unfortunately during this discussion.      On 01/02/2015 we documented that the patient was tolerating her tamoxifen well with minimal hot flashes and she was still undergoing her radiation treatment to the site of her disease.  She was also documented to have osteoporosis in her bone density and we suggested for her to receive IV Reclast that will have a dual function of treatment of osteoporosis as well as prevention of bone metastasis.      In regard to her conjugal and personal life, the patient has very poor self-esteem.  She finds no one in her life that will be supportive to her and the relationships with her  are very minimal and just in a very low level of activity.  I asked her to bring her  for the next visit to have a conversation one on one, face to face with him and see if he can become a partner for her to help her to heal emotionally and to be supportive for her all the time.  Eventually the patient will require some counseling as well.    We did not recommend any antianxiety or antidepressant medication for her at this point.  The patient was not willing to take anything anyway.      On 01/14/2015,  she was handling her tamoxifen well. She was advised to continue the medicine. Five more sessions of radiation therapy to complete her treatment.     On 02/25/2015, the patient has no symptoms related to her cancer with exception of the hot flashes induced by the tamoxifen. Physical activity has improved dramatically, her ability in regards to movement in her upper extremities and pain that she was experiencing before. She will have her expanders removed in the summer and she will have permanent implants then. In regards to her Reclast infusion, she had a reaction to the medicine including fevers and skeletal pain that was short lasting. She will require premedication with dexamethasone for the next infusion in 1 year.     In regards to her relationship with her , this situation has substantially improved and they are back together, not completely back to normal, but in a much better situation. Obviously, this closes the loop of her life and self-esteem that has substantially improved.     On 05/15/2015 the patient was feeling terrific.  She had minimal discomfort in her shoulders and she is ready to have her expanders removed and have her implants.  Otherwise tolerance to tamoxifen was excellent. She was not having any vaginal bleeding and she was not having any menstrual flow.  Her personal life and her marriage life have further improved and she feels satisfied.  We advised her to continue tamoxifen and keeping her port flushed every couple of months.     Her physical exam was unrevealing at this time.    On 09/14/2015 she had a complete 12 point review of system, having excellent level of energy, normal appetite, normal bowel function, normal urination, no vaginal bleeding, tolerance to tamoxifen was appropriate, hot flashes were ongoing, no cardiorespiratory  or neurological or skeletal symptomatology.  Her physical exam was unrevealing.  Under these circumstances we advised her to remain on tamoxifen 20  mg a day.  She will be informed about her estradiol level, FSH and LH.  Otherwise, she will have her port flushed today and this will be done every 6 weeks with MD visit again in 3 months. She will be due to have a new Reclast infusion in January 2016 and on that occasion she will be pre-medicated with dexamethasone to avoid fever and achiness in the skeleton induced by the first one.     On 12/14/2015 Tamoxifen was continued. The patient had no clinical indications of breast cancer recurrence. She completed her reconstruction of her breasts with bilateral implants, looking cosmetically very pleasing to her.       Review of Systems             General: no fever, no chills, no fatigue,no weight changes, no lack of appetite.  Eyes: no epiphora, xerophthalmia,conjunctivitis, pain, glaucoma, blurred vision, blindness, secretion, photophobia, proptosis, diplopia.  Ears: no otorrhea, tinnitus, otorrhagia, deafness, pain, vertigo.  Nose: no rhinorrhea, no epistaxis, no alteration in perception of odors, no sinuses pressure.  Mouth: no alteration in gums or teeth,  No ulcers, no difficulty with mastication or deglut ion, no odynophagia.  Neck: no masses or pain, no thyroid alterations, no pain in muscles or arteries, no carotid odynia, no crepitation.  Respiratory: no cough, no sputum production,no dyspnea,no trepopnea, no pleuritic pain,no hemoptysis.  Heart: no syncope, no irregularity, no palpitations, no angina,no orthopnea,no paroxysmal nocturnal dyspnea.  Vascular Venous: no tenderness,no edema,no palpable cords,no postphlebitic syndrome, no skin changes no ulcerations.  Vascular Arterial: no distal ischemia, noclaudication, no gangrene, no neuropathic ischemic pain, no skin ulcers, no paleness no cyanosis.  GI: no dysphagia, no odynophagia, no regurgitation, no heartburn,no indigestion,no nausea,no vomiting,no hematemesis ,no melena,no jaundice,no distention, no obstipation,no enterorrhagia,stated proctalgia,and anal  " lesions, and changes in bowel habits.  : no frequency, no hesitancy, no hematuria, no discharge,no  pain.  Musculoskeletal: no muscle or tendon pain or inflammation,no  joint pain, no edema, no functional limitation,no fasciculations, no mass.  Neurologic: no headache, no seizures, noalterations on Craneal nerves, no motor deficit, no sensory deficit, normal coordination, no alteration in memory,normal orientation, calculation,normal writting, verbal and written language.  Skin: no rashes,no pruritus no localized lesions.  Psychiatric: no anxiety, no depression,no agitation, no delusions, proper insight.      Medications:  The current medication list was reviewed in the EMR    ALLERGIES:  No Known Allergies    Objective      Vitals:    03/19/19 0828   BP: 100/65   Pulse: 76   Resp: 12   Temp: 98.4 °F (36.9 °C)   TempSrc: Oral   SpO2: 99%   Weight: 65.1 kg (143 lb 9.6 oz)   Height: 169.5 cm (66.73\")   PainSc: 0-No pain     Current Status 3/19/2019   ECOG score 0       Physical Exam   GENERAL:  Well-developed, well-nourished  Patient  in no acute distress.   SKIN:  Warm, dry ,NO rashes,NO purpura ,NO petechiae.  HEENT:  Pupils were equal and reactive to light and accomodation, conjunctivas non injected, no pterigion, normal extraocular movements, normal visual acuity.   Mouth mucosa was moist, no exudates in oropharynx, normal gum line, normal roof of the mouth and pillars, normal papillations of the tongue.  NECK:  Supple with good range of motion; no thyromegaly or masses, no JVD or bruits, no cervical adenopathies.No carotid arteries pain, no carotid abnormal pulsation , NO arterial dance.  LYMPHATICS:  No cervical, NO supraclavicular, NO axillary,NO epitrochlear , NO inguinal adenopathy.  CHEST:  Normal excursion of both adonis thoraces, normal voice fremitus, no subcutaneous emphysema, normal axillas, no rashes or acanthosis nigricans. Lungs clear to percussion and auscultation, normal breath sounds bilaterally, " no wheezing,NO crackles NO ronchi, NO stridor, NO rubs.  CARDIAC AND VASCULAR:  normal rate and regular rhythm, without murmurs,NO rubs NO S3 NO S4 right or left . Normal femoral, popliteal, pedis, brachial and carotid pulses..  INSPECTION of  breast documented symmetry of the tissue per se and location and size of the nipple,no retractions or inversion of the nipple, normal skin without lesions, no erythema or nodules,no paud'orange, no prominence of superficial veins or chest wall collateral circulation.PALPATION of the breast documented normal skin turgor, no induration, alteration in local temperature, or pain, no palpable masses or nodules, normal mobility of the tissues,no fixation of the tissue or parenchyma to the chest wall, no alteration at the tail of the breasts or axillas, no adenopathies. Bilateral breast reconstruction Surgical sites was well healed.No lymphedema in either extremity.  ABDOMEN:  Soft, nontender with no organomegaly or masses, no ascites, no collateral circulation,no distention,no [place] sign, no abdominal pain, no inguinal hernias,no umbilical hernia, no abdominal bruits. Normal bowel sounds.  GENITAL: Not  Performed.  ANAL AND RECTAL EXAMS:  INSPECTION: skin was normal in color and aspect, with fissures at 12,3 and 6 oclock, no prolapse, no external hemorrhoids,PALPATION: no areas of tenderness or bulging , sphincter had normal contractility, no pain, no rectal masses, normal color stool .EXAM PERFORMED WITH MY MEDICAL ASSISTANT IN THE ROOM  EXTREMITIES  AND SPINE:  No clubbing, cyanosis or edema, no deformities or pain .No kyphosis, scoliosis, deformities or pain in spine, ribs or pelvic bone.  NEUROLOGICAL:  Patient was awake, alert, oriented to time, person and place.                            RECENT LABS:  Hematology WBC   Date Value Ref Range Status   03/19/2019 5.18 3.40 - 10.80 10*3/mm3 Final     RBC   Date Value Ref Range Status   03/19/2019 4.51 3.77 - 5.28 10*6/mm3 Final      Hemoglobin   Date Value Ref Range Status   03/19/2019 13.8 12.0 - 15.9 g/dL Final     Hematocrit   Date Value Ref Range Status   03/19/2019 40.7 34.0 - 46.6 % Final     Platelets   Date Value Ref Range Status   03/19/2019 263 140 - 450 10*3/mm3 Final              Assessment/Plan  1History of stage IIA, [id number], ER weakly-positive, SC negative, HER2-negative right upper outer quadrant breast cancer, status post bilateral mastectomies. The patient completed her adjuvant chemotherapy under NSABP B49 clinical trial.The patient today looks terrific and she has no other issues. She is not experiencing any side effects of Femara. Her physical examination is normal. Her breast implants are not changing and her white count, hemoglobin and platelet count are normal. The tolerance to Femara is excellent. Therefore under these premises, I advised her to remain on her Femara 2.5 mg a day and continue having her port flushed every 6 weeks.     2.Bone health. We know that the patient is osteoporotic and she is going to proceed with her 2nd dose of Reclast today. Eventually in the fall of this year, she will have a repeat bone density test. She remains on her vitamin D supplementation. She is physically active. She has sunshine exposure.   3. The patient has constipation and 3 different anal fissures. She has no external hemorrhoids and no rectal masses. I did perform examination with my medical assistant in the room.     Therefore, I recommended for her in regard to anal fissures to try to continue working on stool softeners and proper diet and proper to be sure that the stool is soft enough. The other measure is I sent a prescription for nitroglycerin ointment to apply locally in this area once a day. Hopefully this will bring up some circulation that will allow these areas to heal.     I will review her back in 4 months.     I reviewed her chemistry profile that was completely normal and it was discussed with her on the  telephone the same day of the blood draw.                                             3/20/2019      CC:

## 2019-03-21 ENCOUNTER — TELEPHONE (OUTPATIENT)
Dept: ONCOLOGY | Facility: HOSPITAL | Age: 44
End: 2019-03-21

## 2019-03-21 NOTE — TELEPHONE ENCOUNTER
----- Message from Kelsey Sifuentes sent at 3/21/2019  8:08 AM EDT -----  Contact: [phone]  Pt is having a reaction to cream MD gave her

## 2019-03-21 NOTE — TELEPHONE ENCOUNTER
Pt calls stating she used nitroglycerin cream last night for anal fissure and within 15 minutes developed severe headache, nausea, and racing heart. She states she still has headache this morning but denies nausea or racing heart or other complaints. Reviewed with Dr. Hadley. Per Dr. Hadley, these are side effects of medication. Per Dr. Hadley, pt can try mixing very small amount of nitroglycerin cream (instructed her to get small amount on a q-tip) and mix with 1 teaspoon of honey and apply. If pt has headache or other side effects again, she would need to stop use and be referred to surgeon. Pt informed and v/u. She will call back with any further complaints.

## 2019-04-15 RX ORDER — LINACLOTIDE 72 UG/1
CAPSULE, GELATIN COATED ORAL
Qty: 30 CAPSULE | Refills: 3 | Status: SHIPPED | OUTPATIENT
Start: 2019-04-15 | End: 2019-08-15 | Stop reason: SDUPTHER

## 2019-05-15 ENCOUNTER — APPOINTMENT (OUTPATIENT)
Dept: ONCOLOGY | Facility: HOSPITAL | Age: 44
End: 2019-05-15

## 2019-05-16 RX ORDER — SODIUM CHLORIDE 0.9 % (FLUSH) 0.9 %
10 SYRINGE (ML) INJECTION AS NEEDED
Status: CANCELLED | OUTPATIENT
Start: 2019-05-17

## 2019-05-17 ENCOUNTER — INFUSION (OUTPATIENT)
Dept: ONCOLOGY | Facility: HOSPITAL | Age: 44
End: 2019-05-17

## 2019-05-17 DIAGNOSIS — Z45.2 FITTING AND ADJUSTMENT OF VASCULAR CATHETER: Primary | ICD-10-CM

## 2019-05-17 PROCEDURE — 96523 IRRIG DRUG DELIVERY DEVICE: CPT | Performed by: INTERNAL MEDICINE

## 2019-05-17 RX ORDER — SODIUM CHLORIDE 0.9 % (FLUSH) 0.9 %
10 SYRINGE (ML) INJECTION AS NEEDED
Status: DISCONTINUED | OUTPATIENT
Start: 2019-05-17 | End: 2019-05-17 | Stop reason: HOSPADM

## 2019-05-17 RX ORDER — SODIUM CHLORIDE 0.9 % (FLUSH) 0.9 %
10 SYRINGE (ML) INJECTION AS NEEDED
Status: CANCELLED | OUTPATIENT
Start: 2019-05-17

## 2019-05-17 RX ADMIN — Medication 10 ML: at 12:59

## 2019-05-17 RX ADMIN — Medication 500 UNITS: at 12:59

## 2019-06-03 RX ORDER — LETROZOLE 2.5 MG/1
TABLET, FILM COATED ORAL
Qty: 30 TABLET | Refills: 3 | Status: SHIPPED | OUTPATIENT
Start: 2019-06-03 | End: 2019-09-19 | Stop reason: SDUPTHER

## 2019-06-25 ENCOUNTER — OFFICE VISIT (OUTPATIENT)
Dept: FAMILY MEDICINE CLINIC | Facility: CLINIC | Age: 44
End: 2019-06-25

## 2019-06-25 VITALS
TEMPERATURE: 98.4 F | HEART RATE: 74 BPM | SYSTOLIC BLOOD PRESSURE: 104 MMHG | DIASTOLIC BLOOD PRESSURE: 67 MMHG | HEIGHT: 67 IN | OXYGEN SATURATION: 92 % | WEIGHT: 138 LBS | BODY MASS INDEX: 21.66 KG/M2

## 2019-06-25 DIAGNOSIS — G47.30 SLEEP APNEA, UNSPECIFIED TYPE: ICD-10-CM

## 2019-06-25 DIAGNOSIS — G47.8 NON-RESTORATIVE SLEEP: ICD-10-CM

## 2019-06-25 DIAGNOSIS — G47.10 HYPERSOMNIA: ICD-10-CM

## 2019-06-25 DIAGNOSIS — Z00.00 ANNUAL PHYSICAL EXAM: Primary | ICD-10-CM

## 2019-06-25 PROCEDURE — 99386 PREV VISIT NEW AGE 40-64: CPT | Performed by: FAMILY MEDICINE

## 2019-07-01 ENCOUNTER — HOSPITAL ENCOUNTER (OUTPATIENT)
Dept: SLEEP MEDICINE | Facility: HOSPITAL | Age: 44
Discharge: HOME | End: 2019-07-01
Admitting: FAMILY MEDICINE

## 2019-07-01 DIAGNOSIS — G47.8 NON-RESTORATIVE SLEEP: ICD-10-CM

## 2019-07-01 DIAGNOSIS — G47.30 SLEEP APNEA, UNSPECIFIED TYPE: ICD-10-CM

## 2019-07-01 DIAGNOSIS — G47.10 HYPERSOMNIA: ICD-10-CM

## 2019-07-01 PROCEDURE — 95806 SLEEP STUDY UNATT&RESP EFFT: CPT | Performed by: FAMILY MEDICINE

## 2019-07-01 PROCEDURE — 95806 SLEEP STUDY UNATT&RESP EFFT: CPT

## 2019-07-08 ENCOUNTER — TELEPHONE (OUTPATIENT)
Dept: SLEEP MEDICINE | Facility: HOSPITAL | Age: 44
End: 2019-07-08

## 2019-07-10 ENCOUNTER — OFFICE VISIT (OUTPATIENT)
Dept: SLEEP MEDICINE | Facility: HOSPITAL | Age: 44
End: 2019-07-10

## 2019-07-10 VITALS
BODY MASS INDEX: 22.13 KG/M2 | HEIGHT: 67 IN | OXYGEN SATURATION: 98 % | SYSTOLIC BLOOD PRESSURE: 140 MMHG | HEART RATE: 92 BPM | WEIGHT: 141 LBS | DIASTOLIC BLOOD PRESSURE: 66 MMHG

## 2019-07-10 DIAGNOSIS — G47.8 NON-RESTORATIVE SLEEP: ICD-10-CM

## 2019-07-10 DIAGNOSIS — F51.04 PSYCHOPHYSIOLOGICAL INSOMNIA: Primary | ICD-10-CM

## 2019-07-10 PROCEDURE — G0463 HOSPITAL OUTPT CLINIC VISIT: HCPCS

## 2019-07-10 PROCEDURE — 99213 OFFICE O/P EST LOW 20 MIN: CPT | Performed by: FAMILY MEDICINE

## 2019-07-10 NOTE — PROGRESS NOTES
Follow Up [facility] Note     Chief Complaint: Hypersomnia nonrestorative sleep    Primary Care Physician: Manasa Walters MD    Diana Higgins is a 43 y.o.female  was last seen at [facility] sleep lab: July 2, 2019 for home sleep study due to complaints of nonrestorative sleep and hypersomnia.  She had a sleep study about 10 years ago which she reported was borderline and she did not tolerate CPAP.  Home sleep study was negative for obstructive sleep apnea with AHI of 0.5 events per hour.  Her lowest oxygen saturation was 92%.  She spent 55.6% of the time snoring.  Continues to report that sleep latency can be up to an hour after taking over-the-counter sleep aid and applying lavender oil to feet.  Does note anxiety component.  Is following up with her gynecologist who controls her Effexor dosage this month.      Current Medications:    Current Outpatient Medications:   •  BIOTIN PO, Take  by mouth., Disp: , Rfl:   •  Cholecalciferol (VITAMIN D-3) 1000 UNITS capsule, Take  by mouth Daily., Disp: , Rfl:   •  letrozole (FEMARA) 2.5 MG tablet, TAKE 1 TABLET BY MOUTH EVERY DAY, Disp: 30 tablet, Rfl: 3  •  LINZESS 72 MCG capsule capsule, TAKE 1 CAPSULE BY MOUTH EVERY DAY IN THE MORNING BEFORE BREAKFAST, Disp: 30 capsule, Rfl: 3  •  nitroglycerin (NITROSTAT) 2 % ointment, Place 0.5 inches on the skin as directed by provider Daily. Apply to anal area once a day, Disp: 30 g, Rfl: 1  •  Probiotic Product ([employer] ADULT PROBIOTIC PO), Take  by mouth daily., Disp: , Rfl:   •  venlafaxine XR (EFFEXOR-XR) 37.5 MG 24 hr capsule, Take 37.5 mg by mouth Daily., Disp: , Rfl: 2  •  Zoledronic Acid (RECLAST IV), Infuse  into a venous catheter. Once a year, Disp: , Rfl:    also entered in Sleep Questionnaire    Patient  has a past medical history of Breast cancer (CMS/HCC), Osteoporosis, Paroxysmal supraventricular tachycardia (CMS/HCC), Sinus headache, Skin cancer, and Wears glasses.    Social History:    Social History  "    Socioeconomic History   • Marital status:      Spouse name: Misha   • Number of children: Not on file   • Years of education: College   • Highest education level: Not on file   Occupational History   • Occupation:      Employer: [employer]     Comment: [employer]   Tobacco Use   • Smoking status: Never Smoker   • Smokeless tobacco: Never Used   Substance and Sexual Activity   • Alcohol use: Yes     Comment: Rare   • Drug use: No   • Sexual activity: Defer       Allergies:  Patient has no known allergies.    Review of Systems:    A complete review of systems was done and all were negative with the exception of all negative    Vital Signs:    Vitals:    07/10/19 0825   BP: 140/66   Pulse: 92   SpO2: 98%   Weight: 64 kg (141 lb)   Height: 170.2 cm (67\")     Body mass index is 22.08 kg/m².    Vital Signs /66   Pulse 92   Ht 170.2 cm (67\")   Wt 64 kg (141 lb)   SpO2 98%   BMI 22.08 kg/m²  Body mass index is 22.08 kg/m².    General Alert and oriented. No acute distress noted   Pharynx/Throat Class II/III Mallampati airway, large tongue, no evidence of redundant lateral pharyngeal tissue. No oral lesions. No thrush. Moist mucous membranes.   Head Normocephalic. Symmetrical. Atraumatic.    Nose No septal deviation. No drainage   Chest Wall Normal shape. Symmetric expansion with respiration. No tenderness.   Neck Trachea midline, no thyromegaly or adenopathy    Lungs Clear to auscultation bilaterally. No wheezes. No rhonchi. No rales. Respirations regular, even and unlabored.   Heart Regular rhythm and normal rate. Normal S1 and S2. No murmur   Abdomen Soft, non-tender and non-distended. Normal bowel sounds. No masses.   Extremities Moves all extremities well. No edema   Psychiatric Normal mood and affect.     Impression:  1. Psychophysiological insomnia    2. Non-restorative sleep        Discussed trying snore Rx device given that she is more 55.6% of the time during " her sleep study.  This could be interrupting her sleep.  When she tries a snore Rx device and if she finds benefit from it we can consider referral to dental sleep specialist to be fitted for her own oral mandibular device.    Discussed adjusting her Effexor dosage after talking to her gynecologist to help control her anxiety better as well.  Discussed again in detail guided meditation to help with the anxiety as well.  Also discussed the option of CBT-I and treatment for her insomnia.  Patient says she would think about this.    Continue follow-up with primary care office as scheduled.  Return to sleep clinic as needed.    Manasa Walters MD  Sleep Medicine  07/10/19  8:50 AM

## 2019-07-19 ENCOUNTER — INFUSION (OUTPATIENT)
Dept: ONCOLOGY | Facility: HOSPITAL | Age: 44
End: 2019-07-19

## 2019-07-19 ENCOUNTER — OFFICE VISIT (OUTPATIENT)
Dept: ONCOLOGY | Facility: CLINIC | Age: 44
End: 2019-07-19

## 2019-07-19 VITALS
SYSTOLIC BLOOD PRESSURE: 97 MMHG | BODY MASS INDEX: 22.16 KG/M2 | DIASTOLIC BLOOD PRESSURE: 66 MMHG | TEMPERATURE: 98.6 F | HEIGHT: 67 IN | OXYGEN SATURATION: 100 % | HEART RATE: 88 BPM | RESPIRATION RATE: 12 BRPM | WEIGHT: 141.2 LBS

## 2019-07-19 DIAGNOSIS — C50.411 MALIGNANT NEOPLASM OF UPPER-OUTER QUADRANT OF RIGHT BREAST IN FEMALE, ESTROGEN RECEPTOR POSITIVE (HCC): Primary | ICD-10-CM

## 2019-07-19 DIAGNOSIS — Z78.0 POST-MENOPAUSAL: ICD-10-CM

## 2019-07-19 DIAGNOSIS — Z17.0 MALIGNANT NEOPLASM OF UPPER-OUTER QUADRANT OF RIGHT BREAST IN FEMALE, ESTROGEN RECEPTOR POSITIVE (HCC): ICD-10-CM

## 2019-07-19 DIAGNOSIS — C50.411 MALIGNANT NEOPLASM OF UPPER-OUTER QUADRANT OF RIGHT BREAST IN FEMALE, ESTROGEN RECEPTOR POSITIVE (HCC): ICD-10-CM

## 2019-07-19 DIAGNOSIS — Z17.0 MALIGNANT NEOPLASM OF UPPER-OUTER QUADRANT OF RIGHT BREAST IN FEMALE, ESTROGEN RECEPTOR POSITIVE (HCC): Primary | ICD-10-CM

## 2019-07-19 DIAGNOSIS — F41.9 ANXIETY: ICD-10-CM

## 2019-07-19 DIAGNOSIS — M81.0 OSTEOPOROSIS WITHOUT CURRENT PATHOLOGICAL FRACTURE, UNSPECIFIED OSTEOPOROSIS TYPE: ICD-10-CM

## 2019-07-19 DIAGNOSIS — K62.5 RECTAL BLEEDING: ICD-10-CM

## 2019-07-19 DIAGNOSIS — Z45.2 FITTING AND ADJUSTMENT OF VASCULAR CATHETER: Primary | ICD-10-CM

## 2019-07-19 DIAGNOSIS — M81.0 AGE-RELATED OSTEOPOROSIS WITHOUT CURRENT PATHOLOGICAL FRACTURE: ICD-10-CM

## 2019-07-19 LAB
ALBUMIN SERPL-MCNC: 4.4 G/DL (ref 3.5–5.2)
ALBUMIN/GLOB SERPL: 1.6 G/DL (ref 1.1–2.4)
ALP SERPL-CCNC: 55 U/L (ref 38–116)
ALT SERPL W P-5'-P-CCNC: 15 U/L (ref 0–33)
ANION GAP SERPL CALCULATED.3IONS-SCNC: 11.9 MMOL/L (ref 5–15)
AST SERPL-CCNC: 20 U/L (ref 0–32)
BASOPHILS # BLD AUTO: 0.03 10*3/MM3 (ref 0–0.2)
BASOPHILS NFR BLD AUTO: 0.6 % (ref 0–1.5)
BILIRUB SERPL-MCNC: 0.4 MG/DL (ref 0.2–1.2)
BUN BLD-MCNC: 13 MG/DL (ref 6–20)
BUN/CREAT SERPL: 18.8 (ref 7.3–30)
CALCIUM SPEC-SCNC: 9.4 MG/DL (ref 8.5–10.2)
CHLORIDE SERPL-SCNC: 102 MMOL/L (ref 98–107)
CO2 SERPL-SCNC: 26.1 MMOL/L (ref 22–29)
CREAT BLD-MCNC: 0.69 MG/DL (ref 0.6–1.1)
DEPRECATED RDW RBC AUTO: 40.6 FL (ref 37–54)
EOSINOPHIL # BLD AUTO: 0.18 10*3/MM3 (ref 0–0.4)
EOSINOPHIL NFR BLD AUTO: 3.8 % (ref 0.3–6.2)
ERYTHROCYTE [DISTWIDTH] IN BLOOD BY AUTOMATED COUNT: 11.9 % (ref 12.3–15.4)
GFR SERPL CREATININE-BSD FRML MDRD: 92 ML/MIN/1.73
GLOBULIN UR ELPH-MCNC: 2.7 GM/DL (ref 1.8–3.5)
GLUCOSE BLD-MCNC: 98 MG/DL (ref 74–124)
HCT VFR BLD AUTO: 40.5 % (ref 34–46.6)
HGB BLD-MCNC: 13.2 G/DL (ref 12–15.9)
IMM GRANULOCYTES # BLD AUTO: 0.02 10*3/MM3 (ref 0–0.05)
IMM GRANULOCYTES NFR BLD AUTO: 0.4 % (ref 0–0.5)
LYMPHOCYTES # BLD AUTO: 1.28 10*3/MM3 (ref 0.7–3.1)
LYMPHOCYTES NFR BLD AUTO: 26.9 % (ref 19.6–45.3)
MCH RBC QN AUTO: 30.3 PG (ref 26.6–33)
MCHC RBC AUTO-ENTMCNC: 32.6 G/DL (ref 31.5–35.7)
MCV RBC AUTO: 93.1 FL (ref 79–97)
MONOCYTES # BLD AUTO: 0.47 10*3/MM3 (ref 0.1–0.9)
MONOCYTES NFR BLD AUTO: 9.9 % (ref 5–12)
NEUTROPHILS # BLD AUTO: 2.77 10*3/MM3 (ref 1.7–7)
NEUTROPHILS NFR BLD AUTO: 58.4 % (ref 42.7–76)
NRBC BLD AUTO-RTO: 0 /100 WBC (ref 0–0.2)
PLATELET # BLD AUTO: 258 10*3/MM3 (ref 140–450)
PMV BLD AUTO: 8.8 FL (ref 6–12)
POTASSIUM BLD-SCNC: 3.8 MMOL/L (ref 3.5–4.7)
PROT SERPL-MCNC: 7.1 G/DL (ref 6.3–8)
RBC # BLD AUTO: 4.35 10*6/MM3 (ref 3.77–5.28)
SODIUM BLD-SCNC: 140 MMOL/L (ref 134–145)
WBC NRBC COR # BLD: 4.75 10*3/MM3 (ref 3.4–10.8)

## 2019-07-19 PROCEDURE — 86300 IMMUNOASSAY TUMOR CA 15-3: CPT | Performed by: INTERNAL MEDICINE

## 2019-07-19 PROCEDURE — 96523 IRRIG DRUG DELIVERY DEVICE: CPT | Performed by: INTERNAL MEDICINE

## 2019-07-19 PROCEDURE — 85025 COMPLETE CBC W/AUTO DIFF WBC: CPT | Performed by: INTERNAL MEDICINE

## 2019-07-19 PROCEDURE — 99214 OFFICE O/P EST MOD 30 MIN: CPT | Performed by: INTERNAL MEDICINE

## 2019-07-19 PROCEDURE — 36415 COLL VENOUS BLD VENIPUNCTURE: CPT | Performed by: INTERNAL MEDICINE

## 2019-07-19 PROCEDURE — 80053 COMPREHEN METABOLIC PANEL: CPT | Performed by: INTERNAL MEDICINE

## 2019-07-19 RX ORDER — SODIUM CHLORIDE 0.9 % (FLUSH) 0.9 %
10 SYRINGE (ML) INJECTION AS NEEDED
Status: CANCELLED | OUTPATIENT
Start: 2019-07-19

## 2019-07-19 RX ORDER — SODIUM CHLORIDE 0.9 % (FLUSH) 0.9 %
10 SYRINGE (ML) INJECTION AS NEEDED
Status: DISCONTINUED | OUTPATIENT
Start: 2019-07-19 | End: 2019-07-19 | Stop reason: HOSPADM

## 2019-07-19 RX ADMIN — SODIUM CHLORIDE, PRESERVATIVE FREE 10 ML: 5 INJECTION INTRAVENOUS at 09:53

## 2019-07-19 RX ADMIN — Medication 500 UNITS: at 09:53

## 2019-07-19 NOTE — PROGRESS NOTES
Subjective   REASONS FOR FOLLOWUP:    1. History of stage IIA, [id number], ER weakly-positive, WA negative, HER2-negative breast cancer, status post bilateral mastectomies. The patient completed her adjuvant chemotherapy under NSABP B49 clinical trial.  On 12/14/2015 she has no clinical evidence of recurrence of breast cancer and she remains on Tamoxifen for the time being. She also has completed her breast reconstruction with bilateral implants and she is very satisfied in regard to the cosmetic result of this. She will remain on Tamoxifen for the time being. She receiving at this time Effexor 37.5 mg a day for hot flashes and anxiety.   2. Bone health. The patient has osteoporosis. She  required a new Reclast infusion in Jan 2017, and on yearly bases             History of Present Illness This patient returns today to the office for followup. She is here today still complaining of some rectal pain and occasional bleeding in the background of constipation. In spite of proper diet, hydration and Linzess she still has difficulty going to the bathroom. Other than that she has not had any skeletal pain, cough, sputum production or urinary symptomatology. Her port is not giving her any difficulty. Her breast implants are aesthetically fine for her. Her anxiety is under control. She remains on her Femara with no difficulties with the medicine and 100% compliance.                         Past Medical History.   Past Medical History:   Diagnosis Date   • Breast cancer (CMS/HCC)     Right, stage IIA, s/p bilateral mastectomy, XRT, and chemotherapy, followed by Dr. Hadley   • Osteoporosis    • Paroxysmal supraventricular tachycardia (CMS/HCC)     CS ostium atrial tachycardia, ablated in 2002 by Dr. Garzon   • Sinus headache     But no history of migranes   • Skin cancer    • Wears glasses      Social History     Socioeconomic History   • Marital status:      Spouse name: Misha   • Number of children: Not on file    • Years of education: College   • Highest education level: Not on file   Occupational History   • Occupation:      Employer: [employer]     Comment: [employer]   Tobacco Use   • Smoking status: Never Smoker   • Smokeless tobacco: Never Used   Substance and Sexual Activity   • Alcohol use: Yes     Comment: Rare   • Drug use: No   • Sexual activity: Defer     Family History   Problem Relation Age of Onset   • Coronary artery disease Mother    • Heart disease Mother    • Thrombophlebitis Mother         2016   • Heart attack Mother    • Hepatitis Father    • Cancer Father    • Crohn's disease Sister    • Drug abuse Brother         Drug overdose   • Cancer Maternal Grandmother 42        Breast   • Thyroid disease Sister    • Other Sister         kidney problems   • Breast cancer Maternal Aunt      :ONCOLOGIC/HEMATOLOGIC HISTORY     The patient was diagnosed with stage II breast cancer with the tumor located in the anterior axillary fold in the tail of the breast. She underwent bilateral mastectomies and she had bilateral expanders placed by Dr. MURPHY Aguilar in preparation for further reconstruction in the future.  The patient’s primary tumor was 1.6 cm in size.  Margins of resection were negative, grade 3, with no lymphovascular invasion. Two sentinel lymph nodes contain metastasis, one of them with macrometastasis that were not spilling out of the lymph node.  Circulating cancer cells were negative.  CA 15-3 was negative, normal.  The patient had a CT scan of the brain, chest, abdomen and pelvis that disclose no distant metastasis at any level.  The radiologist questioned an internal mammary node on the right side that does not make any clinical sense given the primary location of the tumor in the right breast.  Her bone scan was negative.  Her MUGA ejection fraction was normal.  With all of this in mind we advised the patient to either participate in the NSABP B47 clinical trial or  to consider chemotherapy in the standard regimen dose dense AC followed by Taxol followed by hormonal anti-estrogen therapy given the finding in the pathological specimen of the mastectomy that her tumor was weakly ER positive, OR negative, HER-2 negative by FISH.      Our research coordinator had the opportunity to interview the patient today to go through the explanation of participation in the clinical trial.      Patient seen 07/24/2014 for a second cycle of AC chemotherapy. Adjustments were made for her antinausea treatments then post chemotherapy to try to reduce insomnia and further nausea.     On 08/07/2014 the patient has encountered size effects of the treatment including nausea and vomiting on days 3, 4 and 5 after chemotherapy related to a continuous bitter metallic taste in her mouth.  She also has experienced a tremendous degree of skeletal pain induced by the Neulasta to the point that she surrendered to bed.  Under these circumstances we advised her to use some mint or crystalized kimberly for the taste in her mouth and she will take Aleve on a regular schedule on days 2, 3, 4 and 5 to minimize the bone pain induced by the Neulasta.  On the other hand she has not developed any mucositis.  She has not developed any infections, abnormal bleeding or significant hematological toxicity.    On 08/21/2014 the patient proceeded with her 4th cycle of Adriamycin/Cytoxan supported with Neulasta. Pepcid AC b.i.d. was initiated at a standing dose to control heartburn. The patient also was advised to continue using mint and kimberly to minimize the taste in her mouth induced by chemicals in her chemotherapy treatment. She was scheduled to receive her next cycle of chemotherapy and at that point she will initiate Taxol on a weekly basis.     She was seen back in the office 09/04/2014 initiating treatment with Taxol given every week, x12.     On 09/18/14, the patient was undergoing therapy with Taxol and having less  fatigue, no nausea, occasional tingling in her fingers but it was very transitory with no discomfort in her feet or toes.  She was still very sensitive, feeling her expanders in the chest wall, and we advised her to initiate a program of water therapy.  Her Taxol treatment was continued.  We also advised her to use massage therapy.     On 09/25/2014, the patient was still experiencing a grade 1 peripheral neuropathy of hands, none in feet, associated with Taxol use.  Otherwise she was feeling fine.   She still had a lot of sensitivity in the chest wall, in areas of breast expanders, and in the shoulders and scapular areas.  We have not been able to get her water therapy classes.   We advised her to continue her care plan in the same way and continue Prilosec on a daily basis to avoid reflux esophagitis that is very well controlled at this time.  Her chemotherapy treatment was continued and hematological parameters were stable and her physical exam was otherwise unremarkable.      The patient was seen back in the office on 10/16/2014, tolerating Taxol well. Plans are made to continue the same.     The patient was seen on 11/06/2014, referral was made to radiation oncology to see if the patient will benefit from radiation therapy since she had involvement of two sentinel lymph nodes with metastatic carcinoma.     On 12/04/2014 the patient had a discussion with us in regard plans for any other form of hormonal adjuvant therapy.  Given the fact that her tumor was weakly estrogen receptor positive, we advised her at her age to initiate therapy with tamoxifen.  I asked her to take this medicine at least for the next 10 years.  I asked her to consider eventually whenever we fulfill the criteria for being post-menopausal to switch this to an aromatase inhibitor.      We advised her to initiate a program of exercise to relieve the pain and discomfort that she has in her upper body due to the expanders and her surgical sites.      We advised her to proceed with bone density test. We need to check this in consideration of the utilization of bisphosphonate either by mouth or IV, not only for keeping her bone health now that she is post-menopausal but also in consideration of prevention of bone metastasis.      We had an extensive discussion with her and her sister about sexual issues that she has encountered during the treatment for her disease and try to relieve her from the stressors of this at this time.  Her  was not present unfortunately during this discussion.      On 01/02/2015 we documented that the patient was tolerating her tamoxifen well with minimal hot flashes and she was still undergoing her radiation treatment to the site of her disease.  She was also documented to have osteoporosis in her bone density and we suggested for her to receive IV Reclast that will have a dual function of treatment of osteoporosis as well as prevention of bone metastasis.      In regard to her conjugal and personal life, the patient has very poor self-esteem.  She finds no one in her life that will be supportive to her and the relationships with her  are very minimal and just in a very low level of activity.  I asked her to bring her  for the next visit to have a conversation one on one, face to face with him and see if he can become a partner for her to help her to heal emotionally and to be supportive for her all the time.  Eventually the patient will require some counseling as well.    We did not recommend any antianxiety or antidepressant medication for her at this point.  The patient was not willing to take anything anyway.      On 01/14/2015, she was handling her tamoxifen well. She was advised to continue the medicine. Five more sessions of radiation therapy to complete her treatment.     On 02/25/2015, the patient has no symptoms related to her cancer with exception of the hot flashes induced by the tamoxifen. Physical  activity has improved dramatically, her ability in regards to movement in her upper extremities and pain that she was experiencing before. She will have her expanders removed in the summer and she will have permanent implants then. In regards to her Reclast infusion, she had a reaction to the medicine including fevers and skeletal pain that was short lasting. She will require premedication with dexamethasone for the next infusion in 1 year.     In regards to her relationship with her , this situation has substantially improved and they are back together, not completely back to normal, but in a much better situation. Obviously, this closes the loop of her life and self-esteem that has substantially improved.     On 05/15/2015 the patient was feeling terrific.  She had minimal discomfort in her shoulders and she is ready to have her expanders removed and have her implants.  Otherwise tolerance to tamoxifen was excellent. She was not having any vaginal bleeding and she was not having any menstrual flow.  Her personal life and her marriage life have further improved and she feels satisfied.  We advised her to continue tamoxifen and keeping her port flushed every couple of months.     Her physical exam was unrevealing at this time.    On 09/14/2015 she had a complete 12 point review of system, having excellent level of energy, normal appetite, normal bowel function, normal urination, no vaginal bleeding, tolerance to tamoxifen was appropriate, hot flashes were ongoing, no cardiorespiratory  or neurological or skeletal symptomatology.  Her physical exam was unrevealing.  Under these circumstances we advised her to remain on tamoxifen 20 mg a day.  She will be informed about her estradiol level, FSH and LH.  Otherwise, she will have her port flushed today and this will be done every 6 weeks with MD visit again in 3 months. She will be due to have a new Reclast infusion in January 2016 and on that occasion she will  be pre-medicated with dexamethasone to avoid fever and achiness in the skeleton induced by the first one.     On 12/14/2015 Tamoxifen was continued. The patient had no clinical indications of breast cancer recurrence. She completed her reconstruction of her breasts with bilateral implants, looking cosmetically very pleasing to her.       Review of Systems       General: no fever, no chills, no fatigue,no weight changes, no lack of appetite.  Eyes: no epiphora, xerophthalmia,conjunctivitis, pain, glaucoma, blurred vision, blindness, secretion, photophobia, proptosis, diplopia.  Ears: no otorrhea, tinnitus, otorrhagia, deafness, pain, vertigo.  Nose: no rhinorrhea, no epistaxis, no alteration in perception of odors, no sinuses pressure.  Mouth: no alteration in gums or teeth,  No ulcers, no difficulty with mastication or deglut ion, no odynophagia.  Neck: no masses or pain, no thyroid alterations, no pain in muscles or arteries, no carotid odynia, no crepitation.  Respiratory: no cough, no sputum production,no dyspnea,no trepopnea, no pleuritic pain,no hemoptysis.  Heart: no syncope, no irregularity, no palpitations, no angina,no orthopnea,no paroxysmal nocturnal dyspnea.  Vascular Venous: no tenderness,no edema,no palpable cords,no postphlebitic syndrome, no skin changes no ulcerations.  Vascular Arterial: no distal ischemia, noclaudication, no gangrene, no neuropathic ischemic pain, no skin ulcers, no paleness no cyanosis.  GI: no dysphagia, no odynophagia, no regurgitation, no heartburn,no indigestion,no nausea,no vomiting,no hematemesis ,no melena,no jaundice,no distention, no obstipation,no enterorrhagia,no proctalgia,no anal  lesions, no changes in bowel habits.  : no frequency, no hesitancy, no hematuria, no discharge,no  pain.  Musculoskeletal: no muscle or tendon pain or inflammation,no  joint pain, no edema, no functional limitation,no fasciculations, no mass.  Neurologic: no headache, no seizures,  "noalterations on Craneal nerves, no motor deficit, no sensory deficit, normal coordination, no alteration in memory,normal orientation, calculation,normal writting, verbal and written language.  Skin: no rashes,no pruritus no localized lesions.  Psychiatric: no anxiety, no depression,no agitation, no delusions, proper insight.        Medications:  The current medication list was reviewed in the EMR    ALLERGIES:  No Known Allergies    Objective      Vitals:    07/19/19 1038   BP: 97/66   Pulse: 88   Resp: 12   Temp: 98.6 °F (37 °C)   TempSrc: Oral   SpO2: 100%   Weight: 64 kg (141 lb 3.2 oz)   Height: 170 cm (66.93\")   PainSc: 0-No pain     Current Status 7/19/2019   ECOG score 0       Physical Exam   GENERAL:  Well-developed, well-nourished  Patient  in no acute distress.   SKIN:  Warm, dry ,NO rashes,NO purpura ,NO petechiae.  HEENT:  Pupils were equal and reactive to light and accomodation, conjunctivas non injected, no pterigion, normal extraocular movements, normal visual acuity.   Mouth mucosa was moist, no exudates in oropharynx, normal gum line, normal roof of the mouth and pillars, normal papillations of the tongue.  NECK:  Supple with good range of motion; no thyromegaly or masses, no JVD or bruits, no cervical adenopathies.No carotid arteries pain, no carotid abnormal pulsation , NO arterial dance.  LYMPHATICS:  No cervical, NO supraclavicular, NO axillary,NO epitrochlear , NO inguinal adenopathy.  CHEST:  Normal excursion of both adonis thoraces, normal voice fremitus, no subcutaneous emphysema, normal axillas, no rashes or acanthosis nigricans. Lungs clear to percussion and auscultation, normal breath sounds bilaterally, no wheezing,NO crackles NO ronchi, NO stridor, NO rubs.  CARDIAC AND VASCULAR:  normal rate and regular rhythm, without murmurs,NO rubs NO S3 NO S4 right or left . Normal femoral, popliteal, pedis, brachial and carotid pulses.bilateral breast implants, no masses or pain no axillary " nodes  ABDOMEN:  Soft, nontender with no organomegaly or masses, no ascites, no collateral circulation,no distention,no Joe sign, no abdominal pain, no inguinal hernias,no umbilical hernia, no abdominal bruits. Normal bowel sounds.  GENITAL: Not  Performed.  EXTREMITIES  AND SPINE:  No clubbing, cyanosis or edema, no deformities or pain .No kyphosis, scoliosis, deformities or pain in spine, ribs or pelvic bone.  NEUROLOGICAL:  Patient was awake, alert, oriented to time, person and place.                                  RECENT LABS:  Hematology WBC   Date Value Ref Range Status   07/19/2019 4.75 3.40 - 10.80 10*3/mm3 Final     RBC   Date Value Ref Range Status   07/19/2019 4.35 3.77 - 5.28 10*6/mm3 Final     Hemoglobin   Date Value Ref Range Status   07/19/2019 13.2 12.0 - 15.9 g/dL Final     Hematocrit   Date Value Ref Range Status   07/19/2019 40.5 34.0 - 46.6 % Final     Platelets   Date Value Ref Range Status   07/19/2019 258 140 - 450 10*3/mm3 Final              Assessment/Plan  1History of stage IIA, [id number], ER weakly-positive, UT negative, HER2-negative right upper outer quadrant breast cancer, status post bilateral mastectomies. The patient completed her adjuvant chemotherapy under NSABP B49 clinical trial.The patient today looks terrific and she has no other issues. She is not experiencing any side effects of Femara. Her physical examination is normal. Her breast implants are not changing and her white count, hemoglobin and platelet count are normal. The tolerance to Femara is excellent. Therefore under these premises, I advised her to remain on her Femara 2.5 mg a day and continue having her port flushed every 6 weeks.     2.Bone health. We know that the patient is osteoporotic .1. She had her 2nd dose of Reclast in 03/2019 and she is due to have another dose in 03/2020.   2. The patient continues having constipation. She remains on Linzess, she has proper diet and she has rectal pain and bleeding. I  do believe that she needs to be seen by colorectal surgeon Karin Kramer MD and proceed with a colonoscopy, anoscopy and go from there.  3. She will have port flush every 8 weeks, doctor visit in 4 months, CBC, CMP and CA 19-9 in 4 months.                                                7/19/2019      CC:

## 2019-07-20 LAB
CHOLEST SERPL-MCNC: 187 MG/DL (ref 100–199)
HDLC SERPL-MCNC: 71 MG/DL
LDLC SERPL CALC-MCNC: 105 MG/DL (ref 0–99)
TRIGL SERPL-MCNC: 57 MG/DL (ref 0–149)
TSH SERPL-ACNC: 2.59 UIU/ML (ref 0.45–4.5)
VLDLC SERPL-MCNC: 11 MG/DL (ref 5–40)

## 2019-07-22 ENCOUNTER — TELEPHONE (OUTPATIENT)
Dept: ONCOLOGY | Facility: HOSPITAL | Age: 44
End: 2019-07-22

## 2019-07-22 NOTE — TELEPHONE ENCOUNTER
Pt called for results of her  but they werent drawn and are not scheduled until November. Message sent to Dr Hadley to see if she need to come back in for labs.  ----- Message from Danyell Mazariegos sent at 7/22/2019  4:47 PM EDT -----  [phone]    Wants to know tumor marker test result.

## 2019-07-24 ENCOUNTER — TELEPHONE (OUTPATIENT)
Dept: ONCOLOGY | Facility: CLINIC | Age: 44
End: 2019-07-24

## 2019-07-24 LAB — CANCER AG15-3 SERPL-ACNC: 18.4 U/ML

## 2019-07-24 NOTE — TELEPHONE ENCOUNTER
I spoke to the pt and informed her that we still had her blood in the lab from her last visit on Friday. That there was no need to come in for more blood, that we were running the CA 15-3 test now. Pt v/u.

## 2019-07-29 ENCOUNTER — OFFICE VISIT (OUTPATIENT)
Dept: SURGERY | Facility: CLINIC | Age: 44
End: 2019-07-29

## 2019-07-29 VITALS — BODY MASS INDEX: 22.29 KG/M2 | OXYGEN SATURATION: 99 % | WEIGHT: 142 LBS | HEIGHT: 67 IN | HEART RATE: 92 BPM

## 2019-07-29 DIAGNOSIS — K62.5 RECTAL BLEED: ICD-10-CM

## 2019-07-29 DIAGNOSIS — K60.2 ANAL FISSURE: ICD-10-CM

## 2019-07-29 DIAGNOSIS — K59.09 OTHER CONSTIPATION: Primary | ICD-10-CM

## 2019-07-29 PROCEDURE — 99244 OFF/OP CNSLTJ NEW/EST MOD 40: CPT | Performed by: SURGERY

## 2019-07-31 NOTE — PROGRESS NOTES
SUMMARY (A/P):    43-year-old lady with probable posterior anal fissure.  I prescribed verapamil cream 2% to apply twice daily and see if this will resolve her symptoms.  Due to her chronic constipation, rectal bleeding, and age of 43, I also recommended colonoscopy.  She would like to proceed with a colonoscopy as soon as possible and has scheduled for later this week.      CC:    Rectal bleeding and pain, referred for consultation by Dr. Manasa Walters    HPI:    43-year-old lady presents with moderately severe constipation and severe anal pain that is worse with bowel movements.  Symptoms have been present for about a year.  Nitroglycerin cream was tried and led to heart palpitations and did not help with her symptoms.  There is associated rectal bleeding both in the toilet and on the tissue.    PSH:    Cardiac ablation for SVT  Port placement  Bilateral mastectomy and tissue expanders for right breast cancer    PMH:    SVT  Right breast cancer    FAMILY HISTORY:    Negative for colorectal cancer  Breast cancer: Maternal grandmother and aunt  Crohn's disease and sister     SOCIAL HISTORY:   Denies tobacco use  Denies alcohol use    ALLERGIES: reviewed, in Epic    MEDICATIONS: reviewed, in Epic    ROS:  No chest pain or shortness of air.  All other systems reviewed and negative other than presenting complaints.    PHYSICAL EXAM:   Constitutional: Well-developed well-nourished, no acute distress  Vital signs: HR 92, weight 142 pounds, height 67 inches, BMI 22.2  Rectal:  Eyes: Conjunctiva normal, sclera nonicteric  ENMT: Hearing grossly normal, oral mucosa moist  Neck: Supple, no palpable mass, trachea midline  Respiratory: Clear to auscultation, normal inspiratory effort  Cardiovascular: Regular rate, no murmur, no carotid bruit, no peripheral edema, no jugular venous distention  Gastrointestinal: Soft, nontender, no palpable mass, no hepatosplenomegaly, negative for hernia, bowel sounds normal  Rectal: Small visible  tender posterior anal fissure  Lymphatics (palpable nodes):  cervical-negative, axillary-negative  Skin:  Warm, dry, no rash on visualized skin surfaces  Musculoskeletal: Symmetric strength, normal gait  Psychiatric: Alert and oriented ×3, normal affect     MATTY SHARIF M.D.

## 2019-08-01 ENCOUNTER — HOSPITAL ENCOUNTER (OUTPATIENT)
Facility: HOSPITAL | Age: 44
Setting detail: HOSPITAL OUTPATIENT SURGERY
Discharge: HOME | End: 2019-08-01
Attending: SURGERY | Admitting: SURGERY

## 2019-08-01 ENCOUNTER — ANESTHESIA EVENT (OUTPATIENT)
Dept: GASTROENTEROLOGY | Facility: HOSPITAL | Age: 44
End: 2019-08-01

## 2019-08-01 ENCOUNTER — ANESTHESIA (OUTPATIENT)
Dept: GASTROENTEROLOGY | Facility: HOSPITAL | Age: 44
End: 2019-08-01

## 2019-08-01 VITALS
TEMPERATURE: 97.9 F | SYSTOLIC BLOOD PRESSURE: 109 MMHG | RESPIRATION RATE: 16 BRPM | HEART RATE: 75 BPM | OXYGEN SATURATION: 100 % | DIASTOLIC BLOOD PRESSURE: 70 MMHG

## 2019-08-01 DIAGNOSIS — K62.5 RECTAL BLEED: ICD-10-CM

## 2019-08-01 DIAGNOSIS — K59.09 OTHER CONSTIPATION: ICD-10-CM

## 2019-08-01 PROCEDURE — 25010000002 GLUCAGON (RDNA) PER 1 MG: Performed by: SURGERY

## 2019-08-01 PROCEDURE — 25010000002 PROPOFOL 10 MG/ML EMULSION: Performed by: NURSE ANESTHETIST, CERTIFIED REGISTERED

## 2019-08-01 PROCEDURE — 45385 COLONOSCOPY W/LESION REMOVAL: CPT | Performed by: SURGERY

## 2019-08-01 PROCEDURE — 88305 TISSUE EXAM BY PATHOLOGIST: CPT | Performed by: SURGERY

## 2019-08-01 RX ORDER — SODIUM CHLORIDE, SODIUM LACTATE, POTASSIUM CHLORIDE, CALCIUM CHLORIDE 600; 310; 30; 20 MG/100ML; MG/100ML; MG/100ML; MG/100ML
1000 INJECTION, SOLUTION INTRAVENOUS CONTINUOUS
Status: DISCONTINUED | OUTPATIENT
Start: 2019-08-01 | End: 2019-08-01 | Stop reason: HOSPADM

## 2019-08-01 RX ORDER — SODIUM CHLORIDE 0.9 % (FLUSH) 0.9 %
20 SYRINGE (ML) INJECTION AS NEEDED
Status: DISCONTINUED | OUTPATIENT
Start: 2019-08-01 | End: 2019-08-01 | Stop reason: HOSPADM

## 2019-08-01 RX ORDER — LIDOCAINE HYDROCHLORIDE 10 MG/ML
0.5 INJECTION, SOLUTION INFILTRATION; PERINEURAL ONCE AS NEEDED
Status: DISCONTINUED | OUTPATIENT
Start: 2019-08-01 | End: 2019-08-01 | Stop reason: HOSPADM

## 2019-08-01 RX ORDER — SODIUM CHLORIDE 0.9 % (FLUSH) 0.9 %
10 SYRINGE (ML) INJECTION AS NEEDED
Status: DISCONTINUED | OUTPATIENT
Start: 2019-08-01 | End: 2019-08-01 | Stop reason: HOSPADM

## 2019-08-01 RX ORDER — SODIUM CHLORIDE 0.9 % (FLUSH) 0.9 %
10 SYRINGE (ML) INJECTION EVERY 12 HOURS SCHEDULED
Status: DISCONTINUED | OUTPATIENT
Start: 2019-08-01 | End: 2019-08-01 | Stop reason: HOSPADM

## 2019-08-01 RX ORDER — SODIUM CHLORIDE 0.9 % (FLUSH) 0.9 %
3 SYRINGE (ML) INJECTION AS NEEDED
Status: DISCONTINUED | OUTPATIENT
Start: 2019-08-01 | End: 2019-08-01 | Stop reason: HOSPADM

## 2019-08-01 RX ORDER — PROPOFOL 10 MG/ML
VIAL (ML) INTRAVENOUS AS NEEDED
Status: DISCONTINUED | OUTPATIENT
Start: 2019-08-01 | End: 2019-08-01 | Stop reason: SURG

## 2019-08-01 RX ORDER — PROPOFOL 10 MG/ML
VIAL (ML) INTRAVENOUS CONTINUOUS PRN
Status: DISCONTINUED | OUTPATIENT
Start: 2019-08-01 | End: 2019-08-01 | Stop reason: SURG

## 2019-08-01 RX ORDER — LIDOCAINE HYDROCHLORIDE 20 MG/ML
INJECTION, SOLUTION INFILTRATION; PERINEURAL AS NEEDED
Status: DISCONTINUED | OUTPATIENT
Start: 2019-08-01 | End: 2019-08-01 | Stop reason: SURG

## 2019-08-01 RX ADMIN — SODIUM CHLORIDE, POTASSIUM CHLORIDE, SODIUM LACTATE AND CALCIUM CHLORIDE 1000 ML: 600; 310; 30; 20 INJECTION, SOLUTION INTRAVENOUS at 13:20

## 2019-08-01 RX ADMIN — SODIUM CHLORIDE, PRESERVATIVE FREE 10 ML: 5 INJECTION INTRAVENOUS at 14:46

## 2019-08-01 RX ADMIN — PROPOFOL 300 MCG/KG/MIN: 10 INJECTION, EMULSION INTRAVENOUS at 14:02

## 2019-08-01 RX ADMIN — Medication 500 UNITS: at 14:46

## 2019-08-01 RX ADMIN — LIDOCAINE HYDROCHLORIDE 100 MG: 20 INJECTION, SOLUTION INFILTRATION; PERINEURAL at 14:02

## 2019-08-01 RX ADMIN — PROPOFOL 100 MG: 10 INJECTION, EMULSION INTRAVENOUS at 14:02

## 2019-08-01 NOTE — OP NOTE
PREOPERATIVE DIAGNOSIS:  Screening    POSTOPERATIVE DIAGNOSIS AND FINDINGS:  Small sigmoid polyp    PROCEDURE:  Colonoscopy to cecum with snare polypectomy    SURGEON:  Pato Mac MD    ANESTHESIA:  MAC    SPECIMEN(S):  Polyp    DESCRIPTION:  In decubitus position digital rectal exam was normal. Colonoscope inserted under direct visualization of lumen to cecum confirmed by visualization of ileocecal valve and appendiceal orifice.    Scope slowly withdrawn circumferentially examining all mucosal surfaces.    Quality of bowel preparation good.    The only mucosal abnormality was a small polyp in the sigmoid colon removed completely with a hot snare and retrieved, good hemostasis at the site.    Tolerated well.    RECOMMENDATION FOR FUTURE SURVEILLANCE:  To be determined based on polyp pathology and issued as separate report    Pato Mac M.D.

## 2019-08-01 NOTE — DISCHARGE INSTRUCTIONS
Colon Polyps  Polyps are tissue growths inside the body. Polyps can grow in many places, including the large intestine (colon). A polyp may be a round bump or a mushroom-shaped growth. You could have one polyp or several.  Most colon polyps are noncancerous (benign). However, some colon polyps can become cancerous over time.  What are the causes?  The exact cause of colon polyps is not known.  What increases the risk?  This condition is more likely to develop in people who:  · Have a family history of colon cancer or colon polyps.  · Are older than 50 or older than 45 if they are .  · Have inflammatory bowel disease, such as ulcerative colitis or Crohn disease.  · Are overweight.  · Smoke cigarettes.  · Do not get enough exercise.  · Drink too much alcohol.  · Eat a diet that is:  ? High in fat and red meat.  ? Low in fiber.  · Had childhood cancer that was treated with abdominal radiation.    What are the signs or symptoms?  Most polyps do not cause symptoms. If you have symptoms, they may include:  · Blood coming from your rectum when having a bowel movement.  · Blood in your stool. The stool may look dark red or black.  · A change in bowel habits, such as constipation or diarrhea.    How is this diagnosed?  This condition is diagnosed with a colonoscopy. This is a procedure that uses a lighted, flexible scope to look at the inside of your colon.  How is this treated?  Treatment for this condition involves removing any polyps that are found. Those polyps will then be tested for cancer. If cancer is found, your health care provider will talk to you about options for colon cancer treatment.  Follow these instructions at home:  Diet  · Eat plenty of fiber, such as fruits, vegetables, and whole grains.  · Eat foods that are high in calcium and vitamin D, such as milk, cheese, yogurt, eggs, liver, fish, and broccoli.  · Limit foods high in fat, red meats, and processed meats, such as hot dogs, sausage,  cohen, and lunch meats.  · Maintain a healthy weight, or lose weight if recommended by your health care provider.  General instructions  · Do not smoke cigarettes.  · Do not drink alcohol excessively.  · Keep all follow-up visits as told by your health care provider. This is important. This includes keeping regularly scheduled colonoscopies. Talk to your health care provider about when you need a colonoscopy.  · Exercise every day or as told by your health care provider.  Contact a health care provider if:  · You have new or worsening bleeding during a bowel movement.  · You have new or increased blood in your stool.  · You have a change in bowel habits.  · You unexpectedly lose weight.  This information is not intended to replace advice given to you by your health care provider. Make sure you discuss any questions you have with your health care provider.  Document Released: 09/13/2005 Document Revised: 05/25/2017 Document Reviewed: 11/07/2016  [employer] Interactive Patient Education © 2019 [employer]  Colonoscopy, Adult, Care After  This sheet gives you information about how to care for yourself after your procedure. Your health care provider may also give you more specific instructions. If you have problems or questions, contact your health care provider.  What can I expect after the procedure?  After the procedure, it is common to have:  · A small amount of blood in your stool for 24 hours after the procedure.  · Some gas.  · Mild abdominal cramping or bloating.  Follow these instructions at home:  General instructions  · For the first 24 hours after the procedure:  ? Do not drive or use machinery.  ? Do not sign important documents.  ? Do not drink alcohol.  ? Do your regular daily activities at a slower pace than normal.  ? Eat soft, easy-to-digest foods.  · Take over-the-counter or prescription medicines only as told by your health care provider.  Relieving cramping and bloating    · Try walking around when  you have cramps or feel bloated.  · Apply heat to your abdomen as told by your health care provider. Use a heat source that your health care provider recommends, such as a moist heat pack or a heating pad.  ? Place a towel between your skin and the heat source.  ? Leave the heat on for 20-30 minutes.  ? Remove the heat if your skin turns bright red. This is especially important if you are unable to feel pain, heat, or cold. You may have a greater risk of getting burned.  Eating and drinking    · Drink enough fluid to keep your urine pale yellow.  · Resume your normal diet as instructed by your health care provider. Avoid heavy or fried foods that are hard to digest.  · Avoid drinking alcohol for as long as instructed by your health care provider.  Contact a health care provider if:  · You have blood in your stool 2-3 days after the procedure.  Get help right away if:  · You have more than a small spotting of blood in your stool.  · You pass large blood clots in your stool.  · Your abdomen is swollen.  · You have nausea or vomiting.  · You have a fever.  · You have increasing abdominal pain that is not relieved with medicine.  Summary  · After the procedure, it is common to have a small amount of blood in your stool. You may also have mild abdominal cramping and bloating.  · For the first 24 hours after the procedure, do not drive or use machinery, sign important documents, or drink alcohol.  · Contact your health care provider if you have a lot of blood in your stool, nausea or vomiting, a fever, or increased abdominal pain.  This information is not intended to replace advice given to you by your health care provider. Make sure you discuss any questions you have with your health care provider.  Document Released: 08/01/2005 Document Revised: 10/10/2018 Document Reviewed: 02/28/2017  [employer] Interactive Patient Education © 2019 [employer]

## 2019-08-01 NOTE — ANESTHESIA POSTPROCEDURE EVALUATION
Patient: Diana FOSTER Gross    Procedure Summary     Date:  08/01/19 Room / Location:   [facility]    Anesthesia Start:  1358 Anesthesia Stop:  1425    Procedure:  COLONOSCOPY TO CECUM WITH HOT POLYPECTOMY (N/A ) Diagnosis:       Other constipation      Rectal bleed      (Other constipation [K59.09])      (Rectal bleed [K62.5])    Surgeon:  Pato Mac MD Provider:  Kane Peralta MD    Anesthesia Type:  MAC ASA Status:  2          Anesthesia Type: MAC  Last vitals  BP   109/70 (08/01/19 1442)   Temp   36.6 °C (97.9 °F) (08/01/19 1305)   Pulse   75 (08/01/19 1442)   Resp   16 (08/01/19 1442)     SpO2   100 % (08/01/19 1442)     Post Anesthesia Care and Evaluation    Patient location during evaluation: bedside  Patient participation: complete - patient participated  Level of consciousness: awake and alert  Pain score: 0  Pain management: adequate  Airway patency: patent  Anesthetic complications: No anesthetic complications  PONV Status: none  Cardiovascular status: acceptable  Respiratory status: acceptable  Hydration status: acceptable  Post Neuraxial Block status: Motor and sensory function returned to baseline

## 2019-08-02 LAB
LAB AP CASE REPORT: NORMAL
PATH REPORT.FINAL DX SPEC: NORMAL
PATH REPORT.GROSS SPEC: NORMAL

## 2019-08-05 ENCOUNTER — TELEPHONE (OUTPATIENT)
Dept: SURGERY | Facility: CLINIC | Age: 44
End: 2019-08-05

## 2019-08-05 ENCOUNTER — DOCUMENTATION (OUTPATIENT)
Dept: SURGERY | Facility: CLINIC | Age: 44
End: 2019-08-05

## 2019-08-05 NOTE — TELEPHONE ENCOUNTER
----- Message from Pato Mac MD sent at 8/5/2019  9:02 AM EDT -----  Please let her know that she had a single benign polyp, five year surveillance recommended-put in computer for reminder

## 2019-08-05 NOTE — PROGRESS NOTES
ENDOSCOPY FOLLOW UP NOTE    Colonoscopy 8/1/19    Indication:  Screening     Findings:  Small sigmoid polyp    Pathology:  Tubular adenoma    Recommendations:  Five year surveillance    Pato Mac M.D.

## 2019-08-13 RX ORDER — LINACLOTIDE 72 UG/1
CAPSULE, GELATIN COATED ORAL
Qty: 30 CAPSULE | Refills: 3 | OUTPATIENT
Start: 2019-08-13

## 2019-08-15 ENCOUNTER — TELEPHONE (OUTPATIENT)
Dept: ONCOLOGY | Facility: HOSPITAL | Age: 44
End: 2019-08-15

## 2019-08-15 NOTE — TELEPHONE ENCOUNTER
----- Message from Lynn Haines sent at 8/15/2019 11:47 AM EDT -----  [phone]   Needs refill on script but the dosage has changed    Pt called stating pharmacy states she has no more refills on Linzess and that Dr. Mac is not refilling for her. New dose is 145mcg. Message sent to Dr. Hadley to approve refilling medication, await response.    Per Dr. Hadley, OK to refill Linzess. Sent script in and informed pt.

## 2019-09-10 ENCOUNTER — INFUSION (OUTPATIENT)
Dept: ONCOLOGY | Facility: HOSPITAL | Age: 44
End: 2019-09-10

## 2019-09-10 DIAGNOSIS — Z45.2 FITTING AND ADJUSTMENT OF VASCULAR CATHETER: Primary | ICD-10-CM

## 2019-09-10 PROCEDURE — 96523 IRRIG DRUG DELIVERY DEVICE: CPT | Performed by: INTERNAL MEDICINE

## 2019-09-10 RX ORDER — SODIUM CHLORIDE 0.9 % (FLUSH) 0.9 %
10 SYRINGE (ML) INJECTION AS NEEDED
Status: CANCELLED | OUTPATIENT
Start: 2019-09-10

## 2019-09-10 RX ORDER — SODIUM CHLORIDE 0.9 % (FLUSH) 0.9 %
10 SYRINGE (ML) INJECTION AS NEEDED
Status: DISCONTINUED | OUTPATIENT
Start: 2019-09-10 | End: 2019-09-10 | Stop reason: HOSPADM

## 2019-09-10 RX ADMIN — Medication 10 ML: at 14:26

## 2019-09-10 RX ADMIN — Medication 500 UNITS: at 14:26

## 2019-09-19 RX ORDER — LETROZOLE 2.5 MG/1
TABLET, FILM COATED ORAL
Qty: 30 TABLET | Refills: 3 | Status: SHIPPED | OUTPATIENT
Start: 2019-09-19 | End: 2020-02-25

## 2019-09-19 NOTE — TELEPHONE ENCOUNTER
Femara refill request rec from [facility]. Per last office note from Dr Hadley-she is to continue Femara. Request approved.

## 2019-10-02 ENCOUNTER — TELEPHONE (OUTPATIENT)
Dept: ONCOLOGY | Facility: CLINIC | Age: 44
End: 2019-10-02

## 2019-10-02 DIAGNOSIS — Z17.0 MALIGNANT NEOPLASM OF UPPER-OUTER QUADRANT OF RIGHT BREAST IN FEMALE, ESTROGEN RECEPTOR POSITIVE (HCC): Primary | ICD-10-CM

## 2019-10-02 DIAGNOSIS — C50.411 MALIGNANT NEOPLASM OF UPPER-OUTER QUADRANT OF RIGHT BREAST IN FEMALE, ESTROGEN RECEPTOR POSITIVE (HCC): Primary | ICD-10-CM

## 2019-10-02 NOTE — TELEPHONE ENCOUNTER
----- Message from Vinicius Graves sent at 10/2/2019 10:54 AM EDT -----  Pt is calling she is having pain in her right breast area          [phone]

## 2019-10-02 NOTE — TELEPHONE ENCOUNTER
Pt  States  off and on for several wks now she has noticed a sharp pain in the rt. Axillary region near the implants.  Pt. Saw dr. Aguilar recently an he felt it could be nerve pain. He advised pt. To call and s/w dr. Hadley about it.  Stats it feels like it is heavy.  Denies swelling or redness.  Can use the rt. arm without problems.  Denies fever or chills.  All d/w dr. Hadley, will bring pt. In tomorrow to see a np at the [place] office.  S/w Arpita Tabares, NP she can see pt. Tomorrow.  Will get a cbc .  Pt. Is agreeable to go to [place].

## 2019-10-03 ENCOUNTER — LAB (OUTPATIENT)
Dept: OTHER | Facility: HOSPITAL | Age: 44
End: 2019-10-03

## 2019-10-03 ENCOUNTER — OFFICE VISIT (OUTPATIENT)
Dept: ONCOLOGY | Facility: CLINIC | Age: 44
End: 2019-10-03

## 2019-10-03 ENCOUNTER — HOSPITAL ENCOUNTER (OUTPATIENT)
Dept: ULTRASOUND IMAGING | Facility: HOSPITAL | Age: 44
Discharge: HOME | End: 2019-10-03

## 2019-10-03 ENCOUNTER — HOSPITAL ENCOUNTER (OUTPATIENT)
Dept: ULTRASOUND IMAGING | Facility: HOSPITAL | Age: 44
Discharge: HOME | End: 2019-10-03
Admitting: NURSE PRACTITIONER

## 2019-10-03 VITALS
HEART RATE: 88 BPM | TEMPERATURE: 98.2 F | DIASTOLIC BLOOD PRESSURE: 64 MMHG | WEIGHT: 142.9 LBS | SYSTOLIC BLOOD PRESSURE: 93 MMHG | HEIGHT: 67 IN | OXYGEN SATURATION: 100 % | BODY MASS INDEX: 22.43 KG/M2 | RESPIRATION RATE: 16 BRPM

## 2019-10-03 DIAGNOSIS — Z17.0 MALIGNANT NEOPLASM OF UPPER-OUTER QUADRANT OF RIGHT BREAST IN FEMALE, ESTROGEN RECEPTOR POSITIVE (HCC): ICD-10-CM

## 2019-10-03 DIAGNOSIS — C50.411 MALIGNANT NEOPLASM OF UPPER-OUTER QUADRANT OF RIGHT BREAST IN FEMALE, ESTROGEN RECEPTOR POSITIVE (HCC): ICD-10-CM

## 2019-10-03 DIAGNOSIS — M79.621 AXILLARY TENDERNESS, RIGHT: ICD-10-CM

## 2019-10-03 DIAGNOSIS — Z17.0 MALIGNANT NEOPLASM OF UPPER-OUTER QUADRANT OF RIGHT BREAST IN FEMALE, ESTROGEN RECEPTOR POSITIVE (HCC): Primary | ICD-10-CM

## 2019-10-03 DIAGNOSIS — C50.411 MALIGNANT NEOPLASM OF UPPER-OUTER QUADRANT OF RIGHT BREAST IN FEMALE, ESTROGEN RECEPTOR POSITIVE (HCC): Primary | ICD-10-CM

## 2019-10-03 LAB
BASOPHILS # BLD AUTO: 0.04 10*3/MM3 (ref 0–0.2)
BASOPHILS NFR BLD AUTO: 0.6 % (ref 0–1.5)
DEPRECATED RDW RBC AUTO: 38.5 FL (ref 37–54)
EOSINOPHIL # BLD AUTO: 0.18 10*3/MM3 (ref 0–0.4)
EOSINOPHIL NFR BLD AUTO: 2.5 % (ref 0.3–6.2)
ERYTHROCYTE [DISTWIDTH] IN BLOOD BY AUTOMATED COUNT: 11.9 % (ref 12.3–15.4)
HCT VFR BLD AUTO: 40.4 % (ref 34–46.6)
HGB BLD-MCNC: 14 G/DL (ref 12–15.9)
IMM GRANULOCYTES # BLD AUTO: 0.04 10*3/MM3 (ref 0–0.05)
IMM GRANULOCYTES NFR BLD AUTO: 0.6 % (ref 0–0.5)
LYMPHOCYTES # BLD AUTO: 1.44 10*3/MM3 (ref 0.7–3.1)
LYMPHOCYTES NFR BLD AUTO: 20.4 % (ref 19.6–45.3)
MCH RBC QN AUTO: 30.5 PG (ref 26.6–33)
MCHC RBC AUTO-ENTMCNC: 34.7 G/DL (ref 31.5–35.7)
MCV RBC AUTO: 88 FL (ref 79–97)
MONOCYTES # BLD AUTO: 0.9 10*3/MM3 (ref 0.1–0.9)
MONOCYTES NFR BLD AUTO: 12.7 % (ref 5–12)
NEUTROPHILS # BLD AUTO: 4.46 10*3/MM3 (ref 1.7–7)
NEUTROPHILS NFR BLD AUTO: 63.2 % (ref 42.7–76)
NRBC BLD AUTO-RTO: 0 /100 WBC (ref 0–0.2)
PLATELET # BLD AUTO: 269 10*3/MM3 (ref 140–450)
PMV BLD AUTO: 9.5 FL (ref 6–12)
RBC # BLD AUTO: 4.59 10*6/MM3 (ref 3.77–5.28)
WBC NRBC COR # BLD: 7.06 10*3/MM3 (ref 3.4–10.8)

## 2019-10-03 PROCEDURE — 76642 ULTRASOUND BREAST LIMITED: CPT

## 2019-10-03 PROCEDURE — 85025 COMPLETE CBC W/AUTO DIFF WBC: CPT | Performed by: NURSE PRACTITIONER

## 2019-10-03 PROCEDURE — 36415 COLL VENOUS BLD VENIPUNCTURE: CPT

## 2019-10-03 PROCEDURE — 99214 OFFICE O/P EST MOD 30 MIN: CPT | Performed by: NURSE PRACTITIONER

## 2019-10-03 NOTE — PROGRESS NOTES
Subjective   REASONS FOR FOLLOWUP:    1. History of stage IIA, [id number], ER weakly-positive, KS negative, HER2-negative breast cancer, status post bilateral mastectomies. The patient completed her adjuvant chemotherapy under NSABP B49 clinical trial.  On 12/14/2015 she has no clinical evidence of recurrence of breast cancer and she remains on Femara for the time being. She also has completed her breast reconstruction with bilateral implants and she is very satisfied in regard to the cosmetic result of this. She will remain on Femara for the time being. She receiving at this time Effexor 37.5 mg a day for hot flashes and anxiety.   2. Bone health. The patient has osteoporosis. She  required a new Reclast infusion in Jan 2017, and on yearly bases  3. Triage visit for right axillary tenderness              History of Present Illness   Patient comes to the office today for a triage visit.  Over the last month, she has had increasing right axillary tenderness with shooting pain extending into her right breast.  Patient states that the pain is intermittent in nature but does seem to be exacerbated with positional changes.  She has tried ibuprofen previously and this only helps modestly.  Patient is status post bilateral mastectomies in 2015.  She also appreciates new edema in the right axilla and the surrounding region.  She denies any pain elsewhere.  No unintentional weight loss noted.  Her bowels and urination are regular.    Of note, patient did see Dr. Aguilar yesterday, who felt that tenderness may be related to scar tissue.     Labs today are all within normal limits. Patient has no other concerns today.                             Past Medical History.   Past Medical History:   Diagnosis Date   • Anxiety    • Breast cancer (CMS/HCC)     Right, stage IIA, s/p bilateral mastectomy, XRT, and chemotherapy, followed by Dr. Hadley   • Osteoporosis    • Paroxysmal supraventricular tachycardia (CMS/HCC)     CS ostium atrial  tachycardia, ablated in 2002 by Dr. Garzon   • PONV (postoperative nausea and vomiting)    • Sinus headache     But no history of migranes   • Skin cancer    • Wears glasses      Social History     Socioeconomic History   • Marital status:      Spouse name: Misha   • Number of children: Not on file   • Years of education: College   • Highest education level: Not on file   Occupational History   • Occupation:      Employer: [employer]     Comment: [employer]   Tobacco Use   • Smoking status: Never Smoker   • Smokeless tobacco: Never Used   Substance and Sexual Activity   • Alcohol use: No     Frequency: Never     Comment: Rare   • Drug use: No   • Sexual activity: Defer     Family History   Problem Relation Age of Onset   • Coronary artery disease Mother    • Heart disease Mother    • Thrombophlebitis Mother         2016   • Heart attack Mother    • Hepatitis Father    • Liver cancer Father    • Crohn's disease Sister    • Drug abuse Brother         Drug overdose   • Cancer Maternal Grandmother 42        Breast   • Thyroid disease Sister    • Other Sister         kidney problems   • Breast cancer Maternal Aunt      :ONCOLOGIC/HEMATOLOGIC HISTORY     The patient was diagnosed with stage II breast cancer with the tumor located in the anterior axillary fold in the tail of the breast. She underwent bilateral mastectomies and she had bilateral expanders placed by Dr. MURPHY Aguilar in preparation for further reconstruction in the future.  The patient’s primary tumor was 1.6 cm in size.  Margins of resection were negative, grade 3, with no lymphovascular invasion. Two sentinel lymph nodes contain metastasis, one of them with macrometastasis that were not spilling out of the lymph node.  Circulating cancer cells were negative.  CA 15-3 was negative, normal.  The patient had a CT scan of the brain, chest, abdomen and pelvis that disclose no distant metastasis at any level.  The  radiologist questioned an internal mammary node on the right side that does not make any clinical sense given the primary location of the tumor in the right breast.  Her bone scan was negative.  Her MUGA ejection fraction was normal.  With all of this in mind we advised the patient to either participate in the NSABP B47 clinical trial or to consider chemotherapy in the standard regimen dose dense AC followed by Taxol followed by hormonal anti-estrogen therapy given the finding in the pathological specimen of the mastectomy that her tumor was weakly ER positive, GA negative, HER-2 negative by FISH.      Our research coordinator had the opportunity to interview the patient today to go through the explanation of participation in the clinical trial.      Patient seen 07/24/2014 for a second cycle of AC chemotherapy. Adjustments were made for her antinausea treatments then post chemotherapy to try to reduce insomnia and further nausea.     On 08/07/2014 the patient has encountered size effects of the treatment including nausea and vomiting on days 3, 4 and 5 after chemotherapy related to a continuous bitter metallic taste in her mouth.  She also has experienced a tremendous degree of skeletal pain induced by the Neulasta to the point that she surrendered to bed.  Under these circumstances we advised her to use some mint or crystalized kimberly for the taste in her mouth and she will take Aleve on a regular schedule on days 2, 3, 4 and 5 to minimize the bone pain induced by the Neulasta.  On the other hand she has not developed any mucositis.  She has not developed any infections, abnormal bleeding or significant hematological toxicity.    On 08/21/2014 the patient proceeded with her 4th cycle of Adriamycin/Cytoxan supported with Neulasta. Pepcid AC b.i.d. was initiated at a standing dose to control heartburn. The patient also was advised to continue using mint and kimberly to minimize the taste in her mouth induced by  chemicals in her chemotherapy treatment. She was scheduled to receive her next cycle of chemotherapy and at that point she will initiate Taxol on a weekly basis.     She was seen back in the office 09/04/2014 initiating treatment with Taxol given every week, x12.     On 09/18/14, the patient was undergoing therapy with Taxol and having less fatigue, no nausea, occasional tingling in her fingers but it was very transitory with no discomfort in her feet or toes.  She was still very sensitive, feeling her expanders in the chest wall, and we advised her to initiate a program of water therapy.  Her Taxol treatment was continued.  We also advised her to use massage therapy.     On 09/25/2014, the patient was still experiencing a grade 1 peripheral neuropathy of hands, none in feet, associated with Taxol use.  Otherwise she was feeling fine.   She still had a lot of sensitivity in the chest wall, in areas of breast expanders, and in the shoulders and scapular areas.  We have not been able to get her water therapy classes.   We advised her to continue her care plan in the same way and continue Prilosec on a daily basis to avoid reflux esophagitis that is very well controlled at this time.  Her chemotherapy treatment was continued and hematological parameters were stable and her physical exam was otherwise unremarkable.      The patient was seen back in the office on 10/16/2014, tolerating Taxol well. Plans are made to continue the same.     The patient was seen on 11/06/2014, referral was made to radiation oncology to see if the patient will benefit from radiation therapy since she had involvement of two sentinel lymph nodes with metastatic carcinoma.     On 12/04/2014 the patient had a discussion with us in regard plans for any other form of hormonal adjuvant therapy.  Given the fact that her tumor was weakly estrogen receptor positive, we advised her at her age to initiate therapy with tamoxifen.  I asked her to take this  medicine at least for the next 10 years.  I asked her to consider eventually whenever we fulfill the criteria for being post-menopausal to switch this to an aromatase inhibitor.      We advised her to initiate a program of exercise to relieve the pain and discomfort that she has in her upper body due to the expanders and her surgical sites.     We advised her to proceed with bone density test. We need to check this in consideration of the utilization of bisphosphonate either by mouth or IV, not only for keeping her bone health now that she is post-menopausal but also in consideration of prevention of bone metastasis.      We had an extensive discussion with her and her sister about sexual issues that she has encountered during the treatment for her disease and try to relieve her from the stressors of this at this time.  Her  was not present unfortunately during this discussion.      On 01/02/2015 we documented that the patient was tolerating her tamoxifen well with minimal hot flashes and she was still undergoing her radiation treatment to the site of her disease.  She was also documented to have osteoporosis in her bone density and we suggested for her to receive IV Reclast that will have a dual function of treatment of osteoporosis as well as prevention of bone metastasis.      In regard to her conjugal and personal life, the patient has very poor self-esteem.  She finds no one in her life that will be supportive to her and the relationships with her  are very minimal and just in a very low level of activity.  I asked her to bring her  for the next visit to have a conversation one on one, face to face with him and see if he can become a partner for her to help her to heal emotionally and to be supportive for her all the time.  Eventually the patient will require some counseling as well.    We did not recommend any antianxiety or antidepressant medication for her at this point.  The patient was  not willing to take anything anyway.      On 01/14/2015, she was handling her tamoxifen well. She was advised to continue the medicine. Five more sessions of radiation therapy to complete her treatment.     On 02/25/2015, the patient has no symptoms related to her cancer with exception of the hot flashes induced by the tamoxifen. Physical activity has improved dramatically, her ability in regards to movement in her upper extremities and pain that she was experiencing before. She will have her expanders removed in the summer and she will have permanent implants then. In regards to her Reclast infusion, she had a reaction to the medicine including fevers and skeletal pain that was short lasting. She will require premedication with dexamethasone for the next infusion in 1 year.     In regards to her relationship with her , this situation has substantially improved and they are back together, not completely back to normal, but in a much better situation. Obviously, this closes the loop of her life and self-esteem that has substantially improved.     On 05/15/2015 the patient was feeling terrific.  She had minimal discomfort in her shoulders and she is ready to have her expanders removed and have her implants.  Otherwise tolerance to tamoxifen was excellent. She was not having any vaginal bleeding and she was not having any menstrual flow.  Her personal life and her marriage life have further improved and she feels satisfied.  We advised her to continue tamoxifen and keeping her port flushed every couple of months.     Her physical exam was unrevealing at this time.    On 09/14/2015 she had a complete 12 point review of system, having excellent level of energy, normal appetite, normal bowel function, normal urination, no vaginal bleeding, tolerance to tamoxifen was appropriate, hot flashes were ongoing, no cardiorespiratory  or neurological or skeletal symptomatology.  Her physical exam was unrevealing.  Under  "these circumstances we advised her to remain on tamoxifen 20 mg a day.  She will be informed about her estradiol level, FSH and LH.  Otherwise, she will have her port flushed today and this will be done every 6 weeks with MD visit again in 3 months. She will be due to have a new Reclast infusion in January 2016 and on that occasion she will be pre-medicated with dexamethasone to avoid fever and achiness in the skeleton induced by the first one.     On 12/14/2015 Tamoxifen was continued. The patient had no clinical indications of breast cancer recurrence. She completed her reconstruction of her breasts with bilateral implants, looking cosmetically very pleasing to her.       Review of Systems   Constitutional: Negative for appetite change, chills, fatigue and fever.   HENT: Negative for hearing loss, mouth sores and tinnitus.    Respiratory: Negative for cough, shortness of breath, wheezing and stridor.    Cardiovascular: Negative for chest pain, palpitations and leg swelling.        See HPI, right axillary tenderness   Gastrointestinal: Negative for abdominal pain, blood in stool, constipation, diarrhea, nausea and vomiting.   Genitourinary: Negative for dysuria, frequency and hematuria.   Musculoskeletal: Negative for gait problem and myalgias.   Skin: Negative for color change, pallor and rash.   Neurological: Negative for weakness and headaches.   Hematological: Negative for adenopathy. Does not bruise/bleed easily.   Psychiatric/Behavioral: Negative for agitation and behavioral problems. The patient is not nervous/anxious.                   Medications:  The current medication list was reviewed in the EMR    ALLERGIES:  No Known Allergies    Objective      Vitals:    10/03/19 0826   BP: 93/64   Pulse: 88   Resp: 16   Temp: 98.2 °F (36.8 °C)   TempSrc: Oral   SpO2: 100%   Weight: 64.8 kg (142 lb 14.4 oz)   Height: 170.2 cm (67.01\")   PainSc: 6  Comment: implant on right side     Current Status 7/19/2019   ECOG " score 0       Physical Exam   Constitutional: She is oriented to person, place, and time. She appears well-developed and well-nourished. No distress.   HENT:   Head: Normocephalic.   Eyes: EOM are normal. Pupils are equal, round, and reactive to light.   Neck: Normal range of motion. Neck supple. No thyromegaly present.   Cardiovascular: Normal rate, regular rhythm and normal heart sounds.   Pulmonary/Chest: Effort normal and breath sounds normal. No respiratory distress. She has no wheezes. She has no rales. She exhibits no tenderness, no deformity and no retraction. Right breast exhibits no inverted nipple, no mass, no nipple discharge, no skin change and no tenderness. Left breast exhibits tenderness. Left breast exhibits no inverted nipple, no mass, no nipple discharge and no skin change. Breasts are symmetrical. There is no breast swelling.   Right axillary tenderness with palpation, appreciation of what appears to be scar tissue just lateral to the right breast. No discrete nodules noted, no erythema, or nipple discharge noted,   Bilateral mastectomy with reconstruction        Abdominal: Soft. Bowel sounds are normal. She exhibits no distension and no mass. There is no tenderness. There is no rebound and no guarding. No hernia.   Genitourinary: No breast tenderness, discharge or bleeding.   Musculoskeletal: Normal range of motion.   Lymphadenopathy:        Head (right side): No submandibular, no tonsillar and no occipital adenopathy present.        Head (left side): No submandibular, no tonsillar and no occipital adenopathy present.     She has no cervical adenopathy.        Right cervical: No superficial cervical, no deep cervical and no posterior cervical adenopathy present.       Left cervical: No superficial cervical, no deep cervical and no posterior cervical adenopathy present.     She has no axillary adenopathy (See above ).   Neurological: She is alert and oriented to person, place, and time. She has  normal reflexes. No cranial nerve deficit. Coordination normal.   Skin: Skin is warm and dry. No bruising, no petechiae and no rash noted. She is not diaphoretic. No cyanosis or erythema. No pallor. Nails show no clubbing.   Psychiatric: She has a normal mood and affect. Her behavior is normal. Judgment and thought content normal.   Nursing note and vitals reviewed.                                       RECENT LABS:  Hematology WBC   Date Value Ref Range Status   10/03/2019 7.06 3.40 - 10.80 10*3/mm3 Final     RBC   Date Value Ref Range Status   10/03/2019 4.59 3.77 - 5.28 10*6/mm3 Final     Hemoglobin   Date Value Ref Range Status   10/03/2019 14.0 12.0 - 15.9 g/dL Final     Hematocrit   Date Value Ref Range Status   10/03/2019 40.4 34.0 - 46.6 % Final     Platelets   Date Value Ref Range Status   10/03/2019 269 140 - 450 10*3/mm3 Final              Assessment/Plan    1. History of stage IIA, [id number], ER weakly-positive, DE negative, HER2-negative right upper outer quadrant breast cancer, status post bilateral mastectomies. The patient completed her adjuvant chemotherapy under NSABP B49 clinical trial.    Patient continues on Femara, 2.5 mg a day.  She called our office with a right axillary edema and discomfort that is progressed over the last several months.  She denies any discrete adenopathies and this is confirmed on examination today.  There is an area that is likely consistent with scar tissue, though we will evaluate this further with right axillary ultrasound and right breast ultrasound to ensure that there is no presence of disease recurrence. I will call her with results once they have been obtained     2. Right axillary tenderness. Please see above. Right breast and Axillary u/s    3.Bone health. We know that the patient is osteoporotic .1. She had her 2nd dose of Reclast in 03/2019 and she is due to have another dose in 03/2020.     4. Constipation.  This is been ongoing issue.  At her last office  visit with Dr. Hadley, he recommended proceeding with a colonoscopy.  This was performed in August 2019 with only a single benign polyp.  Five-year surveillance was recommended.  Patient continues on Linzess and denies any bowel irregularity today     5. She is currently scheduled for MD visit with Dr. Hadley in one month with CBC, CMP and CA 19-9 prior. We will schedule an office visit sooner if needed based upon ultrasound results     6. I have asked the patient to call with any progression of symptoms or if new issues arise prior to her next office visit                                                10/3/2019      CC:

## 2019-11-05 ENCOUNTER — OFFICE VISIT (OUTPATIENT)
Dept: ONCOLOGY | Facility: CLINIC | Age: 44
End: 2019-11-05

## 2019-11-05 ENCOUNTER — INFUSION (OUTPATIENT)
Dept: ONCOLOGY | Facility: HOSPITAL | Age: 44
End: 2019-11-05

## 2019-11-05 VITALS
RESPIRATION RATE: 12 BRPM | TEMPERATURE: 98.6 F | SYSTOLIC BLOOD PRESSURE: 104 MMHG | HEIGHT: 67 IN | DIASTOLIC BLOOD PRESSURE: 66 MMHG | OXYGEN SATURATION: 100 % | WEIGHT: 141.5 LBS | HEART RATE: 88 BPM | BODY MASS INDEX: 22.21 KG/M2

## 2019-11-05 DIAGNOSIS — F41.9 ANXIETY: ICD-10-CM

## 2019-11-05 DIAGNOSIS — C50.411 MALIGNANT NEOPLASM OF UPPER-OUTER QUADRANT OF RIGHT BREAST IN FEMALE, ESTROGEN RECEPTOR POSITIVE (HCC): Primary | ICD-10-CM

## 2019-11-05 DIAGNOSIS — Z45.2 FITTING AND ADJUSTMENT OF VASCULAR CATHETER: ICD-10-CM

## 2019-11-05 DIAGNOSIS — C50.411 MALIGNANT NEOPLASM OF UPPER-OUTER QUADRANT OF RIGHT BREAST IN FEMALE, ESTROGEN RECEPTOR POSITIVE (HCC): ICD-10-CM

## 2019-11-05 DIAGNOSIS — Z17.0 MALIGNANT NEOPLASM OF UPPER-OUTER QUADRANT OF RIGHT BREAST IN FEMALE, ESTROGEN RECEPTOR POSITIVE (HCC): Primary | ICD-10-CM

## 2019-11-05 DIAGNOSIS — Z17.0 MALIGNANT NEOPLASM OF UPPER-OUTER QUADRANT OF RIGHT BREAST IN FEMALE, ESTROGEN RECEPTOR POSITIVE (HCC): ICD-10-CM

## 2019-11-05 LAB
ALBUMIN SERPL-MCNC: 4.3 G/DL (ref 3.5–5.2)
ALBUMIN/GLOB SERPL: 1.6 G/DL (ref 1.1–2.4)
ALP SERPL-CCNC: 59 U/L (ref 38–116)
ALT SERPL W P-5'-P-CCNC: 15 U/L (ref 0–33)
ANION GAP SERPL CALCULATED.3IONS-SCNC: 12.9 MMOL/L (ref 5–15)
AST SERPL-CCNC: 23 U/L (ref 0–32)
BASOPHILS # BLD AUTO: 0.05 10*3/MM3 (ref 0–0.2)
BASOPHILS NFR BLD AUTO: 1.1 % (ref 0–1.5)
BILIRUB SERPL-MCNC: 0.4 MG/DL (ref 0.2–1.2)
BUN BLD-MCNC: 17 MG/DL (ref 6–20)
BUN/CREAT SERPL: 24.6 (ref 7.3–30)
CALCIUM SPEC-SCNC: 9.1 MG/DL (ref 8.5–10.2)
CANCER AG15-3 SERPL-ACNC: 16.6 U/ML
CHLORIDE SERPL-SCNC: 103 MMOL/L (ref 98–107)
CO2 SERPL-SCNC: 24.1 MMOL/L (ref 22–29)
CREAT BLD-MCNC: 0.69 MG/DL (ref 0.6–1.1)
DEPRECATED RDW RBC AUTO: 39.6 FL (ref 37–54)
EOSINOPHIL # BLD AUTO: 0.17 10*3/MM3 (ref 0–0.4)
EOSINOPHIL NFR BLD AUTO: 3.6 % (ref 0.3–6.2)
ERYTHROCYTE [DISTWIDTH] IN BLOOD BY AUTOMATED COUNT: 11.8 % (ref 12.3–15.4)
GFR SERPL CREATININE-BSD FRML MDRD: 92 ML/MIN/1.73
GLOBULIN UR ELPH-MCNC: 2.7 GM/DL (ref 1.8–3.5)
GLUCOSE BLD-MCNC: 75 MG/DL (ref 74–124)
HCT VFR BLD AUTO: 41.5 % (ref 34–46.6)
HGB BLD-MCNC: 13.6 G/DL (ref 12–15.9)
IMM GRANULOCYTES # BLD AUTO: 0.01 10*3/MM3 (ref 0–0.05)
IMM GRANULOCYTES NFR BLD AUTO: 0.2 % (ref 0–0.5)
LYMPHOCYTES # BLD AUTO: 1.26 10*3/MM3 (ref 0.7–3.1)
LYMPHOCYTES NFR BLD AUTO: 26.9 % (ref 19.6–45.3)
MCH RBC QN AUTO: 30 PG (ref 26.6–33)
MCHC RBC AUTO-ENTMCNC: 32.8 G/DL (ref 31.5–35.7)
MCV RBC AUTO: 91.6 FL (ref 79–97)
MONOCYTES # BLD AUTO: 0.43 10*3/MM3 (ref 0.1–0.9)
MONOCYTES NFR BLD AUTO: 9.2 % (ref 5–12)
NEUTROPHILS # BLD AUTO: 2.77 10*3/MM3 (ref 1.7–7)
NEUTROPHILS NFR BLD AUTO: 59 % (ref 42.7–76)
NRBC BLD AUTO-RTO: 0 /100 WBC (ref 0–0.2)
PLATELET # BLD AUTO: 267 10*3/MM3 (ref 140–450)
PMV BLD AUTO: 9.2 FL (ref 6–12)
POTASSIUM BLD-SCNC: 3.8 MMOL/L (ref 3.5–4.7)
PROT SERPL-MCNC: 7 G/DL (ref 6.3–8)
RBC # BLD AUTO: 4.53 10*6/MM3 (ref 3.77–5.28)
SODIUM BLD-SCNC: 140 MMOL/L (ref 134–145)
WBC NRBC COR # BLD: 4.69 10*3/MM3 (ref 3.4–10.8)

## 2019-11-05 PROCEDURE — 85025 COMPLETE CBC W/AUTO DIFF WBC: CPT | Performed by: INTERNAL MEDICINE

## 2019-11-05 PROCEDURE — 86300 IMMUNOASSAY TUMOR CA 15-3: CPT | Performed by: INTERNAL MEDICINE

## 2019-11-05 PROCEDURE — 99214 OFFICE O/P EST MOD 30 MIN: CPT | Performed by: INTERNAL MEDICINE

## 2019-11-05 PROCEDURE — 80053 COMPREHEN METABOLIC PANEL: CPT | Performed by: INTERNAL MEDICINE

## 2019-11-05 PROCEDURE — 96523 IRRIG DRUG DELIVERY DEVICE: CPT | Performed by: INTERNAL MEDICINE

## 2019-11-05 NOTE — PROGRESS NOTES
Subjective   REASONS FOR FOLLOWUP:    1. History of stage IIA, [id number], ER weakly-positive, IA negative, HER2-negative breast cancer, status post bilateral mastectomies. The patient completed her adjuvant chemotherapy under NSABP B49 clinical trial.  On 12/14/2015 she has no clinical evidence of recurrence of breast cancer and she remains on Femara for the time being. She also has completed her breast reconstruction with bilateral implants and she is very satisfied in regard to the cosmetic result of this. She will remain on Femara for the time being. She receiving at this time Effexor 37.5 mg a day for hot flashes and anxiety.   2. Bone health. The patient has osteoporosis. She  required a new Reclast infusion in Jan 2017, and on yearly bases  3. Triage visit for right axillary tenderness              History of Present Illness   This patient returned today to the office for followup. She is here today with no symptoms pertinent to her previous history of right breast cancer. She remains on Femara adjuvantly and she is going to complete 5 years of being free of disease this November. Her appetite remains good, she remains constipated. She has had a colonoscopy in August that documented a tubular adenoma. Urination is normal. She has had her pelvic examination being normal as well. She has no cardiovascular or respiratory issues. She has very poor peripheral access and she wants to keep her port if possible. She has not had any difficulties with the port, no thrombosis, no infection and we will keep this going.                               Past Medical History.   Past Medical History:   Diagnosis Date   • Anxiety    • Breast cancer (CMS/HCC)     Right, stage IIA, s/p bilateral mastectomy, XRT, and chemotherapy, followed by Dr. Hadley   • Osteoporosis    • Paroxysmal supraventricular tachycardia (CMS/HCC)     CS ostium atrial tachycardia, ablated in 2002 by Dr. Garzon   • PONV (postoperative nausea and vomiting)     • Sinus headache     But no history of migranes   • Skin cancer    • Wears glasses      Social History     Socioeconomic History   • Marital status:      Spouse name: Misha   • Number of children: Not on file   • Years of education: College   • Highest education level: Not on file   Occupational History   • Occupation:      Employer: [employer]     Comment: [employer]   Tobacco Use   • Smoking status: Never Smoker   • Smokeless tobacco: Never Used   Substance and Sexual Activity   • Alcohol use: No     Frequency: Never     Comment: Rare   • Drug use: No   • Sexual activity: Defer     Family History   Problem Relation Age of Onset   • Coronary artery disease Mother    • Heart disease Mother    • Thrombophlebitis Mother         2016   • Heart attack Mother    • Hepatitis Father    • Liver cancer Father    • Crohn's disease Sister    • Drug abuse Brother         Drug overdose   • Cancer Maternal Grandmother 42        Breast   • Thyroid disease Sister    • Other Sister         kidney problems   • Breast cancer Maternal Aunt      :ONCOLOGIC/HEMATOLOGIC HISTORY     The patient was diagnosed with stage II breast cancer with the tumor located in the anterior axillary fold in the tail of the breast. She underwent bilateral mastectomies and she had bilateral expanders placed by Dr. MURPHY Aguilar in preparation for further reconstruction in the future.  The patient’s primary tumor was 1.6 cm in size.  Margins of resection were negative, grade 3, with no lymphovascular invasion. Two sentinel lymph nodes contain metastasis, one of them with macrometastasis that were not spilling out of the lymph node.  Circulating cancer cells were negative.  CA 15-3 was negative, normal.  The patient had a CT scan of the brain, chest, abdomen and pelvis that disclose no distant metastasis at any level.  The radiologist questioned an internal mammary node on the right side that does not make any  clinical sense given the primary location of the tumor in the right breast.  Her bone scan was negative.  Her MUGA ejection fraction was normal.  With all of this in mind we advised the patient to either participate in the NSABP B47 clinical trial or to consider chemotherapy in the standard regimen dose dense AC followed by Taxol followed by hormonal anti-estrogen therapy given the finding in the pathological specimen of the mastectomy that her tumor was weakly ER positive, KY negative, HER-2 negative by FISH.      Our research coordinator had the opportunity to interview the patient today to go through the explanation of participation in the clinical trial.      Patient seen 07/24/2014 for a second cycle of AC chemotherapy. Adjustments were made for her antinausea treatments then post chemotherapy to try to reduce insomnia and further nausea.     On 08/07/2014 the patient has encountered size effects of the treatment including nausea and vomiting on days 3, 4 and 5 after chemotherapy related to a continuous bitter metallic taste in her mouth.  She also has experienced a tremendous degree of skeletal pain induced by the Neulasta to the point that she surrendered to bed.  Under these circumstances we advised her to use some mint or crystalized kimberly for the taste in her mouth and she will take Aleve on a regular schedule on days 2, 3, 4 and 5 to minimize the bone pain induced by the Neulasta.  On the other hand she has not developed any mucositis.  She has not developed any infections, abnormal bleeding or significant hematological toxicity.    On 08/21/2014 the patient proceeded with her 4th cycle of Adriamycin/Cytoxan supported with Neulasta. Pepcid AC b.i.d. was initiated at a standing dose to control heartburn. The patient also was advised to continue using mint and kimberly to minimize the taste in her mouth induced by chemicals in her chemotherapy treatment. She was scheduled to receive her next cycle of  chemotherapy and at that point she will initiate Taxol on a weekly basis.     She was seen back in the office 09/04/2014 initiating treatment with Taxol given every week, x12.     On 09/18/14, the patient was undergoing therapy with Taxol and having less fatigue, no nausea, occasional tingling in her fingers but it was very transitory with no discomfort in her feet or toes.  She was still very sensitive, feeling her expanders in the chest wall, and we advised her to initiate a program of water therapy.  Her Taxol treatment was continued.  We also advised her to use massage therapy.     On 09/25/2014, the patient was still experiencing a grade 1 peripheral neuropathy of hands, none in feet, associated with Taxol use.  Otherwise she was feeling fine.   She still had a lot of sensitivity in the chest wall, in areas of breast expanders, and in the shoulders and scapular areas.  We have not been able to get her water therapy classes.   We advised her to continue her care plan in the same way and continue Prilosec on a daily basis to avoid reflux esophagitis that is very well controlled at this time.  Her chemotherapy treatment was continued and hematological parameters were stable and her physical exam was otherwise unremarkable.      The patient was seen back in the office on 10/16/2014, tolerating Taxol well. Plans are made to continue the same.     The patient was seen on 11/06/2014, referral was made to radiation oncology to see if the patient will benefit from radiation therapy since she had involvement of two sentinel lymph nodes with metastatic carcinoma.     On 12/04/2014 the patient had a discussion with us in regard plans for any other form of hormonal adjuvant therapy.  Given the fact that her tumor was weakly estrogen receptor positive, we advised her at her age to initiate therapy with tamoxifen.  I asked her to take this medicine at least for the next 10 years.  I asked her to consider eventually whenever  we fulfill the criteria for being post-menopausal to switch this to an aromatase inhibitor.      We advised her to initiate a program of exercise to relieve the pain and discomfort that she has in her upper body due to the expanders and her surgical sites.     We advised her to proceed with bone density test. We need to check this in consideration of the utilization of bisphosphonate either by mouth or IV, not only for keeping her bone health now that she is post-menopausal but also in consideration of prevention of bone metastasis.      We had an extensive discussion with her and her sister about sexual issues that she has encountered during the treatment for her disease and try to relieve her from the stressors of this at this time.  Her  was not present unfortunately during this discussion.      On 01/02/2015 we documented that the patient was tolerating her tamoxifen well with minimal hot flashes and she was still undergoing her radiation treatment to the site of her disease.  She was also documented to have osteoporosis in her bone density and we suggested for her to receive IV Reclast that will have a dual function of treatment of osteoporosis as well as prevention of bone metastasis.      In regard to her conjugal and personal life, the patient has very poor self-esteem.  She finds no one in her life that will be supportive to her and the relationships with her  are very minimal and just in a very low level of activity.  I asked her to bring her  for the next visit to have a conversation one on one, face to face with him and see if he can become a partner for her to help her to heal emotionally and to be supportive for her all the time.  Eventually the patient will require some counseling as well.    We did not recommend any antianxiety or antidepressant medication for her at this point.  The patient was not willing to take anything anyway.      On 01/14/2015, she was handling her  tamoxifen well. She was advised to continue the medicine. Five more sessions of radiation therapy to complete her treatment.     On 02/25/2015, the patient has no symptoms related to her cancer with exception of the hot flashes induced by the tamoxifen. Physical activity has improved dramatically, her ability in regards to movement in her upper extremities and pain that she was experiencing before. She will have her expanders removed in the summer and she will have permanent implants then. In regards to her Reclast infusion, she had a reaction to the medicine including fevers and skeletal pain that was short lasting. She will require premedication with dexamethasone for the next infusion in 1 year.     In regards to her relationship with her , this situation has substantially improved and they are back together, not completely back to normal, but in a much better situation. Obviously, this closes the loop of her life and self-esteem that has substantially improved.     On 05/15/2015 the patient was feeling terrific.  She had minimal discomfort in her shoulders and she is ready to have her expanders removed and have her implants.  Otherwise tolerance to tamoxifen was excellent. She was not having any vaginal bleeding and she was not having any menstrual flow.  Her personal life and her marriage life have further improved and she feels satisfied.  We advised her to continue tamoxifen and keeping her port flushed every couple of months.     Her physical exam was unrevealing at this time.    On 09/14/2015 she had a complete 12 point review of system, having excellent level of energy, normal appetite, normal bowel function, normal urination, no vaginal bleeding, tolerance to tamoxifen was appropriate, hot flashes were ongoing, no cardiorespiratory  or neurological or skeletal symptomatology.  Her physical exam was unrevealing.  Under these circumstances we advised her to remain on tamoxifen 20 mg a day.  She will  be informed about her estradiol level, FSH and LH.  Otherwise, she will have her port flushed today and this will be done every 6 weeks with MD visit again in 3 months. She will be due to have a new Reclast infusion in January 2016 and on that occasion she will be pre-medicated with dexamethasone to avoid fever and achiness in the skeleton induced by the first one.     On 12/14/2015 Tamoxifen was continued. The patient had no clinical indications of breast cancer recurrence. She completed her reconstruction of her breasts with bilateral implants, looking cosmetically very pleasing to her.          Review of Systems:      General: no fever, no chills, no fatigue,no weight changes, no lack of appetite.  Eyes: no epiphora, xerophthalmia,conjunctivitis, pain, glaucoma, blurred vision, blindness, secretion, photophobia, proptosis, diplopia.  Ears: no otorrhea, tinnitus, otorrhagia, deafness, pain, vertigo.  Nose: no rhinorrhea, no epistaxis, no alteration in perception of odors, no sinuses pressure.  Mouth: no alteration in gums or teeth,  No ulcers, no difficulty with mastication or deglut ion, no odynophagia.  Neck: no masses or pain, no thyroid alterations, no pain in muscles or arteries, no carotid odynia, no crepitation.  Respiratory: no cough, no sputum production,no dyspnea,no trepopnea, no pleuritic pain,no hemoptysis.  Heart: no syncope, no irregularity, no palpitations, no angina,no orthopnea,no paroxysmal nocturnal dyspnea.  Vascular Venous: no tenderness,no edema,no palpable cords,no postphlebitic syndrome, no skin changes no ulcerations.  Vascular Arterial: no distal ischemia, noclaudication, no gangrene, no neuropathic ischemic pain, no skin ulcers, no paleness no cyanosis.  GI: no dysphagia, no odynophagia, no regurgitation, no heartburn,no indigestion,no nausea,no vomiting,no hematemesis ,no melena,no jaundice,no distention, no obstipation,no enterorrhagia,no proctalgia,no anal  lesions, no changes in  "bowel habits.  : no frequency, no hesitancy, no hematuria, no discharge,no  pain.  Musculoskeletal: no muscle or tendon pain or inflammation,no  joint pain, no edema, no functional limitation,no fasciculations, no mass.  Neurologic: no headache, no seizures, noalterations on Craneal nerves, no motor deficit, no sensory deficit, normal coordination, no alteration in memory,normal orientation, calculation,normal writting, verbal and written language.  Skin: no rashes,no pruritus no localized lesions.  Psychiatric: no anxiety, no depression,no agitation, no delusions, proper insight.                Medications:  The current medication list was reviewed in the EMR    ALLERGIES:  No Known Allergies    Objective      Vitals:    11/05/19 1004   BP: 104/66   Pulse: 88   Resp: 12   Temp: 98.6 °F (37 °C)   TempSrc: Oral   SpO2: 100%   Weight: 64.2 kg (141 lb 8 oz)   Height: 170 cm (66.93\")   PainSc: 0-No pain     Current Status 11/5/2019   ECOG score 0               Physical Exam:  GENERAL:  Well-developed, well-nourished  Patient  in no acute distress.   SKIN:  Warm, dry ,NO rashes,NO purpura ,NO petechiae.  HEENT:  Pupils were equal and reactive to light and accomodation, conjunctivas non injected, no pterigion, normal extraocular movements, normal visual acuity.   Mouth mucosa was moist, no exudates in oropharynx, normal gum line, normal roof of the mouth and pillars, normal papillations of the tongue.  NECK:  Supple with good range of motion; no thyromegaly or masses, no JVD or bruits, no cervical adenopathies.No carotid arteries pain, no carotid abnormal pulsation , NO arterial dance.  LYMPHATICS:  No cervical, NO supraclavicular, NO axillary,NO epitrochlear , NO inguinal adenopathy.  CHEST:  Normal excursion of both adonis thoraces, normal voice fremitus, no subcutaneous emphysema, normal axillas, no rashes or acanthosis nigricans. Lungs clear to percussion and auscultation, normal breath sounds bilaterally, no " wheezing,NO crackles NO ronchi, NO stridor, NO rubs.  CARDIAC AND VASCULAR:  normal rate and regular rhythm, without murmurs,NO rubs NO S3 NO S4 right or left . Normal femoral, popliteal, pedis, brachial and carotid pulses.  INSPECTION of  breast documented symmetry of the tissue per se and location and size of the nipple,no retractions or inversion of the nipple, normal skin without lesions, no erythema or nodules,no paud'orange, no prominence of superficial veins or chest wall collateral circulation.PALPATION of the breast documented normal skin turgor, no induration, alteration in local temperature, or pain, no palpable masses or nodules, normal mobility of the tissues,no fixation of the tissue or parenchyma to the chest wall, no alteration at the tail of the breasts or axillas, no adenopathies. Bilateral reconstruction Surgical site were well healed.No lymphedema in either extremity.  ABDOMEN:  Soft, nontender with no organomegaly or masses, no ascites, no collateral circulation,no distention,no Joe sign, no abdominal pain, no inguinal hernias,no umbilical hernia, no abdominal bruits. Normal bowel sounds.  GENITAL: Not  Performed.  EXTREMITIES  AND SPINE:  No clubbing, cyanosis or edema, no deformities or pain .No kyphosis, scoliosis, deformities or pain in spine, ribs or pelvic bone.  NEUROLOGICAL:  Patient was awake, alert, oriented to time, person and place.                                    RECENT LABS:LIMITED DIRECTED RIGHT BREAST ULTRASOUND     HISTORY: Previous mastectomy and right breast reconstruction with saline  implant. Pain in lateral right breast.     FINDINGS:  Ultrasound evaluation of the area of concern extends lateral  to the margin of the breast implant. No abnormality is seen in this  region.     CONCLUSION: Negative directed right breast ultrasound.      BI-RADS CATEGORY 1: Negative.     This report was finalized on 10/3/2019 4:23 PM by Dr. George Jacques M.D.     Pathology Exam:  [id number]   Order: [id number]   Status:  Final result   Visible to patient:  Yes (MyChart)   Next appt:  12/27/2019 at 10:30 AM in Family Medicine (Manasa Walters MD)   Dx:  Other constipation; Rectal bleed   Component    Case Report   Surgical Pathology Report                         Case: [id number]                                   Authorizing Provider:  Pato Mac MD       Collected:           08/01/2019 02:15 PM           Ordering Location:     [facility]  Received:            08/01/2019 03:12 PM                                  [facility]                                                                   Pathologist:           Abiodun Luque MD                                                          Specimen:    Large Intestine, Sigmoid Colon, SIGMOID COLON POLYP                                        Final Diagnosis   1.  Sigmoid Colon Biopsy:                A.  Tubular adenoma.                B.  No high glandular dysplasia nor malignancy identified.       jat/brb    Electronically signed by Abiodun Luque MD on 8/2/2019 at 1158             Hematology WBC   Date Value Ref Range Status   11/05/2019 4.69 3.40 - 10.80 10*3/mm3 Final     RBC   Date Value Ref Range Status   11/05/2019 4.53 3.77 - 5.28 10*6/mm3 Final     Hemoglobin   Date Value Ref Range Status   11/05/2019 13.6 12.0 - 15.9 g/dL Final     Hematocrit   Date Value Ref Range Status   11/05/2019 41.5 34.0 - 46.6 % Final     Platelets   Date Value Ref Range Status   11/05/2019 267 140 - 450 10*3/mm3 Final              Assessment/Plan    1. History of stage IIA, [id number], ER weakly-positive, [place] negative, HER2-negative right upper outer quadrant breast cancer, status post bilateral mastectomies. The patient completed her adjuvant chemotherapy under NSABP B49 clinical trial.Patient continues on Femara, 2.5 mg a day. The patient will be completing almost 5 years since she was documented to be cancer free on 11/27/2019.     So far  the patient has no clinical evidence of recurrent disease. The most recent breast ultrasound disclosed no abnormalities in the skin, axilla or soft tissues. This is confirmed by my physical examination today.    The patient has had a colonoscopy showing a tubular adenoma that was removed and she will require another colonoscopy in 3 years.     The patient also was advised in regard to taking some magnesium supplement for chronic back pain that could be beneficial not only for that purpose but also because of her chronic constipation. I made some suggestions in regard dietetic measures that she could take to see if her bowel activity improves.     The patient will be advised as well in regard to CBD oil. She wants to know if this could be beneficial for chronic back pain, she can try I will not oppose.     She will have a port flush in 2 months and 4 months and doctor visit in 4 months.                                                 11/5/2019      CC:

## 2019-12-13 RX ORDER — LINACLOTIDE 145 UG/1
CAPSULE, GELATIN COATED ORAL
Qty: 30 CAPSULE | Refills: 1 | Status: SHIPPED | OUTPATIENT
Start: 2019-12-13 | End: 2020-02-05

## 2019-12-23 ENCOUNTER — APPOINTMENT (OUTPATIENT)
Dept: ONCOLOGY | Facility: HOSPITAL | Age: 44
End: 2019-12-23

## 2019-12-27 ENCOUNTER — INFUSION (OUTPATIENT)
Dept: ONCOLOGY | Facility: HOSPITAL | Age: 44
End: 2019-12-27

## 2019-12-27 DIAGNOSIS — Z45.2 FITTING AND ADJUSTMENT OF VASCULAR CATHETER: Primary | ICD-10-CM

## 2019-12-27 PROCEDURE — 96523 IRRIG DRUG DELIVERY DEVICE: CPT | Performed by: INTERNAL MEDICINE

## 2019-12-27 RX ORDER — SODIUM CHLORIDE 0.9 % (FLUSH) 0.9 %
10 SYRINGE (ML) INJECTION AS NEEDED
Status: DISCONTINUED | OUTPATIENT
Start: 2019-12-27 | End: 2019-12-27 | Stop reason: HOSPADM

## 2019-12-27 RX ORDER — HEPARIN SODIUM (PORCINE) LOCK FLUSH IV SOLN 100 UNIT/ML 100 UNIT/ML
500 SOLUTION INTRAVENOUS AS NEEDED
Status: CANCELLED | OUTPATIENT
Start: 2019-12-27

## 2019-12-27 RX ORDER — SODIUM CHLORIDE 0.9 % (FLUSH) 0.9 %
10 SYRINGE (ML) INJECTION AS NEEDED
Status: CANCELLED | OUTPATIENT
Start: 2019-12-27

## 2019-12-27 RX ADMIN — Medication 500 UNITS: at 13:05

## 2019-12-27 RX ADMIN — Medication 10 ML: at 13:05

## 2019-12-30 ENCOUNTER — OFFICE VISIT (OUTPATIENT)
Dept: RETAIL CLINIC | Facility: CLINIC | Age: 44
End: 2019-12-30

## 2019-12-30 VITALS
TEMPERATURE: 97.7 F | OXYGEN SATURATION: 99 % | RESPIRATION RATE: 18 BRPM | SYSTOLIC BLOOD PRESSURE: 96 MMHG | HEART RATE: 81 BPM | DIASTOLIC BLOOD PRESSURE: 66 MMHG

## 2019-12-30 DIAGNOSIS — B97.89 VIRAL RESPIRATORY ILLNESS: Primary | ICD-10-CM

## 2019-12-30 DIAGNOSIS — H65.01 RIGHT ACUTE SEROUS OTITIS MEDIA, RECURRENCE NOT SPECIFIED: ICD-10-CM

## 2019-12-30 DIAGNOSIS — J98.8 VIRAL RESPIRATORY ILLNESS: Primary | ICD-10-CM

## 2019-12-30 PROCEDURE — 99213 OFFICE O/P EST LOW 20 MIN: CPT | Performed by: NURSE PRACTITIONER

## 2019-12-30 RX ORDER — CETIRIZINE HYDROCHLORIDE, PSEUDOEPHEDRINE HYDROCHLORIDE 5; 120 MG/1; MG/1
1 TABLET, FILM COATED, EXTENDED RELEASE ORAL 2 TIMES DAILY
Qty: 14 TABLET | Refills: 0 | Status: SHIPPED | OUTPATIENT
Start: 2019-12-30 | End: 2020-01-06

## 2019-12-30 RX ORDER — PREDNISONE 20 MG/1
20 TABLET ORAL DAILY
Qty: 5 TABLET | Refills: 0 | Status: SHIPPED | OUTPATIENT
Start: 2019-12-30 | End: 2020-01-04

## 2019-12-30 NOTE — PATIENT INSTRUCTIONS
Viral Respiratory Infection  A respiratory infection is an illness that affects part of the respiratory system, such as the lungs, nose, or throat. A respiratory infection that is caused by a virus is called a viral respiratory infection.  Common types of viral respiratory infections include:  · A cold.  · The flu (influenza).  · A respiratory syncytial virus (RSV) infection.  What are the causes?  This condition is caused by a virus.  What are the signs or symptoms?  Symptoms of this condition include:  · A stuffy or runny nose.  · Yellow or green nasal discharge.  · A cough.  · Sneezing.  · Fatigue.  · Achy muscles.  · A sore throat.  · Sweating or chills.  · A fever.  · A headache.  How is this diagnosed?  This condition may be diagnosed based on:  · Your symptoms.  · A physical exam.  · Testing of nasal swabs.  How is this treated?  This condition may be treated with medicines, such as:  · Antiviral medicine. This may shorten the length of time a person has symptoms.  · Expectorants. These make it easier to cough up mucus.  · Decongestant nasal sprays.  · Acetaminophen or NSAIDs to relieve fever and pain.  Antibiotic medicines are not prescribed for viral infections. This is because antibiotics are designed to kill bacteria. They are not effective against viruses.  Follow these instructions at home:    Managing pain and congestion  · Take over-the-counter and prescription medicines only as told by your health care provider.  · If you have a sore throat, gargle with a salt-water mixture 3-4 times a day or as needed. To make a salt-water mixture, completely dissolve ½-1 tsp of salt in 1 cup of warm water.  · Use nose drops made from salt water to ease congestion and soften raw skin around your nose.  · Drink enough fluid to keep your urine pale yellow. This helps prevent dehydration and helps loosen up mucus.  General instructions  · Rest as much as possible.  · Do not drink alcohol.  · Do not use any products  that contain nicotine or tobacco, such as cigarettes and e-cigarettes. If you need help quitting, ask your health care provider.  · Keep all follow-up visits as told by your health care provider. This is important.  How is this prevented?    · Get an annual flu shot. You may get the flu shot in late summer, fall, or winter. Ask your health care provider when you should get your flu shot.  · Avoid exposing others to your respiratory infection.  ? Stay home from work or school as told by your health care provider.  ? Wash your hands with soap and water often, especially after you cough or sneeze. If soap and water are not available, use alcohol-based hand .  · Avoid contact with people who are sick during cold and flu season. This is generally fall and winter.  Contact a health care provider if:  · Your symptoms last for 10 days or longer.  · Your symptoms get worse over time.  · You have a fever.  · You have severe sinus pain in your face or forehead.  · The glands in your jaw or neck become very swollen.  Get help right away if you:  · Feel pain or pressure in your chest.  · Have shortness of breath.  · Faint or feel like you will faint.  · Have severe and persistent vomiting.  · Feel confused or disoriented.  Summary  · A respiratory infection is an illness that affects part of the respiratory system, such as the lungs, nose, or throat. A respiratory infection that is caused by a virus is called a viral respiratory infection.  · Common types of viral respiratory infections are a cold, influenza, and respiratory syncytial virus (RSV) infection.  · Symptoms of this condition include a stuffy or runny nose, cough, sneezing, fatigue, achy muscles, sore throat, and fevers or chills.  · Antibiotic medicines are not prescribed for viral infections. This is because antibiotics are designed to kill bacteria. They are not effective against viruses.  This information is not intended to replace advice given to you by  your health care provider. Make sure you discuss any questions you have with your health care provider.  Document Released: 09/27/2006 Document Revised: 01/28/2019 Document Reviewed: 01/28/2019  [employer] Interactive Patient Education © 2019 [employer]  Otitis Media With Effusion, Pediatric    Otitis media with effusion (OME) occurs when there is inflammation of the middle ear and fluid in the middle ear space. There are no signs and symptoms of infection. The middle ear space contains air and the bones for hearing. Air in the middle ear space helps to transmit sound to the brain.  OME is a common condition in children, and it often occurs after an ear infection. This condition may be present for several weeks or longer after an ear infection. Most cases of this condition get better on their own.  What are the causes?  OME is caused by a blockage of the eustachian tube in one or both ears. These tubes drain fluid in the ears to the back of the nose (nasopharynx). If the tissue in the tube swells up (edema), the tube closes. This prevents fluid from draining. Blockage can be caused by:  · Ear infections.  · Colds and other upper respiratory infections.  · Allergies.  · Irritants, such as tobacco smoke.  · Enlarged adenoids. The adenoids are areas of soft tissue located high in the back of the throat, behind the nose and the roof of the mouth. They are part of the body’s natural defense (immune) system.  · A mass in the nasopharynx.  · Damage to the ear caused by pressure changes (barotrauma).  What increases the risk?  Your child is more likely to develop this condition if:  · He or she has repeated ear and sinus infections.  · He or she has allergies.  · He or she is exposed to tobacco smoke.  · He or she attends .  · He or she is not .  What are the signs or symptoms?  Symptoms of this condition may not be obvious. Sometimes this condition does not have any symptoms, or symptoms may overlap with  "those of a cold or upper respiratory tract illness.  Symptoms of this condition include:  · Temporary hearing loss.  · A feeling of fullness in the ear without pain.  · Irritability or agitation.  · Balance (vestibular) problems.  As a result of hearing loss, your child may:  · Listen to the TV at a loud volume.  · Not respond to questions.  · Ask \"What?\" often when spoken to.  · Mistake or confuse one sound or word for another.  · Perform poorly at school.  · Have a poor attention span.  · Become agitated or irritated easily.  How is this diagnosed?  This condition is diagnosed with an ear exam. Your child's health care provider will look inside your child's ear with an instrument (otoscope) to check for redness, swelling, and fluid.  Other tests may be done, including:  · A test to check the movement of the eardrum (pneumatic otoscopy). This is done by squeezing a small amount of air into the ear.  · A test that changes air pressure in the middle ear to check how well the eardrum moves and to see if the eustachian tube is working (tympanogram).  · Hearing test (audiogram). This test involves playing tones at different pitches to see if your child can hear each tone.  How is this treated?  Treatment for this condition depends on the cause. In many cases, the fluid goes away on its own.  In some cases, your child may need a procedure to create a hole in the eardrum to allow fluid to drain (myringotomy) and to insert small drainage tubes (tympanostomy tubes) into the eardrums. These tubes help to drain fluid and prevent infection. This procedure may be recommended if:  · OME does not get better over several months.  · Your child has many ear infections within several months.  · Your child has noticeable hearing loss.  · Your child has problems with speech and language development.  Surgery may also be done to remove the adenoids (adenoidectomy).  Follow these instructions at home:  · Give over-the-counter and " prescription medicines only as told by your child's health care provider.  · Keep children away from any tobacco smoke.  · Keep all follow-up visits as told by your child's health care provider. This is important.  How is this prevented?  · Keep your child's vaccinations up to date. Make sure your child gets all recommended vaccinations, including a pneumonia and flu vaccine.  · Encourage hand washing. Your child should wash his or her hands often with soap and water. If there is no soap and water, he or she should use hand .  · Avoid exposing your child to tobacco smoke.  · Breastfeed your baby, if possible. Babies who are  as long as possible are less likely to develop this condition.  Contact a health care provider if:  · Your child's hearing does not get better after 3 months.  · Your child's hearing is worse.  · Your child has ear pain.  · Your child has a fever.  · Your child has drainage from the ear.  · Your child is dizzy.  · Your child has a lump on his or her neck.  Get help right away if:  · Your child has bleeding from the nose.  · Your child cannot move part of her or his face.  · Your child has trouble breathing.  · Your child cannot smell.  · Your child develops severe congestion.  · Your child develops weakness.  · Your child who is younger than 3 months has a temperature of 100°F (38°C) or higher.  Summary  · Otitis media with effusion (OME) occurs when there is inflammation of the middle ear and fluid in the middle ear space.  · This condition is caused by blockage of one or both eustachian tubes, which drain fluid in the ears to the back of the nose.  · Symptoms of this condition can include temporary hearing loss, a feeling of fullness in the ear, irritability or agitation, and balance (vestibular) problems. Sometimes, there are no symptoms.  · This condition is diagnosed with an ear exam and tests, such as pneumatic otoscopy, tympanogram, and audiogram.  · Treatment for this  condition depends on the cause. In many cases, the fluid goes away on its own.  This information is not intended to replace advice given to you by your health care provider. Make sure you discuss any questions you have with your health care provider.  Document Released: 03/09/2005 Document Revised: 11/09/2017 Document Reviewed: 11/09/2017  [employer] Interactive Patient Education © 2019 [employer]

## 2019-12-30 NOTE — PROGRESS NOTES
Subjective   Patient ID: Diana Higgins is a 44 y.o. female presents with   Chief Complaint   Patient presents with   • URI       URI    This is a new problem. The current episode started yesterday. The problem has been gradually worsening. There has been no fever. Associated symptoms include congestion, coughing (mild, dry), headaches and nausea. Pertinent negatives include no diarrhea, ear pain, rhinorrhea, sneezing, sore throat, vomiting or wheezing. She has tried nothing (mucinex, tylenol sinus) for the symptoms. The treatment provided mild relief.       No Known Allergies    The following portions of the patient's history were reviewed and updated as appropriate: allergies, current medications, past family history, past medical history, past social history, past surgical history and problem list.      Review of Systems   Constitutional: Positive for diaphoresis and fatigue. Negative for chills and fever.   HENT: Positive for congestion and sinus pressure. Negative for ear pain, postnasal drip, rhinorrhea, sneezing and sore throat.    Respiratory: Positive for cough (mild, dry), chest tightness and shortness of breath. Negative for wheezing.    Cardiovascular: Negative.    Gastrointestinal: Positive for nausea. Negative for diarrhea and vomiting.   Neurological: Positive for dizziness and headaches.       Objective     Vitals:    12/30/19 1515   BP: 96/66   Pulse: 81   Resp: 18   Temp: 97.7 °F (36.5 °C)   SpO2: 99%         Physical Exam   Constitutional: She is oriented to person, place, and time. She appears well-developed and well-nourished. She does not appear ill. No distress.   HENT:   Head: Normocephalic.   Right Ear: Hearing, external ear and ear canal normal. A middle ear effusion is present.   Left Ear: Hearing, tympanic membrane, external ear and ear canal normal.   Nose: Mucosal edema and sinus tenderness present. No rhinorrhea.   Mouth/Throat: Mucous membranes are normal. Posterior oropharyngeal  erythema (mild) present. No tonsillar exudate.   Eyes: Conjunctivae are normal.   Sclera white.   Neck: No tracheal deviation present.   Cardiovascular: Normal rate, regular rhythm, S1 normal, S2 normal and normal heart sounds.   Pulmonary/Chest: Effort normal and breath sounds normal. No accessory muscle usage. No respiratory distress.   Abdominal: Soft. Bowel sounds are normal. There is no tenderness.   Lymphadenopathy:     She has no cervical adenopathy.   Neurological: She is alert and oriented to person, place, and time.   Skin: Skin is warm and dry.   Vitals reviewed.        Diana was seen today for uri.    Diagnoses and all orders for this visit:    Viral respiratory illness  -     cetirizine-pseudoephedrine (ZyrTEC-D) 5-120 MG per 12 hr tablet; Take 1 tablet by mouth 2 (Two) Times a Day for 7 days.    Right acute serous otitis media, recurrence not specified  -     predniSONE (DELTASONE) 20 MG tablet; Take 1 tablet by mouth Daily for 5 days.        Patient understands possible side effects of all medications ordered. Follow-up with Primary Care Physician in 48-72 hours if these symptoms worsen or fail to improve as anticipated. Patient verbalizes understanding.    Viral Respiratory Infection  A respiratory infection is an illness that affects part of the respiratory system, such as the lungs, nose, or throat. A respiratory infection that is caused by a virus is called a viral respiratory infection.  Common types of viral respiratory infections include:  · A cold.  · The flu (influenza).  · A respiratory syncytial virus (RSV) infection.  What are the causes?  This condition is caused by a virus.  What are the signs or symptoms?  Symptoms of this condition include:  · A stuffy or runny nose.  · Yellow or green nasal discharge.  · A cough.  · Sneezing.  · Fatigue.  · Achy muscles.  · A sore throat.  · Sweating or chills.  · A fever.  · A headache.  How is this diagnosed?  This condition may be diagnosed based  on:  · Your symptoms.  · A physical exam.  · Testing of nasal swabs.  How is this treated?  This condition may be treated with medicines, such as:  · Antiviral medicine. This may shorten the length of time a person has symptoms.  · Expectorants. These make it easier to cough up mucus.  · Decongestant nasal sprays.  · Acetaminophen or NSAIDs to relieve fever and pain.  Antibiotic medicines are not prescribed for viral infections. This is because antibiotics are designed to kill bacteria. They are not effective against viruses.  Follow these instructions at home:    Managing pain and congestion  · Take over-the-counter and prescription medicines only as told by your health care provider.  · If you have a sore throat, gargle with a salt-water mixture 3-4 times a day or as needed. To make a salt-water mixture, completely dissolve ½-1 tsp of salt in 1 cup of warm water.  · Use nose drops made from salt water to ease congestion and soften raw skin around your nose.  · Drink enough fluid to keep your urine pale yellow. This helps prevent dehydration and helps loosen up mucus.  General instructions  · Rest as much as possible.  · Do not drink alcohol.  · Do not use any products that contain nicotine or tobacco, such as cigarettes and e-cigarettes. If you need help quitting, ask your health care provider.  · Keep all follow-up visits as told by your health care provider. This is important.  How is this prevented?    · Get an annual flu shot. You may get the flu shot in late summer, fall, or winter. Ask your health care provider when you should get your flu shot.  · Avoid exposing others to your respiratory infection.  ? Stay home from work or school as told by your health care provider.  ? Wash your hands with soap and water often, especially after you cough or sneeze. If soap and water are not available, use alcohol-based hand .  · Avoid contact with people who are sick during cold and flu season. This is generally  fall and winter.  Contact a health care provider if:  · Your symptoms last for 10 days or longer.  · Your symptoms get worse over time.  · You have a fever.  · You have severe sinus pain in your face or forehead.  · The glands in your jaw or neck become very swollen.  Get help right away if you:  · Feel pain or pressure in your chest.  · Have shortness of breath.  · Faint or feel like you will faint.  · Have severe and persistent vomiting.  · Feel confused or disoriented.  Summary  · A respiratory infection is an illness that affects part of the respiratory system, such as the lungs, nose, or throat. A respiratory infection that is caused by a virus is called a viral respiratory infection.  · Common types of viral respiratory infections are a cold, influenza, and respiratory syncytial virus (RSV) infection.  · Symptoms of this condition include a stuffy or runny nose, cough, sneezing, fatigue, achy muscles, sore throat, and fevers or chills.  · Antibiotic medicines are not prescribed for viral infections. This is because antibiotics are designed to kill bacteria. They are not effective against viruses.  This information is not intended to replace advice given to you by your health care provider. Make sure you discuss any questions you have with your health care provider.  Document Released: 09/27/2006 Document Revised: 01/28/2019 Document Reviewed: 01/28/2019  [employer] Interactive Patient Education © 2019 [employer]  Otitis Media With Effusion, Pediatric    Otitis media with effusion (OME) occurs when there is inflammation of the middle ear and fluid in the middle ear space. There are no signs and symptoms of infection. The middle ear space contains air and the bones for hearing. Air in the middle ear space helps to transmit sound to the brain.  OME is a common condition in children, and it often occurs after an ear infection. This condition may be present for several weeks or longer after an ear infection. Most  "cases of this condition get better on their own.  What are the causes?  OME is caused by a blockage of the eustachian tube in one or both ears. These tubes drain fluid in the ears to the back of the nose (nasopharynx). If the tissue in the tube swells up (edema), the tube closes. This prevents fluid from draining. Blockage can be caused by:  · Ear infections.  · Colds and other upper respiratory infections.  · Allergies.  · Irritants, such as tobacco smoke.  · Enlarged adenoids. The adenoids are areas of soft tissue located high in the back of the throat, behind the nose and the roof of the mouth. They are part of the body’s natural defense (immune) system.  · A mass in the nasopharynx.  · Damage to the ear caused by pressure changes (barotrauma).  What increases the risk?  Your child is more likely to develop this condition if:  · He or she has repeated ear and sinus infections.  · He or she has allergies.  · He or she is exposed to tobacco smoke.  · He or she attends .  · He or she is not .  What are the signs or symptoms?  Symptoms of this condition may not be obvious. Sometimes this condition does not have any symptoms, or symptoms may overlap with those of a cold or upper respiratory tract illness.  Symptoms of this condition include:  · Temporary hearing loss.  · A feeling of fullness in the ear without pain.  · Irritability or agitation.  · Balance (vestibular) problems.  As a result of hearing loss, your child may:  · Listen to the TV at a loud volume.  · Not respond to questions.  · Ask \"What?\" often when spoken to.  · Mistake or confuse one sound or word for another.  · Perform poorly at school.  · Have a poor attention span.  · Become agitated or irritated easily.  How is this diagnosed?  This condition is diagnosed with an ear exam. Your child's health care provider will look inside your child's ear with an instrument (otoscope) to check for redness, swelling, and fluid.  Other tests may " be done, including:  · A test to check the movement of the eardrum (pneumatic otoscopy). This is done by squeezing a small amount of air into the ear.  · A test that changes air pressure in the middle ear to check how well the eardrum moves and to see if the eustachian tube is working (tympanogram).  · Hearing test (audiogram). This test involves playing tones at different pitches to see if your child can hear each tone.  How is this treated?  Treatment for this condition depends on the cause. In many cases, the fluid goes away on its own.  In some cases, your child may need a procedure to create a hole in the eardrum to allow fluid to drain (myringotomy) and to insert small drainage tubes (tympanostomy tubes) into the eardrums. These tubes help to drain fluid and prevent infection. This procedure may be recommended if:  · OME does not get better over several months.  · Your child has many ear infections within several months.  · Your child has noticeable hearing loss.  · Your child has problems with speech and language development.  Surgery may also be done to remove the adenoids (adenoidectomy).  Follow these instructions at home:  · Give over-the-counter and prescription medicines only as told by your child's health care provider.  · Keep children away from any tobacco smoke.  · Keep all follow-up visits as told by your child's health care provider. This is important.  How is this prevented?  · Keep your child's vaccinations up to date. Make sure your child gets all recommended vaccinations, including a pneumonia and flu vaccine.  · Encourage hand washing. Your child should wash his or her hands often with soap and water. If there is no soap and water, he or she should use hand .  · Avoid exposing your child to tobacco smoke.  · Breastfeed your baby, if possible. Babies who are  as long as possible are less likely to develop this condition.  Contact a health care provider if:  · Your child's  hearing does not get better after 3 months.  · Your child's hearing is worse.  · Your child has ear pain.  · Your child has a fever.  · Your child has drainage from the ear.  · Your child is dizzy.  · Your child has a lump on his or her neck.  Get help right away if:  · Your child has bleeding from the nose.  · Your child cannot move part of her or his face.  · Your child has trouble breathing.  · Your child cannot smell.  · Your child develops severe congestion.  · Your child develops weakness.  · Your child who is younger than 3 months has a temperature of 100°F (38°C) or higher.  Summary  · Otitis media with effusion (OME) occurs when there is inflammation of the middle ear and fluid in the middle ear space.  · This condition is caused by blockage of one or both eustachian tubes, which drain fluid in the ears to the back of the nose.  · Symptoms of this condition can include temporary hearing loss, a feeling of fullness in the ear, irritability or agitation, and balance (vestibular) problems. Sometimes, there are no symptoms.  · This condition is diagnosed with an ear exam and tests, such as pneumatic otoscopy, tympanogram, and audiogram.  · Treatment for this condition depends on the cause. In many cases, the fluid goes away on its own.  This information is not intended to replace advice given to you by your health care provider. Make sure you discuss any questions you have with your health care provider.  Document Released: 03/09/2005 Document Revised: 11/09/2017 Document Reviewed: 11/09/2017  [employer] Interactive Patient Education © 2019 [employer]

## 2020-02-03 ENCOUNTER — TELEPHONE (OUTPATIENT)
Dept: ONCOLOGY | Facility: CLINIC | Age: 45
End: 2020-02-03

## 2020-02-03 NOTE — TELEPHONE ENCOUNTER
Patient stated that her daughter was just diagnosed with the flu and the patients PCP gave her an order of tamaflu-she would like a call back on wether or not she can take this medication.    Patients Phone: [phone]

## 2020-02-05 RX ORDER — LINACLOTIDE 145 UG/1
CAPSULE, GELATIN COATED ORAL
Qty: 30 CAPSULE | Refills: 1 | Status: SHIPPED | OUTPATIENT
Start: 2020-02-05 | End: 2020-04-03 | Stop reason: SDUPTHER

## 2020-02-17 ENCOUNTER — OFFICE VISIT (OUTPATIENT)
Dept: ONCOLOGY | Facility: CLINIC | Age: 45
End: 2020-02-17

## 2020-02-17 ENCOUNTER — INFUSION (OUTPATIENT)
Dept: ONCOLOGY | Facility: HOSPITAL | Age: 45
End: 2020-02-17

## 2020-02-17 VITALS
OXYGEN SATURATION: 97 % | WEIGHT: 143 LBS | HEIGHT: 66 IN | RESPIRATION RATE: 14 BRPM | BODY MASS INDEX: 22.98 KG/M2 | TEMPERATURE: 98 F | HEART RATE: 96 BPM | SYSTOLIC BLOOD PRESSURE: 102 MMHG | DIASTOLIC BLOOD PRESSURE: 71 MMHG

## 2020-02-17 DIAGNOSIS — C50.411 MALIGNANT NEOPLASM OF UPPER-OUTER QUADRANT OF RIGHT BREAST IN FEMALE, ESTROGEN RECEPTOR POSITIVE (HCC): Primary | ICD-10-CM

## 2020-02-17 DIAGNOSIS — M81.0 AGE-RELATED OSTEOPOROSIS WITHOUT CURRENT PATHOLOGICAL FRACTURE: ICD-10-CM

## 2020-02-17 DIAGNOSIS — Z45.2 FITTING AND ADJUSTMENT OF VASCULAR CATHETER: ICD-10-CM

## 2020-02-17 DIAGNOSIS — F41.9 ANXIETY: ICD-10-CM

## 2020-02-17 DIAGNOSIS — K59.04 CHRONIC IDIOPATHIC CONSTIPATION: ICD-10-CM

## 2020-02-17 DIAGNOSIS — Z17.0 MALIGNANT NEOPLASM OF UPPER-OUTER QUADRANT OF RIGHT BREAST IN FEMALE, ESTROGEN RECEPTOR POSITIVE (HCC): Primary | ICD-10-CM

## 2020-02-17 DIAGNOSIS — Z78.0 POST-MENOPAUSAL: ICD-10-CM

## 2020-02-17 LAB
ALBUMIN SERPL-MCNC: 4.1 G/DL (ref 3.5–5.2)
ALBUMIN/GLOB SERPL: 1.5 G/DL (ref 1.1–2.4)
ALP SERPL-CCNC: 62 U/L (ref 38–116)
ALT SERPL W P-5'-P-CCNC: 16 U/L (ref 0–33)
ANION GAP SERPL CALCULATED.3IONS-SCNC: 12.9 MMOL/L (ref 5–15)
AST SERPL-CCNC: 23 U/L (ref 0–32)
BASOPHILS # BLD AUTO: 0.03 10*3/MM3 (ref 0–0.2)
BASOPHILS NFR BLD AUTO: 0.5 % (ref 0–1.5)
BILIRUB SERPL-MCNC: 0.3 MG/DL (ref 0.2–1.2)
BUN BLD-MCNC: 15 MG/DL (ref 6–20)
BUN/CREAT SERPL: 22.1 (ref 7.3–30)
CALCIUM SPEC-SCNC: 9.1 MG/DL (ref 8.5–10.2)
CANCER AG15-3 SERPL-ACNC: 19.3 U/ML
CHLORIDE SERPL-SCNC: 101 MMOL/L (ref 98–107)
CO2 SERPL-SCNC: 24.1 MMOL/L (ref 22–29)
CREAT BLD-MCNC: 0.68 MG/DL (ref 0.6–1.1)
DEPRECATED RDW RBC AUTO: 42 FL (ref 37–54)
EOSINOPHIL # BLD AUTO: 0.15 10*3/MM3 (ref 0–0.4)
EOSINOPHIL NFR BLD AUTO: 2.4 % (ref 0.3–6.2)
ERYTHROCYTE [DISTWIDTH] IN BLOOD BY AUTOMATED COUNT: 12.3 % (ref 12.3–15.4)
GFR SERPL CREATININE-BSD FRML MDRD: 93 ML/MIN/1.73
GLOBULIN UR ELPH-MCNC: 2.8 GM/DL (ref 1.8–3.5)
GLUCOSE BLD-MCNC: 78 MG/DL (ref 74–124)
HCT VFR BLD AUTO: 39.1 % (ref 34–46.6)
HGB BLD-MCNC: 12.9 G/DL (ref 12–15.9)
IMM GRANULOCYTES # BLD AUTO: 0.02 10*3/MM3 (ref 0–0.05)
IMM GRANULOCYTES NFR BLD AUTO: 0.3 % (ref 0–0.5)
LYMPHOCYTES # BLD AUTO: 1.21 10*3/MM3 (ref 0.7–3.1)
LYMPHOCYTES NFR BLD AUTO: 19.7 % (ref 19.6–45.3)
MCH RBC QN AUTO: 30.6 PG (ref 26.6–33)
MCHC RBC AUTO-ENTMCNC: 33 G/DL (ref 31.5–35.7)
MCV RBC AUTO: 92.9 FL (ref 79–97)
MONOCYTES # BLD AUTO: 0.68 10*3/MM3 (ref 0.1–0.9)
MONOCYTES NFR BLD AUTO: 11.1 % (ref 5–12)
NEUTROPHILS # BLD AUTO: 4.06 10*3/MM3 (ref 1.7–7)
NEUTROPHILS NFR BLD AUTO: 66 % (ref 42.7–76)
NRBC BLD AUTO-RTO: 0 /100 WBC (ref 0–0.2)
PLATELET # BLD AUTO: 270 10*3/MM3 (ref 140–450)
PMV BLD AUTO: 8.7 FL (ref 6–12)
POTASSIUM BLD-SCNC: 3.9 MMOL/L (ref 3.5–4.7)
PROT SERPL-MCNC: 6.9 G/DL (ref 6.3–8)
RBC # BLD AUTO: 4.21 10*6/MM3 (ref 3.77–5.28)
SODIUM BLD-SCNC: 138 MMOL/L (ref 134–145)
WBC NRBC COR # BLD: 6.15 10*3/MM3 (ref 3.4–10.8)

## 2020-02-17 PROCEDURE — 96523 IRRIG DRUG DELIVERY DEVICE: CPT

## 2020-02-17 PROCEDURE — 99215 OFFICE O/P EST HI 40 MIN: CPT | Performed by: INTERNAL MEDICINE

## 2020-02-17 PROCEDURE — 86300 IMMUNOASSAY TUMOR CA 15-3: CPT | Performed by: INTERNAL MEDICINE

## 2020-02-17 PROCEDURE — 85025 COMPLETE CBC W/AUTO DIFF WBC: CPT

## 2020-02-17 PROCEDURE — 25010000003 HEPARIN LOCK FLUCH PER 10 UNITS: Performed by: INTERNAL MEDICINE

## 2020-02-17 PROCEDURE — 36591 DRAW BLOOD OFF VENOUS DEVICE: CPT

## 2020-02-17 PROCEDURE — 80053 COMPREHEN METABOLIC PANEL: CPT

## 2020-02-17 RX ORDER — METRONIDAZOLE 250 MG/1
250 TABLET ORAL 3 TIMES DAILY
Qty: 21 TABLET | Refills: 0 | Status: SHIPPED | OUTPATIENT
Start: 2020-02-17 | End: 2020-05-08

## 2020-02-17 RX ORDER — SODIUM CHLORIDE 0.9 % (FLUSH) 0.9 %
10 SYRINGE (ML) INJECTION AS NEEDED
Status: CANCELLED | OUTPATIENT
Start: 2020-02-17

## 2020-02-17 RX ORDER — HEPARIN SODIUM (PORCINE) LOCK FLUSH IV SOLN 100 UNIT/ML 100 UNIT/ML
500 SOLUTION INTRAVENOUS AS NEEDED
Status: CANCELLED | OUTPATIENT
Start: 2020-02-17

## 2020-02-17 RX ORDER — SODIUM CHLORIDE 0.9 % (FLUSH) 0.9 %
10 SYRINGE (ML) INJECTION AS NEEDED
Status: DISCONTINUED | OUTPATIENT
Start: 2020-02-17 | End: 2020-02-17 | Stop reason: HOSPADM

## 2020-02-17 RX ORDER — HEPARIN SODIUM (PORCINE) LOCK FLUSH IV SOLN 100 UNIT/ML 100 UNIT/ML
500 SOLUTION INTRAVENOUS AS NEEDED
Status: DISCONTINUED | OUTPATIENT
Start: 2020-02-17 | End: 2020-02-17 | Stop reason: HOSPADM

## 2020-02-17 RX ADMIN — SODIUM CHLORIDE, PRESERVATIVE FREE 10 ML: 5 INJECTION INTRAVENOUS at 09:45

## 2020-02-17 RX ADMIN — Medication 500 UNITS: at 09:45

## 2020-02-17 NOTE — PROGRESS NOTES
Subjective   REASONS FOR FOLLOWUP:    1. History of stage IIA, J8A1yD0, ER weakly-positive, NH negative, HER2-negative breast cancer, status post bilateral mastectomies. The patient completed her adjuvant chemotherapy under NSABP B49 clinical trial.  On 12/14/2015 she has no clinical evidence of recurrence of breast cancer and she remains on Femara for the time being. She also has completed her breast reconstruction with bilateral implants and she is very satisfied in regard to the cosmetic result of this. She will remain on Femara for the time being. She receiving at this time Effexor 37.5 mg a day for hot flashes and anxiety.   2. Bone health. The patient has osteoporosis. She  required a new Reclast infusion in Jan 2017, and on yearly bases  3. Triage visit for right axillary tenderness              History of Present Illness This patient returns today to the office stating that she feels depressed with lesser degree of anxiety and she thinks that she has the winter blues. She is crying and she feels lonely because her grownup girls are not at home anymore and her  works a weird schedule and they do not see each other that often. She is taking care of her ill mother as well who lives with her. She has had fracture of both tibias.     In any event the patient complains of sensation of bloating in her abdomen half an hour after she eats or drinks something, she feels that her abdomen is distended. She has no nausea, no vomiting, no heartburn, no indigestion. She continues having tremendous difficulty with constipation to the point of 1 bowel movement every 5-6 days, almost like a rabbit. She still has hemorrhoidal bleeding and discomfort from so much straining trying to pass bowel activity. All of this in the background of using Linzess and trying to modify her diet in a positive way and keeping proper hydration.     The patient denies any urinary symptomatology. She denies any bone pain or joint pain. She  has not had any cough, sputum production or shortness of breath. No lesions in the skin. She is working full time besides all of the things that she does in her house and taking care of her mother.                                   Past Medical History.   Past Medical History:   Diagnosis Date   • Anxiety    • Breast cancer (CMS/HCC)     Right, stage IIA, s/p bilateral mastectomy, XRT, and chemotherapy, followed by Dr. Hadley   • Osteoporosis    • Paroxysmal supraventricular tachycardia (CMS/HCC)     CS ostium atrial tachycardia, ablated in 2002 by Dr. Garzon   • PONV (postoperative nausea and vomiting)    • Sinus headache     But no history of migranes   • Skin cancer    • Wears glasses      Social History     Socioeconomic History   • Marital status:      Spouse name: Misha   • Number of children: Not on file   • Years of education: College   • Highest education level: Not on file   Occupational History   • Occupation:      Employer: [employer]     Comment: [employer]   Tobacco Use   • Smoking status: Never Smoker   • Smokeless tobacco: Never Used   Substance and Sexual Activity   • Alcohol use: Yes     Frequency: Never     Comment: Rare   • Drug use: No   • Sexual activity: Defer     Family History   Problem Relation Age of Onset   • Coronary artery disease Mother    • Heart disease Mother    • Thrombophlebitis Mother         2016   • Heart attack Mother    • Hepatitis Father    • Liver cancer Father    • Crohn's disease Sister    • Drug abuse Brother         Drug overdose   • Cancer Maternal Grandmother 42        Breast   • Thyroid disease Sister    • Other Sister         kidney problems   • Breast cancer Maternal Aunt      :ONCOLOGIC/HEMATOLOGIC HISTORY     The patient was diagnosed with stage II breast cancer with the tumor located in the anterior axillary fold in the tail of the breast. She underwent bilateral mastectomies and she had bilateral expanders placed by  Dr. MURPHY Aguilar in preparation for further reconstruction in the future.  The patient’s primary tumor was 1.6 cm in size.  Margins of resection were negative, grade 3, with no lymphovascular invasion. Two sentinel lymph nodes contain metastasis, one of them with macrometastasis that were not spilling out of the lymph node.  Circulating cancer cells were negative.  CA 15-3 was negative, normal.  The patient had a CT scan of the brain, chest, abdomen and pelvis that disclose no distant metastasis at any level.  The radiologist questioned an internal mammary node on the right side that does not make any clinical sense given the primary location of the tumor in the right breast.  Her bone scan was negative.  Her MUGA ejection fraction was normal.  With all of this in mind we advised the patient to either participate in the NSABP B47 clinical trial or to consider chemotherapy in the standard regimen dose dense AC followed by Taxol followed by hormonal anti-estrogen therapy given the finding in the pathological specimen of the mastectomy that her tumor was weakly ER positive, VA negative, HER-2 negative by FISH.      Our research coordinator had the opportunity to interview the patient today to go through the explanation of participation in the clinical trial.      Patient seen 07/24/2014 for a second cycle of AC chemotherapy. Adjustments were made for her antinausea treatments then post chemotherapy to try to reduce insomnia and further nausea.     On 08/07/2014 the patient has encountered size effects of the treatment including nausea and vomiting on days 3, 4 and 5 after chemotherapy related to a continuous bitter metallic taste in her mouth.  She also has experienced a tremendous degree of skeletal pain induced by the Neulasta to the point that she surrendered to bed.  Under these circumstances we advised her to use some mint or crystalized kimberly for the taste in her mouth and she will take Aleve on a regular  schedule on days 2, 3, 4 and 5 to minimize the bone pain induced by the Neulasta.  On the other hand she has not developed any mucositis.  She has not developed any infections, abnormal bleeding or significant hematological toxicity.    On 08/21/2014 the patient proceeded with her 4th cycle of Adriamycin/Cytoxan supported with Neulasta. Pepcid AC b.i.d. was initiated at a standing dose to control heartburn. The patient also was advised to continue using mint and kimberly to minimize the taste in her mouth induced by chemicals in her chemotherapy treatment. She was scheduled to receive her next cycle of chemotherapy and at that point she will initiate Taxol on a weekly basis.     She was seen back in the office 09/04/2014 initiating treatment with Taxol given every week, x12.     On 09/18/14, the patient was undergoing therapy with Taxol and having less fatigue, no nausea, occasional tingling in her fingers but it was very transitory with no discomfort in her feet or toes.  She was still very sensitive, feeling her expanders in the chest wall, and we advised her to initiate a program of water therapy.  Her Taxol treatment was continued.  We also advised her to use massage therapy.     On 09/25/2014, the patient was still experiencing a grade 1 peripheral neuropathy of hands, none in feet, associated with Taxol use.  Otherwise she was feeling fine.   She still had a lot of sensitivity in the chest wall, in areas of breast expanders, and in the shoulders and scapular areas.  We have not been able to get her water therapy classes.   We advised her to continue her care plan in the same way and continue Prilosec on a daily basis to avoid reflux esophagitis that is very well controlled at this time.  Her chemotherapy treatment was continued and hematological parameters were stable and her physical exam was otherwise unremarkable.      The patient was seen back in the office on 10/16/2014, tolerating Taxol well. Plans are  made to continue the same.     The patient was seen on 11/06/2014, referral was made to radiation oncology to see if the patient will benefit from radiation therapy since she had involvement of two sentinel lymph nodes with metastatic carcinoma.     On 12/04/2014 the patient had a discussion with us in regard plans for any other form of hormonal adjuvant therapy.  Given the fact that her tumor was weakly estrogen receptor positive, we advised her at her age to initiate therapy with tamoxifen.  I asked her to take this medicine at least for the next 10 years.  I asked her to consider eventually whenever we fulfill the criteria for being post-menopausal to switch this to an aromatase inhibitor.      We advised her to initiate a program of exercise to relieve the pain and discomfort that she has in her upper body due to the expanders and her surgical sites.     We advised her to proceed with bone density test. We need to check this in consideration of the utilization of bisphosphonate either by mouth or IV, not only for keeping her bone health now that she is post-menopausal but also in consideration of prevention of bone metastasis.      We had an extensive discussion with her and her sister about sexual issues that she has encountered during the treatment for her disease and try to relieve her from the stressors of this at this time.  Her  was not present unfortunately during this discussion.      On 01/02/2015 we documented that the patient was tolerating her tamoxifen well with minimal hot flashes and she was still undergoing her radiation treatment to the site of her disease.  She was also documented to have osteoporosis in her bone density and we suggested for her to receive IV Reclast that will have a dual function of treatment of osteoporosis as well as prevention of bone metastasis.      In regard to her conjugal and personal life, the patient has very poor self-esteem.  She finds no one in her life  that will be supportive to her and the relationships with her  are very minimal and just in a very low level of activity.  I asked her to bring her  for the next visit to have a conversation one on one, face to face with him and see if he can become a partner for her to help her to heal emotionally and to be supportive for her all the time.  Eventually the patient will require some counseling as well.    We did not recommend any antianxiety or antidepressant medication for her at this point.  The patient was not willing to take anything anyway.      On 01/14/2015, she was handling her tamoxifen well. She was advised to continue the medicine. Five more sessions of radiation therapy to complete her treatment.     On 02/25/2015, the patient has no symptoms related to her cancer with exception of the hot flashes induced by the tamoxifen. Physical activity has improved dramatically, her ability in regards to movement in her upper extremities and pain that she was experiencing before. She will have her expanders removed in the summer and she will have permanent implants then. In regards to her Reclast infusion, she had a reaction to the medicine including fevers and skeletal pain that was short lasting. She will require premedication with dexamethasone for the next infusion in 1 year.     In regards to her relationship with her , this situation has substantially improved and they are back together, not completely back to normal, but in a much better situation. Obviously, this closes the loop of her life and self-esteem that has substantially improved.     On 05/15/2015 the patient was feeling terrific.  She had minimal discomfort in her shoulders and she is ready to have her expanders removed and have her implants.  Otherwise tolerance to tamoxifen was excellent. She was not having any vaginal bleeding and she was not having any menstrual flow.  Her personal life and her marriage life have further  improved and she feels satisfied.  We advised her to continue tamoxifen and keeping her port flushed every couple of months.     Her physical exam was unrevealing at this time.    On 09/14/2015 she had a complete 12 point review of system, having excellent level of energy, normal appetite, normal bowel function, normal urination, no vaginal bleeding, tolerance to tamoxifen was appropriate, hot flashes were ongoing, no cardiorespiratory  or neurological or skeletal symptomatology.  Her physical exam was unrevealing.  Under these circumstances we advised her to remain on tamoxifen 20 mg a day.  She will be informed about her estradiol level, FSH and LH.  Otherwise, she will have her port flushed today and this will be done every 6 weeks with MD visit again in 3 months. She will be due to have a new Reclast infusion in January 2016 and on that occasion she will be pre-medicated with dexamethasone to avoid fever and achiness in the skeleton induced by the first one.     On 12/14/2015 Tamoxifen was continued. The patient had no clinical indications of breast cancer recurrence. She completed her reconstruction of her breasts with bilateral implants, looking cosmetically very pleasing to her.          Review of Systems:        General: no fever, no chills, no fatigue,no weight changes, no lack of appetite.  Eyes: no epiphora, xerophthalmia,conjunctivitis, pain, glaucoma, blurred vision, blindness, secretion, photophobia, proptosis, diplopia.  Ears: no otorrhea, tinnitus, otorrhagia, deafness, pain, vertigo.  Nose: no rhinorrhea, no epistaxis, no alteration in perception of odors, no sinuses pressure.  Mouth: no alteration in gums or teeth,  No ulcers, no difficulty with mastication or deglut ion, no odynophagia.  Neck: no masses or pain, no thyroid alterations, no pain in muscles or arteries, no carotid odynia, no crepitation.  Respiratory: no cough, no sputum production,no dyspnea,no trepopnea, no pleuritic pain,no  "hemoptysis.  Heart: no syncope, no irregularity, no palpitations, no angina,no orthopnea,no paroxysmal nocturnal dyspnea.  Vascular Venous: no tenderness,no edema,no palpable cords,no postphlebitic syndrome, no skin changes no ulcerations.  Vascular Arterial: no distal ischemia, noclaudication, no gangrene, no neuropathic ischemic pain, no skin ulcers, no paleness no cyanosis.  GI: no dysphagia, no odynophagia, no regurgitation, no heartburn, indigestion,no nausea,no vomiting,no hematemesis ,no melena,no jaundice,stated distention, severe obstipation,no enterorrhagia, proctalgia,hemorhoid anal  lesions,  changes in bowel habits.  : no frequency, no hesitancy, no hematuria, no discharge,no  pain.  Musculoskeletal: no muscle or tendon pain or inflammation,no  joint pain, no edema, no functional limitation,no fasciculations, no mass.  Neurologic: no headache, no seizures, noalterations on Craneal nerves, no motor deficit, no sensory deficit, normal coordination, no alteration in memory,normal orientation, calculation,normal writting, verbal and written language.  Skin: no rashes,no pruritus no localized lesions.  Psychiatric: no anxiety,  depression,no agitation, no delusions, proper insight.                  Medications:  The current medication list was reviewed in the EMR    ALLERGIES:  No Known Allergies    Objective      Vitals:    02/17/20 1008   BP: 102/71   Pulse: 96   Resp: 14   Temp: 98 °F (36.7 °C)   TempSrc: Oral   SpO2: 97%   Weight: 64.9 kg (143 lb)   Height: 168 cm (66.14\")  Comment: new height   PainSc: 0-No pain     Current Status 2/17/2020   ECOG score 0               Physical Exam:  GENERAL:  Well-developed, well-nourished  Patient  in no acute distress.   SKIN:  Warm, dry ,NO rashes,NO purpura ,NO petechiae.  HEENT:  Pupils were equal and reactive to light and accomodation, conjunctivas non injected, no pterigion, normal extraocular movements, normal visual acuity.   Mouth mucosa was moist, no " exudates in oropharynx, normal gum line, normal roof of the mouth and pillars, normal papillations of the tongue.  NECK:  Supple with good range of motion; no thyromegaly or masses, no JVD or bruits, no cervical adenopathies.No carotid arteries pain, no carotid abnormal pulsation , NO arterial dance.  LYMPHATICS:  No cervical, NO supraclavicular, NO axillary,NO epitrochlear , NO inguinal adenopathy.  CHEST:  Normal excursion of both adonis thoraces, normal voice fremitus, no subcutaneous emphysema, normal axillas, no rashes or acanthosis nigricans. Lungs clear to percussion and auscultation, normal breath sounds bilaterally, no wheezing,NO crackles NO ronchi, NO stridor, NO rubs.  CARDIAC AND VASCULAR:  normal rate and regular rhythm, without murmurs,NO rubs NO S3 NO S4 right or left . Normal femoral, popliteal, pedis, brachial and carotid pulses.  INSPECTION of  breast documented symmetry of the tissue per se and location and size of the nipple,no retractions or inversion of the nipple, normal skin without lesions, no erythema or nodules,no paud'orange, no prominence of superficial veins or chest wall collateral circulation.PALPATION of the breast documented normal skin turgor, no induration, alteration in local temperature, or pain, no palpable masses or nodules, normal mobility of the tissues,no fixation of the tissue or parenchyma to the chest wall, no alteration at the tail of the breasts or axillas, no adenopathies. Surgical site was well healed.No lymphedema in either extremity.  ABDOMEN:  Soft, nontender with no organomegaly or masses, no ascites, no collateral circulation,no distention,no Mead sign, no abdominal pain, no inguinal hernias,no umbilical hernia, no abdominal bruits. Normal bowel sounds.  GENITAL: Not  Performed.  EXTREMITIES  AND SPINE:  No clubbing, cyanosis or edema, no deformities or pain .No kyphosis, scoliosis, deformities or pain in spine, ribs or pelvic bone.  NEUROLOGICAL:  Patient was  awake, alert, oriented to time, person and place.                                          RECENT LABS:        Hematology WBC   Date Value Ref Range Status   02/17/2020 6.15 3.40 - 10.80 10*3/mm3 Final     RBC   Date Value Ref Range Status   02/17/2020 4.21 3.77 - 5.28 10*6/mm3 Final     Hemoglobin   Date Value Ref Range Status   02/17/2020 12.9 12.0 - 15.9 g/dL Final     Hematocrit   Date Value Ref Range Status   02/17/2020 39.1 34.0 - 46.6 % Final     Platelets   Date Value Ref Range Status   02/17/2020 270 140 - 450 10*3/mm3 Final              Assessment/Plan    1. History of stage IIA, L4T6tC5, ER weakly-positive, NH negative, HER2-negative right upper outer quadrant breast cancer, status post bilateral mastectomies. The patient completed her adjuvant chemotherapy under NSABP B49 clinical trial.Patient continues on Femara, 2.5 mg a day. The patient will be completing almost 5 years since she was documented to be cancer free on 11/27/2019.     So far the patient has no clinical evidence of recurrent disease. The most recent breast ultrasound disclosed no abnormalities in the skin, axilla or soft tissues. This is confirmed by my physical examination today.    The patient has had a colonoscopy showing a tubular adenoma that was removed and she will require another colonoscopy in 3 years.       Today the patient has a sensation of bloating in her abdomen after she eats or drinks something and she feels that her abdomen is all distended. Because she has such a degree of constipation that is another issue that makes her GI tract to be in turmoil. The patient is also feeling more depressed and I pointed out that the GI tract is many times reflection of what goes on in her brain and her nervous system and system of defense in regard stressors. I do believe that the patient has obviously slow transit constipation, Linzess is not doing too much in spite of changing her diet things are not improving. She has had  colonoscopy showing no obstructive lesions and I find nothing to suggest a bowel obstruction but it makes me to wonder if she has symptoms that could suggest gastritis or something of this nature.     On the other hand I wonder if she could have bacterial overgrowth syndrome and I wonder if this is part of the problem.     The patient is depressed, she needs to be seen by Danyell Webb MD, and Karen Celis RN, and probably modify the Effexor or switch it to something different.     I need the patient to be seen by oncology nutrition, maybe the proper modification in the diet will help her out in some way or the other to improve bowel activity.     I advised her to take metronidazole 250 mg tablets 1 three times a day for 7 days to see if this makes a difference in regard to the function of her gastrointestinal tract.    I will review her back in a month and see how she is doing.     I went ahead and requested a CT scan of the abdomen and pelvis. She will be called with the report of this.     In the meantime she will remain on her Femara for the time being. I am planning to extend her Femara use for a total of 10 years.                                                2/17/2020      CC:

## 2020-02-18 ENCOUNTER — INFUSION (OUTPATIENT)
Dept: ONCOLOGY | Facility: HOSPITAL | Age: 45
End: 2020-02-18

## 2020-02-18 ENCOUNTER — HOSPITAL ENCOUNTER (OUTPATIENT)
Dept: PET IMAGING | Facility: HOSPITAL | Age: 45
Discharge: HOME | End: 2020-02-18
Admitting: INTERNAL MEDICINE

## 2020-02-18 DIAGNOSIS — M81.0 AGE-RELATED OSTEOPOROSIS WITHOUT CURRENT PATHOLOGICAL FRACTURE: ICD-10-CM

## 2020-02-18 DIAGNOSIS — F41.9 ANXIETY: ICD-10-CM

## 2020-02-18 DIAGNOSIS — Z78.0 POST-MENOPAUSAL: ICD-10-CM

## 2020-02-18 DIAGNOSIS — Z17.0 MALIGNANT NEOPLASM OF UPPER-OUTER QUADRANT OF RIGHT BREAST IN FEMALE, ESTROGEN RECEPTOR POSITIVE (HCC): ICD-10-CM

## 2020-02-18 DIAGNOSIS — Z45.2 FITTING AND ADJUSTMENT OF VASCULAR CATHETER: ICD-10-CM

## 2020-02-18 DIAGNOSIS — C50.411 MALIGNANT NEOPLASM OF UPPER-OUTER QUADRANT OF RIGHT BREAST IN FEMALE, ESTROGEN RECEPTOR POSITIVE (HCC): ICD-10-CM

## 2020-02-18 DIAGNOSIS — K59.04 CHRONIC IDIOPATHIC CONSTIPATION: ICD-10-CM

## 2020-02-18 LAB — CREAT BLDA-MCNC: 0.6 MG/DL (ref 0.6–1.3)

## 2020-02-18 PROCEDURE — 82565 ASSAY OF CREATININE: CPT

## 2020-02-18 PROCEDURE — 0 DIATRIZOATE MEGLUMINE & SODIUM PER 1 ML: Performed by: INTERNAL MEDICINE

## 2020-02-18 PROCEDURE — 74177 CT ABD & PELVIS W/CONTRAST: CPT

## 2020-02-18 PROCEDURE — 25010000002 IOPAMIDOL 61 % SOLUTION: Performed by: INTERNAL MEDICINE

## 2020-02-18 RX ADMIN — IOPAMIDOL 85 ML: 612 INJECTION, SOLUTION INTRAVENOUS at 14:35

## 2020-02-18 RX ADMIN — DIATRIZOATE MEGLUMINE AND DIATRIZOATE SODIUM 30 ML: 660; 100 LIQUID ORAL; RECTAL at 13:50

## 2020-02-19 ENCOUNTER — DOCUMENTATION (OUTPATIENT)
Dept: ONCOLOGY | Facility: CLINIC | Age: 45
End: 2020-02-19

## 2020-02-19 ENCOUNTER — TELEPHONE (OUTPATIENT)
Dept: ONCOLOGY | Facility: CLINIC | Age: 45
End: 2020-02-19

## 2020-02-19 NOTE — PROGRESS NOTES
CSW received a return phone call from patient regarding scheduling of initial SOS assessment.  Patient requested an appointment on 3/6, the same day she is scheduled to see the dietician.  CSW scheduled appointment for 3/6 at 1:00.

## 2020-02-19 NOTE — TELEPHONE ENCOUNTER
CT SCANS ABDOMEN AND PELVIS     HISTORY: CA; C50.411-Malignant neoplasm of upper-outer quadrant of right  female breast; Z17.0-Estrogen receptor positive status (ER+);  Z78.0-Asymptomatic menopausal state; M81.0-Age-related osteoporosis  without current pathological fracture; Z45.2-Encounter for adjustment  and management of vascular access device; K59.04-Chronic idiopathic  constipation; F41.9-Anxiety disorder, unspecified     COMPARISON: 06/26/2014.     TECHNIQUE: Radiation dose reduction techniques were utilized, including  automated exposure control and exposure modulation based on body size.  Axial images were obtained from the lung bases to the symphysis pubis  with oral and IV contrast.     FINDINGS:  There are a multitude of subcentimeter hypodense renal  lesions which are too small for detailed assessment. They are more  abundant on the right side compared to the left but likely reflect tiny  cysts. Most were present on the previous exam which would support benign  etiology as well.     Remaining solid organs have an unremarkable appearance. Normal aorta and  appendix. GI tract without evidence of obstruction or acute  inflammation. Moderate constipation. Regional osseous structures are  intact.        IMPRESSION:  IMPRESSION :   1. Tiny renal lesions, likely cysts.  2. Constipation without obstruction or acute inflammation.                     This report was finalized on 2/19/2020 4:04 AM by Hussain Alfaro M.D.  I called the patient with the report of her CT scan that discloses no metastasis at any level besides a bowel full of fecal matter.  I asked her to be more drastic with her bowel activity using alternate MiraLAX and Milk of Magnesia.  Her liver is normal.  The pancreas anatomy is normal.  There are small tiny cysts in the right kidney that probably have been present for a long time with no implications. There are no bone lesions. There is no retroperitoneal lymph nodes.  There is no other  alteration and the patient was satisfied with the report along with the report of normal blood work including her tumor marker.

## 2020-02-25 ENCOUNTER — TELEPHONE (OUTPATIENT)
Dept: ONCOLOGY | Facility: CLINIC | Age: 45
End: 2020-02-25

## 2020-02-25 RX ORDER — LETROZOLE 2.5 MG/1
TABLET, FILM COATED ORAL
Qty: 90 TABLET | Refills: 0 | Status: SHIPPED | OUTPATIENT
Start: 2020-02-25 | End: 2020-05-15

## 2020-02-25 NOTE — TELEPHONE ENCOUNTER
Patient would like to speak to Dr. Hadley' nurse.    She saw Dr. Hadley last week and he had asked her if she wanted to stop taking Linzess.  They never really finished that conversation, and she wonders if it's ok for her to stop the medication.  She said it is not helping.    Patient call back # [phone]

## 2020-02-25 NOTE — TELEPHONE ENCOUNTER
Pt called stating that she had spoken to Dr. Hadley about stopping the Linzess because it was not helping. Pt is taking it appropriately. She is also taking Flagyl at this time to see if this does helps with the function of her GI tract. She has not completed this course yet but feels like it is helping her somewhat. D/W Coty Love NP and it was decided that pt can stop the Linzess if she feels that it isnt helping. Informed pt and she v/u.

## 2020-03-06 ENCOUNTER — DOCUMENTATION (OUTPATIENT)
Dept: ONCOLOGY | Facility: CLINIC | Age: 45
End: 2020-03-06

## 2020-03-06 ENCOUNTER — TELEPHONE (OUTPATIENT)
Dept: ONCOLOGY | Facility: HOSPITAL | Age: 45
End: 2020-03-06

## 2020-03-06 ENCOUNTER — CLINICAL SUPPORT (OUTPATIENT)
Dept: OTHER | Facility: HOSPITAL | Age: 45
End: 2020-03-06

## 2020-03-06 VITALS — HEIGHT: 66 IN | WEIGHT: 143 LBS | BODY MASS INDEX: 22.98 KG/M2

## 2020-03-06 DIAGNOSIS — K59.04 CHRONIC IDIOPATHIC CONSTIPATION: ICD-10-CM

## 2020-03-06 DIAGNOSIS — F51.04 PSYCHOPHYSIOLOGICAL INSOMNIA: Primary | ICD-10-CM

## 2020-03-06 RX ORDER — GABAPENTIN 300 MG/1
300 CAPSULE ORAL
Qty: 30 CAPSULE | Refills: 1 | OUTPATIENT
Start: 2020-03-06 | End: 2021-03-10 | Stop reason: SDUPTHER

## 2020-03-06 RX ORDER — VENLAFAXINE HYDROCHLORIDE 75 MG/1
75 CAPSULE, EXTENDED RELEASE ORAL DAILY
Qty: 30 CAPSULE | Refills: 1 | Status: SHIPPED | OUTPATIENT
Start: 2020-03-06 | End: 2020-04-24

## 2020-03-06 NOTE — TELEPHONE ENCOUNTER
----- Message from Alf Hadley MD sent at 3/6/2020  4:29 PM EST -----   Isabel Bowers, Alf Valdez MD  Cc: Amy Celis APRN Dr. Montes,     I assessed Diana Higgins and talked with Karen Celis.  Karen is recommending we increase her Effexor-XR to 75 mg and add 300 mg of Gabapentin HS.  And then we'll follow up in two weeks.  Are you ok with prescribing?       Thank you,   Isabel

## 2020-03-06 NOTE — TELEPHONE ENCOUNTER
REC'D MESSAGE PER DR. MARTINEZ. CALLED HIM TO CONFIRM. HE DOES WANT TO INC EFFEXOR XR TO 75MG DAILY AND ALSO PRESCRIBE NEURONTIN 300MG Q HS. NEURONTIN RX CALLED IN AND EFFEXOR RX E-SCRIBED. PT MADE AWARE OF NEW RX'S AND SHE V/U.
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
## 2020-03-06 NOTE — PROGRESS NOTES
"Outpatient Oncology Nutrition Assessment      Patient Name:  Diana Higigns  YOB: 1975  MRN: [id number]      Assessment Date:  3/6/2020    Comments:  [facility] She complains on chronic constipation, bloating, worse since starting femara. She has stopped taking Linzess as it did not make a difference.  She was on flagyl for a week and actually had normal bowel movements while taking it.   I reviewed her typical po intake. Discussed ways to increase fiber in her diet, soluble and insoluble sources. I recommend that she continue to take a probiotic but consider changing brands as she has taken the same one for many years. We also discussed some different options for breakfast, snacks. I provided her with recipes and a grocery list, sample meal plans. I suggested she consider adding fermented foods to her diet and gave her ideas of ways to do this. I also encouraged daily activity and increased water intake.   Will be available for any questions or concerns. She follow up with MD in a few weeks.     Reason for Assessment     Row Name 03/06/20 1500          Reason for Assessment    Reason For Assessment  diagnosis/disease state;physician consult     Diagnosis  cancer diagnosis/related complications     Identified At Risk by Screening Criteria  need for education           Anthropometrics     Row Name 03/06/20 1500          Anthropometrics    Height  168 cm (66.14\")     Weight  64.9 kg (143 lb)        Ideal Body Weight (IBW)    Ideal Body Weight (IBW) (kg)  59.9     % Ideal Body Weight  108.29        Usual Body Weight (UBW)    Usual Body Weight  64.9 kg (143 lb)     % Usual Body Weight  100        Body Mass Index (BMI)    BMI (kg/m2)  23.03         Labs/Tests/Procedures/Meds     Row Name 03/06/20 1500          Medications    Pertinent Medications Reviewed  reviewed, pertinent     Pertinent Medications Comments  Vit D 3, letrozole, probiotic, effexor     " "    Physical Findings     Row Name 03/06/20 1500          Physical Findings    Gastrointestinal  constipation         Estimated/Assessed Needs     Row Name 03/06/20 1500          Calculation Measurements    Height  168 cm (66.14\")                   Problem/Interventions:    Information deficit related to fiber intake as evidenced by chronic constipation.          Intervention Goal     Row Name 03/06/20 1500          Intervention Goal    General  Maintain nutrition;Provide information regarding MNT for treatment/condition;Reduce/improve symptoms     Weight  Maintain weight             Education/Evaluation     Row Name 03/06/20 1500          Education    Education  Provided education regarding;Education topics;Advised regarding habits/behavior     Provided education regarding  Preventative health     Education Topics  Fiber     Advised Regarding Habits/Behavior  Activity;Eating pattern;Food choices;Meal planning        Monitor/Evaluation    Education Follow-up  Reinforce PRN           Electronically signed by:  Chloe Loyola RD, RUSSELL  03/06/20 3:10 PM  "

## 2020-03-09 ENCOUNTER — TELEPHONE (OUTPATIENT)
Dept: ONCOLOGY | Facility: CLINIC | Age: 45
End: 2020-03-09

## 2020-03-09 NOTE — TELEPHONE ENCOUNTER
Patient needs to move appointments scheduled for 3/26 to Friday, 2/27 if possible.    If it will not be possible, she wants to keep them for 3/26.    Please call patient to let her know.    [phone]

## 2020-03-13 ENCOUNTER — DOCUMENTATION (OUTPATIENT)
Dept: ONCOLOGY | Facility: CLINIC | Age: 45
End: 2020-03-13

## 2020-03-18 ENCOUNTER — DOCUMENTATION (OUTPATIENT)
Dept: ONCOLOGY | Facility: CLINIC | Age: 45
End: 2020-03-18

## 2020-03-18 NOTE — PROGRESS NOTES
CSW provided supportive counseling to patient by phone rather than in person, due to Covid-19 precautions.  Patient has noticed a remarkable decrease in symptoms of depression and anxiety since she started taking the higher dose of Effexor-XR.  Patient stated she no longer feels on the verge of tears all of the time, nor does she feel on the verge of an anxiety attack.  Patient stated she has been feeling groggy in the morning, so she started taking the Gabapentin at around 8:00 pm instead of 10:00 pm.  This change has helped her to feel less sedated in the morning.  Patient's sleep has improved-she is not waking up in the middle of the night.      Patient had a conversation with her sister about needing more help with the care of their mother.  Patient stated her sister didn't realize how stressed and burned out patient has been feeling.  As a result of their conversation, patient's sister has talked with their 2 additional siblings and everyone has agreed to help out more.  CSW and patient discussed the idea of the siblings having a regular meeting to discuss care needs and how things are going every few weeks; in an effort to make sure patient receives ongoing support.  Patient felt this is a good idea and will bring it up with her siblings.      CSW discussed update with Karen Celis.  Karen recommended patient continue on current dose of Effexor and CSW will check in with her again in two weeks.

## 2020-03-26 ENCOUNTER — APPOINTMENT (OUTPATIENT)
Dept: ONCOLOGY | Facility: HOSPITAL | Age: 45
End: 2020-03-26

## 2020-03-27 ENCOUNTER — DOCUMENTATION (OUTPATIENT)
Dept: ONCOLOGY | Facility: CLINIC | Age: 45
End: 2020-03-27

## 2020-03-27 RX ORDER — LINACLOTIDE 145 UG/1
CAPSULE, GELATIN COATED ORAL
Qty: 30 CAPSULE | Refills: 1 | OUTPATIENT
Start: 2020-03-27

## 2020-03-27 NOTE — PROGRESS NOTES
CSW contacted patient to check in and provide supportive counseling.  Patient continues to feel better following the increase in Effexor.  Patient stated she has moments of concern regarding the recurrence of cancer, but has been able to talk herself through these thoughts.  Patient also feels she is managing the stress of care-giving for her mother better, and plans to continue to seek support from her siblings after the pandemic subsides.      CSW will plan to follow up with patient at her next appointment in May, but patient will contact CSW if she notices a decline in mood or needs to talk.

## 2020-04-24 RX ORDER — VENLAFAXINE HYDROCHLORIDE 75 MG/1
CAPSULE, EXTENDED RELEASE ORAL
Qty: 90 CAPSULE | Refills: 0 | Status: SHIPPED | OUTPATIENT
Start: 2020-04-24 | End: 2020-07-14

## 2020-04-29 ENCOUNTER — DOCUMENTATION (OUTPATIENT)
Dept: ONCOLOGY | Facility: CLINIC | Age: 45
End: 2020-04-29

## 2020-04-29 NOTE — PROGRESS NOTES
CSW contacted patient to check on effectiveness of Effexor and overall mood.  Patient reported she continues to do very well with the Effexor and is feeling very good.  CSW will continue to follow up and provide support/resource as necessary.

## 2020-05-04 DIAGNOSIS — Z17.0 MALIGNANT NEOPLASM OF UPPER-OUTER QUADRANT OF RIGHT BREAST IN FEMALE, ESTROGEN RECEPTOR POSITIVE (HCC): ICD-10-CM

## 2020-05-04 DIAGNOSIS — M81.0 AGE-RELATED OSTEOPOROSIS WITHOUT CURRENT PATHOLOGICAL FRACTURE: ICD-10-CM

## 2020-05-04 DIAGNOSIS — C50.411 MALIGNANT NEOPLASM OF UPPER-OUTER QUADRANT OF RIGHT BREAST IN FEMALE, ESTROGEN RECEPTOR POSITIVE (HCC): ICD-10-CM

## 2020-05-08 ENCOUNTER — APPOINTMENT (OUTPATIENT)
Dept: ONCOLOGY | Facility: HOSPITAL | Age: 45
End: 2020-05-08

## 2020-05-08 ENCOUNTER — INFUSION (OUTPATIENT)
Dept: ONCOLOGY | Facility: HOSPITAL | Age: 45
End: 2020-05-08

## 2020-05-08 ENCOUNTER — OFFICE VISIT (OUTPATIENT)
Dept: ONCOLOGY | Facility: CLINIC | Age: 45
End: 2020-05-08

## 2020-05-08 VITALS
WEIGHT: 139.2 LBS | HEIGHT: 67 IN | SYSTOLIC BLOOD PRESSURE: 99 MMHG | OXYGEN SATURATION: 98 % | RESPIRATION RATE: 18 BRPM | DIASTOLIC BLOOD PRESSURE: 71 MMHG | HEART RATE: 97 BPM | BODY MASS INDEX: 21.85 KG/M2 | TEMPERATURE: 97.1 F

## 2020-05-08 DIAGNOSIS — K59.04 CHRONIC IDIOPATHIC CONSTIPATION: ICD-10-CM

## 2020-05-08 DIAGNOSIS — F51.04 PSYCHOPHYSIOLOGICAL INSOMNIA: ICD-10-CM

## 2020-05-08 DIAGNOSIS — Z17.0 MALIGNANT NEOPLASM OF UPPER-OUTER QUADRANT OF RIGHT BREAST IN FEMALE, ESTROGEN RECEPTOR POSITIVE (HCC): ICD-10-CM

## 2020-05-08 DIAGNOSIS — M81.0 AGE-RELATED OSTEOPOROSIS WITHOUT CURRENT PATHOLOGICAL FRACTURE: ICD-10-CM

## 2020-05-08 DIAGNOSIS — F32.9 REACTIVE DEPRESSION: ICD-10-CM

## 2020-05-08 DIAGNOSIS — Z17.0 MALIGNANT NEOPLASM OF UPPER-OUTER QUADRANT OF RIGHT BREAST IN FEMALE, ESTROGEN RECEPTOR POSITIVE (HCC): Primary | ICD-10-CM

## 2020-05-08 DIAGNOSIS — Z45.2 FITTING AND ADJUSTMENT OF VASCULAR CATHETER: Primary | ICD-10-CM

## 2020-05-08 DIAGNOSIS — C50.411 MALIGNANT NEOPLASM OF UPPER-OUTER QUADRANT OF RIGHT BREAST IN FEMALE, ESTROGEN RECEPTOR POSITIVE (HCC): ICD-10-CM

## 2020-05-08 DIAGNOSIS — F41.9 ANXIETY: ICD-10-CM

## 2020-05-08 DIAGNOSIS — C50.411 MALIGNANT NEOPLASM OF UPPER-OUTER QUADRANT OF RIGHT BREAST IN FEMALE, ESTROGEN RECEPTOR POSITIVE (HCC): Primary | ICD-10-CM

## 2020-05-08 DIAGNOSIS — K59.09 OTHER CONSTIPATION: ICD-10-CM

## 2020-05-08 DIAGNOSIS — Z78.0 POST-MENOPAUSAL: ICD-10-CM

## 2020-05-08 DIAGNOSIS — Z45.2 FITTING AND ADJUSTMENT OF VASCULAR CATHETER: ICD-10-CM

## 2020-05-08 PROBLEM — K62.5 RECTAL BLEED: Status: RESOLVED | Noted: 2019-07-29 | Resolved: 2020-05-08

## 2020-05-08 LAB
ALBUMIN SERPL-MCNC: 4.1 G/DL (ref 3.5–5.2)
ALBUMIN/GLOB SERPL: 1.4 G/DL (ref 1.1–2.4)
ALP SERPL-CCNC: 65 U/L (ref 38–116)
ALT SERPL W P-5'-P-CCNC: 13 U/L (ref 0–33)
ANION GAP SERPL CALCULATED.3IONS-SCNC: 8.1 MMOL/L (ref 5–15)
AST SERPL-CCNC: 22 U/L (ref 0–32)
BASOPHILS # BLD AUTO: 0.03 10*3/MM3 (ref 0–0.2)
BASOPHILS NFR BLD AUTO: 0.6 % (ref 0–1.5)
BILIRUB SERPL-MCNC: 0.3 MG/DL (ref 0.2–1.2)
BUN BLD-MCNC: 12 MG/DL (ref 6–20)
BUN/CREAT SERPL: 17.1 (ref 7.3–30)
CALCIUM SPEC-SCNC: 9.3 MG/DL (ref 8.5–10.2)
CANCER AG15-3 SERPL-ACNC: 18 U/ML
CHLORIDE SERPL-SCNC: 102 MMOL/L (ref 98–107)
CO2 SERPL-SCNC: 26.9 MMOL/L (ref 22–29)
CREAT BLD-MCNC: 0.7 MG/DL (ref 0.6–1.1)
DEPRECATED RDW RBC AUTO: 39.3 FL (ref 37–54)
EOSINOPHIL # BLD AUTO: 0.13 10*3/MM3 (ref 0–0.4)
EOSINOPHIL NFR BLD AUTO: 2.8 % (ref 0.3–6.2)
ERYTHROCYTE [DISTWIDTH] IN BLOOD BY AUTOMATED COUNT: 11.6 % (ref 12.3–15.4)
GFR SERPL CREATININE-BSD FRML MDRD: 90 ML/MIN/1.73
GLOBULIN UR ELPH-MCNC: 2.9 GM/DL (ref 1.8–3.5)
GLUCOSE BLD-MCNC: 93 MG/DL (ref 74–124)
HCT VFR BLD AUTO: 39.2 % (ref 34–46.6)
HGB BLD-MCNC: 13 G/DL (ref 12–15.9)
IMM GRANULOCYTES # BLD AUTO: 0.02 10*3/MM3 (ref 0–0.05)
IMM GRANULOCYTES NFR BLD AUTO: 0.4 % (ref 0–0.5)
LYMPHOCYTES # BLD AUTO: 1.06 10*3/MM3 (ref 0.7–3.1)
LYMPHOCYTES NFR BLD AUTO: 22.7 % (ref 19.6–45.3)
MAGNESIUM SERPL-MCNC: 1.8 MG/DL (ref 1.8–2.5)
MCH RBC QN AUTO: 30.6 PG (ref 26.6–33)
MCHC RBC AUTO-ENTMCNC: 33.2 G/DL (ref 31.5–35.7)
MCV RBC AUTO: 92.2 FL (ref 79–97)
MONOCYTES # BLD AUTO: 0.48 10*3/MM3 (ref 0.1–0.9)
MONOCYTES NFR BLD AUTO: 10.3 % (ref 5–12)
NEUTROPHILS # BLD AUTO: 2.94 10*3/MM3 (ref 1.7–7)
NEUTROPHILS NFR BLD AUTO: 63.2 % (ref 42.7–76)
NRBC BLD AUTO-RTO: 0 /100 WBC (ref 0–0.2)
PHOSPHATE SERPL-MCNC: 3.7 MG/DL (ref 2.5–4.5)
PLATELET # BLD AUTO: 280 10*3/MM3 (ref 140–450)
PMV BLD AUTO: 9.1 FL (ref 6–12)
POTASSIUM BLD-SCNC: 4.1 MMOL/L (ref 3.5–4.7)
PROT SERPL-MCNC: 7 G/DL (ref 6.3–8)
RBC # BLD AUTO: 4.25 10*6/MM3 (ref 3.77–5.28)
SODIUM BLD-SCNC: 137 MMOL/L (ref 134–145)
WBC NRBC COR # BLD: 4.66 10*3/MM3 (ref 3.4–10.8)

## 2020-05-08 PROCEDURE — 84100 ASSAY OF PHOSPHORUS: CPT

## 2020-05-08 PROCEDURE — 96365 THER/PROPH/DIAG IV INF INIT: CPT

## 2020-05-08 PROCEDURE — 80053 COMPREHEN METABOLIC PANEL: CPT

## 2020-05-08 PROCEDURE — 86300 IMMUNOASSAY TUMOR CA 15-3: CPT | Performed by: INTERNAL MEDICINE

## 2020-05-08 PROCEDURE — 83735 ASSAY OF MAGNESIUM: CPT

## 2020-05-08 PROCEDURE — 25010000002 ZOLEDRONIC ACID 5 MG/100ML SOLUTION: Performed by: INTERNAL MEDICINE

## 2020-05-08 PROCEDURE — 99214 OFFICE O/P EST MOD 30 MIN: CPT | Performed by: INTERNAL MEDICINE

## 2020-05-08 PROCEDURE — 25010000003 HEPARIN LOCK FLUSH PER 10 UNITS: Performed by: INTERNAL MEDICINE

## 2020-05-08 PROCEDURE — 85025 COMPLETE CBC W/AUTO DIFF WBC: CPT

## 2020-05-08 RX ORDER — SODIUM CHLORIDE 0.9 % (FLUSH) 0.9 %
10 SYRINGE (ML) INJECTION AS NEEDED
Status: CANCELLED | OUTPATIENT
Start: 2020-05-08

## 2020-05-08 RX ORDER — SODIUM CHLORIDE 9 MG/ML
250 INJECTION, SOLUTION INTRAVENOUS ONCE
Status: COMPLETED | OUTPATIENT
Start: 2020-05-08 | End: 2020-05-08

## 2020-05-08 RX ORDER — SODIUM CHLORIDE 0.9 % (FLUSH) 0.9 %
10 SYRINGE (ML) INJECTION AS NEEDED
Status: DISCONTINUED | OUTPATIENT
Start: 2020-05-08 | End: 2020-05-08 | Stop reason: HOSPADM

## 2020-05-08 RX ORDER — ZOLEDRONIC ACID 5 MG/100ML
5 INJECTION, SOLUTION INTRAVENOUS ONCE
Status: CANCELLED | OUTPATIENT
Start: 2020-05-08

## 2020-05-08 RX ORDER — SODIUM CHLORIDE 9 MG/ML
250 INJECTION, SOLUTION INTRAVENOUS ONCE
Status: CANCELLED | OUTPATIENT
Start: 2020-05-08

## 2020-05-08 RX ORDER — ZOLEDRONIC ACID 5 MG/100ML
5 INJECTION, SOLUTION INTRAVENOUS ONCE
Status: COMPLETED | OUTPATIENT
Start: 2020-05-08 | End: 2020-05-08

## 2020-05-08 RX ORDER — HEPARIN SODIUM (PORCINE) LOCK FLUSH IV SOLN 100 UNIT/ML 100 UNIT/ML
500 SOLUTION INTRAVENOUS AS NEEDED
Status: CANCELLED | OUTPATIENT
Start: 2020-05-08

## 2020-05-08 RX ORDER — HEPARIN SODIUM (PORCINE) LOCK FLUSH IV SOLN 100 UNIT/ML 100 UNIT/ML
500 SOLUTION INTRAVENOUS AS NEEDED
Status: DISCONTINUED | OUTPATIENT
Start: 2020-05-08 | End: 2020-05-08 | Stop reason: HOSPADM

## 2020-05-08 RX ADMIN — SODIUM CHLORIDE 100 ML: 9 INJECTION, SOLUTION INTRAVENOUS at 11:25

## 2020-05-08 RX ADMIN — ZOLEDRONIC ACID 5 MG: 0.05 INJECTION, SOLUTION INTRAVENOUS at 11:26

## 2020-05-08 RX ADMIN — Medication 500 UNITS: at 11:44

## 2020-05-08 RX ADMIN — SODIUM CHLORIDE, PRESERVATIVE FREE 10 ML: 5 INJECTION INTRAVENOUS at 11:44

## 2020-05-08 NOTE — PROGRESS NOTES
Subjective   REASONS FOR FOLLOWUP:    1. History of stage IIA, [id number], ER weakly-positive, NC negative, HER2-negative breast cancer, status post bilateral mastectomies. The patient completed her adjuvant chemotherapy under NSABP B49 clinical trial.  On 12/14/2015 she has no clinical evidence of recurrence of breast cancer and she remains on Femara for the time being. She also has completed her breast reconstruction with bilateral implants and she is very satisfied in regard to the cosmetic result of this. She will remain on Femara for the time being. She receiving at this time Effexor 37.5 mg a day for hot flashes and anxiety.   2. Bone health. The patient has osteoporosis. She  required a new Reclast infusion in Jan 2017, and on yearly bases  3. Triage visit for right axillary tenderness              History of Present Illness PATIENT WAS CALLED THE DAY BEFORE BY THE OFFICE TO ASK FOR SYMPTOMS THAT COULD BE CONSISTENT WITH CORONAVIRUS INFECTION, AND BEING NEGATIVE WAS SCHEDULED TO BE SEEN IN THE OFFICE TODAY. SIMILAR QUESTIONING TODAY INCLUDING, CHILLS, FEVER, NEW COUGH, SHORTNESS OF BREATH, DIARRHEA,DIFFUSE BODY ACHES  AND CHANGES IN SMELL OR TASTE WERE NEGATIVE.      This patient returns today to the office for follow up. She is here today with no specific complaints, actually she feels better after she discontinued the Linzess and she has been taking Milk of Magnesia on a regular basis having bowel activity on regular basis. She has no abdominal bloating or pain. Her appetite and weight remain stable. Her bowel function is otherwise normal, urination is normal. No issues in regard to her breast implants. No cardiovascular or respiratory issues. Her depression is much better after we raised the dose of Effexor when she was having panic attacks. She is sleeping better with Neurontin that she takes intermittently.     She has no symptoms that would suggest breast cancer recurrence.                              Past Medical History.   Past Medical History:   Diagnosis Date   • Anxiety    • Breast cancer (CMS/HCC)     Right, stage IIA, s/p bilateral mastectomy, XRT, and chemotherapy, followed by Dr. Hadley   • H/O Fractured tibia    • Osteoporosis    • Paroxysmal supraventricular tachycardia (CMS/HCC)     CS ostium atrial tachycardia, ablated in 2002 by Dr. Garzon   • PONV (postoperative nausea and vomiting)    • Sinus headache     But no history of migraines   • Skin cancer    • Wears glasses      Social History     Socioeconomic History   • Marital status:      Spouse name: Misha   • Number of children: Not on file   • Years of education: College   • Highest education level: Not on file   Occupational History   • Occupation:      Employer: [employer]     Comment: [employer]   Tobacco Use   • Smoking status: Never Smoker   • Smokeless tobacco: Never Used   Substance and Sexual Activity   • Alcohol use: Yes     Frequency: Never     Comment: Rare   • Drug use: No   • Sexual activity: Defer     Family History   Problem Relation Age of Onset   • Coronary artery disease Mother    • Heart disease Mother    • Thrombophlebitis Mother         2016   • Heart attack Mother    • Hepatitis Father    • Liver cancer Father    • Crohn's disease Sister    • Drug abuse Brother         Drug overdose   • Cancer Maternal Grandmother 42        Breast   • Thyroid disease Sister    • Other Sister         kidney problems   • Breast cancer Maternal Aunt      :ONCOLOGIC/HEMATOLOGIC HISTORY     The patient was diagnosed with stage II breast cancer with the tumor located in the anterior axillary fold in the tail of the breast. She underwent bilateral mastectomies and she had bilateral expanders placed by Dr. MURPHY Aguilar in preparation for further reconstruction in the future.  The patient’s primary tumor was 1.6 cm in size.  Margins of resection were negative, grade 3, with no  lymphovascular invasion. Two sentinel lymph nodes contain metastasis, one of them with macrometastasis that were not spilling out of the lymph node.  Circulating cancer cells were negative.  CA 15-3 was negative, normal.  The patient had a CT scan of the brain, chest, abdomen and pelvis that disclose no distant metastasis at any level.  The radiologist questioned an internal mammary node on the right side that does not make any clinical sense given the primary location of the tumor in the right breast.  Her bone scan was negative.  Her MUGA ejection fraction was normal.  With all of this in mind we advised the patient to either participate in the NSABP B47 clinical trial or to consider chemotherapy in the standard regimen dose dense AC followed by Taxol followed by hormonal anti-estrogen therapy given the finding in the pathological specimen of the mastectomy that her tumor was weakly ER positive, AR negative, HER-2 negative by FISH.      Our research coordinator had the opportunity to interview the patient today to go through the explanation of participation in the clinical trial.      Patient seen 07/24/2014 for a second cycle of AC chemotherapy. Adjustments were made for her antinausea treatments then post chemotherapy to try to reduce insomnia and further nausea.     On 08/07/2014 the patient has encountered size effects of the treatment including nausea and vomiting on days 3, 4 and 5 after chemotherapy related to a continuous bitter metallic taste in her mouth.  She also has experienced a tremendous degree of skeletal pain induced by the Neulasta to the point that she surrendered to bed.  Under these circumstances we advised her to use some mint or crystalized kimberly for the taste in her mouth and she will take Aleve on a regular schedule on days 2, 3, 4 and 5 to minimize the bone pain induced by the Neulasta.  On the other hand she has not developed any mucositis.  She has not developed any infections,  abnormal bleeding or significant hematological toxicity.    On 08/21/2014 the patient proceeded with her 4th cycle of Adriamycin/Cytoxan supported with Neulasta. Pepcid AC b.i.d. was initiated at a standing dose to control heartburn. The patient also was advised to continue using mint and kimberly to minimize the taste in her mouth induced by chemicals in her chemotherapy treatment. She was scheduled to receive her next cycle of chemotherapy and at that point she will initiate Taxol on a weekly basis.     She was seen back in the office 09/04/2014 initiating treatment with Taxol given every week, x12.     On 09/18/14, the patient was undergoing therapy with Taxol and having less fatigue, no nausea, occasional tingling in her fingers but it was very transitory with no discomfort in her feet or toes.  She was still very sensitive, feeling her expanders in the chest wall, and we advised her to initiate a program of water therapy.  Her Taxol treatment was continued.  We also advised her to use massage therapy.     On 09/25/2014, the patient was still experiencing a grade 1 peripheral neuropathy of hands, none in feet, associated with Taxol use.  Otherwise she was feeling fine.   She still had a lot of sensitivity in the chest wall, in areas of breast expanders, and in the shoulders and scapular areas.  We have not been able to get her water therapy classes.   We advised her to continue her care plan in the same way and continue Prilosec on a daily basis to avoid reflux esophagitis that is very well controlled at this time.  Her chemotherapy treatment was continued and hematological parameters were stable and her physical exam was otherwise unremarkable.      The patient was seen back in the office on 10/16/2014, tolerating Taxol well. Plans are made to continue the same.     The patient was seen on 11/06/2014, referral was made to radiation oncology to see if the patient will benefit from radiation therapy since she had  involvement of two sentinel lymph nodes with metastatic carcinoma.     On 12/04/2014 the patient had a discussion with us in regard plans for any other form of hormonal adjuvant therapy.  Given the fact that her tumor was weakly estrogen receptor positive, we advised her at her age to initiate therapy with tamoxifen.  I asked her to take this medicine at least for the next 10 years.  I asked her to consider eventually whenever we fulfill the criteria for being post-menopausal to switch this to an aromatase inhibitor.      We advised her to initiate a program of exercise to relieve the pain and discomfort that she has in her upper body due to the expanders and her surgical sites.     We advised her to proceed with bone density test. We need to check this in consideration of the utilization of bisphosphonate either by mouth or IV, not only for keeping her bone health now that she is post-menopausal but also in consideration of prevention of bone metastasis.      We had an extensive discussion with her and her sister about sexual issues that she has encountered during the treatment for her disease and try to relieve her from the stressors of this at this time.  Her  was not present unfortunately during this discussion.      On 01/02/2015 we documented that the patient was tolerating her tamoxifen well with minimal hot flashes and she was still undergoing her radiation treatment to the site of her disease.  She was also documented to have osteoporosis in her bone density and we suggested for her to receive IV Reclast that will have a dual function of treatment of osteoporosis as well as prevention of bone metastasis.      In regard to her conjugal and personal life, the patient has very poor self-esteem.  She finds no one in her life that will be supportive to her and the relationships with her  are very minimal and just in a very low level of activity.  I asked her to bring her  for the next visit  to have a conversation one on one, face to face with him and see if he can become a partner for her to help her to heal emotionally and to be supportive for her all the time.  Eventually the patient will require some counseling as well.    We did not recommend any antianxiety or antidepressant medication for her at this point.  The patient was not willing to take anything anyway.      On 01/14/2015, she was handling her tamoxifen well. She was advised to continue the medicine. Five more sessions of radiation therapy to complete her treatment.     On 02/25/2015, the patient has no symptoms related to her cancer with exception of the hot flashes induced by the tamoxifen. Physical activity has improved dramatically, her ability in regards to movement in her upper extremities and pain that she was experiencing before. She will have her expanders removed in the summer and she will have permanent implants then. In regards to her Reclast infusion, she had a reaction to the medicine including fevers and skeletal pain that was short lasting. She will require premedication with dexamethasone for the next infusion in 1 year.     In regards to her relationship with her , this situation has substantially improved and they are back together, not completely back to normal, but in a much better situation. Obviously, this closes the loop of her life and self-esteem that has substantially improved.     On 05/15/2015 the patient was feeling terrific.  She had minimal discomfort in her shoulders and she is ready to have her expanders removed and have her implants.  Otherwise tolerance to tamoxifen was excellent. She was not having any vaginal bleeding and she was not having any menstrual flow.  Her personal life and her marriage life have further improved and she feels satisfied.  We advised her to continue tamoxifen and keeping her port flushed every couple of months.     Her physical exam was unrevealing at this time.    On  09/14/2015 she had a complete 12 point review of system, having excellent level of energy, normal appetite, normal bowel function, normal urination, no vaginal bleeding, tolerance to tamoxifen was appropriate, hot flashes were ongoing, no cardiorespiratory  or neurological or skeletal symptomatology.  Her physical exam was unrevealing.  Under these circumstances we advised her to remain on tamoxifen 20 mg a day.  She will be informed about her estradiol level, FSH and LH.  Otherwise, she will have her port flushed today and this will be done every 6 weeks with MD visit again in 3 months. She will be due to have a new Reclast infusion in January 2016 and on that occasion she will be pre-medicated with dexamethasone to avoid fever and achiness in the skeleton induced by the first one.     On 12/14/2015 Tamoxifen was continued. The patient had no clinical indications of breast cancer recurrence. She completed her reconstruction of her breasts with bilateral implants, looking cosmetically very pleasing to her.          Review of Systems:            General: no fever, no chills, no fatigue,no weight changes, no lack of appetite.  Eyes: no epiphora, xerophthalmia,conjunctivitis, pain, glaucoma, blurred vision, blindness, secretion, photophobia, proptosis, diplopia.  Ears: no otorrhea, tinnitus, otorrhagia, deafness, pain, vertigo.  Nose: no rhinorrhea, no epistaxis, no alteration in perception of odors, no sinuses pressure.  Mouth: no alteration in gums or teeth,  No ulcers, no difficulty with mastication or deglut ion, no odynophagia.  Neck: no masses or pain, no thyroid alterations, no pain in muscles or arteries, no carotid odynia, no crepitation.  Respiratory: no cough, no sputum production,no dyspnea,no trepopnea, no pleuritic pain,no hemoptysis.  Heart: no syncope, no irregularity, no palpitations, no angina,no orthopnea,no paroxysmal nocturnal dyspnea.  Vascular Venous: no tenderness,no edema,no palpable cords,no  "postphlebitic syndrome, no skin changes no ulcerations.  Vascular Arterial: no distal ischemia, noclaudication, no gangrene, no neuropathic ischemic pain, no skin ulcers, no paleness no cyanosis.  GI: no dysphagia, no odynophagia, no regurgitation, no heartburn,no indigestion,no nausea,no vomiting,no hematemesis ,no melena,no jaundice,no distention, no obstipation,no enterorrhagia,no proctalgia,no anal  lesions, no changes in bowel habits.  : no frequency, no hesitancy, no hematuria, no discharge,no  pain.  Musculoskeletal: no muscle or tendon pain or inflammation,no  joint pain, no edema, no functional limitation,no fasciculations, no mass.  Neurologic: no headache, no seizures, noalterations on Craneal nerves, no motor deficit, no sensory deficit, normal coordination, no alteration in memory,normal orientation, calculation,normal writting, verbal and written language.  Skin: no rashes,no pruritus no localized lesions.  Psychiatric: less anxiety, less depression,no agitation, no delusions, proper insight.                Medications:  The current medication list was reviewed in the EMR    ALLERGIES:  No Known Allergies    Objective      Vitals:    05/08/20 1041   BP: 99/71   Pulse: 97   Resp: 18   Temp: 97.1 °F (36.2 °C)   TempSrc: Oral   SpO2: 98%   Weight: 63.1 kg (139 lb 3.2 oz)   Height: 170.2 cm (67\")   PainSc: 0-No pain     Current Status 5/8/2020   ECOG score 0               Physical Exam:      GENERAL:  Well-developed, well-nourished  Patient  in no acute distress.   SKIN:  Warm, dry ,NO rashes,NO purpura ,NO petechiae.  HEENT:  Pupils were equal and reactive to light and accomodation, conjunctivas non injected, no pterigion, normal extraocular movements, normal visual acuity.   Mouth mucosa was moist, no exudates in oropharynx, normal gum line, normal roof of the mouth and pillars, normal papillations of the tongue.  NECK:  Supple with good range of motion; no thyromegaly or masses, no JVD or bruits, no " cervical adenopathies.No carotid arteries pain, no carotid abnormal pulsation , NO arterial dance.  LYMPHATICS:  No cervical, NO supraclavicular, NO axillary,NO epitrochlear , NO inguinal adenopathy.  CARDIAC   normal rate and regular rhythm, without murmur,NO rubs NO S3 NO S4 right or left . Normal femoral, popliteal, pedis, brachial and carotid pulses.  VASCULAR ARTERIAL: normal carotids,brachial,radial,femoral,popliteal, pedis pulses , no bruits.no paleness or cyanosis, no pain, no edema, no numbness, no gangrene.  VASCULAR VENOUS: no cyanosis, collateral circulation, varicosities, edema, palpable cords, pain, erythema.  INSPECTION of  breast documented symmetry of the tissue per se and location and size of the nipple,no retractions or inversion of the nipple, normal skin without lesions, no erythema or nodules,no paud'orange, no prominence of superficial veins or chest wall collateral circulation.PALPATION of the breast documented normal skin turgor, no induration, alteration in local temperature, or pain, no palpable masses or nodules, normal mobility of the tissues,no fixation of the tissue or parenchyma to the chest wall, no alteration at the tail of the breasts or axillas, no adenopathies.bilateral implants Surgical site was well healed.No lymphedema in either extremity.  ABDOMEN:  Soft, nontender with no hepatomegaly, no splenomegaly,no masses, no ascites, no collateral circulation,no distention,no Joe sign, no abdominal pain, no inguinal hernias,no umbilical hernia, no abdominal bruits. Normal bowel sounds.  GENITAL: Not  Performed.  EXTREMITIES  AND SPINE:  No clubbing, cyanosis or edema, no deformities or pain .No kyphosis, scoliosis, deformities or pain in spine, ribs or pelvic bone.  NEUROLOGICAL:  Patient was awake, alert, oriented to time, person and place.                                          RECENT LABS:        Hematology WBC   Date Value Ref Range Status   02/17/2020 6.15 3.40 - 10.80  10*3/mm3 Final     RBC   Date Value Ref Range Status   02/17/2020 4.21 3.77 - 5.28 10*6/mm3 Final     Hemoglobin   Date Value Ref Range Status   02/17/2020 12.9 12.0 - 15.9 g/dL Final     Hematocrit   Date Value Ref Range Status   02/17/2020 39.1 34.0 - 46.6 % Final     Platelets   Date Value Ref Range Status   02/17/2020 270 140 - 450 10*3/mm3 Final              Assessment/Plan    1. History of stage IIA, [id number], ER weakly-positive, [place] negative, HER2-negative right upper outer quadrant breast cancer, status post bilateral mastectomies. The patient completed her adjuvant chemotherapy under NSABP B49 clinical trial.Patient continues on Femara, 2.5 mg a day. 1. Upon review on 05/08/2020 the patient looks free of cancer recurrence, her clinical examination is normal and she feels substantially better.   2. The patient has had chronic slow transit constipation. She has been on multiple medicines. Actually after our discussion the last time I suggested Milk of Magnesia that she takes on a regular basis and she goes to the bathroom nowadays much better than before. She is no longer taking Linzess or any other laxatives. Her diet is appropriate, her volume status in regard to hydration remains normal.   3. Chronic anxiety, panic attacks and depression. The patient takes Effexor, the dose was raised. This made a big difference in regard benefit and she feels substantially better from this point of view.  4. Insomnia. The patient takes Neurontin intermittently and this has been very helpful as well.   5. Osteoporosis. The patient continues taking physical activity, vitamin D, sun once a week for 10 minutes upper body when the sun is out and she will be due for her yearly Reclast infusion today. She will proceed with this today.   6. Port. The port remains in place, she remains on maintenance every 6 weeks.    RECOMMENDATIONS: As stated above. The patient will remain on her medicines like they are. I will see her back in 3  months with a CBC, CMP and a CA 15-3.        I DISCUSSED WITH PATIENT IN DETAIL FORMS TO DECREASE CHANCES OF CORONAVIRUS INFECTION INCLUDING ISOLATION, PROPER HAND HIGIENE, AVOID PUBLIC PLACES  WITH CROWDS, FOLLOW  CDC RECOMENDATIONS, AND KEEP PERSONAL AND SOCIAL RESPONSIBILITY, WARE A MASK IN PUBLIC PLACES.  PATIENT IS AWARE THIS INFECTION COULD HAVE SEVERE CONSEQUENCES TO PERSONAL HEALTH AND FAMILY RAMIFICATIONS OF THIS.  DURING THE VISIT WITH THE PATIENT TODAY , PATIENT HAD FACE MASK, I HAD PROPPER PROTECTIVE EQUIPMENT, AND I DID HAND HYGIENE WITH SOAP AND WATER BEFORE AND AFTER THE VISIT.                                               5/8/2020      CC:

## 2020-05-15 RX ORDER — LETROZOLE 2.5 MG/1
TABLET, FILM COATED ORAL
Qty: 90 TABLET | Refills: 0 | Status: SHIPPED | OUTPATIENT
Start: 2020-05-15 | End: 2020-08-10

## 2020-06-15 RX ORDER — SODIUM CHLORIDE 0.9 % (FLUSH) 0.9 %
10 SYRINGE (ML) INJECTION AS NEEDED
Status: CANCELLED | OUTPATIENT
Start: 2020-06-15

## 2020-06-15 RX ORDER — HEPARIN SODIUM (PORCINE) LOCK FLUSH IV SOLN 100 UNIT/ML 100 UNIT/ML
500 SOLUTION INTRAVENOUS AS NEEDED
Status: CANCELLED | OUTPATIENT
Start: 2020-06-15

## 2020-06-18 ENCOUNTER — INFUSION (OUTPATIENT)
Dept: ONCOLOGY | Facility: HOSPITAL | Age: 45
End: 2020-06-18

## 2020-06-18 DIAGNOSIS — Z45.2 FITTING AND ADJUSTMENT OF VASCULAR CATHETER: Primary | ICD-10-CM

## 2020-06-18 PROCEDURE — 25010000003 HEPARIN LOCK FLUSH PER 10 UNITS: Performed by: INTERNAL MEDICINE

## 2020-06-18 PROCEDURE — 96523 IRRIG DRUG DELIVERY DEVICE: CPT

## 2020-06-18 RX ORDER — SODIUM CHLORIDE 0.9 % (FLUSH) 0.9 %
10 SYRINGE (ML) INJECTION AS NEEDED
Status: DISCONTINUED | OUTPATIENT
Start: 2020-06-18 | End: 2020-06-18 | Stop reason: HOSPADM

## 2020-06-18 RX ORDER — SODIUM CHLORIDE 0.9 % (FLUSH) 0.9 %
10 SYRINGE (ML) INJECTION AS NEEDED
Status: CANCELLED | OUTPATIENT
Start: 2020-06-18

## 2020-06-18 RX ORDER — HEPARIN SODIUM (PORCINE) LOCK FLUSH IV SOLN 100 UNIT/ML 100 UNIT/ML
500 SOLUTION INTRAVENOUS AS NEEDED
Status: CANCELLED | OUTPATIENT
Start: 2020-06-18

## 2020-06-18 RX ORDER — HEPARIN SODIUM (PORCINE) LOCK FLUSH IV SOLN 100 UNIT/ML 100 UNIT/ML
500 SOLUTION INTRAVENOUS AS NEEDED
Status: DISCONTINUED | OUTPATIENT
Start: 2020-06-18 | End: 2020-06-18 | Stop reason: HOSPADM

## 2020-06-18 RX ADMIN — Medication 10 ML: at 13:42

## 2020-06-18 RX ADMIN — SODIUM CHLORIDE, PRESERVATIVE FREE 500 UNITS: 5 INJECTION INTRAVENOUS at 13:42

## 2020-07-14 RX ORDER — VENLAFAXINE HYDROCHLORIDE 75 MG/1
CAPSULE, EXTENDED RELEASE ORAL
Qty: 90 CAPSULE | Refills: 0 | Status: SHIPPED | OUTPATIENT
Start: 2020-07-14 | End: 2020-10-05

## 2020-07-31 ENCOUNTER — OFFICE VISIT (OUTPATIENT)
Dept: ONCOLOGY | Facility: CLINIC | Age: 45
End: 2020-07-31

## 2020-07-31 ENCOUNTER — INFUSION (OUTPATIENT)
Dept: ONCOLOGY | Facility: HOSPITAL | Age: 45
End: 2020-07-31

## 2020-07-31 VITALS
OXYGEN SATURATION: 99 % | TEMPERATURE: 97.7 F | SYSTOLIC BLOOD PRESSURE: 112 MMHG | DIASTOLIC BLOOD PRESSURE: 67 MMHG | HEIGHT: 67 IN | RESPIRATION RATE: 20 BRPM | HEART RATE: 85 BPM | WEIGHT: 138.2 LBS | BODY MASS INDEX: 21.69 KG/M2

## 2020-07-31 DIAGNOSIS — C50.411 MALIGNANT NEOPLASM OF UPPER-OUTER QUADRANT OF RIGHT BREAST IN FEMALE, ESTROGEN RECEPTOR POSITIVE (HCC): Primary | ICD-10-CM

## 2020-07-31 DIAGNOSIS — Z78.0 POST-MENOPAUSAL: ICD-10-CM

## 2020-07-31 DIAGNOSIS — F32.9 REACTIVE DEPRESSION: ICD-10-CM

## 2020-07-31 DIAGNOSIS — F51.04 PSYCHOPHYSIOLOGICAL INSOMNIA: ICD-10-CM

## 2020-07-31 DIAGNOSIS — F41.9 ANXIETY: ICD-10-CM

## 2020-07-31 DIAGNOSIS — K59.04 CHRONIC IDIOPATHIC CONSTIPATION: ICD-10-CM

## 2020-07-31 DIAGNOSIS — M81.0 AGE-RELATED OSTEOPOROSIS WITHOUT CURRENT PATHOLOGICAL FRACTURE: ICD-10-CM

## 2020-07-31 DIAGNOSIS — Z17.0 MALIGNANT NEOPLASM OF UPPER-OUTER QUADRANT OF RIGHT BREAST IN FEMALE, ESTROGEN RECEPTOR POSITIVE (HCC): Primary | ICD-10-CM

## 2020-07-31 DIAGNOSIS — K59.09 OTHER CONSTIPATION: ICD-10-CM

## 2020-07-31 DIAGNOSIS — Z45.2 FITTING AND ADJUSTMENT OF VASCULAR CATHETER: ICD-10-CM

## 2020-07-31 LAB
ALBUMIN SERPL-MCNC: 4.2 G/DL (ref 3.5–5.2)
ALBUMIN/GLOB SERPL: 1.5 G/DL (ref 1.1–2.4)
ALP SERPL-CCNC: 57 U/L (ref 38–116)
ALT SERPL W P-5'-P-CCNC: 12 U/L (ref 0–33)
ANION GAP SERPL CALCULATED.3IONS-SCNC: 8.7 MMOL/L (ref 5–15)
AST SERPL-CCNC: 22 U/L (ref 0–32)
BASOPHILS # BLD AUTO: 0.05 10*3/MM3 (ref 0–0.2)
BASOPHILS NFR BLD AUTO: 0.9 % (ref 0–1.5)
BILIRUB SERPL-MCNC: 0.3 MG/DL (ref 0.2–1.2)
BUN SERPL-MCNC: 14 MG/DL (ref 6–20)
BUN/CREAT SERPL: 20.9 (ref 7.3–30)
CALCIUM SPEC-SCNC: 9.2 MG/DL (ref 8.5–10.2)
CANCER AG15-3 SERPL-ACNC: 17.4 U/ML
CHLORIDE SERPL-SCNC: 105 MMOL/L (ref 98–107)
CO2 SERPL-SCNC: 24.3 MMOL/L (ref 22–29)
CREAT SERPL-MCNC: 0.67 MG/DL (ref 0.6–1.1)
DEPRECATED RDW RBC AUTO: 40.1 FL (ref 37–54)
EOSINOPHIL # BLD AUTO: 0.14 10*3/MM3 (ref 0–0.4)
EOSINOPHIL NFR BLD AUTO: 2.5 % (ref 0.3–6.2)
ERYTHROCYTE [DISTWIDTH] IN BLOOD BY AUTOMATED COUNT: 11.9 % (ref 12.3–15.4)
FERRITIN SERPL-MCNC: 94.4 NG/ML (ref 11–207)
GFR SERPL CREATININE-BSD FRML MDRD: 95 ML/MIN/1.73
GLOBULIN UR ELPH-MCNC: 2.8 GM/DL (ref 1.8–3.5)
GLUCOSE SERPL-MCNC: 81 MG/DL (ref 74–124)
HCT VFR BLD AUTO: 38.4 % (ref 34–46.6)
HGB BLD-MCNC: 12.8 G/DL (ref 12–15.9)
HGB RETIC QN AUTO: 35.9 PG (ref 29.8–36.1)
IMM GRANULOCYTES # BLD AUTO: 0.02 10*3/MM3 (ref 0–0.05)
IMM GRANULOCYTES NFR BLD AUTO: 0.4 % (ref 0–0.5)
IMM RETICS NFR: 10.9 % (ref 3–15.8)
IRON 24H UR-MRATE: 91 MCG/DL (ref 37–145)
IRON SATN MFR SERPL: 25 % (ref 14–48)
LYMPHOCYTES # BLD AUTO: 1.37 10*3/MM3 (ref 0.7–3.1)
LYMPHOCYTES NFR BLD AUTO: 24.8 % (ref 19.6–45.3)
MCH RBC QN AUTO: 30.5 PG (ref 26.6–33)
MCHC RBC AUTO-ENTMCNC: 33.3 G/DL (ref 31.5–35.7)
MCV RBC AUTO: 91.4 FL (ref 79–97)
MONOCYTES # BLD AUTO: 0.5 10*3/MM3 (ref 0.1–0.9)
MONOCYTES NFR BLD AUTO: 9 % (ref 5–12)
NEUTROPHILS NFR BLD AUTO: 3.45 10*3/MM3 (ref 1.7–7)
NEUTROPHILS NFR BLD AUTO: 62.4 % (ref 42.7–76)
NRBC BLD AUTO-RTO: 0 /100 WBC (ref 0–0.2)
PLATELET # BLD AUTO: 278 10*3/MM3 (ref 140–450)
PMV BLD AUTO: 9 FL (ref 6–12)
POTASSIUM SERPL-SCNC: 4.2 MMOL/L (ref 3.5–4.7)
PROT SERPL-MCNC: 7 G/DL (ref 6.3–8)
RBC # BLD AUTO: 4.2 10*6/MM3 (ref 3.77–5.28)
RETICS/RBC NFR AUTO: 1.24 % (ref 0.7–1.9)
SODIUM SERPL-SCNC: 138 MMOL/L (ref 134–145)
TIBC SERPL-MCNC: 368 MCG/DL (ref 249–505)
TRANSFERRIN SERPL-MCNC: 263 MG/DL (ref 200–360)
VIT B12 BLD-MCNC: 282 PG/ML (ref 211–946)
WBC # BLD AUTO: 5.53 10*3/MM3 (ref 3.4–10.8)

## 2020-07-31 PROCEDURE — 82728 ASSAY OF FERRITIN: CPT | Performed by: INTERNAL MEDICINE

## 2020-07-31 PROCEDURE — 85046 RETICYTE/HGB CONCENTRATE: CPT | Performed by: INTERNAL MEDICINE

## 2020-07-31 PROCEDURE — 86300 IMMUNOASSAY TUMOR CA 15-3: CPT | Performed by: INTERNAL MEDICINE

## 2020-07-31 PROCEDURE — 25010000003 HEPARIN LOCK FLUSH PER 10 UNITS: Performed by: INTERNAL MEDICINE

## 2020-07-31 PROCEDURE — 99214 OFFICE O/P EST MOD 30 MIN: CPT | Performed by: INTERNAL MEDICINE

## 2020-07-31 PROCEDURE — 85025 COMPLETE CBC W/AUTO DIFF WBC: CPT

## 2020-07-31 PROCEDURE — 84466 ASSAY OF TRANSFERRIN: CPT | Performed by: INTERNAL MEDICINE

## 2020-07-31 PROCEDURE — 80053 COMPREHEN METABOLIC PANEL: CPT

## 2020-07-31 PROCEDURE — 36591 DRAW BLOOD OFF VENOUS DEVICE: CPT

## 2020-07-31 PROCEDURE — 36415 COLL VENOUS BLD VENIPUNCTURE: CPT | Performed by: INTERNAL MEDICINE

## 2020-07-31 PROCEDURE — 83540 ASSAY OF IRON: CPT | Performed by: INTERNAL MEDICINE

## 2020-07-31 PROCEDURE — 82607 VITAMIN B-12: CPT | Performed by: INTERNAL MEDICINE

## 2020-07-31 RX ORDER — HEPARIN SODIUM (PORCINE) LOCK FLUSH IV SOLN 100 UNIT/ML 100 UNIT/ML
500 SOLUTION INTRAVENOUS AS NEEDED
Status: DISCONTINUED | OUTPATIENT
Start: 2020-07-31 | End: 2020-07-31 | Stop reason: HOSPADM

## 2020-07-31 RX ORDER — SODIUM CHLORIDE 0.9 % (FLUSH) 0.9 %
10 SYRINGE (ML) INJECTION AS NEEDED
Status: CANCELLED | OUTPATIENT
Start: 2020-07-31

## 2020-07-31 RX ORDER — HEPARIN SODIUM (PORCINE) LOCK FLUSH IV SOLN 100 UNIT/ML 100 UNIT/ML
500 SOLUTION INTRAVENOUS AS NEEDED
Status: CANCELLED | OUTPATIENT
Start: 2020-07-31

## 2020-07-31 RX ORDER — SODIUM CHLORIDE 0.9 % (FLUSH) 0.9 %
10 SYRINGE (ML) INJECTION AS NEEDED
Status: DISCONTINUED | OUTPATIENT
Start: 2020-07-31 | End: 2020-07-31 | Stop reason: HOSPADM

## 2020-07-31 RX ADMIN — SODIUM CHLORIDE, PRESERVATIVE FREE 500 UNITS: 5 INJECTION INTRAVENOUS at 11:09

## 2020-07-31 RX ADMIN — Medication 10 ML: at 11:09

## 2020-07-31 NOTE — PROGRESS NOTES
Subjective   REASONS FOR FOLLOWUP:    1. History of stage IIA, [id number], ER weakly-positive, IL negative, HER2-negative breast cancer, status post bilateral mastectomies. The patient completed her adjuvant chemotherapy under NSABP B49 clinical trial.  On 12/14/2015 she has no clinical evidence of recurrence of breast cancer and she remains on Femara for the time being. She also has completed her breast reconstruction with bilateral implants and she is very satisfied in regard to the cosmetic result of this. She will remain on Femara for the time being. She receiving at this time Effexor 37.5 mg a day for hot flashes and anxiety.   2. Bone health. The patient has osteoporosis. She  GETS Reclast infusion  on yearly bases, LAST ONE MAY 2020                 History of Present Illness PATIENT WAS CALLED THE DAY BEFORE BY THE OFFICE TO ASK FOR SYMPTOMS THAT COULD BE CONSISTENT WITH CORONAVIRUS INFECTION, AND BEING NEGATIVE WAS SCHEDULED TO BE SEEN IN THE OFFICE TODAY. SIMILAR QUESTIONING TODAY INCLUDING, CHILLS, FEVER, NEW COUGH, SHORTNESS OF BREATH, DIARRHEA,DIFFUSE BODY ACHES  AND CHANGES IN SMELL OR TASTE WERE NEGATIVE.DURING THE VISIT WITH THE PATIENT TODAY , PATIENT HAD FACE MASK, I HAD PROPPER PROTECTIVE EQUIPMENT, AND I DID HAND HYGIENE WITH SOAP AND WATER BEFORE AND AFTER THE VISIT.     This patient returns today to the office for followup complaining that she is developing hair loss. She is very upset obviously being a woman and she has read that Femara can induce this issue. She is not having any lesions in the scalp to speak of and no other skin alterations. Other than that her appetite is good. Her weight is stable. Urination is normal. As usual an element of constipation. No passage of blood in the stool. She has no skeletal pain. She had reconstruction of her breast with no alterations, no bone pain and no axillary adenopathies or lymphedema.                            Past Medical History.   Past Medical  History:   Diagnosis Date   • Anxiety    • Breast cancer (CMS/HCC)     Right, stage IIA, s/p bilateral mastectomy, XRT, and chemotherapy, followed by Dr. Hadley   • H/O Fractured tibia    • Osteoporosis    • Paroxysmal supraventricular tachycardia (CMS/HCC)     CS ostium atrial tachycardia, ablated in 2002 by Dr. Garzon   • PONV (postoperative nausea and vomiting)    • Sinus headache     But no history of migraines   • Skin cancer    • Wears glasses      Social History     Socioeconomic History   • Marital status:      Spouse name: Misha   • Number of children: Not on file   • Years of education: College   • Highest education level: Not on file   Occupational History   • Occupation:      Employer: [employer]     Comment: [employer]   Tobacco Use   • Smoking status: Never Smoker   • Smokeless tobacco: Never Used   Substance and Sexual Activity   • Alcohol use: Yes     Frequency: Never     Comment: Rare   • Drug use: No   • Sexual activity: Defer     Family History   Problem Relation Age of Onset   • Coronary artery disease Mother    • Heart disease Mother    • Thrombophlebitis Mother         2016   • Heart attack Mother    • Hepatitis Father    • Liver cancer Father    • Crohn's disease Sister    • Drug abuse Brother         Drug overdose   • Cancer Maternal Grandmother 42        Breast   • Thyroid disease Sister    • Other Sister         kidney problems   • Breast cancer Maternal Aunt      :ONCOLOGIC/HEMATOLOGIC HISTORY     The patient was diagnosed with stage II breast cancer with the tumor located in the anterior axillary fold in the tail of the  RIGHT breast. She underwent bilateral mastectomies and she had bilateral expanders placed by Dr. MURPHY Aguilar in preparation for further reconstruction in the future.  The patient’s primary tumor was 1.6 cm in size.  Margins of resection were negative, grade 3, with no lymphovascular invasion. Two sentinel lymph nodes contain  metastasis, one of them with macrometastasis that were not spilling out of the lymph node.  Circulating cancer cells were negative.  CA 15-3 was negative, normal.  The patient had a CT scan of the brain, chest, abdomen and pelvis that disclose no distant metastasis at any level.  The radiologist questioned an internal mammary node on the right side that does not make any clinical sense given the primary location of the tumor in the right breast.  Her bone scan was negative.  Her MUGA ejection fraction was normal.  With all of this in mind we advised the patient to either participate in the NSABP B47 clinical trial or to consider chemotherapy in the standard regimen dose dense AC followed by Taxol followed by hormonal anti-estrogen therapy given the finding in the pathological specimen of the mastectomy that her tumor was weakly ER positive, [place] negative, HER-2 negative by FISH.      Our research coordinator had the opportunity to interview the patient today to go through the explanation of participation in the clinical trial.      Patient seen 07/24/2014 for a second cycle of AC chemotherapy. Adjustments were made for her antinausea treatments then post chemotherapy to try to reduce insomnia and further nausea.     On 08/07/2014 the patient has encountered size effects of the treatment including nausea and vomiting on days 3, 4 and 5 after chemotherapy related to a continuous bitter metallic taste in her mouth.  She also has experienced a tremendous degree of skeletal pain induced by the Neulasta to the point that she surrendered to bed.  Under these circumstances we advised her to use some mint or crystalized kimberly for the taste in her mouth and she will take Aleve on a regular schedule on days 2, 3, 4 and 5 to minimize the bone pain induced by the Neulasta.  On the other hand she has not developed any mucositis.  She has not developed any infections, abnormal bleeding or significant hematological toxicity.    On  08/21/2014 the patient proceeded with her 4th cycle of Adriamycin/Cytoxan supported with Neulasta. Pepcid AC b.i.d. was initiated at a standing dose to control heartburn. The patient also was advised to continue using mint and kimberly to minimize the taste in her mouth induced by chemicals in her chemotherapy treatment. She was scheduled to receive her next cycle of chemotherapy and at that point she will initiate Taxol on a weekly basis.     She was seen back in the office 09/04/2014 initiating treatment with Taxol given every week, x12.     On 09/18/14, the patient was undergoing therapy with Taxol and having less fatigue, no nausea, occasional tingling in her fingers but it was very transitory with no discomfort in her feet or toes.  She was still very sensitive, feeling her expanders in the chest wall, and we advised her to initiate a program of water therapy.  Her Taxol treatment was continued.  We also advised her to use massage therapy.     On 09/25/2014, the patient was still experiencing a grade 1 peripheral neuropathy of hands, none in feet, associated with Taxol use.  Otherwise she was feeling fine.   She still had a lot of sensitivity in the chest wall, in areas of breast expanders, and in the shoulders and scapular areas.  We have not been able to get her water therapy classes.   We advised her to continue her care plan in the same way and continue Prilosec on a daily basis to avoid reflux esophagitis that is very well controlled at this time.  Her chemotherapy treatment was continued and hematological parameters were stable and her physical exam was otherwise unremarkable.      The patient was seen back in the office on 10/16/2014, tolerating Taxol well. Plans are made to continue the same.     The patient was seen on 11/06/2014, referral was made to radiation oncology to see if the patient will benefit from radiation therapy since she had involvement of two sentinel lymph nodes with metastatic  carcinoma.     On 12/04/2014 the patient had a discussion with us in regard plans for any other form of hormonal adjuvant therapy.  Given the fact that her tumor was weakly estrogen receptor positive, we advised her at her age to initiate therapy with tamoxifen.  I asked her to take this medicine at least for the next 10 years.  I asked her to consider eventually whenever we fulfill the criteria for being post-menopausal to switch this to an aromatase inhibitor.      We advised her to initiate a program of exercise to relieve the pain and discomfort that she has in her upper body due to the expanders and her surgical sites.     We advised her to proceed with bone density test. We need to check this in consideration of the utilization of bisphosphonate either by mouth or IV, not only for keeping her bone health now that she is post-menopausal but also in consideration of prevention of bone metastasis.      We had an extensive discussion with her and her sister about sexual issues that she has encountered during the treatment for her disease and try to relieve her from the stressors of this at this time.  Her  was not present unfortunately during this discussion.      On 01/02/2015 we documented that the patient was tolerating her tamoxifen well with minimal hot flashes and she was still undergoing her radiation treatment to the site of her disease.  She was also documented to have osteoporosis in her bone density and we suggested for her to receive IV Reclast that will have a dual function of treatment of osteoporosis as well as prevention of bone metastasis.      In regard to her conjugal and personal life, the patient has very poor self-esteem.  She finds no one in her life that will be supportive to her and the relationships with her  are very minimal and just in a very low level of activity.  I asked her to bring her  for the next visit to have a conversation one on one, face to face with him  and see if he can become a partner for her to help her to heal emotionally and to be supportive for her all the time.  Eventually the patient will require some counseling as well.    We did not recommend any antianxiety or antidepressant medication for her at this point.  The patient was not willing to take anything anyway.      On 01/14/2015, she was handling her tamoxifen well. She was advised to continue the medicine. Five more sessions of radiation therapy to complete her treatment.     On 02/25/2015, the patient has no symptoms related to her cancer with exception of the hot flashes induced by the tamoxifen. Physical activity has improved dramatically, her ability in regards to movement in her upper extremities and pain that she was experiencing before. She will have her expanders removed in the summer and she will have permanent implants then. In regards to her Reclast infusion, she had a reaction to the medicine including fevers and skeletal pain that was short lasting. She will require premedication with dexamethasone for the next infusion in 1 year.     In regards to her relationship with her , this situation has substantially improved and they are back together, not completely back to normal, but in a much better situation. Obviously, this closes the loop of her life and self-esteem that has substantially improved.     On 05/15/2015 the patient was feeling terrific.  She had minimal discomfort in her shoulders and she is ready to have her expanders removed and have her implants.  Otherwise tolerance to tamoxifen was excellent. She was not having any vaginal bleeding and she was not having any menstrual flow.  Her personal life and her marriage life have further improved and she feels satisfied.  We advised her to continue tamoxifen and keeping her port flushed every couple of months.     Her physical exam was unrevealing at this time.    On 09/14/2015 she had a complete 12 point review of system,  having excellent level of energy, normal appetite, normal bowel function, normal urination, no vaginal bleeding, tolerance to tamoxifen was appropriate, hot flashes were ongoing, no cardiorespiratory  or neurological or skeletal symptomatology.  Her physical exam was unrevealing.  Under these circumstances we advised her to remain on tamoxifen 20 mg a day.  She will be informed about her estradiol level, FSH and LH.  Otherwise, she will have her port flushed today and this will be done every 6 weeks with MD visit again in 3 months. She will be due to have a new Reclast infusion in January 2016 and on that occasion she will be pre-medicated with dexamethasone to avoid fever and achiness in the skeleton induced by the first one.     On 12/14/2015 Tamoxifen was continued. The patient had no clinical indications of breast cancer recurrence. She completed her reconstruction of her breasts with bilateral implants, looking cosmetically very pleasing to her.          Review of Systems:  General: no fever, no chills, no fatigue,no weight changes, no lack of appetite.  Eyes: no epiphora, xerophthalmia,conjunctivitis, pain, glaucoma, blurred vision, blindness, secretion, photophobia, proptosis, diplopia.  Ears: no otorrhea, tinnitus, otorrhagia, deafness, pain, vertigo.  Nose: no rhinorrhea, no epistaxis, no alteration in perception of odors, no sinuses pressure.  Mouth: no alteration in gums or teeth,  No ulcers, no difficulty with mastication or deglut ion, no odynophagia.  Neck: no masses or pain, no thyroid alterations, no pain in muscles or arteries, no carotid odynia, no crepitation.  Respiratory: no cough, no sputum production,no dyspnea,no trepopnea, no pleuritic pain,no hemoptysis.  Heart: no syncope, no irregularity, no palpitations, no angina,no orthopnea,no paroxysmal nocturnal dyspnea.  Vascular Venous: no tenderness,no edema,no palpable cords,no postphlebitic syndrome, no skin changes no ulcerations.  Vascular  "Arterial: no distal ischemia, noclaudication, no gangrene, no neuropathic ischemic pain, no skin ulcers, no paleness no cyanosis.  GI: no dysphagia, no odynophagia, no regurgitation, no heartburn,no indigestion,no nausea,no vomiting,no hematemesis ,no melena,no jaundice,no distention, no obstipation,no enterorrhagia,no proctalgia,no anal  lesions, no changes in bowel habits.  : no frequency, no hesitancy, no hematuria, no discharge,no  pain.  Musculoskeletal: no muscle or tendon pain or inflammation,no  joint pain, no edema, no functional limitation,no fasciculations, no mass.  Neurologic: no headache, no seizures, noalterations on Craneal nerves, no motor deficit, no sensory deficit, normal coordination, no alteration in memory,normal orientation, calculation,normal writting, verbal and written language.  Skin: no rashes,no pruritus no localized lesions.HAIR LOSS  Psychiatric: no anxiety, no depression,no agitation, no delusions, proper insight.          ALLERGIES:  No Known Allergies    Objective      Vitals:    07/31/20 1149   BP: 112/67   Pulse: 85   Resp: 20   Temp: 97.7 °F (36.5 °C)   TempSrc: Temporal   SpO2: 99%   Weight: 62.7 kg (138 lb 3.2 oz)   Height: 170.2 cm (67\")   PainSc: 0-No pain     Current Status 7/31/2020   ECOG score 0             Physical Exam:Patient screened  for depression based on a PHQ-9 score of 0 on 7/31/2020.            GENERAL ASPECT: IN GOOD HEALTH WALKING THROUGH THE ROOM NO DIFFICULTIES, NO PAIN.PROPER NUTRITION.WELL KEPT PHYSICALLY.  EYES : NOT JAUNDICED, PUPILS WERE EQUAL.  NECK: NO CERVICAL ADENOPATHIES OR MASSES OR THYROID ENLARGEMENT.  HEART: REGULAR ,NO MURMURS , NO GALLOPS, NO RUBS.  ABDOMEN: NOT DISTENDED, NO PAIN, NO LIVER OR SPLEEN ENLARGEMENT, NO ASCITES, NO COLLATERAL CIRCULATION.  EXTREMITIES: NO EDEMA, NO PAIN, NO CLUBBING, NO DEFORMITIES.  NEUROLOGIC: NORMAL CONVERSATION, MEMORY , SPEECH AND GAIT.  SKIN: NO LESIONS.HAIR LOSS IS REAL NO ALOPECIA AREATA  BREAT: " INSPECTION of  breast documented symmetry of the tissue per se and location and size of the nipple,no retractions or inversion of the nipple, normal skin without lesions, no erythema or nodules,no paud'orange, no prominence of superficial veins or chest wall collateral circulation.PALPATION of the breast documented normal skin turgor, no induration, alteration in local temperature, or pain, no palpable masses or nodules, normal mobility of the tissues,no fixation of the tissue or parenchyma to the chest wall, no alteration at the tail of the breasts or axillas, no adenopathies.R BREAST Surgical site was well healed.No lymphedema in either extremity.                                          RECENT LABS:        Hematology WBC   Date Value Ref Range Status   07/31/2020 5.53 3.40 - 10.80 10*3/mm3 Final     RBC   Date Value Ref Range Status   07/31/2020 4.20 3.77 - 5.28 10*6/mm3 Final     Hemoglobin   Date Value Ref Range Status   07/31/2020 12.8 12.0 - 15.9 g/dL Final     Hematocrit   Date Value Ref Range Status   07/31/2020 38.4 34.0 - 46.6 % Final     Platelets   Date Value Ref Range Status   07/31/2020 278 140 - 450 10*3/mm3 Final              Assessment/Plan    1. History of stage IIA, [id number], ER weakly-positive, [place] negative, HER2-negative right upper outer quadrant breast cancer, status post bilateral mastectomies. The patient completed her adjuvant chemotherapy under NSABP B49 clinical trial.Patient continues on Femara, 2.5 mg a day. 1. Upon review on 05/08/2020 the patient looks free of cancer recurrence, her clinical examination is normal and she feels substantially better.   2. The patient has had chronic slow transit constipation. She has been on multiple medicines. Actually after our discussion the last time I suggested Milk of Magnesia that she takes on a regular basis and she goes to the bathroom nowadays much better than before. She is no longer taking Linzess or any other laxatives. Her diet is  appropriate, her volume status in regard to hydration remains normal.   3. Chronic anxiety, panic attacks and depression. The patient takes Effexor, the dose was raised. This made a big difference in regard benefit and she feels substantially better from this point of view.  4. Insomnia. The patient takes Neurontin intermittently and this has been very helpful as well.   5. Osteoporosis. The patient continues taking physical activity, vitamin D, sun once a week for 10 minutes upper body when the sun is out and she will be due for her yearly Reclast infusion .   6. Port. The port remains in place, she remains on maintenance every 6 weeks.The patient was rechecked on 07/31/2020 and she brought up the issue of hair loss associated with letrozole. This is a side effect in a minority of patients who take this medicine and I pointed out to the patient that in spite of being a woman and understanding that hair is a form of communication for a woman, I would prefer for her to remain on letrozole given her risk of recurrence in regard to breast cancer at the time of the original diagnosis. I pointed out to her that she will benefit from Rogaine shampoo or something of this nature and even offered referral to a dermatologist for further assessment. She does not want to pursue that referral at this time. I do not want to remove the letrozole because that will be opening the door for cancer recurrence and she is a 44-year-old person who has still 2 girls to raise and educate and she has a life in front of her. She understands that clearly and she chooses to keep taking the medicine even though it has been almost 5 years into this.     I pointed out to her that she probably will be one of those individuals who will require the Femara in my opinion for 8-10 years or maybe forever.     I suggested again to use Rogaine for women and see if this helps anything. She is taking biotin and I asked her to do that. I will check a B12  level and a ferritin iron profile to be sure that she is not depleted that could be also producing some changes in regard to hair growth and velocity of growth.     Otherwise, I will review her back in 4 months with a CBC, CMP and a CA 15-3. I find no reason for any radiological assessment on her at this time.          I DISCUSSED WITH PATIENT IN DETAIL FORMS TO DECREASE CHANCES OF CORONAVIRUS INFECTION INCLUDING ISOLATION, PROPER HAND HIGIENE, AVOID PUBLIC PLACES  WITH CROWDS, FOLLOW  CDC RECOMENDATIONS, AND KEEP PERSONAL AND SOCIAL RESPONSIBILITY, WARE A MASK IN PUBLIC PLACES.  PATIENT IS AWARE THIS INFECTION COULD HAVE SEVERE CONSEQUENCES TO PERSONAL HEALTH AND FAMILY RAMIFICATIONS OF THIS.                                                   7/31/2020      CC:

## 2020-08-10 RX ORDER — LETROZOLE 2.5 MG/1
TABLET, FILM COATED ORAL
Qty: 90 TABLET | Refills: 1 | Status: SHIPPED | OUTPATIENT
Start: 2020-08-10 | End: 2021-01-25

## 2020-08-31 ENCOUNTER — TELEPHONE (OUTPATIENT)
Dept: ONCOLOGY | Facility: CLINIC | Age: 45
End: 2020-08-31

## 2020-08-31 NOTE — TELEPHONE ENCOUNTER
Patient wants to reschedule her 9/10/20 port flush to 9/4/20. Said prefers early as possible.     Callback# [phone]

## 2020-09-04 ENCOUNTER — INFUSION (OUTPATIENT)
Dept: ONCOLOGY | Facility: HOSPITAL | Age: 45
End: 2020-09-04

## 2020-09-04 DIAGNOSIS — Z17.0 MALIGNANT NEOPLASM OF UPPER-OUTER QUADRANT OF RIGHT BREAST IN FEMALE, ESTROGEN RECEPTOR POSITIVE (HCC): ICD-10-CM

## 2020-09-04 DIAGNOSIS — C50.411 MALIGNANT NEOPLASM OF UPPER-OUTER QUADRANT OF RIGHT BREAST IN FEMALE, ESTROGEN RECEPTOR POSITIVE (HCC): ICD-10-CM

## 2020-09-04 PROCEDURE — 96523 IRRIG DRUG DELIVERY DEVICE: CPT

## 2020-09-10 ENCOUNTER — APPOINTMENT (OUTPATIENT)
Dept: ONCOLOGY | Facility: HOSPITAL | Age: 45
End: 2020-09-10

## 2020-09-15 ENCOUNTER — TELEMEDICINE (OUTPATIENT)
Dept: FAMILY MEDICINE CLINIC | Facility: CLINIC | Age: 45
End: 2020-09-15

## 2020-09-15 DIAGNOSIS — M54.9 MID BACK PAIN: ICD-10-CM

## 2020-09-15 DIAGNOSIS — M54.2 CERVICAL PAIN (NECK): Primary | ICD-10-CM

## 2020-09-15 PROCEDURE — 72070 X-RAY EXAM THORAC SPINE 2VWS: CPT | Performed by: FAMILY MEDICINE

## 2020-09-15 PROCEDURE — 72050 X-RAY EXAM NECK SPINE 4/5VWS: CPT | Performed by: FAMILY MEDICINE

## 2020-09-15 PROCEDURE — 99213 OFFICE O/P EST LOW 20 MIN: CPT | Performed by: FAMILY MEDICINE

## 2020-09-15 RX ORDER — NAPROXEN 500 MG/1
500 TABLET ORAL 2 TIMES DAILY PRN
Qty: 60 TABLET | Refills: 1 | Status: SHIPPED | OUTPATIENT
Start: 2020-09-15 | End: 2021-04-05

## 2020-09-15 RX ORDER — CYCLOBENZAPRINE HYDROCHLORIDE 7.5 MG/1
7.5 TABLET, FILM COATED ORAL 3 TIMES DAILY PRN
Qty: 45 TABLET | Refills: 0 | Status: SHIPPED | OUTPATIENT
Start: 2020-09-15 | End: 2021-04-05

## 2020-09-15 RX ORDER — CYCLOBENZAPRINE HYDROCHLORIDE 7.5 MG/1
7.5 TABLET, FILM COATED ORAL 3 TIMES DAILY PRN
Qty: 45 TABLET | Refills: 1 | Status: SHIPPED | OUTPATIENT
Start: 2020-09-15 | End: 2020-09-15

## 2020-09-15 NOTE — PROGRESS NOTES
Subjective   Diana Higgins is a 44 y.o. female.     History of Present Illness   44-year-old female presents today via video visit to discuss back pain.    Patient complains of neck to mid back pain. The symptoms began 4 days ago.  Pain is a result of no known event. Pain is located back sometimes shooting down R arm region. Discomfort is described as aching. Symptoms are exacerbated by nothing.  Evaluation to date: none. Therapy to date includes: rest, heat and OTC NSAIDs          The following portions of the patient's history were reviewed and updated as appropriate: allergies, current medications, past family history, past medical history, past social history, past surgical history and problem list.    Review of Systems   Constitutional: Negative for chills and fever.   Musculoskeletal: Positive for back pain.   Neurological: Negative for weakness and numbness.       Objective   Physical Exam  Constitutional:       General: She is not in acute distress.     Appearance: She is well-developed.   HENT:      Head: Normocephalic and atraumatic.   Pulmonary:      Effort: Pulmonary effort is normal. No respiratory distress.   Neurological:      Mental Status: She is alert and oriented to person, place, and time.   Psychiatric:         Behavior: Behavior normal.           No results found for: COLORU, CLARITYU, SPECGRAV, PHUR, LEUKOCYTESUR, NITRITE, PROTEINPOCUA, GLUCOSEUR, KETONESU, UROBILINOGEN, BILIRUBINUR, RBCUR      Assessment/Plan     Diana was seen today for back pain.    Diagnoses and all orders for this visit:    Cervical pain (neck)  -     XR Spine Cervical Complete 4 or 5 View (In Office)  -     XR Spine Thoracic 2 View (In Office)  -     naproxen (Naprosyn) 500 MG tablet; Take 1 tablet by mouth 2 (Two) Times a Day As Needed for Moderate Pain . With meals.  -     cyclobenzaprine (FEXMID) 7.5 MG tablet; Take 1 tablet by mouth 3 (Three) Times a Day As Needed for Muscle Spasms.    Mid back pain  -     XR  Spine Cervical Complete 4 or 5 View (In Office)  -     XR Spine Thoracic 2 View (In Office)  -     naproxen (Naprosyn) 500 MG tablet; Take 1 tablet by mouth 2 (Two) Times a Day As Needed for Moderate Pain . With meals.  -     cyclobenzaprine (FEXMID) 7.5 MG tablet; Take 1 tablet by mouth 3 (Three) Times a Day As Needed for Muscle Spasms.      Follow-up x-rays refer to PT meds as noted above.  Continue therapy.  Return to clinic in 4 weeks for follow-up or sooner if needed.  Go to ER for any new or worsening symptoms.  Patient expressed understanding and agreeable to plan.    Time spent during visit: 15 minutes.

## 2020-09-17 NOTE — PROGRESS NOTES
Normal cervical spine and thoracic spine x-ray.  Continue with plan of care as discussed at last visit.  If no improvement with PT or if any worsening symptoms will consider MRI of spine.

## 2020-09-18 ENCOUNTER — TELEPHONE (OUTPATIENT)
Dept: ONCOLOGY | Facility: CLINIC | Age: 45
End: 2020-09-18

## 2020-09-18 NOTE — TELEPHONE ENCOUNTER
Pt had positive Covid test yesterday. She as last in our office on 9/4/2020. Her Symptoms started on 9/13/2020. He just feels like she has a little cold. Positive results relayed to Nurse manager Levi Anthony RN. Pt V/U.

## 2020-10-05 RX ORDER — VENLAFAXINE HYDROCHLORIDE 75 MG/1
CAPSULE, EXTENDED RELEASE ORAL
Qty: 90 CAPSULE | Refills: 1 | Status: SHIPPED | OUTPATIENT
Start: 2020-10-05 | End: 2021-03-10 | Stop reason: SDUPTHER

## 2020-12-04 ENCOUNTER — OFFICE VISIT (OUTPATIENT)
Dept: ONCOLOGY | Facility: CLINIC | Age: 45
End: 2020-12-04

## 2020-12-04 ENCOUNTER — INFUSION (OUTPATIENT)
Dept: ONCOLOGY | Facility: HOSPITAL | Age: 45
End: 2020-12-04

## 2020-12-04 VITALS
SYSTOLIC BLOOD PRESSURE: 109 MMHG | RESPIRATION RATE: 20 BRPM | OXYGEN SATURATION: 97 % | HEART RATE: 76 BPM | BODY MASS INDEX: 21.9 KG/M2 | WEIGHT: 139.5 LBS | DIASTOLIC BLOOD PRESSURE: 71 MMHG | TEMPERATURE: 96.9 F | HEIGHT: 67 IN

## 2020-12-04 DIAGNOSIS — C50.411 MALIGNANT NEOPLASM OF UPPER-OUTER QUADRANT OF RIGHT BREAST IN FEMALE, ESTROGEN RECEPTOR POSITIVE (HCC): Primary | ICD-10-CM

## 2020-12-04 DIAGNOSIS — Z45.2 FITTING AND ADJUSTMENT OF VASCULAR CATHETER: ICD-10-CM

## 2020-12-04 DIAGNOSIS — M81.0 AGE-RELATED OSTEOPOROSIS WITHOUT CURRENT PATHOLOGICAL FRACTURE: ICD-10-CM

## 2020-12-04 DIAGNOSIS — N83.201 CYST OF RIGHT OVARY: ICD-10-CM

## 2020-12-04 DIAGNOSIS — F51.04 PSYCHOPHYSIOLOGICAL INSOMNIA: ICD-10-CM

## 2020-12-04 DIAGNOSIS — Z17.0 MALIGNANT NEOPLASM OF UPPER-OUTER QUADRANT OF RIGHT BREAST IN FEMALE, ESTROGEN RECEPTOR POSITIVE (HCC): Primary | ICD-10-CM

## 2020-12-04 DIAGNOSIS — Z78.0 POST-MENOPAUSAL: ICD-10-CM

## 2020-12-04 DIAGNOSIS — F32.9 REACTIVE DEPRESSION: ICD-10-CM

## 2020-12-04 DIAGNOSIS — K59.04 CHRONIC IDIOPATHIC CONSTIPATION: ICD-10-CM

## 2020-12-04 LAB
ALBUMIN SERPL-MCNC: 4.2 G/DL (ref 3.5–5.2)
ALBUMIN/GLOB SERPL: 1.7 G/DL (ref 1.1–2.4)
ALP SERPL-CCNC: 58 U/L (ref 38–116)
ALT SERPL W P-5'-P-CCNC: 19 U/L (ref 0–33)
ANION GAP SERPL CALCULATED.3IONS-SCNC: 9.3 MMOL/L (ref 5–15)
AST SERPL-CCNC: 25 U/L (ref 0–32)
BASOPHILS # BLD AUTO: 0.04 10*3/MM3 (ref 0–0.2)
BASOPHILS NFR BLD AUTO: 0.6 % (ref 0–1.5)
BILIRUB SERPL-MCNC: 0.2 MG/DL (ref 0.2–1.2)
BUN SERPL-MCNC: 12 MG/DL (ref 6–20)
BUN/CREAT SERPL: 18.5 (ref 7.3–30)
CALCIUM SPEC-SCNC: 9 MG/DL (ref 8.5–10.2)
CANCER AG125 SERPL QL: 9 U/ML (ref 0–38.1)
CANCER AG15-3 SERPL-ACNC: 16.7 U/ML
CHLORIDE SERPL-SCNC: 106 MMOL/L (ref 98–107)
CO2 SERPL-SCNC: 25.7 MMOL/L (ref 22–29)
CREAT SERPL-MCNC: 0.65 MG/DL (ref 0.6–1.1)
DEPRECATED RDW RBC AUTO: 39.8 FL (ref 37–54)
EOSINOPHIL # BLD AUTO: 0.19 10*3/MM3 (ref 0–0.4)
EOSINOPHIL NFR BLD AUTO: 2.9 % (ref 0.3–6.2)
ERYTHROCYTE [DISTWIDTH] IN BLOOD BY AUTOMATED COUNT: 12 % (ref 12.3–15.4)
GFR SERPL CREATININE-BSD FRML MDRD: 98 ML/MIN/1.73
GLOBULIN UR ELPH-MCNC: 2.5 GM/DL (ref 1.8–3.5)
GLUCOSE SERPL-MCNC: 131 MG/DL (ref 74–124)
HCT VFR BLD AUTO: 38.3 % (ref 34–46.6)
HGB BLD-MCNC: 12.8 G/DL (ref 12–15.9)
IMM GRANULOCYTES # BLD AUTO: 0.01 10*3/MM3 (ref 0–0.05)
IMM GRANULOCYTES NFR BLD AUTO: 0.2 % (ref 0–0.5)
LYMPHOCYTES # BLD AUTO: 1.68 10*3/MM3 (ref 0.7–3.1)
LYMPHOCYTES NFR BLD AUTO: 25.3 % (ref 19.6–45.3)
MCH RBC QN AUTO: 30.3 PG (ref 26.6–33)
MCHC RBC AUTO-ENTMCNC: 33.4 G/DL (ref 31.5–35.7)
MCV RBC AUTO: 90.5 FL (ref 79–97)
MONOCYTES # BLD AUTO: 0.6 10*3/MM3 (ref 0.1–0.9)
MONOCYTES NFR BLD AUTO: 9 % (ref 5–12)
NEUTROPHILS NFR BLD AUTO: 4.13 10*3/MM3 (ref 1.7–7)
NEUTROPHILS NFR BLD AUTO: 62 % (ref 42.7–76)
NRBC BLD AUTO-RTO: 0 /100 WBC (ref 0–0.2)
PLATELET # BLD AUTO: 265 10*3/MM3 (ref 140–450)
PMV BLD AUTO: 8.9 FL (ref 6–12)
POTASSIUM SERPL-SCNC: 4.4 MMOL/L (ref 3.5–4.7)
PROT SERPL-MCNC: 6.7 G/DL (ref 6.3–8)
RBC # BLD AUTO: 4.23 10*6/MM3 (ref 3.77–5.28)
SODIUM SERPL-SCNC: 141 MMOL/L (ref 134–145)
WBC # BLD AUTO: 6.65 10*3/MM3 (ref 3.4–10.8)

## 2020-12-04 PROCEDURE — 25010000003 HEPARIN LOCK FLUSH PER 10 UNITS: Performed by: INTERNAL MEDICINE

## 2020-12-04 PROCEDURE — 86300 IMMUNOASSAY TUMOR CA 15-3: CPT | Performed by: INTERNAL MEDICINE

## 2020-12-04 PROCEDURE — 85025 COMPLETE CBC W/AUTO DIFF WBC: CPT

## 2020-12-04 PROCEDURE — 36591 DRAW BLOOD OFF VENOUS DEVICE: CPT

## 2020-12-04 PROCEDURE — 80053 COMPREHEN METABOLIC PANEL: CPT

## 2020-12-04 PROCEDURE — 86304 IMMUNOASSAY TUMOR CA 125: CPT | Performed by: OBSTETRICS & GYNECOLOGY

## 2020-12-04 PROCEDURE — 99215 OFFICE O/P EST HI 40 MIN: CPT | Performed by: INTERNAL MEDICINE

## 2020-12-04 RX ORDER — SODIUM CHLORIDE 0.9 % (FLUSH) 0.9 %
10 SYRINGE (ML) INJECTION AS NEEDED
Status: DISCONTINUED | OUTPATIENT
Start: 2020-12-04 | End: 2020-12-04 | Stop reason: HOSPADM

## 2020-12-04 RX ORDER — HEPARIN SODIUM (PORCINE) LOCK FLUSH IV SOLN 100 UNIT/ML 100 UNIT/ML
500 SOLUTION INTRAVENOUS AS NEEDED
Status: CANCELLED | OUTPATIENT
Start: 2020-12-04

## 2020-12-04 RX ORDER — HEPARIN SODIUM (PORCINE) LOCK FLUSH IV SOLN 100 UNIT/ML 100 UNIT/ML
500 SOLUTION INTRAVENOUS AS NEEDED
Status: DISCONTINUED | OUTPATIENT
Start: 2020-12-04 | End: 2020-12-04 | Stop reason: HOSPADM

## 2020-12-04 RX ORDER — SODIUM CHLORIDE 0.9 % (FLUSH) 0.9 %
10 SYRINGE (ML) INJECTION AS NEEDED
Status: CANCELLED | OUTPATIENT
Start: 2020-12-04

## 2020-12-04 RX ADMIN — Medication 500 UNITS: at 14:04

## 2020-12-04 RX ADMIN — SODIUM CHLORIDE, PRESERVATIVE FREE 10 ML: 5 INJECTION INTRAVENOUS at 14:04

## 2020-12-04 NOTE — PROGRESS NOTES
Subjective   REASONS FOR FOLLOWUP:    1. History of stage IIA, [id number], ER weakly-positive, MI negative, HER2-negative breast cancer, status post bilateral mastectomies. The patient completed her adjuvant chemotherapy under NSABP B49 clinical trial.  On 2015 she has no clinical evidence of recurrence of breast cancer and she remains on Femara for the time being. She also has completed her breast reconstruction with bilateral implants and she is very satisfied in regard to the cosmetic result of this. She will remain on Femara for the time being. She receiving at this time Effexor 37.5 mg a day for hot flashes and anxiety.   2. Bone health. The patient has osteoporosis. She  GETS Reclast infusion  on yearly bases, LAST ONE MAY 2020                 History of Present Illness   PATIENT WAS CALLED THE DAY BEFORE BY THE OFFICE TO ASK FOR SYMPTOMS THAT COULD BE CONSISTENT WITH CORONAVIRUS INFECTION, AND BEING NEGATIVE WAS SCHEDULED TO BE SEEN IN THE OFFICE TODAY. SIMILAR QUESTIONING TODAY INCLUDING, CHILLS, FEVER, NEW COUGH, SHORTNESS OF BREATH, DIARRHEA,DIFFUSE BODY ACHES  AND CHANGES IN SMELL OR TASTE WERE NEGATIVE.THE PATIENT DENIED ANY CONTACT WITH PERSONS WHO WERE POSITIVE FOR COVID, AND PATIENT IS NOT IN CATEGORY OF HIGH RISK BEHAVIOR TO ACQUIRE COVID.    DURING THE VISIT WITH THE PATIENT TODAY , PATIENT HAD FACE MASK, MY MEDICAL ASSISTANT AND I  HAD PROPPER PROTECTIVE EQUIPMENT, AND I DID HAND HYGIENE WITH SOAP AND WATER BEFORE AND AFTER THE VISIT.  This patient returns today to the office for followup in regard to her pervious history of right breast cancer.  She states today that she has had COVID infection after she went to a .  She had significant pain in her back, low-grade fever, losing her taste and smell, and minimal cough with rhinorrhea and end of the story.  Fortunately she did not give the infection to her  or her other children.  She has recovered from this completely and this  happened close to 6 weeks ago.  In any event, from the point of view of her breast cancer she is doing better.  She has minimal constipation at this time.  She has had methodology to take care of the constipation associated with the use of chronic aromatase inhibition.  The patient is in good spirits and the patient is not having any other issues in regard to her nutrition.  Appetite and weight are stable.  Urination is normal.  No obvious skeletal pain.  No alteration in her breast implants.  Her port remains functional.                                  Past Medical History.   Past Medical History:   Diagnosis Date   • Anxiety    • Breast cancer (CMS/HCC)     Right, stage IIA, s/p bilateral mastectomy, XRT, and chemotherapy, followed by Dr. Hadley   • H/O Fractured tibia    • Osteoporosis    • Paroxysmal supraventricular tachycardia (CMS/HCC)     CS ostium atrial tachycardia, ablated in 2002 by Dr. Garzon   • PONV (postoperative nausea and vomiting)    • Sinus headache     But no history of migraines   • Skin cancer    • Wears glasses      Past Surgical History:   Procedure Laterality Date   • BREAST BIOPSY  2014   • BREAST RECONSTRUCTION Bilateral    • CARDIAC ABLATION  2002    Dr. Garzon 11 years ago tachycardia   • COLONOSCOPY N/A    • COLONOSCOPY N/A 8/1/2019    Procedure: COLONOSCOPY TO CECUM WITH HOT POLYPECTOMY;  Surgeon: Pato Mac MD;  Location: [facility] ENDOSCOPY;  Service: General   • ENDOMETRIAL ABLATION  03/2013    Dr. Young   • KNEE ARTHROSCOPY Right     as a teenager   • LASIK     • MASTECTOMY Bilateral 2014   • TONSILLECTOMY N/A    • VENOUS ACCESS DEVICE (PORT) INSERTION       Social History     Socioeconomic History   • Marital status:      Spouse name: Misha   • Number of children: Not on file   • Years of education: College   • Highest education level: Not on file   Occupational History   • Occupation:      Employer: [employer]     Comment: [employer]   Tobacco Use   • Smoking status: Never Smoker   • Smokeless tobacco: Never Used   Substance and Sexual Activity   • Alcohol use: Yes     Frequency: Never     Comment: Rare   • Drug use: No   • Sexual activity: Defer     Family History   Problem Relation Age of Onset   • Coronary artery disease Mother    • Heart disease Mother    • Thrombophlebitis Mother         2016   • Heart attack Mother    • Hepatitis Father    • Liver cancer Father    • Crohn's disease Sister    • Drug abuse Brother         Drug overdose   • Cancer Maternal Grandmother 42        Breast   • Thyroid disease Sister    • Other Sister         kidney problems   • Breast cancer Maternal Aunt      :ONCOLOGIC/HEMATOLOGIC HISTORY     The patient was diagnosed with stage II breast cancer with the tumor located in the anterior axillary fold in the tail of the  RIGHT breast. She underwent bilateral mastectomies and she had bilateral expanders placed by Dr. MURPHY Aguilar in preparation for further reconstruction in the future.  The patient’s primary tumor was 1.6 cm in size.  Margins of resection were negative, grade 3, with no lymphovascular invasion. Two sentinel lymph nodes contain metastasis, one of them with macrometastasis that were not spilling out of the lymph node.  Circulating cancer cells were negative.  CA 15-3 was negative, normal.  The patient had a CT scan of the brain, chest, abdomen and pelvis that disclose no distant metastasis at any level.  The radiologist questioned an internal mammary node on the right side that does not make any clinical sense given the primary location of the tumor in the right breast.  Her bone scan was negative.  Her MUGA ejection fraction was normal.  With all of this in mind we advised the patient to either participate in the NSABP B47 clinical trial or to consider chemotherapy in the standard regimen dose dense AC followed by Taxol followed by hormonal anti-estrogen therapy given the finding in the  pathological specimen of the mastectomy that her tumor was weakly ER positive, AL negative, HER-2 negative by FISH.      Our research coordinator had the opportunity to interview the patient today to go through the explanation of participation in the clinical trial.      Patient seen 07/24/2014 for a second cycle of AC chemotherapy. Adjustments were made for her antinausea treatments then post chemotherapy to try to reduce insomnia and further nausea.     On 08/07/2014 the patient has encountered size effects of the treatment including nausea and vomiting on days 3, 4 and 5 after chemotherapy related to a continuous bitter metallic taste in her mouth.  She also has experienced a tremendous degree of skeletal pain induced by the Neulasta to the point that she surrendered to bed.  Under these circumstances we advised her to use some mint or crystalized kimberly for the taste in her mouth and she will take Aleve on a regular schedule on days 2, 3, 4 and 5 to minimize the bone pain induced by the Neulasta.  On the other hand she has not developed any mucositis.  She has not developed any infections, abnormal bleeding or significant hematological toxicity.    On 08/21/2014 the patient proceeded with her 4th cycle of Adriamycin/Cytoxan supported with Neulasta. Pepcid AC b.i.d. was initiated at a standing dose to control heartburn. The patient also was advised to continue using mint and kimberly to minimize the taste in her mouth induced by chemicals in her chemotherapy treatment. She was scheduled to receive her next cycle of chemotherapy and at that point she will initiate Taxol on a weekly basis.     She was seen back in the office 09/04/2014 initiating treatment with Taxol given every week, x12.     On 09/18/14, the patient was undergoing therapy with Taxol and having less fatigue, no nausea, occasional tingling in her fingers but it was very transitory with no discomfort in her feet or toes.  She was still very sensitive,  feeling her expanders in the chest wall, and we advised her to initiate a program of water therapy.  Her Taxol treatment was continued.  We also advised her to use massage therapy.     On 09/25/2014, the patient was still experiencing a grade 1 peripheral neuropathy of hands, none in feet, associated with Taxol use.  Otherwise she was feeling fine.   She still had a lot of sensitivity in the chest wall, in areas of breast expanders, and in the shoulders and scapular areas.  We have not been able to get her water therapy classes.   We advised her to continue her care plan in the same way and continue Prilosec on a daily basis to avoid reflux esophagitis that is very well controlled at this time.  Her chemotherapy treatment was continued and hematological parameters were stable and her physical exam was otherwise unremarkable.      The patient was seen back in the office on 10/16/2014, tolerating Taxol well. Plans are made to continue the same.     The patient was seen on 11/06/2014, referral was made to radiation oncology to see if the patient will benefit from radiation therapy since she had involvement of two sentinel lymph nodes with metastatic carcinoma.     On 12/04/2014 the patient had a discussion with us in regard plans for any other form of hormonal adjuvant therapy.  Given the fact that her tumor was weakly estrogen receptor positive, we advised her at her age to initiate therapy with tamoxifen.  I asked her to take this medicine at least for the next 10 years.  I asked her to consider eventually whenever we fulfill the criteria for being post-menopausal to switch this to an aromatase inhibitor.      We advised her to initiate a program of exercise to relieve the pain and discomfort that she has in her upper body due to the expanders and her surgical sites.     We advised her to proceed with bone density test. We need to check this in consideration of the utilization of bisphosphonate either by mouth or IV,  not only for keeping her bone health now that she is post-menopausal but also in consideration of prevention of bone metastasis.      We had an extensive discussion with her and her sister about sexual issues that she has encountered during the treatment for her disease and try to relieve her from the stressors of this at this time.  Her  was not present unfortunately during this discussion.      On 01/02/2015 we documented that the patient was tolerating her tamoxifen well with minimal hot flashes and she was still undergoing her radiation treatment to the site of her disease.  She was also documented to have osteoporosis in her bone density and we suggested for her to receive IV Reclast that will have a dual function of treatment of osteoporosis as well as prevention of bone metastasis.      In regard to her conjugal and personal life, the patient has very poor self-esteem.  She finds no one in her life that will be supportive to her and the relationships with her  are very minimal and just in a very low level of activity.  I asked her to bring her  for the next visit to have a conversation one on one, face to face with him and see if he can become a partner for her to help her to heal emotionally and to be supportive for her all the time.  Eventually the patient will require some counseling as well.    We did not recommend any antianxiety or antidepressant medication for her at this point.  The patient was not willing to take anything anyway.      On 01/14/2015, she was handling her tamoxifen well. She was advised to continue the medicine. Five more sessions of radiation therapy to complete her treatment.     On 02/25/2015, the patient has no symptoms related to her cancer with exception of the hot flashes induced by the tamoxifen. Physical activity has improved dramatically, her ability in regards to movement in her upper extremities and pain that she was experiencing before. She will have  her expanders removed in the summer and she will have permanent implants then. In regards to her Reclast infusion, she had a reaction to the medicine including fevers and skeletal pain that was short lasting. She will require premedication with dexamethasone for the next infusion in 1 year.     In regards to her relationship with her , this situation has substantially improved and they are back together, not completely back to normal, but in a much better situation. Obviously, this closes the loop of her life and self-esteem that has substantially improved.     On 05/15/2015 the patient was feeling terrific.  She had minimal discomfort in her shoulders and she is ready to have her expanders removed and have her implants.  Otherwise tolerance to tamoxifen was excellent. She was not having any vaginal bleeding and she was not having any menstrual flow.  Her personal life and her marriage life have further improved and she feels satisfied.  We advised her to continue tamoxifen and keeping her port flushed every couple of months.     Her physical exam was unrevealing at this time.    On 09/14/2015 she had a complete 12 point review of system, having excellent level of energy, normal appetite, normal bowel function, normal urination, no vaginal bleeding, tolerance to tamoxifen was appropriate, hot flashes were ongoing, no cardiorespiratory  or neurological or skeletal symptomatology.  Her physical exam was unrevealing.  Under these circumstances we advised her to remain on tamoxifen 20 mg a day.  She will be informed about her estradiol level, FSH and LH.  Otherwise, she will have her port flushed today and this will be done every 6 weeks with MD visit again in 3 months. She will be due to have a new Reclast infusion in January 2016 and on that occasion she will be pre-medicated with dexamethasone to avoid fever and achiness in the skeleton induced by the first one.     On 12/14/2015 Tamoxifen was continued. The  patient had no clinical indications of breast cancer recurrence. She completed her reconstruction of her breasts with bilateral implants, looking cosmetically very pleasing to her.          Review of Systems:      General: no fever, no chills, no fatigue,no weight changes, no lack of appetite.  Eyes: no epiphora, xerophthalmia,conjunctivitis, pain, glaucoma, blurred vision, blindness, secretion, photophobia, proptosis, diplopia.  Ears: no otorrhea, tinnitus, otorrhagia, deafness, pain, vertigo.  Nose: no rhinorrhea, no epistaxis, no alteration in perception of odors, no sinuses pressure.  Mouth: no alteration in gums or teeth,  No ulcers, no difficulty with mastication or deglut ion, no odynophagia.  Neck: no masses or pain, no thyroid alterations, no pain in muscles or arteries, no carotid odynia, no crepitation.  Respiratory: no cough, no sputum production,no dyspnea,no trepopnea, no pleuritic pain,no hemoptysis.  Heart: no syncope, no irregularity, no palpitations, no angina,no orthopnea,no paroxysmal nocturnal dyspnea.  Vascular Venous: no tenderness,no edema,no palpable cords,no postphlebitic syndrome, no skin changes no ulcerations.  Vascular Arterial: no distal ischemia, noclaudication, no gangrene, no neuropathic ischemic pain, no skin ulcers, no paleness no cyanosis.  GI: no dysphagia, no odynophagia, no regurgitation, no heartburn,no indigestion,no nausea,no vomiting,no hematemesis ,no melena,no jaundice,no distention, no obstipation,no enterorrhagia,no proctalgia,no anal  lesions, STATED changes in bowel habits.  : no frequency, no hesitancy, no hematuria, no discharge,no  pain.  Musculoskeletal: STATED muscle or tendon pain or inflammation,no  joint pain, no edema, no functional limitation,no fasciculations, no mass.  Neurologic: no headache, no seizures, noalterations on Craneal nerves, no motor deficit, no sensory deficit, normal coordination, no alteration in memory,normal orientation,  "calculation,normal writting, verbal and written language.  Skin: no rashes,no pruritus no localized lesions.  Psychiatric: no anxiety, no depression,no agitation, no delusions, proper insight.      ALLERGIES:  No Known Allergies    Objective      Vitals:    12/04/20 1438   BP: 109/71   Pulse: 76   Resp: 20   Temp: 96.9 °F (36.1 °C)   TempSrc: Temporal   SpO2: 97%   Weight: 63.3 kg (139 lb 8 oz)   Height: 170.2 cm (67\")   PainSc: 0-No pain     Current Status 12/4/2020   ECOG score 0             Physical Exam:  I HAVE PERSONALLY REVIEWED THE HISTORY OF THE PRESENT ILLNESS, PAST MEDICAL HISTORY, FAMILY HISTORY, SOCIAL HISTORY, ALLERGIES, MEDICATIONS STATED ABOVE IN THE OFFICE NOTE FROM TODAY.        GENERAL:  Well-developed, well-nourished  Patient  in no acute distress.   SKIN:  Warm, dry ,NO rashes,NO purpura ,NO petechiae.  HEENT:  Pupils were equal and reactive to light and accomodation, conjunctivae noninjected, no pterygium, normal extraocular movements, normal visual acuity.   NECK:  Supple with good range of motion; no thyromegaly or masses, no JVD or bruits, no cervical adenopathies.No carotid artery pain, no carotid abnormal pulsation , NO arterial dance.  LYMPHATICS:  No cervical, NO supraclavicular, NO axillary,NO epitrochlear , NO inguinal adenopathy.  CARDIAC   normal rate and regular rhythm, without murmur,NO rubs NO S3 NO S4 right or left . Normal femoral, popliteal, pedis, brachial and carotid pulses.  INSPECTION of  breast documented symmetry of the tissue per se and location and size of the nipple,no retractions or inversion of the nipple, normal skin without lesions, no erythema or nodules,no peau d'orange, no prominence of superficial veins or chest wall collateral circulation.PALPATION of the breast documented normal skin turgor, no induration, alteration in local temperature, or pain, no palpable masses or nodules, normal mobility of the tissues,no fixation of the tissue or parenchyma to the " chest wall, no alteration at the tail of the breasts or axillas, no adenopathies. BILATERAL IMPLANTSSurgical site was well healed.No lymphedema in either extremity.  VASCULAR ARTERIAL: normal carotids,brachial,radial,femoral,popliteal, pedis pulses , no bruits.no paleness or cyanosis, no pain, no edema, no numbness, no gangrene.  VASCULAR VENOUS: no cyanosis, collateral circulation, varicosities, edema, palpable cords, pain, erythema.  ABDOMEN:  Soft, nontender with no hepatomegaly, no splenomegaly,no masses, no ascites, no collateral circulation,no distention,no Walsh sign, no abdominal pain, no inguinal hernias,no umbilical hernia, no abdominal bruits. Normal bowel sounds.  GENITAL: Not  Performed.  EXTREMITIES  AND SPINE:  No clubbing, cyanosis or edema, no deformities or pain .No kyphosis, scoliosis, deformities or pain in spine, ribs or pelvic bone.  NEUROLOGICAL:  Patient was awake, alert, oriented to time, person and place.        RECENT LABS:        Hematology WBC   Date Value Ref Range Status   12/04/2020 6.65 3.40 - 10.80 10*3/mm3 Final     RBC   Date Value Ref Range Status   12/04/2020 4.23 3.77 - 5.28 10*6/mm3 Final     Hemoglobin   Date Value Ref Range Status   12/04/2020 12.8 12.0 - 15.9 g/dL Final     Hematocrit   Date Value Ref Range Status   12/04/2020 38.3 34.0 - 46.6 % Final     Platelets   Date Value Ref Range Status   12/04/2020 265 140 - 450 10*3/mm3 Final              Assessment/Plan    1. History of stage IIA, [id number], ER weakly-positive, AL negative, HER2-negative right upper outer quadrant breast cancer, status post bilateral mastectomies. The patient completed her adjuvant chemotherapy under NSABP B49 clinical trial.Patient continues on Femara, 2.5 mg a day. 1. Upon review on 05/08/2020 the patient looks free of cancer recurrence, her clinical examination is normal and she feels substantially better.   As per today I find no evidence of recurrent disease about her breast cancer on her  breast examination that remains unchanged with bilateral breast implants and no skeletal pain. She remains on 100% compliance Femara with minimal side effects with the exception of constipation.        2. The patient has had chronic slow transit constipation. She has been on multiple medicines. Actually after our discussion the last time I suggested Milk of Magnesia that she takes on a regular basis and she goes to the bathroom nowadays much better than before. She is no longer taking Linzess or any other laxatives. Her diet is appropriate, her volume status in regard to hydration remains normal.     3. Chronic anxiety, panic attacks and depression. The patient takes Effexor, the dose was raised. This made a big difference in regard benefit and she feels substantially better from this point of view.    4. Insomnia. The patient takes Neurontin intermittently and this has been very helpful as well.     5. Osteoporosis. The patient continues taking physical activity, vitamin D, sun once a week for 10 minutes upper body when the sun is out and she will be due for her yearly Reclast infusion .     6. Port. The port remains in place, she remains on maintenance every 6 weeks.The patient was rechecked on 07/31/2020 and she brought up the issue of hair loss associated with letrozole. This is a side effect in a minority of patients who take this medicine and I pointed out to the patient that in spite of being a woman and understanding that hair is a form of communication for a woman, I would prefer for her to remain on letrozole given her risk of recurrence in regard to breast cancer at the time of the original diagnosis. I pointed out to her that she will benefit from Rogaine shampoo or something of this nature and even offered referral to a dermatologist for further assessment. She does not want to pursue that referral at this time. I do not want to remove the letrozole because that will be opening the door for cancer  recurrence and she is a 44-year-old person who has still 2 girls to raise and educate and she has a life in front of her. She understands that clearly and she chooses to keep taking the medicine even though it has been almost 5 years into this.     I pointed out to her that she probably will be one of those individuals who will require the Femara in my opinion for 8-10 years or maybe forever.     The patient was reviewed on 2020.  Several weeks ago she acquired COVID infection at a  home.  She had significant illness with fever, achiness throughout her body, nasal congestion, minimal cough, but she did not develop any other further issues.  The symptoms subsided 10 days later.  By chance, she did not give the viral infection to her family.  She was in quarantine at that point.      Today, she looks fine and I find no other issues besides the need for her to strongly consider getting the vaccine for COVID independent of having the infection.  The fact that she had mild symptoms probably indicated a mild viral load.  She was wearing a mask at the time of being at the  home and those patients we know have lesser degree of immunity.     Otherwise, we will review her back in 4 months.    She also brought up to the table the conversation that she goes to the [facility] for ovarian cancer screening.  This happened a month ago.  A small cyst was seen on her ovaries. She has seen her gynecologist and an ultrasound has been obtained today showing a 1 cm cyst, she could not tell right or left.  We obtained this information from her gynecologist.  Her gynecologist has ordered a  and we will share this information with her once that the report becomes available.  The patient will be called at home with the report for this.  I think this needs to be followed up with all of the precautions necessary.    Her port will be flushed in 2 and 4 months and I will review her back in 4 months with a  CBC, CMP, CA 15-3 and a .                                                         12/4/2020      CC:

## 2021-01-25 RX ORDER — LETROZOLE 2.5 MG/1
TABLET, FILM COATED ORAL
Qty: 90 TABLET | Refills: 3 | Status: SHIPPED | OUTPATIENT
Start: 2021-01-25 | End: 2021-03-30 | Stop reason: SDUPTHER

## 2021-01-27 ENCOUNTER — TELEPHONE (OUTPATIENT)
Dept: ONCOLOGY | Facility: CLINIC | Age: 46
End: 2021-01-27

## 2021-01-27 NOTE — TELEPHONE ENCOUNTER
Patient needs to r/s 2/1 port flush to 2/2 around 2:30 or 3:00 if possible.    If not, she will keep it as is.    [phone]

## 2021-02-01 ENCOUNTER — APPOINTMENT (OUTPATIENT)
Dept: ONCOLOGY | Facility: HOSPITAL | Age: 46
End: 2021-02-01

## 2021-02-02 ENCOUNTER — INFUSION (OUTPATIENT)
Dept: ONCOLOGY | Facility: HOSPITAL | Age: 46
End: 2021-02-02

## 2021-02-02 DIAGNOSIS — Z45.2 FITTING AND ADJUSTMENT OF VASCULAR CATHETER: Primary | ICD-10-CM

## 2021-02-02 PROCEDURE — 25010000003 HEPARIN LOCK FLUSH PER 10 UNITS: Performed by: INTERNAL MEDICINE

## 2021-02-02 PROCEDURE — 96523 IRRIG DRUG DELIVERY DEVICE: CPT

## 2021-02-02 RX ORDER — HEPARIN SODIUM (PORCINE) LOCK FLUSH IV SOLN 100 UNIT/ML 100 UNIT/ML
500 SOLUTION INTRAVENOUS AS NEEDED
Status: CANCELLED | OUTPATIENT
Start: 2021-02-02

## 2021-02-02 RX ORDER — HEPARIN SODIUM (PORCINE) LOCK FLUSH IV SOLN 100 UNIT/ML 100 UNIT/ML
500 SOLUTION INTRAVENOUS AS NEEDED
Status: DISCONTINUED | OUTPATIENT
Start: 2021-02-02 | End: 2021-02-02 | Stop reason: HOSPADM

## 2021-02-02 RX ORDER — SODIUM CHLORIDE 0.9 % (FLUSH) 0.9 %
10 SYRINGE (ML) INJECTION AS NEEDED
Status: DISCONTINUED | OUTPATIENT
Start: 2021-02-02 | End: 2021-02-02 | Stop reason: HOSPADM

## 2021-02-02 RX ORDER — SODIUM CHLORIDE 0.9 % (FLUSH) 0.9 %
10 SYRINGE (ML) INJECTION AS NEEDED
Status: CANCELLED | OUTPATIENT
Start: 2021-02-02

## 2021-02-02 RX ADMIN — SODIUM CHLORIDE, PRESERVATIVE FREE 10 ML: 5 INJECTION INTRAVENOUS at 14:39

## 2021-02-02 RX ADMIN — Medication 500 UNITS: at 14:39

## 2021-03-02 ENCOUNTER — APPOINTMENT (OUTPATIENT)
Dept: CARDIOLOGY | Facility: HOSPITAL | Age: 46
End: 2021-03-02

## 2021-03-02 ENCOUNTER — HOSPITAL ENCOUNTER (EMERGENCY)
Facility: HOSPITAL | Age: 46
Discharge: HOME | End: 2021-03-02
Attending: EMERGENCY MEDICINE | Admitting: EMERGENCY MEDICINE

## 2021-03-02 VITALS
HEIGHT: 67 IN | RESPIRATION RATE: 16 BRPM | SYSTOLIC BLOOD PRESSURE: 121 MMHG | DIASTOLIC BLOOD PRESSURE: 75 MMHG | OXYGEN SATURATION: 100 % | HEART RATE: 83 BPM | BODY MASS INDEX: 21.97 KG/M2 | WEIGHT: 140 LBS | TEMPERATURE: 96.7 F

## 2021-03-02 DIAGNOSIS — R55 SYNCOPE, UNSPECIFIED SYNCOPE TYPE: Primary | ICD-10-CM

## 2021-03-02 LAB
ALBUMIN SERPL-MCNC: 4 G/DL (ref 3.5–5.2)
ALBUMIN/GLOB SERPL: 1.3 G/DL
ALP SERPL-CCNC: 67 U/L (ref 39–117)
ALT SERPL W P-5'-P-CCNC: 16 U/L (ref 1–33)
ANION GAP SERPL CALCULATED.3IONS-SCNC: 10.6 MMOL/L (ref 5–15)
AST SERPL-CCNC: 27 U/L (ref 1–32)
BASOPHILS # BLD AUTO: 0.04 10*3/MM3 (ref 0–0.2)
BASOPHILS NFR BLD AUTO: 0.6 % (ref 0–1.5)
BILIRUB SERPL-MCNC: 0.2 MG/DL (ref 0–1.2)
BUN SERPL-MCNC: 16 MG/DL (ref 6–20)
BUN/CREAT SERPL: 20.5 (ref 7–25)
CALCIUM SPEC-SCNC: 9.4 MG/DL (ref 8.6–10.5)
CHLORIDE SERPL-SCNC: 105 MMOL/L (ref 98–107)
CO2 SERPL-SCNC: 23.4 MMOL/L (ref 22–29)
CREAT SERPL-MCNC: 0.78 MG/DL (ref 0.57–1)
D DIMER PPP FEU-MCNC: 0.48 MCGFEU/ML (ref 0–0.49)
D-LACTATE SERPL-SCNC: 0.6 MMOL/L (ref 0.5–2)
D-LACTATE SERPL-SCNC: 2.9 MMOL/L (ref 0.5–2)
DEPRECATED RDW RBC AUTO: 38.5 FL (ref 37–54)
EOSINOPHIL # BLD AUTO: 0.12 10*3/MM3 (ref 0–0.4)
EOSINOPHIL NFR BLD AUTO: 1.7 % (ref 0.3–6.2)
ERYTHROCYTE [DISTWIDTH] IN BLOOD BY AUTOMATED COUNT: 12 % (ref 12.3–15.4)
GFR SERPL CREATININE-BSD FRML MDRD: 79 ML/MIN/1.73
GLOBULIN UR ELPH-MCNC: 3 GM/DL
GLUCOSE SERPL-MCNC: 127 MG/DL (ref 65–99)
HCT VFR BLD AUTO: 37.1 % (ref 34–46.6)
HGB BLD-MCNC: 12.9 G/DL (ref 12–15.9)
IMM GRANULOCYTES # BLD AUTO: 0.01 10*3/MM3 (ref 0–0.05)
IMM GRANULOCYTES NFR BLD AUTO: 0.1 % (ref 0–0.5)
LACTATE HOLD SPECIMEN: NORMAL
LYMPHOCYTES # BLD AUTO: 1.12 10*3/MM3 (ref 0.7–3.1)
LYMPHOCYTES NFR BLD AUTO: 16 % (ref 19.6–45.3)
MCH RBC QN AUTO: 31 PG (ref 26.6–33)
MCHC RBC AUTO-ENTMCNC: 34.8 G/DL (ref 31.5–35.7)
MCV RBC AUTO: 89.2 FL (ref 79–97)
MONOCYTES # BLD AUTO: 0.73 10*3/MM3 (ref 0.1–0.9)
MONOCYTES NFR BLD AUTO: 10.4 % (ref 5–12)
NEUTROPHILS NFR BLD AUTO: 4.98 10*3/MM3 (ref 1.7–7)
NEUTROPHILS NFR BLD AUTO: 71.2 % (ref 42.7–76)
NRBC BLD AUTO-RTO: 0 /100 WBC (ref 0–0.2)
PLATELET # BLD AUTO: 280 10*3/MM3 (ref 140–450)
PMV BLD AUTO: 9.1 FL (ref 6–12)
POTASSIUM SERPL-SCNC: 3.8 MMOL/L (ref 3.5–5.2)
PROT SERPL-MCNC: 7 G/DL (ref 6–8.5)
QT INTERVAL: 375 MS
RBC # BLD AUTO: 4.16 10*6/MM3 (ref 3.77–5.28)
SODIUM SERPL-SCNC: 139 MMOL/L (ref 136–145)
TROPONIN T SERPL-MCNC: <0.01 NG/ML (ref 0–0.03)
WBC # BLD AUTO: 7 10*3/MM3 (ref 3.4–10.8)

## 2021-03-02 PROCEDURE — 84484 ASSAY OF TROPONIN QUANT: CPT | Performed by: PHYSICIAN ASSISTANT

## 2021-03-02 PROCEDURE — 85379 FIBRIN DEGRADATION QUANT: CPT | Performed by: PHYSICIAN ASSISTANT

## 2021-03-02 PROCEDURE — 93010 ELECTROCARDIOGRAM REPORT: CPT | Performed by: INTERNAL MEDICINE

## 2021-03-02 PROCEDURE — 85025 COMPLETE CBC W/AUTO DIFF WBC: CPT | Performed by: PHYSICIAN ASSISTANT

## 2021-03-02 PROCEDURE — 99283 EMERGENCY DEPT VISIT LOW MDM: CPT

## 2021-03-02 PROCEDURE — 93005 ELECTROCARDIOGRAM TRACING: CPT | Performed by: PHYSICIAN ASSISTANT

## 2021-03-02 PROCEDURE — 96374 THER/PROPH/DIAG INJ IV PUSH: CPT

## 2021-03-02 PROCEDURE — 25010000003 HEPARIN LOCK FLUSH PER 10 UNITS: Performed by: PHYSICIAN ASSISTANT

## 2021-03-02 PROCEDURE — 83605 ASSAY OF LACTIC ACID: CPT | Performed by: EMERGENCY MEDICINE

## 2021-03-02 PROCEDURE — 80053 COMPREHEN METABOLIC PANEL: CPT | Performed by: PHYSICIAN ASSISTANT

## 2021-03-02 PROCEDURE — 93246 EXT ECG>7D<15D RECORDING: CPT

## 2021-03-02 PROCEDURE — 99284 EMERGENCY DEPT VISIT MOD MDM: CPT

## 2021-03-02 PROCEDURE — 83605 ASSAY OF LACTIC ACID: CPT | Performed by: PHYSICIAN ASSISTANT

## 2021-03-02 RX ORDER — HEPARIN SODIUM (PORCINE) LOCK FLUSH IV SOLN 100 UNIT/ML 100 UNIT/ML
500 SOLUTION INTRAVENOUS ONCE
Status: COMPLETED | OUTPATIENT
Start: 2021-03-02 | End: 2021-03-02

## 2021-03-02 RX ORDER — SODIUM CHLORIDE 0.9 % (FLUSH) 0.9 %
10 SYRINGE (ML) INJECTION AS NEEDED
Status: DISCONTINUED | OUTPATIENT
Start: 2021-03-02 | End: 2021-03-02 | Stop reason: HOSPADM

## 2021-03-02 RX ADMIN — SODIUM CHLORIDE 1000 ML: 9 INJECTION, SOLUTION INTRAVENOUS at 17:40

## 2021-03-02 RX ADMIN — HEPARIN 500 UNITS: 100 SYRINGE at 19:13

## 2021-03-02 RX ADMIN — SODIUM CHLORIDE 1000 ML: 9 INJECTION, SOLUTION INTRAVENOUS at 15:27

## 2021-03-02 NOTE — ED PROVIDER NOTES
EMERGENCY DEPARTMENT ENCOUNTER    Room Number:  11/11  Date of encounter:  3/2/2021  PCP: Manasa Walters MD  Historian: Patient      I used full protective equipment while examining this patient.  This includes face mask, gloves and protective eyewear.  I washed my hands before entering the room and immediately upon leaving the room      HPI:  Chief Complaint: Syncope  A complete HPI/ROS/PMH/PSH/SH/FH are unobtainable due to: Nothing    Context: Diana Higgins is a 45 y.o. female who presents to the ED c/o sudden onset of syncope just prior to arrival.  Patient states she had a syncopal episode while at work earlier today.  Patient states she felt normal this morning.  Patient states she ate her lunch and was walking to the restroom to urinate.  Patient states she began to feel lightheaded and had heart palpitations.  She felt her vision fading.  He denied any chest pain.  Patient states she passed out.  Patient states her coworkers helped her to the ground.  She does not believe she injured anything from the fall.  Upon awakening patient, has felt short of breath, and jittery.  She denies any overt chest pain.  Patient does have a history of SVT.  Patient states her symptoms today are similar to previous history of SVT.  She did have an ablation in 2002, and has not had any exacerbations.  She also has a history of breast cancer which is in remission.    Review of Medical Records  I reviewed patient's last oncology office visit from 12/4/2020.  Patient is in remission from breast cancer.    PAST MEDICAL HISTORY  Active Ambulatory Problems     Diagnosis Date Noted   • Malignant neoplasm of upper-outer quadrant of right breast in female, estrogen receptor positive (CMS/[facility]) 03/14/2016   • Osteoporosis 12/21/2016   • Fitting and adjustment of vascular catheter 03/09/2017   • Post-menopausal 03/13/2017   • Chronic idiopathic constipation 10/05/2018   • Anxiety 10/05/2018   • Psychophysiological insomnia 07/10/2019    • Other constipation 07/29/2019   • Reactive depression 05/08/2020   • Cyst of right ovary 12/04/2020     Resolved Ambulatory Problems     Diagnosis Date Noted   • Rectal bleed 07/29/2019     Past Medical History:   Diagnosis Date   • Breast cancer (CMS/HCC)    • H/O Fractured tibia    • Paroxysmal supraventricular tachycardia (CMS/HCC)    • PONV (postoperative nausea and vomiting)    • Sinus headache    • Skin cancer    • Wears glasses          PAST SURGICAL HISTORY  Past Surgical History:   Procedure Laterality Date   • BREAST BIOPSY  2014   • BREAST RECONSTRUCTION Bilateral    • CARDIAC ABLATION  2002    Dr. Garzon 11 years ago tachycardia   • COLONOSCOPY N/A    • COLONOSCOPY N/A 8/1/2019    Procedure: COLONOSCOPY TO CECUM WITH HOT POLYPECTOMY;  Surgeon: Pato Mac MD;  Location: [facility] ENDOSCOPY;  Service: General   • ENDOMETRIAL ABLATION  03/2013    Dr. Young   • KNEE ARTHROSCOPY Right     as a teenager   • LASIK     • MASTECTOMY Bilateral 2014   • TONSILLECTOMY N/A    • VENOUS ACCESS DEVICE (PORT) INSERTION           FAMILY HISTORY  Family History   Problem Relation Age of Onset   • Coronary artery disease Mother    • Heart disease Mother    • Thrombophlebitis Mother         2016   • Heart attack Mother    • Hepatitis Father    • Liver cancer Father    • Crohn's disease Sister    • Drug abuse Brother         Drug overdose   • Cancer Maternal Grandmother 42        Breast   • Thyroid disease Sister    • Other Sister         kidney problems   • Breast cancer Maternal Aunt          SOCIAL HISTORY  Social History     Socioeconomic History   • Marital status:      Spouse name: Misha   • Number of children: Not on file   • Years of education: College   • Highest education level: Not on file   Occupational History   • Occupation:      Employer: [employer]     Comment: [employer]   Tobacco Use   • Smoking status: Never Smoker   • Smokeless tobacco: Never Used    Substance and Sexual Activity   • Alcohol use: Yes     Frequency: Never     Comment: Rare   • Drug use: No   • Sexual activity: Defer         ALLERGIES  Patient has no known allergies.        REVIEW OF SYSTEMS  All systems reviewed and negative except for those discussed in HPI.       PHYSICAL EXAM    I have reviewed the triage vital signs and nursing notes.    ED Triage Vitals   Temp Heart Rate Resp BP SpO2   03/02/21 1434 03/02/21 1432 03/02/21 1434 03/02/21 1432 03/02/21 1432   96.7 °F (35.9 °C) 111 16 123/85 100 %      Temp src Heart Rate Source Patient Position BP Location FiO2 (%)   03/02/21 1434 03/02/21 1434 03/02/21 1434 -- --   Tympanic Monitor Lying         Physical Exam  GENERAL: Alert, oriented, mild distress, anxious   HENT: head atraumatic, no nuchal rigidity  EYES: no scleral icterus, EOMI  CV: regular rhythm, tachycardic rate, no murmur  RESPIRATORY: normal effort, CTA.  Port in left anterior chest.  ABDOMEN: soft, nontender  MUSCULOSKELETAL: no deformity, FROM, no calf swelling or tenderness  NEURO: alert, moves all extremities, follows commands  SKIN: warm, dry        LAB RESULTS  Recent Results (from the past 24 hour(s))   ECG 12 Lead    Collection Time: 03/02/21  2:41 PM   Result Value Ref Range    QT Interval 375 ms   Comprehensive Metabolic Panel    Collection Time: 03/02/21  3:27 PM    Specimen: Blood   Result Value Ref Range    Glucose 127 (H) 65 - 99 mg/dL    BUN 16 6 - 20 mg/dL    Creatinine 0.78 0.57 - 1.00 mg/dL    Sodium 139 136 - 145 mmol/L    Potassium 3.8 3.5 - 5.2 mmol/L    Chloride 105 98 - 107 mmol/L    CO2 23.4 22.0 - 29.0 mmol/L    Calcium 9.4 8.6 - 10.5 mg/dL    Total Protein 7.0 6.0 - 8.5 g/dL    Albumin 4.00 3.50 - 5.20 g/dL    ALT (SGPT) 16 1 - 33 U/L    AST (SGOT) 27 1 - 32 U/L    Alkaline Phosphatase 67 39 - 117 U/L    Total Bilirubin 0.2 0.0 - 1.2 mg/dL    eGFR Non African Amer 79 >60 mL/min/1.73    Globulin 3.0 gm/dL    A/G Ratio 1.3 g/dL    BUN/Creatinine Ratio 20.5  7.0 - 25.0    Anion Gap 10.6 5.0 - 15.0 mmol/L   Troponin    Collection Time: 03/02/21  3:27 PM    Specimen: Blood   Result Value Ref Range    Troponin T <0.010 0.000 - 0.030 ng/mL   CBC Auto Differential    Collection Time: 03/02/21  3:27 PM    Specimen: Blood   Result Value Ref Range    WBC 7.00 3.40 - 10.80 10*3/mm3    RBC 4.16 3.77 - 5.28 10*6/mm3    Hemoglobin 12.9 12.0 - 15.9 g/dL    Hematocrit 37.1 34.0 - 46.6 %    MCV 89.2 79.0 - 97.0 fL    MCH 31.0 26.6 - 33.0 pg    MCHC 34.8 31.5 - 35.7 g/dL    RDW 12.0 (L) 12.3 - 15.4 %    RDW-SD 38.5 37.0 - 54.0 fl    MPV 9.1 6.0 - 12.0 fL    Platelets 280 140 - 450 10*3/mm3    Neutrophil % 71.2 42.7 - 76.0 %    Lymphocyte % 16.0 (L) 19.6 - 45.3 %    Monocyte % 10.4 5.0 - 12.0 %    Eosinophil % 1.7 0.3 - 6.2 %    Basophil % 0.6 0.0 - 1.5 %    Immature Grans % 0.1 0.0 - 0.5 %    Neutrophils, Absolute 4.98 1.70 - 7.00 10*3/mm3    Lymphocytes, Absolute 1.12 0.70 - 3.10 10*3/mm3    Monocytes, Absolute 0.73 0.10 - 0.90 10*3/mm3    Eosinophils, Absolute 0.12 0.00 - 0.40 10*3/mm3    Basophils, Absolute 0.04 0.00 - 0.20 10*3/mm3    Immature Grans, Absolute 0.01 0.00 - 0.05 10*3/mm3    nRBC 0.0 0.0 - 0.2 /100 WBC   D-dimer, Quantitative    Collection Time: 03/02/21  3:52 PM    Specimen: Blood   Result Value Ref Range    D-Dimer, Quantitative 0.48 0.00 - 0.49 MCGFEU/mL   Lactic Acid, Plasma    Collection Time: 03/02/21  3:52 PM    Specimen: Blood   Result Value Ref Range    Lactate 2.9 (C) 0.5 - 2.0 mmol/L   Lactic Acid, Plasma    Collection Time: 03/02/21  5:57 PM    Specimen: Blood   Result Value Ref Range    Lactate 0.6 0.5 - 2.0 mmol/L       Ordered the above labs and independently reviewed the results.    MEDICATIONS GIVEN IN ER    Medications   sodium chloride 0.9 % flush 10 mL (has no administration in time range)   sodium chloride 0.9 % flush 10 mL (has no administration in time range)   sodium chloride 0.9 % bolus 1,000 mL (0 mL Intravenous Stopped 3/2/21 9658)   sodium  chloride 0.9 % bolus 1,000 mL (1,000 mL Intravenous New Bag 3/2/21 1740)         PROGRESS, DATA ANALYSIS, CONSULTS, AND MEDICAL DECISION MAKING    All labs have been independently reviewed by me.  All radiology studies have been reviewed by me and discussed with radiologist dictating the report.   EKG's independently viewed and interpreted by me.  Discussion below represents my analysis of pertinent findings related to patient's condition, differential diagnosis, treatment plan and final disposition.    I have discussed case with Dr. Damian, emergency room physician.  He has performed his own bedside examination and agrees with treatment plan.    ED Course as of Mar 02 2004   Tue Mar 02, 2021   1439 Patient presents with syncopal episode just prior to arrival.  Patient does have a history of SVT, as well as breast cancer.  Plan to check basic labs, EKG, reevaluate.  At this time she is normotensive and slightly tachycardic.  Differential diagnoses include but not limited to pulmonary embolism, vasovagal syncope, cardiac arrhythmia.    [EE]   1543 EKG          EKG time: 1441  Rhythm/Rate: Normal sinus, rate 89  P waves and TX: Normal P, normal TX  QRS, axis: Narrow QRS, rightward axis  ST and T waves: Normal ST and T waves    Interpreted Contemporaneously by me, independently viewed  Not significantly changed compared to prior 6-14      [TR]   1545 WBC: 7.00 [EE]   1545 Hemoglobin: 12.9 [EE]   1628 D-Dimer, Quant: 0.48 [EE]   1628 No evidence of sepsis.  No clear etiology to lactic acidosis.  Patient is afebrile.  Has normal white blood cell count.  Plan to give an additional bolus of fluids.  We will recheck this lactic acidosis and 2 hours.   Lactate(!!): 2.9 [EE]   1804 I discussed case with Dr. Faust, cardiology.  He agrees with treatment plan for Holter monitor and follow-up with Dr. Garzon.    [EE]   1834 Improved from 2.9.   Lactate: 0.6 [EE]   1849 Recheck of patient.  She has stable vitals and feels  much better.  No suspicion of sepsis.  No clear etiology to patient's syncope.  No evidence of PE.  Likely vasovagal versus recurrence of SVT.  Will place patient on Holter monitor and have her follow-up with cardiology.    [EE]      ED Course User Index  [EE] Perez Mcintosh PA  [TR] Gene Damian MD       AS OF 18:55 EST VITALS:    BP - 116/73  HR - 73  TEMP - 96.7 °F (35.9 °C) (Tympanic)  O2 SATS - 100%        DIAGNOSIS  Final diagnoses:   Syncope, unspecified syncope type         DISPOSITION  Discharged           Perez Mcintosh PA  03/02/21 2004

## 2021-03-02 NOTE — ED TRIAGE NOTES
Pt was at work when she complained of dizziness and had a syncopal episode. Pt was sitting when she passed out and did not fall. Pt complains of SOA, chills, and shaking. Pt is not answering questions at this point and history obtained from family. Patient masked at arrival and triage staff wore all appropriate PPE during entire encounter with patient.

## 2021-03-02 NOTE — ED NOTES
Patient requests her port to be accessed at this time. IV therapy paged with call back.      Ophelia Joel RN  03/02/21 4877

## 2021-03-02 NOTE — ED PROVIDER NOTES
MD ATTESTATION NOTE    The NIKOLE and I have discussed this patient's history, physical exam, and treatment plan.  I have reviewed the documentation and personally had a face to face interaction with the patient. I affirm the documentation and agree with the treatment and plan.  The attached note describes my personal findings.      Diana Higgins is a 45 y.o. female who presents to the ED c/o a syncopal episode today.  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  She reports that she stood up and felt worse.  She sent down in a chair and started seeing spots and passed out.  She reports she was feeling palpitations during this time.  She states it felt like her heart was flipping and flopping.  She denies any prior syncopal episodes.  She does have a history of SVT with an ablation approximately 15 years ago.  She has not had any episodes since then.  She also has a history of breast cancer and has a port in place.  She has been in remission for several years.  She reports she currently feels better although feels like something is keeping her from taking a deep breath.  She denies chest pain.  She denies headache.  She denies recent illness.  She denies nausea and vomiting.  She denies diarrhea.  She had 1 hypotensive reading upon arrival.      On exam:  GENERAL: Awake, alert, no acute distress  SKIN: Warm, dry  HENT: Normocephalic, atraumatic  EYES: no scleral icterus  CV: regular rhythm, regular rate.  Port in the left chest.  RESPIRATORY: normal effort, lungs clear  ABDOMEN: soft, non-tender, non-distended  MUSCULOSKELETAL: no deformity  NEURO: alert, moves all extremities, follows commands    Labs  Recent Results (from the past 24 hour(s))   ECG 12 Lead    Collection Time: 03/02/21  2:41 PM   Result Value Ref Range    QT Interval 375 ms   Comprehensive Metabolic Panel    Collection Time: 03/02/21  3:27 PM     Specimen: Blood   Result Value Ref Range    Glucose 127 (H) 65 - 99 mg/dL    BUN 16 6 - 20 mg/dL    Creatinine 0.78 0.57 - 1.00 mg/dL    Sodium 139 136 - 145 mmol/L    Potassium 3.8 3.5 - 5.2 mmol/L    Chloride 105 98 - 107 mmol/L    CO2 23.4 22.0 - 29.0 mmol/L    Calcium 9.4 8.6 - 10.5 mg/dL    Total Protein 7.0 6.0 - 8.5 g/dL    Albumin 4.00 3.50 - 5.20 g/dL    ALT (SGPT) 16 1 - 33 U/L    AST (SGOT) 27 1 - 32 U/L    Alkaline Phosphatase 67 39 - 117 U/L    Total Bilirubin 0.2 0.0 - 1.2 mg/dL    eGFR Non African Amer 79 >60 mL/min/1.73    Globulin 3.0 gm/dL    A/G Ratio 1.3 g/dL    BUN/Creatinine Ratio 20.5 7.0 - 25.0    Anion Gap 10.6 5.0 - 15.0 mmol/L   Troponin    Collection Time: 03/02/21  3:27 PM    Specimen: Blood   Result Value Ref Range    Troponin T <0.010 0.000 - 0.030 ng/mL   CBC Auto Differential    Collection Time: 03/02/21  3:27 PM    Specimen: Blood   Result Value Ref Range    WBC 7.00 3.40 - 10.80 10*3/mm3    RBC 4.16 3.77 - 5.28 10*6/mm3    Hemoglobin 12.9 12.0 - 15.9 g/dL    Hematocrit 37.1 34.0 - 46.6 %    MCV 89.2 79.0 - 97.0 fL    MCH 31.0 26.6 - 33.0 pg    MCHC 34.8 31.5 - 35.7 g/dL    RDW 12.0 (L) 12.3 - 15.4 %    RDW-SD 38.5 37.0 - 54.0 fl    MPV 9.1 6.0 - 12.0 fL    Platelets 280 140 - 450 10*3/mm3    Neutrophil % 71.2 42.7 - 76.0 %    Lymphocyte % 16.0 (L) 19.6 - 45.3 %    Monocyte % 10.4 5.0 - 12.0 %    Eosinophil % 1.7 0.3 - 6.2 %    Basophil % 0.6 0.0 - 1.5 %    Immature Grans % 0.1 0.0 - 0.5 %    Neutrophils, Absolute 4.98 1.70 - 7.00 10*3/mm3    Lymphocytes, Absolute 1.12 0.70 - 3.10 10*3/mm3    Monocytes, Absolute 0.73 0.10 - 0.90 10*3/mm3    Eosinophils, Absolute 0.12 0.00 - 0.40 10*3/mm3    Basophils, Absolute 0.04 0.00 - 0.20 10*3/mm3    Immature Grans, Absolute 0.01 0.00 - 0.05 10*3/mm3    nRBC 0.0 0.0 - 0.2 /100 WBC   D-dimer, Quantitative    Collection Time: 03/02/21  3:52 PM    Specimen: Blood   Result Value Ref Range    D-Dimer, Quantitative 0.48 0.00 - 0.49 MCGFEU/mL   Lactic  Acid, Plasma    Collection Time: 03/02/21  3:52 PM    Specimen: Blood   Result Value Ref Range    Lactate 2.9 (C) 0.5 - 2.0 mmol/L   Lactic Acid, Reflex Timer (This will reflex a repeat order 3-3:15 hours after ordered.)    Collection Time: 03/02/21  3:52 PM    Specimen: Blood   Result Value Ref Range    Hold Tube Hold for add-ons.    Lactic Acid, Plasma    Collection Time: 03/02/21  5:57 PM    Specimen: Blood   Result Value Ref Range    Lactate 0.6 0.5 - 2.0 mmol/L       Radiology  No Radiology Exams Resulted Within Past 24 Hours    Medical Decision Making:  ED Course as of Mar 02 2142   Tue Mar 02, 2021   1439 Patient presents with syncopal episode just prior to arrival.  Patient does have a history of SVT, as well as breast cancer.  Plan to check basic labs, EKG, reevaluate.  At this time she is normotensive and slightly tachycardic.  Differential diagnoses include but not limited to pulmonary embolism, vasovagal syncope, cardiac arrhythmia.    [EE]   1543 EKG          EKG time: 1441  Rhythm/Rate: Normal sinus, rate 89  P waves and AZ: Normal P, normal AZ  QRS, axis: Narrow QRS, rightward axis  ST and T waves: Normal ST and T waves    Interpreted Contemporaneously by me, independently viewed  Not significantly changed compared to prior 6-14      [TR]   1545 WBC: 7.00 [EE]   1545 Hemoglobin: 12.9 [EE]   1628 D-Dimer, Quant: 0.48 [EE]   1628 No evidence of sepsis.  No clear etiology to lactic acidosis.  Patient is afebrile.  Has normal white blood cell count.  Plan to give an additional bolus of fluids.  We will recheck this lactic acidosis and 2 hours.   Lactate(!!): 2.9 [EE]   1804 I discussed case with Dr. Faust, cardiology.  He agrees with treatment plan for Holter monitor and follow-up with Dr. Garzon.    [EE]   1834 Improved from 2.9.   Lactate: 0.6 [EE]   1849 Recheck of patient.  She has stable vitals and feels much better.  No suspicion of sepsis.  No clear etiology to patient's syncope.  No evidence  of PE.  Likely vasovagal versus recurrence of SVT.  Will place patient on Holter monitor and have her follow-up with cardiology.    [EE]      ED Course User Index  [EE] Perez Mcintosh PA  [TR] Gene Damian MD       Given the patient has difficulty taking a deep breath, plan to rule out PE with a D-dimer.  Given hypotension and tachycardia, rule out sepsis with lactic acid.  I suspect that this constellation of events represents recurrent SVT.  If her work-up here is negative today, plan for Holter monitor.    PPE: Both the patient and I wore a surgical mask throughout the entire patient encounter. I wore protective goggles.     Diagnosis  Final diagnoses:   Syncope, unspecified syncope type        Gene Damian MD  03/02/21 0214

## 2021-03-03 ENCOUNTER — TELEPHONE (OUTPATIENT)
Dept: CARDIOLOGY | Facility: CLINIC | Age: 46
End: 2021-03-03

## 2021-03-03 ENCOUNTER — TELEPHONE (OUTPATIENT)
Dept: ONCOLOGY | Facility: CLINIC | Age: 46
End: 2021-03-03

## 2021-03-03 NOTE — TELEPHONE ENCOUNTER
Patient called today. She was seen in the ER yesterday and was told to schedule with Dr. Garzon. She has never seen him before and it looks like from the notes, Dr. Faust told the ER doctor to have her follow up with Ryann. She does have a Zio.    Please advise    Monica

## 2021-03-03 NOTE — TELEPHONE ENCOUNTER
Returning call to pt. Pt is requesting to speak with Dr. Hadley directly. Pt stated she had to go to the ER yesterday after passing out and work and they have her on a heart monitor and would like to get Dr. Hadley's opinion on some things. Pt also stated she is supposed to get her second covid vaccine and would like to talk to him about that as well. Told pt I would send him a message to see if he has a second to give the pt a call. Pt v/u.

## 2021-03-03 NOTE — TELEPHONE ENCOUNTER
The patient has called the office today very concerned about the events from yesterday. After she finished lunch, she felt dizzy headed. Her heart was racing and she passed out. Her coworkers called her  who lives 5 minutes away from her work and he took her to the emergency room. She was very tense, she was very anxious and she was having shortness of breath but no chest pain. The EKG in the hospital, CBC, CMP were normal. The patient received IV fluids. She got better and she went home. She raised the question about what happened. I do not know the answer but very likely her paroxysmal supraventricular tachycardia. She is supposed to see Dr. Garzon at some point and I agree with that approach completely. The 2nd question that she had is if she should proceed with the 2nd dose of COVID vaccine tomorrow and my answer to her was absolutely yes. I pointed out to her that the 2nd dose can give her a little bit of low-grade fever, some achiness, some fatigue but this is transitory for a question of 24-36 hours. She should be fine on Sunday. Lack of proper vaccination will lead her immune system to be weak enough that maybe she will become prone to have a real infection with real consequences. She agreed with me that she will proceed with her shot tomorrow.    I asked her to call if any other issues.

## 2021-03-03 NOTE — TELEPHONE ENCOUNTER
Caller: BRUNA REEVES    Relationship to patient: SELF    Best call back number: [phone]     PT WOULD LIKE TO SPEAK TO A NURSE WHEN POSSIBLE. WOULD NOT STATE WHAT CALL IS REGARDING

## 2021-03-10 DIAGNOSIS — F51.04 PSYCHOPHYSIOLOGICAL INSOMNIA: ICD-10-CM

## 2021-03-11 RX ORDER — GABAPENTIN 300 MG/1
300 CAPSULE ORAL
Qty: 30 CAPSULE | Refills: 1 | OUTPATIENT
Start: 2021-03-11 | End: 2021-03-30 | Stop reason: SDUPTHER

## 2021-03-11 RX ORDER — VENLAFAXINE HYDROCHLORIDE 75 MG/1
75 CAPSULE, EXTENDED RELEASE ORAL DAILY
Qty: 90 CAPSULE | Refills: 1 | Status: SHIPPED | OUTPATIENT
Start: 2021-03-11 | End: 2021-03-30 | Stop reason: SDUPTHER

## 2021-03-22 PROCEDURE — 93248 EXT ECG>7D<15D REV&INTERPJ: CPT | Performed by: INTERNAL MEDICINE

## 2021-03-30 ENCOUNTER — INFUSION (OUTPATIENT)
Dept: ONCOLOGY | Facility: HOSPITAL | Age: 46
End: 2021-03-30

## 2021-03-30 ENCOUNTER — OFFICE VISIT (OUTPATIENT)
Dept: ONCOLOGY | Facility: CLINIC | Age: 46
End: 2021-03-30

## 2021-03-30 VITALS
SYSTOLIC BLOOD PRESSURE: 99 MMHG | BODY MASS INDEX: 22.13 KG/M2 | WEIGHT: 141 LBS | HEART RATE: 80 BPM | OXYGEN SATURATION: 100 % | RESPIRATION RATE: 19 BRPM | HEIGHT: 67 IN | DIASTOLIC BLOOD PRESSURE: 66 MMHG | TEMPERATURE: 97.3 F

## 2021-03-30 DIAGNOSIS — Z78.0 POST-MENOPAUSAL: ICD-10-CM

## 2021-03-30 DIAGNOSIS — Z45.2 FITTING AND ADJUSTMENT OF VASCULAR CATHETER: ICD-10-CM

## 2021-03-30 DIAGNOSIS — M81.8 OTHER OSTEOPOROSIS WITHOUT CURRENT PATHOLOGICAL FRACTURE: ICD-10-CM

## 2021-03-30 DIAGNOSIS — F51.04 PSYCHOPHYSIOLOGICAL INSOMNIA: ICD-10-CM

## 2021-03-30 DIAGNOSIS — K59.09 OTHER CONSTIPATION: ICD-10-CM

## 2021-03-30 DIAGNOSIS — C50.411 MALIGNANT NEOPLASM OF UPPER-OUTER QUADRANT OF RIGHT BREAST IN FEMALE, ESTROGEN RECEPTOR POSITIVE (HCC): ICD-10-CM

## 2021-03-30 DIAGNOSIS — N83.201 CYST OF RIGHT OVARY: ICD-10-CM

## 2021-03-30 DIAGNOSIS — K59.04 CHRONIC IDIOPATHIC CONSTIPATION: ICD-10-CM

## 2021-03-30 DIAGNOSIS — F32.9 REACTIVE DEPRESSION: ICD-10-CM

## 2021-03-30 DIAGNOSIS — Z17.0 MALIGNANT NEOPLASM OF UPPER-OUTER QUADRANT OF RIGHT BREAST IN FEMALE, ESTROGEN RECEPTOR POSITIVE (HCC): Primary | ICD-10-CM

## 2021-03-30 DIAGNOSIS — C50.411 MALIGNANT NEOPLASM OF UPPER-OUTER QUADRANT OF RIGHT BREAST IN FEMALE, ESTROGEN RECEPTOR POSITIVE (HCC): Primary | ICD-10-CM

## 2021-03-30 DIAGNOSIS — Z17.0 MALIGNANT NEOPLASM OF UPPER-OUTER QUADRANT OF RIGHT BREAST IN FEMALE, ESTROGEN RECEPTOR POSITIVE (HCC): ICD-10-CM

## 2021-03-30 DIAGNOSIS — M81.8 OTHER OSTEOPOROSIS WITHOUT CURRENT PATHOLOGICAL FRACTURE: Primary | ICD-10-CM

## 2021-03-30 LAB
ALBUMIN SERPL-MCNC: 4 G/DL (ref 3.5–5.2)
ALBUMIN/GLOB SERPL: 1.5 G/DL (ref 1.1–2.4)
ALP SERPL-CCNC: 61 U/L (ref 38–116)
ALT SERPL W P-5'-P-CCNC: 16 U/L (ref 0–33)
ANION GAP SERPL CALCULATED.3IONS-SCNC: 6.8 MMOL/L (ref 5–15)
AST SERPL-CCNC: 22 U/L (ref 0–32)
BASOPHILS # BLD AUTO: 0.02 10*3/MM3 (ref 0–0.2)
BASOPHILS NFR BLD AUTO: 0.5 % (ref 0–1.5)
BILIRUB SERPL-MCNC: 0.3 MG/DL (ref 0.2–1.2)
BUN SERPL-MCNC: 15 MG/DL (ref 6–20)
BUN/CREAT SERPL: 23.1 (ref 7.3–30)
CALCIUM SPEC-SCNC: 9.3 MG/DL (ref 8.5–10.2)
CANCER AG125 SERPL QL: 10.4 U/ML (ref 0–38.1)
CANCER AG15-3 SERPL-ACNC: 17.3 U/ML
CHLORIDE SERPL-SCNC: 103 MMOL/L (ref 98–107)
CO2 SERPL-SCNC: 27.2 MMOL/L (ref 22–29)
CREAT SERPL-MCNC: 0.65 MG/DL (ref 0.6–1.1)
DEPRECATED RDW RBC AUTO: 39.8 FL (ref 37–54)
EOSINOPHIL # BLD AUTO: 0.17 10*3/MM3 (ref 0–0.4)
EOSINOPHIL NFR BLD AUTO: 3.9 % (ref 0.3–6.2)
ERYTHROCYTE [DISTWIDTH] IN BLOOD BY AUTOMATED COUNT: 11.9 % (ref 12.3–15.4)
GFR SERPL CREATININE-BSD FRML MDRD: 98 ML/MIN/1.73
GLOBULIN UR ELPH-MCNC: 2.6 GM/DL (ref 1.8–3.5)
GLUCOSE SERPL-MCNC: 87 MG/DL (ref 74–124)
HCT VFR BLD AUTO: 38.9 % (ref 34–46.6)
HGB BLD-MCNC: 13.1 G/DL (ref 12–15.9)
IMM GRANULOCYTES # BLD AUTO: 0.01 10*3/MM3 (ref 0–0.05)
IMM GRANULOCYTES NFR BLD AUTO: 0.2 % (ref 0–0.5)
LYMPHOCYTES # BLD AUTO: 1.29 10*3/MM3 (ref 0.7–3.1)
LYMPHOCYTES NFR BLD AUTO: 29.7 % (ref 19.6–45.3)
MCH RBC QN AUTO: 30.5 PG (ref 26.6–33)
MCHC RBC AUTO-ENTMCNC: 33.7 G/DL (ref 31.5–35.7)
MCV RBC AUTO: 90.7 FL (ref 79–97)
MONOCYTES # BLD AUTO: 0.48 10*3/MM3 (ref 0.1–0.9)
MONOCYTES NFR BLD AUTO: 11 % (ref 5–12)
NEUTROPHILS NFR BLD AUTO: 2.38 10*3/MM3 (ref 1.7–7)
NEUTROPHILS NFR BLD AUTO: 54.7 % (ref 42.7–76)
NRBC BLD AUTO-RTO: 0 /100 WBC (ref 0–0.2)
PLATELET # BLD AUTO: 260 10*3/MM3 (ref 140–450)
PMV BLD AUTO: 8.8 FL (ref 6–12)
POTASSIUM SERPL-SCNC: 4 MMOL/L (ref 3.5–4.7)
PROT SERPL-MCNC: 6.6 G/DL (ref 6.3–8)
RBC # BLD AUTO: 4.29 10*6/MM3 (ref 3.77–5.28)
SODIUM SERPL-SCNC: 137 MMOL/L (ref 134–145)
WBC # BLD AUTO: 4.35 10*3/MM3 (ref 3.4–10.8)

## 2021-03-30 PROCEDURE — 85025 COMPLETE CBC W/AUTO DIFF WBC: CPT

## 2021-03-30 PROCEDURE — 86300 IMMUNOASSAY TUMOR CA 15-3: CPT | Performed by: INTERNAL MEDICINE

## 2021-03-30 PROCEDURE — 99215 OFFICE O/P EST HI 40 MIN: CPT | Performed by: INTERNAL MEDICINE

## 2021-03-30 PROCEDURE — 80053 COMPREHEN METABOLIC PANEL: CPT

## 2021-03-30 PROCEDURE — 25010000003 HEPARIN LOCK FLUSH PER 10 UNITS: Performed by: INTERNAL MEDICINE

## 2021-03-30 PROCEDURE — 36415 COLL VENOUS BLD VENIPUNCTURE: CPT

## 2021-03-30 PROCEDURE — 36591 DRAW BLOOD OFF VENOUS DEVICE: CPT

## 2021-03-30 PROCEDURE — 86304 IMMUNOASSAY TUMOR CA 125: CPT | Performed by: INTERNAL MEDICINE

## 2021-03-30 RX ORDER — SODIUM CHLORIDE 0.9 % (FLUSH) 0.9 %
10 SYRINGE (ML) INJECTION AS NEEDED
Status: CANCELLED | OUTPATIENT
Start: 2021-03-30

## 2021-03-30 RX ORDER — GABAPENTIN 300 MG/1
300 CAPSULE ORAL
Qty: 90 CAPSULE | Refills: 1 | OUTPATIENT
Start: 2021-03-30 | End: 2021-04-01 | Stop reason: SDUPTHER

## 2021-03-30 RX ORDER — HEPARIN SODIUM (PORCINE) LOCK FLUSH IV SOLN 100 UNIT/ML 100 UNIT/ML
500 SOLUTION INTRAVENOUS AS NEEDED
Status: DISCONTINUED | OUTPATIENT
Start: 2021-03-30 | End: 2021-03-30 | Stop reason: HOSPADM

## 2021-03-30 RX ORDER — VENLAFAXINE HYDROCHLORIDE 75 MG/1
75 CAPSULE, EXTENDED RELEASE ORAL DAILY
Qty: 90 CAPSULE | Refills: 1 | Status: SHIPPED | OUTPATIENT
Start: 2021-03-30 | End: 2022-02-07

## 2021-03-30 RX ORDER — LETROZOLE 2.5 MG/1
2.5 TABLET, FILM COATED ORAL DAILY
Qty: 90 TABLET | Refills: 3 | Status: SHIPPED | OUTPATIENT
Start: 2021-03-30 | End: 2022-04-05

## 2021-03-30 RX ORDER — HEPARIN SODIUM (PORCINE) LOCK FLUSH IV SOLN 100 UNIT/ML 100 UNIT/ML
500 SOLUTION INTRAVENOUS AS NEEDED
Status: CANCELLED | OUTPATIENT
Start: 2021-03-30

## 2021-03-30 RX ADMIN — Medication 500 UNITS: at 07:54

## 2021-03-30 NOTE — PROGRESS NOTES
Subjective   REASONS FOR FOLLOWUP:    1. History of stage IIA, [id number], ER weakly-positive, VA negative, HER2-negative breast cancer, status post bilateral mastectomies. The patient completed her adjuvant chemotherapy under NSABP B49 clinical trial.  On 12/14/2015 she has no clinical evidence of recurrence of breast cancer and she remains on Femara for the time being. She also has completed her breast reconstruction with bilateral implants and she is very satisfied in regard to the cosmetic result of this. She will remain on Femara for the time being. She receiving at this time Effexor 37.5 mg a day for hot flashes and anxiety.   2. Bone health. The patient has osteoporosis. She  GETS Reclast infusion  on yearly bases, LAST ONE MAY 2020                 History of Present Illness   PATIENT WAS CALLED THE DAY BEFORE BY THE OFFICE TO ASK FOR SYMPTOMS THAT COULD BE CONSISTENT WITH CORONAVIRUS INFECTION, AND BEING NEGATIVE WAS SCHEDULED TO BE SEEN IN THE OFFICE TODAY. SIMILAR QUESTIONING TODAY INCLUDING, CHILLS, FEVER, NEW COUGH, SHORTNESS OF BREATH, DIARRHEA,DIFFUSE BODY ACHES  AND CHANGES IN SMELL OR TASTE WERE NEGATIVE.THE PATIENT DENIED ANY CONTACT WITH PERSONS WHO WERE POSITIVE FOR COVID, AND PATIENT IS NOT IN CATEGORY OF HIGH RISK BEHAVIOR TO ACQUIRE COVID.    DURING THE VISIT WITH THE PATIENT TODAY , PATIENT HAD FACE MASK, MY MEDICAL ASSISTANT AND I  HAD PROPPER PROTECTIVE EQUIPMENT, AND I DID HAND HYGIENE WITH SOAP AND WATER BEFORE AND AFTER THE VISIT.      The patient returns today to the office for followup. Overall the main problem remains constipation for which she requires MiraLAX almost on a regular basis. The other problem is irregular heartbeat with paroxysmal episodes of tachycardia similar to the paroxysmal supraventricular tachycardia that she had ablation for by Dr. Garzon 19 years ago. She was told by him that when she gets into menopause most likely these issues will reoccur. This has been the  fact. She has had 1 episode. She landed in the emergency room and she has had another episode this last Sunday when she was with some friends in [place], [place]. She has not been drinking alcohol, tea or excessive amount of chocolate. She has not had any strong emotions and she has not been dehydrated.     Her depression and anxiety and insomnia are under good control taking the present regimen of medicines that include Effexor as well as Neurontin. The patient has not had any other new health issues including no bone pain, no rashes in the skin, no alterations in the breast implants, no respiratory symptoms. She has not had any other health issues recently. She completed her 2 COVID vaccinations.                            Past Medical History.   Past Medical History:   Diagnosis Date   • Anxiety    • Breast cancer (CMS/HCC)     Right, stage IIA, s/p bilateral mastectomy, XRT, and chemotherapy, followed by Dr. Hadley   • H/O Fractured tibia    • Osteoporosis    • Paroxysmal supraventricular tachycardia (CMS/HCC)     CS ostium atrial tachycardia, ablated in 2002 by Dr. Garzon   • PONV (postoperative nausea and vomiting)    • Sinus headache     But no history of migraines   • Skin cancer    • Wears glasses      Past Surgical History:   Procedure Laterality Date   • BREAST BIOPSY  2014   • BREAST RECONSTRUCTION Bilateral    • CARDIAC ABLATION  2002    Dr. Garzon 11 years ago tachycardia   • COLONOSCOPY N/A    • COLONOSCOPY N/A 8/1/2019    Procedure: COLONOSCOPY TO CECUM WITH HOT POLYPECTOMY;  Surgeon: Pato Mac MD;  Location: [facility] ENDOSCOPY;  Service: General   • ENDOMETRIAL ABLATION  03/2013    Dr. Young   • KNEE ARTHROSCOPY Right     as a teenager   • LASIK     • MASTECTOMY Bilateral 2014   • TONSILLECTOMY N/A    • VENOUS ACCESS DEVICE (PORT) INSERTION       Social History     Socioeconomic History   • Marital status:      Spouse name: Misha   • Number of children: Not on file   • Years of  education: College   • Highest education level: Not on file   Tobacco Use   • Smoking status: Never Smoker   • Smokeless tobacco: Never Used   Substance and Sexual Activity   • Alcohol use: Yes     Comment: Rare   • Drug use: No   • Sexual activity: Defer     Family History   Problem Relation Age of Onset   • Coronary artery disease Mother    • Heart disease Mother    • Thrombophlebitis Mother         2016   • Heart attack Mother    • Hepatitis Father    • Liver cancer Father    • Crohn's disease Sister    • Drug abuse Brother         Drug overdose   • Cancer Maternal Grandmother 42        Breast   • Thyroid disease Sister    • Other Sister         kidney problems   • Breast cancer Maternal Aunt      :ONCOLOGIC/HEMATOLOGIC HISTORY     The patient was diagnosed with stage II breast cancer with the tumor located in the anterior axillary fold in the tail of the  RIGHT breast. She underwent bilateral mastectomies and she had bilateral expanders placed by Dr. MURPHY Aguilar in preparation for further reconstruction in the future.  The patient’s primary tumor was 1.6 cm in size.  Margins of resection were negative, grade 3, with no lymphovascular invasion. Two sentinel lymph nodes contain metastasis, one of them with macrometastasis that were not spilling out of the lymph node.  Circulating cancer cells were negative.  CA 15-3 was negative, normal.  The patient had a CT scan of the brain, chest, abdomen and pelvis that disclose no distant metastasis at any level.  The radiologist questioned an internal mammary node on the right side that does not make any clinical sense given the primary location of the tumor in the right breast.  Her bone scan was negative.  Her MUGA ejection fraction was normal.  With all of this in mind we advised the patient to either participate in the NSABP B47 clinical trial or to consider chemotherapy in the standard regimen dose dense AC followed by Taxol followed by hormonal anti-estrogen  therapy given the finding in the pathological specimen of the mastectomy that her tumor was weakly ER positive, MA negative, HER-2 negative by FISH.      Our research coordinator had the opportunity to interview the patient today to go through the explanation of participation in the clinical trial.      Patient seen 07/24/2014 for a second cycle of AC chemotherapy. Adjustments were made for her antinausea treatments then post chemotherapy to try to reduce insomnia and further nausea.     On 08/07/2014 the patient has encountered size effects of the treatment including nausea and vomiting on days 3, 4 and 5 after chemotherapy related to a continuous bitter metallic taste in her mouth.  She also has experienced a tremendous degree of skeletal pain induced by the Neulasta to the point that she surrendered to bed.  Under these circumstances we advised her to use some mint or crystalized kimberly for the taste in her mouth and she will take Aleve on a regular schedule on days 2, 3, 4 and 5 to minimize the bone pain induced by the Neulasta.  On the other hand she has not developed any mucositis.  She has not developed any infections, abnormal bleeding or significant hematological toxicity.    On 08/21/2014 the patient proceeded with her 4th cycle of Adriamycin/Cytoxan supported with Neulasta. Pepcid AC b.i.d. was initiated at a standing dose to control heartburn. The patient also was advised to continue using mint and kimberly to minimize the taste in her mouth induced by chemicals in her chemotherapy treatment. She was scheduled to receive her next cycle of chemotherapy and at that point she will initiate Taxol on a weekly basis.     She was seen back in the office 09/04/2014 initiating treatment with Taxol given every week, x12.     On 09/18/14, the patient was undergoing therapy with Taxol and having less fatigue, no nausea, occasional tingling in her fingers but it was very transitory with no discomfort in her feet or  toes.  She was still very sensitive, feeling her expanders in the chest wall, and we advised her to initiate a program of water therapy.  Her Taxol treatment was continued.  We also advised her to use massage therapy.     On 09/25/2014, the patient was still experiencing a grade 1 peripheral neuropathy of hands, none in feet, associated with Taxol use.  Otherwise she was feeling fine.   She still had a lot of sensitivity in the chest wall, in areas of breast expanders, and in the shoulders and scapular areas.  We have not been able to get her water therapy classes.   We advised her to continue her care plan in the same way and continue Prilosec on a daily basis to avoid reflux esophagitis that is very well controlled at this time.  Her chemotherapy treatment was continued and hematological parameters were stable and her physical exam was otherwise unremarkable.      The patient was seen back in the office on 10/16/2014, tolerating Taxol well. Plans are made to continue the same.     The patient was seen on 11/06/2014, referral was made to radiation oncology to see if the patient will benefit from radiation therapy since she had involvement of two sentinel lymph nodes with metastatic carcinoma.     On 12/04/2014 the patient had a discussion with us in regard plans for any other form of hormonal adjuvant therapy.  Given the fact that her tumor was weakly estrogen receptor positive, we advised her at her age to initiate therapy with tamoxifen.  I asked her to take this medicine at least for the next 10 years.  I asked her to consider eventually whenever we fulfill the criteria for being post-menopausal to switch this to an aromatase inhibitor.      We advised her to initiate a program of exercise to relieve the pain and discomfort that she has in her upper body due to the expanders and her surgical sites.     We advised her to proceed with bone density test. We need to check this in consideration of the utilization of  bisphosphonate either by mouth or IV, not only for keeping her bone health now that she is post-menopausal but also in consideration of prevention of bone metastasis.      We had an extensive discussion with her and her sister about sexual issues that she has encountered during the treatment for her disease and try to relieve her from the stressors of this at this time.  Her  was not present unfortunately during this discussion.      On 01/02/2015 we documented that the patient was tolerating her tamoxifen well with minimal hot flashes and she was still undergoing her radiation treatment to the site of her disease.  She was also documented to have osteoporosis in her bone density and we suggested for her to receive IV Reclast that will have a dual function of treatment of osteoporosis as well as prevention of bone metastasis.      In regard to her conjugal and personal life, the patient has very poor self-esteem.  She finds no one in her life that will be supportive to her and the relationships with her  are very minimal and just in a very low level of activity.  I asked her to bring her  for the next visit to have a conversation one on one, face to face with him and see if he can become a partner for her to help her to heal emotionally and to be supportive for her all the time.  Eventually the patient will require some counseling as well.    We did not recommend any antianxiety or antidepressant medication for her at this point.  The patient was not willing to take anything anyway.      On 01/14/2015, she was handling her tamoxifen well. She was advised to continue the medicine. Five more sessions of radiation therapy to complete her treatment.     On 02/25/2015, the patient has no symptoms related to her cancer with exception of the hot flashes induced by the tamoxifen. Physical activity has improved dramatically, her ability in regards to movement in her upper extremities and pain that she  was experiencing before. She will have her expanders removed in the summer and she will have permanent implants then. In regards to her Reclast infusion, she had a reaction to the medicine including fevers and skeletal pain that was short lasting. She will require premedication with dexamethasone for the next infusion in 1 year.     In regards to her relationship with her , this situation has substantially improved and they are back together, not completely back to normal, but in a much better situation. Obviously, this closes the loop of her life and self-esteem that has substantially improved.     On 05/15/2015 the patient was feeling terrific.  She had minimal discomfort in her shoulders and she is ready to have her expanders removed and have her implants.  Otherwise tolerance to tamoxifen was excellent. She was not having any vaginal bleeding and she was not having any menstrual flow.  Her personal life and her marriage life have further improved and she feels satisfied.  We advised her to continue tamoxifen and keeping her port flushed every couple of months.     Her physical exam was unrevealing at this time.    On 09/14/2015 she had a complete 12 point review of system, having excellent level of energy, normal appetite, normal bowel function, normal urination, no vaginal bleeding, tolerance to tamoxifen was appropriate, hot flashes were ongoing, no cardiorespiratory  or neurological or skeletal symptomatology.  Her physical exam was unrevealing.  Under these circumstances we advised her to remain on tamoxifen 20 mg a day.  She will be informed about her estradiol level, FSH and LH.  Otherwise, she will have her port flushed today and this will be done every 6 weeks with MD visit again in 3 months. She will be due to have a new Reclast infusion in January 2016 and on that occasion she will be pre-medicated with dexamethasone to avoid fever and achiness in the skeleton induced by the first one.     On  "12/14/2015 Tamoxifen was continued. The patient had no clinical indications of breast cancer recurrence. She completed her reconstruction of her breasts with bilateral implants, looking cosmetically very pleasing to her.            ALLERGIES:  No Known Allergies    Objective      Vitals:    03/30/21 0826   BP: 99/66   Pulse: 80   Resp: 19   Temp: 97.3 °F (36.3 °C)   TempSrc: Temporal   SpO2: 100%   Weight: 64 kg (141 lb)   Height: 170.2 cm (67.01\")   PainSc: 0-No pain     Current Status 3/30/2021   ECOG score 0             Physical Exam:I HAVE PERSONALLY REVIEWED THE HISTORY OF THE PRESENT ILLNESS, PAST MEDICAL HISTORY, FAMILY HISTORY, SOCIAL HISTORY, ALLERGIES, MEDICATIONS STATED ABOVE IN THE OFFICE NOTE FROM TODAY.    Patient screened  for depression based on a PHQ-9 score of 0 on 3/30/2021.     GENERAL:  Well-developed, well-nourished  Patient  in no acute distress.   SKIN:  Warm, dry ,NO rashes,NO purpura ,NO petechiae.  HEENT:  Pupils were equal and reactive to light and accomodation, conjunctivae noninjected, no pterygium, normal extraocular movements, normal visual acuity.   NECK:  Supple with good range of motion; no thyromegaly or masses, no JVD or bruits, no cervical adenopathies.No carotid artery pain, no carotid abnormal pulsation , NO arterial dance.  LYMPHATICS:  No cervical, NO supraclavicular, NO axillary,NO epitrochlear , NO inguinal adenopathy.  CARDIAC   normal rate and regular rhythm, without murmur,NO rubs NO S3 NO S4 right or left . Normal femoral, popliteal, pedis, brachial and carotid pulses.  INSPECTION of  breast documented symmetry of the tissue per se and location and size of the nipple,no retractions or inversion of the nipple, normal skin without lesions, no erythema or nodules,no peau d'orange, no prominence of superficial veins or chest wall collateral circulation.PALPATION of the breast documented normal skin turgor, no induration, alteration in local temperature, or pain, no " palpable masses or nodules, normal mobility of the tissues,no fixation of the tissue or parenchyma to the chest wall, no alteration at the tail of the breasts or axillas, no adenopathies. BILATERAL BREAST IMPLANTS Surgical site were well healed.No lymphedema in either extremity.  VASCULAR ARTERIAL: normal carotids,brachial,radial,femoral,popliteal, pedis pulses , no bruits.no paleness or cyanosis, no pain, no edema, no numbness, no gangrene.  VASCULAR VENOUS: no cyanosis, collateral circulation, varicosities, edema, palpable cords, pain, erythema.  ABDOMEN:  Soft, nontender with no hepatomegaly, no splenomegaly,no masses, no ascites, no collateral circulation,no distention,no Joe sign, no abdominal pain, no inguinal hernias,no umbilical hernia, no abdominal bruits. Normal bowel sounds.  GENITAL: Not  Performed.  EXTREMITIES  AND SPINE:  No clubbing, cyanosis or edema, no deformities or pain .No kyphosis, scoliosis, deformities or pain in spine, ribs or pelvic bone.  NEUROLOGICAL:  Patient was awake, alert, oriented to time, person and place.            RECENT LABS:        Hematology WBC   Date Value Ref Range Status   03/30/2021 4.35 3.40 - 10.80 10*3/mm3 Final     RBC   Date Value Ref Range Status   03/30/2021 4.29 3.77 - 5.28 10*6/mm3 Final     Hemoglobin   Date Value Ref Range Status   03/30/2021 13.1 12.0 - 15.9 g/dL Final     Hematocrit   Date Value Ref Range Status   03/30/2021 38.9 34.0 - 46.6 % Final     Platelets   Date Value Ref Range Status   03/30/2021 260 140 - 450 10*3/mm3 Final              Assessment/Plan    1. History of stage IIA, [id number], ER weakly-positive, AR negative, HER2-negative right upper outer quadrant breast cancer, status post bilateral mastectomies. The patient completed her adjuvant chemotherapy under NSABP B49 clinical trial.Patient continues on Femara, 2.5 mg a day. 1. Upon review on 05/08/2020 the patient looks free of cancer recurrence, her clinical examination is normal and  she feels substantially better.   As per today I find no evidence of recurrent disease about her breast cancer on her breast examination that remains unchanged with bilateral breast implants and no skeletal pain. She remains on 100% compliance Femara with minimal side effects with the exception of constipation.        2. The patient has had chronic slow transit constipation. She has been on multiple medicines. Actually after our discussion the last time I suggested Milk of Magnesia that she takes on a regular basis and she goes to the bathroom nowadays much better than before. She is no longer taking Linzess or any other laxatives. Her diet is appropriate, her volume status in regard to hydration remains normal.     3. Chronic anxiety, panic attacks and depression. The patient takes Effexor, the dose was raised. This made a big difference in regard benefit and she feels substantially better from this point of view.    4. Insomnia. The patient takes Neurontin intermittently and this has been very helpful as well.     5. Osteoporosis. The patient continues taking physical activity, vitamin D, sun once a week for 10 minutes upper body when the sun is out and she will be due for her yearly Reclast infusion .     6. Port. The port remains in place, she remains on maintenance every 6 weeks.The patient was rechecked on 07/31/2020 and she brought up the issue of hair loss associated with letrozole. This is a side effect in a minority of patients who take this medicine and I pointed out to the patient that in spite of being a woman and understanding that hair is a form of communication for a woman, I would prefer for her to remain on letrozole given her risk of recurrence in regard to breast cancer at the time of the original diagnosis. I pointed out to her that she will benefit from Rogaine shampoo or something of this nature and even offered referral to a dermatologist for further assessment. She does not want to pursue  that referral at this time. I do not want to remove the letrozole because that will be opening the door for cancer recurrence and she is a 44-year-old person who has still 2 girls to raise and educate and she has a life in front of her. She understands that clearly and she chooses to keep taking the medicine even though it has been almost 5 years into this.     I pointed out to her that she probably will be one of those individuals who will require the Femara in my opinion for 8-10 years or maybe forever.     The patient was reviewed on 2020.  Several weeks ago she acquired COVID infection at a  home.  She had significant illness with fever, achiness throughout her body, nasal congestion, minimal cough, but she did not develop any other further issues.  The symptoms subsided 10 days later.  By chance, she did not give the viral infection to her family.  She was in quarantine at that point.        1. The patient was further reviewed on 2021. From the point of view of her breast cancer, I find nothing to suggest breast cancer recurrence. Her clinical examination is normal. Obviously her breast implants do not show any abnormalities. Obviously she has no need for mammograms. Her CA 15-3 is normal. Her white count, hemoglobin, platelets and chemistry profile are completely normal.     2. From the point of view of her ovarian cyst, this has disappeared according to a recent ultrasound given to her by her gynecologist. Her CA-125 is negative normal. This will not be repeated.     3. From the point of view of her insomnia, she needs to continue her Neurontin and a new refill for this medicine has been given.     4. From the point of view of her anxiety and depression, she remains on Effexor and the dose that she requires has no need for change. A refill has been given.     5. Her Femara will remain ongoing and a refill of this medicine has been given.     I will review her back in 4 months. Other than  that, no other implications in regard to her care. She completed 2 COVID vaccinations at this time.    Finally, she is going to be seen by Dr. Garzon on 04/06/2021 and I would not be surprised if she needs to have long-term monitoring in regard to her heart or even do another electrophysiological study in order to figure out why she is having dyspnea AND PALPITATIONS symptoms. I doubt that the Effexor has anything to do with this side effect or this problem. She is not drinking any alcohol, not drinking caffeine-containing foods. Hydration is appropriate. Diet is appropriate. Weight is appropriate.                                                       3/30/2021      CC:

## 2021-03-31 ENCOUNTER — TELEPHONE (OUTPATIENT)
Dept: ONCOLOGY | Facility: CLINIC | Age: 46
End: 2021-03-31

## 2021-03-31 DIAGNOSIS — F51.04 PSYCHOPHYSIOLOGICAL INSOMNIA: ICD-10-CM

## 2021-03-31 RX ORDER — GABAPENTIN 300 MG/1
300 CAPSULE ORAL
Qty: 90 CAPSULE | Refills: 1 | Status: CANCELLED | OUTPATIENT
Start: 2021-03-31

## 2021-03-31 NOTE — TELEPHONE ENCOUNTER
Caller: BRUNA REEVES    Relationship: SELF    Best call back number: [phone]    Medication needed:   Requested Prescriptions      No prescriptions requested or ordered in this encounter      GABAOEBTUB 300 MG 90 CAPSULE   When do you need the refill by: ASAP    What additional details did the patient provide when requesting the medication: PT STATES SHE SAW DR MARTINEZ YESTERDAY AND SCRIPT HAS NOT BEEN CALLED INTO PHARMACY   Does the patient have less than a 3 day supply:  [x] Yes  [] No    What is the patient's preferred pharmacy:    [facility] [phone]

## 2021-03-31 NOTE — TELEPHONE ENCOUNTER
Called pt's pharmacy to check on gabapentin rx. Pharmacy said rx has . Will route new rx to Dr. Hadley to sign.

## 2021-04-01 ENCOUNTER — TELEPHONE (OUTPATIENT)
Dept: ONCOLOGY | Facility: CLINIC | Age: 46
End: 2021-04-01

## 2021-04-01 DIAGNOSIS — F51.04 PSYCHOPHYSIOLOGICAL INSOMNIA: ICD-10-CM

## 2021-04-01 RX ORDER — GABAPENTIN 300 MG/1
300 CAPSULE ORAL
Qty: 90 CAPSULE | Refills: 1 | Status: SHIPPED | OUTPATIENT
Start: 2021-04-01 | End: 2021-09-30

## 2021-04-01 NOTE — TELEPHONE ENCOUNTER
Caller: BRUNA REEVES     Relationship to patient: SELF    Best call back number: [phone]    Patient is needing: TO CHECK THE STATUS OF HER GABAPENTIN RX. SHE SAID THE PHARMACY HAS STATED THEY HAVE NOT RECEIVED IT AND SHE IS COMPLETELY OUT OF THE MEDICATION.

## 2021-04-02 ENCOUNTER — BULK ORDERING (OUTPATIENT)
Dept: CASE MANAGEMENT | Facility: OTHER | Age: 46
End: 2021-04-02

## 2021-04-02 DIAGNOSIS — Z23 IMMUNIZATION DUE: ICD-10-CM

## 2021-04-05 ENCOUNTER — OFFICE VISIT (OUTPATIENT)
Dept: CARDIOLOGY | Facility: CLINIC | Age: 46
End: 2021-04-05

## 2021-04-05 VITALS
WEIGHT: 141.8 LBS | HEART RATE: 76 BPM | BODY MASS INDEX: 22.26 KG/M2 | HEIGHT: 67 IN | DIASTOLIC BLOOD PRESSURE: 70 MMHG | SYSTOLIC BLOOD PRESSURE: 108 MMHG

## 2021-04-05 DIAGNOSIS — R00.2 PALPITATIONS: Primary | ICD-10-CM

## 2021-04-05 DIAGNOSIS — R00.0 SINUS TACHYCARDIA: ICD-10-CM

## 2021-04-05 DIAGNOSIS — R55 VASOVAGAL EPISODE: ICD-10-CM

## 2021-04-05 PROCEDURE — 93000 ELECTROCARDIOGRAM COMPLETE: CPT | Performed by: INTERNAL MEDICINE

## 2021-04-05 PROCEDURE — 99204 OFFICE O/P NEW MOD 45 MIN: CPT | Performed by: INTERNAL MEDICINE

## 2021-04-05 NOTE — PROGRESS NOTES
Date of Office Visit: 2021  Encounter Provider: Kane Garzon MD  Place of Service: [facility] CARDIOLOGY  Patient Name: Diana Higgins  : 1975    Subjective:     Encounter Date:2021      Patient ID: Diana Higgins is a 45 y.o. female who has a cc self referred for an episode of palp and syncope.     She has longstanding palp and intermittent tachy but worse in the 3 months. (COVID in Sept)     In early March  she had what sounds like a vagal episode -- seen in the ED and discharged.     She had a zio which was normal tho she had palp assoc with ST    She walks on a treadmill every day -- 3.9mph, incline 2.0. No issues. No angina. No dave.     There have been no hospital admission since the last visit.     There have been no bleeding events.       Past Medical History:   Diagnosis Date   • Anxiety    • Breast cancer (CMS/HCC)     Right, stage IIA, s/p bilateral mastectomy, XRT, and chemotherapy, followed by Dr. Hadley   • H/O Fractured tibia    • Osteoporosis    • Paroxysmal supraventricular tachycardia (CMS/HCC)     CS ostium atrial tachycardia, ablated in  by Dr. Garzon   • PONV (postoperative nausea and vomiting)    • Sinus headache     But no history of migraines   • Skin cancer    • Wears glasses        Social History     Socioeconomic History   • Marital status:      Spouse name: Misha   • Number of children: Not on file   • Years of education: College   • Highest education level: Not on file   Tobacco Use   • Smoking status: Never Smoker   • Smokeless tobacco: Never Used   Vaping Use   • Vaping Use: Never used   Substance and Sexual Activity   • Alcohol use: Yes     Comment: Rare   • Drug use: No   • Sexual activity: Defer       Family History   Problem Relation Age of Onset   • Coronary artery disease Mother    • Heart disease Mother    • Thrombophlebitis Mother         2016   • Heart attack Mother    • Hepatitis Father    • Liver cancer Father    •  "Crohn's disease Sister    • Drug abuse Brother         Drug overdose   • Cancer Maternal Grandmother 42        Breast   • Thyroid disease Sister    • Other Sister         kidney problems   • Breast cancer Maternal Aunt        Review of Systems   Constitutional: Negative for fever and night sweats.   HENT: Negative for ear pain and stridor.    Eyes: Negative for discharge and visual halos.   Cardiovascular: Negative for cyanosis.   Respiratory: Negative for hemoptysis and sputum production.    Hematologic/Lymphatic: Negative for adenopathy.   Skin: Negative for nail changes and unusual hair distribution.   Musculoskeletal: Negative for gout and joint swelling.   Gastrointestinal: Negative for bowel incontinence and flatus.   Genitourinary: Negative for dysuria and flank pain.   Neurological: Negative for seizures and tremors.   Psychiatric/Behavioral: Negative for altered mental status. The patient is not nervous/anxious.             Objective:     Vitals:    04/05/21 1312   BP: 108/70   Pulse: 76   Weight: 64.3 kg (141 lb 12.8 oz)   Height: 170.2 cm (67\")         Eyes:      General:         Right eye: No discharge.         Left eye: No discharge.   HENT:      Head: Normocephalic and atraumatic.   Neck:      Thyroid: No thyromegaly.      Vascular: No JVD.   Pulmonary:      Effort: Pulmonary effort is normal.      Breath sounds: Normal breath sounds. No rales.   Cardiovascular:      Normal rate. Regular rhythm.      No gallop.   Edema:     Peripheral edema absent.   Abdominal:      General: Bowel sounds are normal.      Palpations: Abdomen is soft.      Tenderness: There is no abdominal tenderness.   Musculoskeletal: Normal range of motion.         General: No deformity. Skin:     General: Skin is warm and dry.      Findings: No erythema.   Neurological:      Mental Status: Alert and oriented to person, place, and time.      Motor: Normal muscle tone.   Psychiatric:         Behavior: Behavior normal.         Thought " Content: Thought content normal.           ECG 12 Lead    Date/Time: 4/5/2021 2:01 PM  Performed by: Kane Garzon MD  Authorized by: Kane Garzon MD   Comparison: compared with previous ECG   Similar to previous ECG  Rhythm: sinus rhythm  Rate: normal  Conduction: conduction normal  ST Segments: ST segments normal  T Waves: T waves normal  QRS axis: normal    Clinical impression: normal ECG            Lab Review:       Assessment:          Diagnosis Plan   1. Palpitations  Adult Transthoracic Echo Complete W/ Cont if Necessary Per Protocol   2. Sinus tachycardia  Adult Transthoracic Echo Complete W/ Cont if Necessary Per Protocol   3. Vasovagal episode            Plan:     She has sinus tachycardia -- hard to know why. No evidence of recurrent tachy     She did have an abn lactate when she came in for that vagal spell. I will check an ECHO    She also has a port still in because of difficult veins.     I will check a CRP and ESR     I wonder if the tip is touching the SN.

## 2021-04-08 ENCOUNTER — HOSPITAL ENCOUNTER (OUTPATIENT)
Dept: CARDIOLOGY | Facility: HOSPITAL | Age: 46
Discharge: HOME | End: 2021-04-08

## 2021-04-08 ENCOUNTER — LAB (OUTPATIENT)
Dept: LAB | Facility: HOSPITAL | Age: 46
End: 2021-04-08

## 2021-04-08 ENCOUNTER — HOSPITAL ENCOUNTER (OUTPATIENT)
Dept: GENERAL RADIOLOGY | Facility: HOSPITAL | Age: 46
Discharge: HOME | End: 2021-04-08

## 2021-04-08 VITALS
HEART RATE: 75 BPM | HEIGHT: 70 IN | SYSTOLIC BLOOD PRESSURE: 106 MMHG | WEIGHT: 141 LBS | DIASTOLIC BLOOD PRESSURE: 74 MMHG | BODY MASS INDEX: 20.19 KG/M2

## 2021-04-08 DIAGNOSIS — R00.2 PALPITATIONS: ICD-10-CM

## 2021-04-08 DIAGNOSIS — R00.0 SINUS TACHYCARDIA: ICD-10-CM

## 2021-04-08 LAB
AORTIC ARCH: 1.9 CM
BH CV ECHO MEAS - ACS: 2 CM
BH CV ECHO MEAS - AO MAX PG: 4 MMHG
BH CV ECHO MEAS - AO MEAN PG: 2 MMHG
BH CV ECHO MEAS - AO ROOT DIAM: 2.8 CM
BH CV ECHO MEAS - AO V2 MAX: 98 CM/SEC
BH CV ECHO MEAS - AO V2 VTI: 18 CM
BH CV ECHO MEAS - AVA(I,D): 2.5 CM2
BH CV ECHO MEAS - EDV(MOD-SP2): 61 ML
BH CV ECHO MEAS - EDV(MOD-SP4): 61 ML
BH CV ECHO MEAS - EF(MOD-BP): 63 %
BH CV ECHO MEAS - ESV(MOD-SP2): 22 ML
BH CV ECHO MEAS - ESV(MOD-SP4): 23 ML
BH CV ECHO MEAS - IVSD: 0.7 CM
BH CV ECHO MEAS - LAT PEAK E' VEL: 16 CM/SEC
BH CV ECHO MEAS - LV MAX PG: 4 MMHG
BH CV ECHO MEAS - LV MEAN PG: 2 MMHG
BH CV ECHO MEAS - LV V1 MAX: 95 CM/SEC
BH CV ECHO MEAS - LV V1 VTI: 18 CM
BH CV ECHO MEAS - LVIDD: 3.7 CM
BH CV ECHO MEAS - LVIDS: 2.5 CM
BH CV ECHO MEAS - LVOT DIAM: 1.8 CM
BH CV ECHO MEAS - LVPWD: 0.7 CM
BH CV ECHO MEAS - MED PEAK E' VEL: 11 CM/SEC
BH CV ECHO MEAS - MV A DUR: 103 SEC
BH CV ECHO MEAS - MV A MAX VEL: 59 CM/SEC
BH CV ECHO MEAS - MV DEC TIME: 185 MSEC
BH CV ECHO MEAS - MV E MAX VEL: 83 CM/SEC
BH CV ECHO MEAS - MV E/A: 1.4
BH CV ECHO MEAS - MV MAX PG: 4 MMHG
BH CV ECHO MEAS - MV MEAN PG: 2 MMHG
BH CV ECHO MEAS - MV P1/2T: 71 MSEC
BH CV ECHO MEAS - MV V2 VTI: 28 CM
BH CV ECHO MEAS - MVA(P1/2T): 3.1 CM2
BH CV ECHO MEAS - PA ACC TIME: 145 SEC
BH CV ECHO MEAS - PA V2 MAX: 93 CM/SEC
BH CV ECHO MEAS - PULM A REVS DUR: 90 SEC
BH CV ECHO MEAS - PULM A REVS VEL: 39 CM/SEC
BH CV ECHO MEAS - PULM DIAS VEL: 61 CM/SEC
BH CV ECHO MEAS - PULM S/D: 0.8
BH CV ECHO MEAS - PULM SYS VEL: 52 CM/SEC
BH CV ECHO MEAS - QP/QS: 0.7
BH CV ECHO MEAS - RAP SYSTOLE: 3 MMHG
BH CV ECHO MEAS - RV MAX PG: 2 MMHG
BH CV ECHO MEAS - RV V1 MAX: 76 CM/SEC
BH CV ECHO MEAS - RV V1 VTI: 13 CM
BH CV ECHO MEAS - RVOT DIAM: 1.8 CM
BH CV ECHO MEAS - RVSP: 20 MMHG
BH CV ECHO MEAS - SUP REN AO DIAM: 1.5 CM
BH CV ECHO MEAS - TAPSE (>1.6): 2.6 CM
BH CV ECHO MEAS - TR MAX VEL: 204 CM/SEC
BH CV ECHO MEASUREMENTS AVERAGE E/E' RATIO: 6.15
BH CV XLRA - RV BASE: 2.5 CM
BH CV XLRA - RV LENGTH: 3.5 CM
BH CV XLRA - RV MID: 1.9 CM
BH CV XLRA - TDI S': 13 CM/SEC
CRP SERPL-MCNC: 0.12 MG/DL (ref 0.01–0.5)
ERYTHROCYTE [SEDIMENTATION RATE] IN BLOOD: 2 MM/HR (ref 0–20)
LEFT ATRIUM VOLUME INDEX: 17 ML/M2
MAXIMAL PREDICTED HEART RATE: 173 BPM
SINUS: 2.8 CM
STRESS TARGET HR: 147 BPM

## 2021-04-08 PROCEDURE — 71046 X-RAY EXAM CHEST 2 VIEWS: CPT

## 2021-04-08 PROCEDURE — 93306 TTE W/DOPPLER COMPLETE: CPT | Performed by: INTERNAL MEDICINE

## 2021-04-08 PROCEDURE — 93306 TTE W/DOPPLER COMPLETE: CPT

## 2021-04-08 PROCEDURE — 85652 RBC SED RATE AUTOMATED: CPT

## 2021-04-08 PROCEDURE — 36415 COLL VENOUS BLD VENIPUNCTURE: CPT

## 2021-04-08 PROCEDURE — 86141 C-REACTIVE PROTEIN HS: CPT

## 2021-04-12 ENCOUNTER — TELEPHONE (OUTPATIENT)
Dept: CARDIOLOGY | Facility: CLINIC | Age: 46
End: 2021-04-12

## 2021-04-12 ENCOUNTER — PREP FOR SURGERY (OUTPATIENT)
Dept: OTHER | Facility: HOSPITAL | Age: 46
End: 2021-04-12

## 2021-04-12 ENCOUNTER — TELEPHONE (OUTPATIENT)
Dept: SURGERY | Facility: CLINIC | Age: 46
End: 2021-04-12

## 2021-04-12 DIAGNOSIS — Z17.0 MALIGNANT NEOPLASM OF UPPER-OUTER QUADRANT OF RIGHT BREAST IN FEMALE, ESTROGEN RECEPTOR POSITIVE (HCC): Primary | ICD-10-CM

## 2021-04-12 DIAGNOSIS — C50.411 MALIGNANT NEOPLASM OF UPPER-OUTER QUADRANT OF RIGHT BREAST IN FEMALE, ESTROGEN RECEPTOR POSITIVE (HCC): Primary | ICD-10-CM

## 2021-04-12 NOTE — TELEPHONE ENCOUNTER
Pt calling about getting her port removed since they are no longer using it. Would you like to see in office?

## 2021-04-12 NOTE — TELEPHONE ENCOUNTER
----- Message from Kane Garzon MD sent at 4/9/2021  1:30 PM EDT -----  Yes  but it should come out.

## 2021-04-28 ENCOUNTER — APPOINTMENT (OUTPATIENT)
Dept: PREADMISSION TESTING | Facility: HOSPITAL | Age: 46
End: 2021-04-28

## 2021-05-11 RX ORDER — HEPARIN SODIUM (PORCINE) LOCK FLUSH IV SOLN 100 UNIT/ML 100 UNIT/ML
500 SOLUTION INTRAVENOUS AS NEEDED
Status: CANCELLED | OUTPATIENT
Start: 2021-05-11

## 2021-05-11 RX ORDER — SODIUM CHLORIDE 0.9 % (FLUSH) 0.9 %
10 SYRINGE (ML) INJECTION AS NEEDED
Status: CANCELLED | OUTPATIENT
Start: 2021-05-11

## 2021-05-12 ENCOUNTER — INFUSION (OUTPATIENT)
Dept: ONCOLOGY | Facility: HOSPITAL | Age: 46
End: 2021-05-12

## 2021-05-12 VITALS
DIASTOLIC BLOOD PRESSURE: 67 MMHG | OXYGEN SATURATION: 100 % | WEIGHT: 142.8 LBS | HEART RATE: 95 BPM | RESPIRATION RATE: 16 BRPM | BODY MASS INDEX: 20.49 KG/M2 | SYSTOLIC BLOOD PRESSURE: 101 MMHG | TEMPERATURE: 97.3 F

## 2021-05-12 DIAGNOSIS — C50.411 MALIGNANT NEOPLASM OF UPPER-OUTER QUADRANT OF RIGHT BREAST IN FEMALE, ESTROGEN RECEPTOR POSITIVE (HCC): ICD-10-CM

## 2021-05-12 DIAGNOSIS — Z17.0 MALIGNANT NEOPLASM OF UPPER-OUTER QUADRANT OF RIGHT BREAST IN FEMALE, ESTROGEN RECEPTOR POSITIVE (HCC): ICD-10-CM

## 2021-05-12 DIAGNOSIS — M81.8 OTHER OSTEOPOROSIS WITHOUT CURRENT PATHOLOGICAL FRACTURE: Primary | ICD-10-CM

## 2021-05-12 LAB
ALBUMIN SERPL-MCNC: 4.2 G/DL (ref 3.5–5.2)
ALBUMIN/GLOB SERPL: 1.6 G/DL (ref 1.1–2.4)
ALP SERPL-CCNC: 64 U/L (ref 38–116)
ALT SERPL W P-5'-P-CCNC: 16 U/L (ref 0–33)
ANION GAP SERPL CALCULATED.3IONS-SCNC: 7.5 MMOL/L (ref 5–15)
AST SERPL-CCNC: 24 U/L (ref 0–32)
BASOPHILS # BLD AUTO: 0.04 10*3/MM3 (ref 0–0.2)
BASOPHILS NFR BLD AUTO: 0.6 % (ref 0–1.5)
BILIRUB SERPL-MCNC: 0.2 MG/DL (ref 0.2–1.2)
BUN SERPL-MCNC: 19 MG/DL (ref 6–20)
BUN/CREAT SERPL: 30.2 (ref 7.3–30)
CALCIUM SPEC-SCNC: 9.3 MG/DL (ref 8.5–10.2)
CHLORIDE SERPL-SCNC: 103 MMOL/L (ref 98–107)
CO2 SERPL-SCNC: 28.5 MMOL/L (ref 22–29)
CREAT SERPL-MCNC: 0.63 MG/DL (ref 0.6–1.1)
DEPRECATED RDW RBC AUTO: 37.9 FL (ref 37–54)
EOSINOPHIL # BLD AUTO: 0.16 10*3/MM3 (ref 0–0.4)
EOSINOPHIL NFR BLD AUTO: 2.5 % (ref 0.3–6.2)
ERYTHROCYTE [DISTWIDTH] IN BLOOD BY AUTOMATED COUNT: 11.8 % (ref 12.3–15.4)
GFR SERPL CREATININE-BSD FRML MDRD: 101 ML/MIN/1.73
GLOBULIN UR ELPH-MCNC: 2.7 GM/DL (ref 1.8–3.5)
GLUCOSE SERPL-MCNC: 161 MG/DL (ref 74–124)
HCT VFR BLD AUTO: 36.8 % (ref 34–46.6)
HGB BLD-MCNC: 12.5 G/DL (ref 12–15.9)
IMM GRANULOCYTES # BLD AUTO: 0.02 10*3/MM3 (ref 0–0.05)
IMM GRANULOCYTES NFR BLD AUTO: 0.3 % (ref 0–0.5)
LYMPHOCYTES # BLD AUTO: 1.49 10*3/MM3 (ref 0.7–3.1)
LYMPHOCYTES NFR BLD AUTO: 23.2 % (ref 19.6–45.3)
MAGNESIUM SERPL-MCNC: 1.9 MG/DL (ref 1.8–2.5)
MCH RBC QN AUTO: 30.3 PG (ref 26.6–33)
MCHC RBC AUTO-ENTMCNC: 34 G/DL (ref 31.5–35.7)
MCV RBC AUTO: 89.1 FL (ref 79–97)
MONOCYTES # BLD AUTO: 0.52 10*3/MM3 (ref 0.1–0.9)
MONOCYTES NFR BLD AUTO: 8.1 % (ref 5–12)
NEUTROPHILS NFR BLD AUTO: 4.2 10*3/MM3 (ref 1.7–7)
NEUTROPHILS NFR BLD AUTO: 65.3 % (ref 42.7–76)
NRBC BLD AUTO-RTO: 0 /100 WBC (ref 0–0.2)
PHOSPHATE SERPL-MCNC: 3.9 MG/DL (ref 2.5–4.5)
PLATELET # BLD AUTO: 273 10*3/MM3 (ref 140–450)
PMV BLD AUTO: 8.9 FL (ref 6–12)
POTASSIUM SERPL-SCNC: 4.2 MMOL/L (ref 3.5–4.7)
PROT SERPL-MCNC: 6.9 G/DL (ref 6.3–8)
RBC # BLD AUTO: 4.13 10*6/MM3 (ref 3.77–5.28)
SODIUM SERPL-SCNC: 139 MMOL/L (ref 134–145)
WBC # BLD AUTO: 6.43 10*3/MM3 (ref 3.4–10.8)

## 2021-05-12 PROCEDURE — 84100 ASSAY OF PHOSPHORUS: CPT

## 2021-05-12 PROCEDURE — 80053 COMPREHEN METABOLIC PANEL: CPT

## 2021-05-12 PROCEDURE — 85025 COMPLETE CBC W/AUTO DIFF WBC: CPT

## 2021-05-12 PROCEDURE — 83735 ASSAY OF MAGNESIUM: CPT

## 2021-05-12 PROCEDURE — 25010000002 ZOLEDRONIC ACID 5 MG/100ML SOLUTION: Performed by: INTERNAL MEDICINE

## 2021-05-12 PROCEDURE — 96374 THER/PROPH/DIAG INJ IV PUSH: CPT

## 2021-05-12 RX ORDER — SODIUM CHLORIDE 9 MG/ML
250 INJECTION, SOLUTION INTRAVENOUS ONCE
Status: COMPLETED | OUTPATIENT
Start: 2021-05-12 | End: 2021-05-12

## 2021-05-12 RX ORDER — ZOLEDRONIC ACID 5 MG/100ML
5 INJECTION, SOLUTION INTRAVENOUS ONCE
Status: COMPLETED | OUTPATIENT
Start: 2021-05-12 | End: 2021-05-12

## 2021-05-12 RX ADMIN — ZOLEDRONIC ACID 5 MG: 0.05 INJECTION, SOLUTION INTRAVENOUS at 14:32

## 2021-05-12 RX ADMIN — SODIUM CHLORIDE 250 ML: 9 INJECTION, SOLUTION INTRAVENOUS at 14:32

## 2021-05-19 ENCOUNTER — PRE-ADMISSION TESTING (OUTPATIENT)
Dept: PREADMISSION TESTING | Facility: HOSPITAL | Age: 46
End: 2021-05-19

## 2021-05-19 VITALS
BODY MASS INDEX: 22.29 KG/M2 | OXYGEN SATURATION: 100 % | DIASTOLIC BLOOD PRESSURE: 65 MMHG | SYSTOLIC BLOOD PRESSURE: 98 MMHG | HEIGHT: 67 IN | RESPIRATION RATE: 20 BRPM | WEIGHT: 142 LBS | HEART RATE: 73 BPM | TEMPERATURE: 98.1 F

## 2021-05-19 LAB — SARS-COV-2 ORF1AB RESP QL NAA+PROBE: NOT DETECTED

## 2021-05-19 PROCEDURE — C9803 HOPD COVID-19 SPEC COLLECT: HCPCS | Performed by: NURSE PRACTITIONER

## 2021-05-19 PROCEDURE — U0004 COV-19 TEST NON-CDC HGH THRU: HCPCS | Performed by: NURSE PRACTITIONER

## 2021-05-19 RX ORDER — ACETAMINOPHEN 500 MG
1000 TABLET ORAL EVERY 6 HOURS PRN
COMMUNITY
End: 2021-10-05

## 2021-05-19 RX ORDER — CHLORHEXIDINE GLUCONATE 500 MG/1
1 CLOTH TOPICAL
COMMUNITY
End: 2021-05-21 | Stop reason: HOSPADM

## 2021-05-20 ENCOUNTER — ANESTHESIA EVENT (OUTPATIENT)
Dept: PERIOP | Facility: HOSPITAL | Age: 46
End: 2021-05-20

## 2021-05-21 ENCOUNTER — HOSPITAL ENCOUNTER (OUTPATIENT)
Facility: HOSPITAL | Age: 46
Setting detail: HOSPITAL OUTPATIENT SURGERY
Discharge: HOME | End: 2021-05-21
Attending: SURGERY | Admitting: SURGERY

## 2021-05-21 ENCOUNTER — ANESTHESIA (OUTPATIENT)
Dept: PERIOP | Facility: HOSPITAL | Age: 46
End: 2021-05-21

## 2021-05-21 VITALS
HEART RATE: 65 BPM | SYSTOLIC BLOOD PRESSURE: 107 MMHG | OXYGEN SATURATION: 100 % | RESPIRATION RATE: 16 BRPM | DIASTOLIC BLOOD PRESSURE: 66 MMHG | TEMPERATURE: 98.1 F

## 2021-05-21 DIAGNOSIS — C50.411 MALIGNANT NEOPLASM OF UPPER-OUTER QUADRANT OF RIGHT BREAST IN FEMALE, ESTROGEN RECEPTOR POSITIVE (HCC): Primary | ICD-10-CM

## 2021-05-21 DIAGNOSIS — Z17.0 MALIGNANT NEOPLASM OF UPPER-OUTER QUADRANT OF RIGHT BREAST IN FEMALE, ESTROGEN RECEPTOR POSITIVE (HCC): Primary | ICD-10-CM

## 2021-05-21 PROCEDURE — 25010000002 PROPOFOL 10 MG/ML EMULSION: Performed by: NURSE ANESTHETIST, CERTIFIED REGISTERED

## 2021-05-21 PROCEDURE — 25010000002 FENTANYL CITRATE (PF) 50 MCG/ML SOLUTION: Performed by: NURSE ANESTHETIST, CERTIFIED REGISTERED

## 2021-05-21 PROCEDURE — 36590 REMOVAL TUNNELED CV CATH: CPT | Performed by: SURGERY

## 2021-05-21 PROCEDURE — 25010000002 ONDANSETRON PER 1 MG: Performed by: NURSE ANESTHETIST, CERTIFIED REGISTERED

## 2021-05-21 PROCEDURE — S0260 H&P FOR SURGERY: HCPCS | Performed by: SURGERY

## 2021-05-21 RX ORDER — LIDOCAINE HYDROCHLORIDE AND EPINEPHRINE 10; 10 MG/ML; UG/ML
INJECTION, SOLUTION INFILTRATION; PERINEURAL AS NEEDED
Status: DISCONTINUED | OUTPATIENT
Start: 2021-05-21 | End: 2021-05-21 | Stop reason: HOSPADM

## 2021-05-21 RX ORDER — ONDANSETRON 4 MG/1
4 TABLET, FILM COATED ORAL EVERY 6 HOURS PRN
Qty: 10 TABLET | Refills: 1 | Status: SHIPPED | OUTPATIENT
Start: 2021-05-21 | End: 2021-05-27

## 2021-05-21 RX ORDER — ONDANSETRON 2 MG/ML
INJECTION INTRAMUSCULAR; INTRAVENOUS AS NEEDED
Status: DISCONTINUED | OUTPATIENT
Start: 2021-05-21 | End: 2021-05-21 | Stop reason: SURG

## 2021-05-21 RX ORDER — LABETALOL HYDROCHLORIDE 5 MG/ML
5 INJECTION, SOLUTION INTRAVENOUS
Status: DISCONTINUED | OUTPATIENT
Start: 2021-05-21 | End: 2021-05-21 | Stop reason: HOSPADM

## 2021-05-21 RX ORDER — FENTANYL CITRATE 50 UG/ML
INJECTION, SOLUTION INTRAMUSCULAR; INTRAVENOUS AS NEEDED
Status: DISCONTINUED | OUTPATIENT
Start: 2021-05-21 | End: 2021-05-21 | Stop reason: SURG

## 2021-05-21 RX ORDER — HYDRALAZINE HYDROCHLORIDE 20 MG/ML
5 INJECTION INTRAMUSCULAR; INTRAVENOUS
Status: DISCONTINUED | OUTPATIENT
Start: 2021-05-21 | End: 2021-05-21 | Stop reason: HOSPADM

## 2021-05-21 RX ORDER — MAGNESIUM HYDROXIDE 1200 MG/15ML
LIQUID ORAL AS NEEDED
Status: DISCONTINUED | OUTPATIENT
Start: 2021-05-21 | End: 2021-05-21 | Stop reason: HOSPADM

## 2021-05-21 RX ORDER — EPHEDRINE SULFATE 50 MG/ML
5 INJECTION, SOLUTION INTRAVENOUS ONCE AS NEEDED
Status: DISCONTINUED | OUTPATIENT
Start: 2021-05-21 | End: 2021-05-21 | Stop reason: HOSPADM

## 2021-05-21 RX ORDER — PROPOFOL 10 MG/ML
VIAL (ML) INTRAVENOUS CONTINUOUS PRN
Status: DISCONTINUED | OUTPATIENT
Start: 2021-05-21 | End: 2021-05-21 | Stop reason: SURG

## 2021-05-21 RX ORDER — SODIUM CHLORIDE, SODIUM LACTATE, POTASSIUM CHLORIDE, CALCIUM CHLORIDE 600; 310; 30; 20 MG/100ML; MG/100ML; MG/100ML; MG/100ML
INJECTION, SOLUTION INTRAVENOUS CONTINUOUS PRN
Status: DISCONTINUED | OUTPATIENT
Start: 2021-05-21 | End: 2021-05-21 | Stop reason: SURG

## 2021-05-21 RX ORDER — ONDANSETRON 2 MG/ML
4 INJECTION INTRAMUSCULAR; INTRAVENOUS ONCE AS NEEDED
Status: DISCONTINUED | OUTPATIENT
Start: 2021-05-21 | End: 2021-05-21 | Stop reason: HOSPADM

## 2021-05-21 RX ORDER — FLUMAZENIL 0.1 MG/ML
0.2 INJECTION INTRAVENOUS AS NEEDED
Status: DISCONTINUED | OUTPATIENT
Start: 2021-05-21 | End: 2021-05-21 | Stop reason: HOSPADM

## 2021-05-21 RX ORDER — DROPERIDOL 2.5 MG/ML
1.25 INJECTION, SOLUTION INTRAMUSCULAR; INTRAVENOUS ONCE AS NEEDED
Status: DISCONTINUED | OUTPATIENT
Start: 2021-05-21 | End: 2021-05-21 | Stop reason: HOSPADM

## 2021-05-21 RX ORDER — DROPERIDOL 2.5 MG/ML
0.62 INJECTION, SOLUTION INTRAMUSCULAR; INTRAVENOUS ONCE AS NEEDED
Status: DISCONTINUED | OUTPATIENT
Start: 2021-05-21 | End: 2021-05-21 | Stop reason: HOSPADM

## 2021-05-21 RX ORDER — PROMETHAZINE HYDROCHLORIDE 25 MG/1
25 TABLET ORAL ONCE AS NEEDED
Status: DISCONTINUED | OUTPATIENT
Start: 2021-05-21 | End: 2021-05-21 | Stop reason: HOSPADM

## 2021-05-21 RX ORDER — PROMETHAZINE HYDROCHLORIDE 25 MG/1
25 SUPPOSITORY RECTAL ONCE AS NEEDED
Status: DISCONTINUED | OUTPATIENT
Start: 2021-05-21 | End: 2021-05-21 | Stop reason: HOSPADM

## 2021-05-21 RX ORDER — ACETAMINOPHEN 650 MG/1
650 SUPPOSITORY RECTAL ONCE AS NEEDED
Status: DISCONTINUED | OUTPATIENT
Start: 2021-05-21 | End: 2021-05-21 | Stop reason: HOSPADM

## 2021-05-21 RX ORDER — NALOXONE HCL 0.4 MG/ML
0.2 VIAL (ML) INJECTION AS NEEDED
Status: DISCONTINUED | OUTPATIENT
Start: 2021-05-21 | End: 2021-05-21 | Stop reason: HOSPADM

## 2021-05-21 RX ORDER — ACETAMINOPHEN 325 MG/1
650 TABLET ORAL ONCE AS NEEDED
Status: DISCONTINUED | OUTPATIENT
Start: 2021-05-21 | End: 2021-05-21 | Stop reason: HOSPADM

## 2021-05-21 RX ORDER — HYDROCODONE BITARTRATE AND ACETAMINOPHEN 7.5; 325 MG/1; MG/1
1 TABLET ORAL ONCE AS NEEDED
Status: DISCONTINUED | OUTPATIENT
Start: 2021-05-21 | End: 2021-05-21 | Stop reason: HOSPADM

## 2021-05-21 RX ORDER — HYDROCODONE BITARTRATE AND ACETAMINOPHEN 5; 325 MG/1; MG/1
TABLET ORAL
Qty: 20 TABLET | Refills: 0 | Status: SHIPPED | OUTPATIENT
Start: 2021-05-21 | End: 2021-05-27

## 2021-05-21 RX ADMIN — ONDANSETRON 4 MG: 2 INJECTION INTRAMUSCULAR; INTRAVENOUS at 10:15

## 2021-05-21 RX ADMIN — FENTANYL CITRATE 25 MCG: 50 INJECTION INTRAMUSCULAR; INTRAVENOUS at 10:14

## 2021-05-21 RX ADMIN — SODIUM CHLORIDE, POTASSIUM CHLORIDE, SODIUM LACTATE AND CALCIUM CHLORIDE: 600; 310; 30; 20 INJECTION, SOLUTION INTRAVENOUS at 10:07

## 2021-05-21 RX ADMIN — PROPOFOL 50 MCG/KG/MIN: 10 INJECTION, EMULSION INTRAVENOUS at 10:08

## 2021-05-21 RX ADMIN — FENTANYL CITRATE 25 MCG: 50 INJECTION INTRAMUSCULAR; INTRAVENOUS at 10:18

## 2021-05-21 NOTE — ANESTHESIA PROCEDURE NOTES
Peripheral IV    Patient location during procedure: OR  Start time: 5/21/2021 10:05 AM  End time: 5/21/2021 10:16 AM  Line placed for Fluids/Medication Admin.  Performed By   Anesthesiologist: Merritt Howard DO  Preanesthetic Checklist  Completed: patient identified, IV checked, site marked, risks and benefits discussed, surgical consent, monitors and equipment checked, pre-op evaluation and timeout performed  Peripheral IV Prep   Patient position: supine   Prep: ChloraPrep  Patient monitoring: cardiac monitor, heart rate and continuous pulse ox  Peripheral IV Procedure   Laterality:left  Location:  Hand  Catheter size: 22 G         Post Assessment   Dressing Type: tape and transparent.    IV Dressing/Site: clean, dry and intact  Additional Notes  Pt masked down with inh technique with IV placed w/o issues.

## 2021-05-21 NOTE — ANESTHESIA POSTPROCEDURE EVALUATION
Patient: Diana Higgins    Procedure Summary     Date: 05/21/21 Room / [facility]:  [facility]  [facility]    Anesthesia Start: 0954 Anesthesia Stop: 1021    Procedure: REMOVAL VENOUS ACCESS DEVICE (Left Chest) Diagnosis:       Malignant neoplasm of upper-outer quadrant of right breast in female, estrogen receptor positive (CMS/HCC)      (Malignant neoplasm of upper-outer quadrant of right breast in female, estrogen receptor positive (CMS/HCC) [C50.411, Z17.0])    Surgeons: Pato Mac MD Provider: Merritt Howard DO    Anesthesia Type: MAC ASA Status: 3          Anesthesia Type: MAC    Vitals  Vitals Value Taken Time   /65 05/21/21 1100   Temp     Pulse 63 05/21/21 1100   Resp 16 05/21/21 1100   SpO2 100 % 05/21/21 1100           Post Anesthesia Care and Evaluation    Patient location during evaluation: bedside  Patient participation: complete - patient participated  Level of consciousness: awake and alert  Pain management: satisfactory to patient  Airway patency: patent  Anesthetic complications: No anesthetic complications  PONV Status: none  Cardiovascular status: acceptable and hemodynamically stable  Respiratory status: acceptable  Hydration status: acceptable    Comments: /65 (BP Location: Left arm, Patient Position: Sitting)   Pulse 63   Temp 36.7 °C (98.1 °F) (Oral)   Resp 16   LMP  (LMP Unknown)   SpO2 100%

## 2021-05-21 NOTE — ANESTHESIA PREPROCEDURE EVALUATION
Anesthesia Evaluation     Patient summary reviewed   history of anesthetic complications: PONV  NPO Solid Status: > 8 hours  NPO Liquid Status: > 2 hours           Airway   Mallampati: I  TM distance: >3 FB  Neck ROM: full  No difficulty expected  Dental - normal exam     Pulmonary     breath sounds clear to auscultation  (+) sleep apnea (no CPAP),   (-) shortness of breath, recent URI, not a smoker  Cardiovascular     Rhythm: regular  Rate: normal    (-) past MI, dysrhythmias (s/p 2002 ablation for SVT), angina      Neuro/Psych  (+) headaches, syncope (h/o vasovagal event), psychiatric history Anxiety,     (-) seizures, CVA  GI/Hepatic/Renal/Endo    (+)   thyroid problem (no meds) hypothyroidism  (-)  obesity, no renal disease    Musculoskeletal     (-) neck pain, neck stiffness  Abdominal    Substance History      OB/GYN          Other      history of cancer (R breast cancer)                  Anesthesia Plan    ASA 3     MAC   (Extremely difficult IV access issue; will use inhalation to relax and place IV in OR with subsequent removal of pts port)    Anesthetic plan, all risks, benefits, and alternatives have been provided, discussed and informed consent has been obtained with: patient.    Plan discussed with CRNA.

## 2021-05-27 ENCOUNTER — OFFICE VISIT (OUTPATIENT)
Dept: SURGERY | Facility: CLINIC | Age: 46
End: 2021-05-27

## 2021-05-27 VITALS — WEIGHT: 140.4 LBS | BODY MASS INDEX: 22.03 KG/M2 | HEIGHT: 67 IN

## 2021-05-27 DIAGNOSIS — Z09 FOLLOW UP: Primary | ICD-10-CM

## 2021-05-27 PROCEDURE — 99024 POSTOP FOLLOW-UP VISIT: CPT | Performed by: PHYSICIAN ASSISTANT

## 2021-05-27 NOTE — PROGRESS NOTES
This is a 45-year-old lady returning to the office today status post PowerPort removal.  She is doing very well since surgery without any implants.  Her incision is healing very well.  She is informed the glue will fall off over the course of the next week or so.  She may return to all activities as tolerated.  All questions were answered at the time this visit.    Portillo Xiao PA-C

## 2021-07-07 ENCOUNTER — OFFICE VISIT (OUTPATIENT)
Dept: ONCOLOGY | Facility: CLINIC | Age: 46
End: 2021-07-07

## 2021-07-07 ENCOUNTER — INFUSION (OUTPATIENT)
Dept: ONCOLOGY | Facility: HOSPITAL | Age: 46
End: 2021-07-07

## 2021-07-07 VITALS
DIASTOLIC BLOOD PRESSURE: 74 MMHG | HEIGHT: 67 IN | TEMPERATURE: 97.5 F | HEART RATE: 84 BPM | OXYGEN SATURATION: 98 % | WEIGHT: 140.3 LBS | RESPIRATION RATE: 16 BRPM | SYSTOLIC BLOOD PRESSURE: 105 MMHG | BODY MASS INDEX: 22.02 KG/M2

## 2021-07-07 DIAGNOSIS — K59.09 OTHER CONSTIPATION: ICD-10-CM

## 2021-07-07 DIAGNOSIS — C50.411 MALIGNANT NEOPLASM OF UPPER-OUTER QUADRANT OF RIGHT BREAST IN FEMALE, ESTROGEN RECEPTOR POSITIVE (HCC): ICD-10-CM

## 2021-07-07 DIAGNOSIS — M81.8 OTHER OSTEOPOROSIS WITHOUT CURRENT PATHOLOGICAL FRACTURE: ICD-10-CM

## 2021-07-07 DIAGNOSIS — Z17.0 MALIGNANT NEOPLASM OF UPPER-OUTER QUADRANT OF RIGHT BREAST IN FEMALE, ESTROGEN RECEPTOR POSITIVE (HCC): Primary | ICD-10-CM

## 2021-07-07 DIAGNOSIS — Z78.0 POST-MENOPAUSAL: ICD-10-CM

## 2021-07-07 DIAGNOSIS — C50.411 MALIGNANT NEOPLASM OF UPPER-OUTER QUADRANT OF RIGHT BREAST IN FEMALE, ESTROGEN RECEPTOR POSITIVE (HCC): Primary | ICD-10-CM

## 2021-07-07 DIAGNOSIS — Z17.0 MALIGNANT NEOPLASM OF UPPER-OUTER QUADRANT OF RIGHT BREAST IN FEMALE, ESTROGEN RECEPTOR POSITIVE (HCC): ICD-10-CM

## 2021-07-07 DIAGNOSIS — F51.04 PSYCHOPHYSIOLOGICAL INSOMNIA: ICD-10-CM

## 2021-07-07 DIAGNOSIS — N83.201 CYST OF RIGHT OVARY: ICD-10-CM

## 2021-07-07 DIAGNOSIS — F41.9 ANXIETY: ICD-10-CM

## 2021-07-07 DIAGNOSIS — F32.9 REACTIVE DEPRESSION: ICD-10-CM

## 2021-07-07 DIAGNOSIS — Z45.2 FITTING AND ADJUSTMENT OF VASCULAR CATHETER: ICD-10-CM

## 2021-07-07 LAB
ALBUMIN SERPL-MCNC: 4.2 G/DL (ref 3.5–5.2)
ALBUMIN/GLOB SERPL: 1.6 G/DL (ref 1.1–2.4)
ALP SERPL-CCNC: 65 U/L (ref 38–116)
ALT SERPL W P-5'-P-CCNC: 16 U/L (ref 0–33)
ANION GAP SERPL CALCULATED.3IONS-SCNC: 8.9 MMOL/L (ref 5–15)
AST SERPL-CCNC: 23 U/L (ref 0–32)
BASOPHILS # BLD AUTO: 0.04 10*3/MM3 (ref 0–0.2)
BASOPHILS NFR BLD AUTO: 0.7 % (ref 0–1.5)
BILIRUB SERPL-MCNC: 0.3 MG/DL (ref 0.2–1.2)
BUN SERPL-MCNC: 19 MG/DL (ref 6–20)
BUN/CREAT SERPL: 27.5 (ref 7.3–30)
CALCIUM SPEC-SCNC: 9.4 MG/DL (ref 8.5–10.2)
CANCER AG15-3 SERPL-ACNC: 17.2 U/ML
CHLORIDE SERPL-SCNC: 103 MMOL/L (ref 98–107)
CO2 SERPL-SCNC: 28.1 MMOL/L (ref 22–29)
CREAT SERPL-MCNC: 0.69 MG/DL (ref 0.6–1.1)
DEPRECATED RDW RBC AUTO: 40 FL (ref 37–54)
EOSINOPHIL # BLD AUTO: 0.12 10*3/MM3 (ref 0–0.4)
EOSINOPHIL NFR BLD AUTO: 2.1 % (ref 0.3–6.2)
ERYTHROCYTE [DISTWIDTH] IN BLOOD BY AUTOMATED COUNT: 11.8 % (ref 12.3–15.4)
GFR SERPL CREATININE-BSD FRML MDRD: 91 ML/MIN/1.73
GLOBULIN UR ELPH-MCNC: 2.7 GM/DL (ref 1.8–3.5)
GLUCOSE SERPL-MCNC: 92 MG/DL (ref 74–124)
HCT VFR BLD AUTO: 41.6 % (ref 34–46.6)
HGB BLD-MCNC: 13.6 G/DL (ref 12–15.9)
IMM GRANULOCYTES # BLD AUTO: 0.02 10*3/MM3 (ref 0–0.05)
IMM GRANULOCYTES NFR BLD AUTO: 0.4 % (ref 0–0.5)
LYMPHOCYTES # BLD AUTO: 1.27 10*3/MM3 (ref 0.7–3.1)
LYMPHOCYTES NFR BLD AUTO: 22.4 % (ref 19.6–45.3)
MCH RBC QN AUTO: 30.1 PG (ref 26.6–33)
MCHC RBC AUTO-ENTMCNC: 32.7 G/DL (ref 31.5–35.7)
MCV RBC AUTO: 92 FL (ref 79–97)
MONOCYTES # BLD AUTO: 0.6 10*3/MM3 (ref 0.1–0.9)
MONOCYTES NFR BLD AUTO: 10.6 % (ref 5–12)
NEUTROPHILS NFR BLD AUTO: 3.63 10*3/MM3 (ref 1.7–7)
NEUTROPHILS NFR BLD AUTO: 63.8 % (ref 42.7–76)
NRBC BLD AUTO-RTO: 0 /100 WBC (ref 0–0.2)
PLATELET # BLD AUTO: 284 10*3/MM3 (ref 140–450)
PMV BLD AUTO: 8.7 FL (ref 6–12)
POTASSIUM SERPL-SCNC: 5.3 MMOL/L (ref 3.5–4.7)
PROT SERPL-MCNC: 6.9 G/DL (ref 6.3–8)
RBC # BLD AUTO: 4.52 10*6/MM3 (ref 3.77–5.28)
SODIUM SERPL-SCNC: 140 MMOL/L (ref 134–145)
WBC # BLD AUTO: 5.68 10*3/MM3 (ref 3.4–10.8)

## 2021-07-07 PROCEDURE — 36415 COLL VENOUS BLD VENIPUNCTURE: CPT

## 2021-07-07 PROCEDURE — 86300 IMMUNOASSAY TUMOR CA 15-3: CPT | Performed by: INTERNAL MEDICINE

## 2021-07-07 PROCEDURE — 80053 COMPREHEN METABOLIC PANEL: CPT

## 2021-07-07 PROCEDURE — 99214 OFFICE O/P EST MOD 30 MIN: CPT | Performed by: INTERNAL MEDICINE

## 2021-07-07 PROCEDURE — 85025 COMPLETE CBC W/AUTO DIFF WBC: CPT

## 2021-07-07 NOTE — PROGRESS NOTES
Subjective   REASONS FOR FOLLOWUP:    1. History of stage IIA, [id number], ER weakly-positive, GA negative, HER2-negative breast cancer, status post bilateral mastectomies. The patient completed her adjuvant chemotherapy under NSABP B49 clinical trial.  On 12/14/2015 she has no clinical evidence of recurrence of breast cancer and she remains on Femara for the time being. She also has completed her breast reconstruction with bilateral implants and she is very satisfied in regard to the cosmetic result of this. She will remain on Femara for the time being. She receiving at this time Effexor 37.5 mg a day for hot flashes and anxiety.   2. Bone health. The patient has osteoporosis. She  GETS Reclast infusion  on yearly bases, LAST ONE MAY 2020                 History of Present Illness       DURING THE VISIT WITH THE PATIENT TODAY , PATIENT HAD FACE MASK, MY MEDICAL ASSISTANT AND I  HAD PROPPER PROTECTIVE EQUIPMENT, AND I DID HAND HYGIENE WITH SOAP AND WATER BEFORE AND AFTER THE VISIT.    This patient returns today to the office for followup stating that she has been feeling relatively well during the last several weeks. Her bowel activity has mildly improved and urination is ongoing with no difficulties. She denies any cough, sputum production, shortness of breath, pleuritic pain, bone pain, jaundice, lesions in the skin or alterations in neurological function. She has been seen by Dr. Garzon who has not advised her to proceed with any other intervention in regard to her paroxysmal supraventricular tachycardia. She is not taking any medicines to keep this from happening. Mentally she remains quiet. She feels happy and her family is carrying on with no major issues. Her oldest daughter is engaged.                                Past Medical History.   Past Medical History:   Diagnosis Date   • Anxiety    • Breast cancer (CMS/[facility])     Right, stage IIA, s/p bilateral mastectomy, XRT, and chemotherapy, followed by   [facility]   • COVID-19 virus infection 09/2020    C/O back pain and loss of taste and smell   • Disease of thyroid gland     history no meds now   • History of cancer chemotherapy    • History of foot fracture     as a child  right   • History of mononucleosis    • Osteoporosis    • Paroxysmal supraventricular tachycardia (CMS/HCC)     CS ostium atrial tachycardia, ablated in 2002 by Dr. Garzon   • PONV (postoperative nausea and vomiting)    • Sinus headache     But no history of migraines   • Skin cancer    • Sleep apnea     mild no machine   • Wears glasses      Past Surgical History:   Procedure Laterality Date   • BREAST BIOPSY  2014   • BREAST RECONSTRUCTION Bilateral    • CARDIAC ABLATION  2002    Dr. Garzon 11 years ago tachycardia   • CARDIAC CATHETERIZATION     • COLONOSCOPY N/A    • COLONOSCOPY N/A 8/1/2019    Procedure: COLONOSCOPY TO CECUM WITH HOT POLYPECTOMY;  Surgeon: Pato Mac MD;  Location: [facility] ENDOSCOPY;  Service: General   • ENDOMETRIAL ABLATION  03/2013    Dr. Young   • EYE SURGERY     • INSERTION OF TISSUE EXPANDER AFTER MASTECTOMY Bilateral    • KNEE ARTHROSCOPY Right     as a teenager   • LASIK     • MASTECTOMY Bilateral 2014   • MASTECTOMY Left    • MASTECTOMY WITH SENTINEL NODE BIOPSY AND AXILLARY NODE DISSECTION Right    • TISSUE EXPANDER  REMOVAL W/ REPLACEMENT OF IMPLANT Bilateral    • TONSILLECTOMY N/A    • VENOUS ACCESS DEVICE (PORT) INSERTION     • VENOUS ACCESS DEVICE (PORT) REMOVAL Left 5/21/2021    Procedure: REMOVAL VENOUS ACCESS DEVICE;  Surgeon: Pato Mac MD;  Location: [facility] OR [facility];  Service: General;  Laterality: Left;     Social History     Socioeconomic History   • Marital status:      Spouse name: Misha   • Number of children: Not on file   • Years of education: College   • Highest education level: Not on file   Tobacco Use   • Smoking status: Never Smoker   • Smokeless tobacco: Never Used   Vaping Use   • Vaping Use: Never used   Substance and  Sexual Activity   • Alcohol use: Yes     Comment: 10 drinks yearly   • Drug use: Never   • Sexual activity: Defer     Family History   Problem Relation Age of Onset   • Coronary artery disease Mother    • Heart disease Mother    • Thrombophlebitis Mother         2016   • Heart attack Mother    • Hepatitis Father    • Liver cancer Father    • Crohn's disease Sister    • Drug abuse Brother         Drug overdose   • Cancer Maternal Grandmother 42        Breast   • Thyroid disease Sister    • Other Sister         kidney problems   • Breast cancer Maternal Aunt    • Malig Hyperthermia Neg Hx      :ONCOLOGIC/HEMATOLOGIC HISTORY     The patient was diagnosed with stage II breast cancer with the tumor located in the anterior axillary fold in the tail of the  RIGHT breast. She underwent bilateral mastectomies and she had bilateral expanders placed by Dr. MURPHY Aguilar in preparation for further reconstruction in the future.  The patient’s primary tumor was 1.6 cm in size.  Margins of resection were negative, grade 3, with no lymphovascular invasion. Two sentinel lymph nodes contain metastasis, one of them with macrometastasis that were not spilling out of the lymph node.  Circulating cancer cells were negative.  CA 15-3 was negative, normal.  The patient had a CT scan of the brain, chest, abdomen and pelvis that disclose no distant metastasis at any level.  The radiologist questioned an internal mammary node on the right side that does not make any clinical sense given the primary location of the tumor in the right breast.  Her bone scan was negative.  Her MUGA ejection fraction was normal.  With all of this in mind we advised the patient to either participate in the NSABP B47 clinical trial or to consider chemotherapy in the standard regimen dose dense AC followed by Taxol followed by hormonal anti-estrogen therapy given the finding in the pathological specimen of the mastectomy that her tumor was weakly ER positive, OR  negative, HER-2 negative by FISH.      Our research coordinator had the opportunity to interview the patient today to go through the explanation of participation in the clinical trial.      Patient seen 07/24/2014 for a second cycle of AC chemotherapy. Adjustments were made for her antinausea treatments then post chemotherapy to try to reduce insomnia and further nausea.     On 08/07/2014 the patient has encountered size effects of the treatment including nausea and vomiting on days 3, 4 and 5 after chemotherapy related to a continuous bitter metallic taste in her mouth.  She also has experienced a tremendous degree of skeletal pain induced by the Neulasta to the point that she surrendered to bed.  Under these circumstances we advised her to use some mint or crystalized kimberly for the taste in her mouth and she will take Aleve on a regular schedule on days 2, 3, 4 and 5 to minimize the bone pain induced by the Neulasta.  On the other hand she has not developed any mucositis.  She has not developed any infections, abnormal bleeding or significant hematological toxicity.    On 08/21/2014 the patient proceeded with her 4th cycle of Adriamycin/Cytoxan supported with Neulasta. Pepcid AC b.i.d. was initiated at a standing dose to control heartburn. The patient also was advised to continue using mint and kimberly to minimize the taste in her mouth induced by chemicals in her chemotherapy treatment. She was scheduled to receive her next cycle of chemotherapy and at that point she will initiate Taxol on a weekly basis.     She was seen back in the office 09/04/2014 initiating treatment with Taxol given every week, x12.     On 09/18/14, the patient was undergoing therapy with Taxol and having less fatigue, no nausea, occasional tingling in her fingers but it was very transitory with no discomfort in her feet or toes.  She was still very sensitive, feeling her expanders in the chest wall, and we advised her to initiate a program  of water therapy.  Her Taxol treatment was continued.  We also advised her to use massage therapy.     On 09/25/2014, the patient was still experiencing a grade 1 peripheral neuropathy of hands, none in feet, associated with Taxol use.  Otherwise she was feeling fine.   She still had a lot of sensitivity in the chest wall, in areas of breast expanders, and in the shoulders and scapular areas.  We have not been able to get her water therapy classes.   We advised her to continue her care plan in the same way and continue Prilosec on a daily basis to avoid reflux esophagitis that is very well controlled at this time.  Her chemotherapy treatment was continued and hematological parameters were stable and her physical exam was otherwise unremarkable.      The patient was seen back in the office on 10/16/2014, tolerating Taxol well. Plans are made to continue the same.     The patient was seen on 11/06/2014, referral was made to radiation oncology to see if the patient will benefit from radiation therapy since she had involvement of two sentinel lymph nodes with metastatic carcinoma.     On 12/04/2014 the patient had a discussion with us in regard plans for any other form of hormonal adjuvant therapy.  Given the fact that her tumor was weakly estrogen receptor positive, we advised her at her age to initiate therapy with tamoxifen.  I asked her to take this medicine at least for the next 10 years.  I asked her to consider eventually whenever we fulfill the criteria for being post-menopausal to switch this to an aromatase inhibitor.      We advised her to initiate a program of exercise to relieve the pain and discomfort that she has in her upper body due to the expanders and her surgical sites.     We advised her to proceed with bone density test. We need to check this in consideration of the utilization of bisphosphonate either by mouth or IV, not only for keeping her bone health now that she is post-menopausal but also in  consideration of prevention of bone metastasis.      We had an extensive discussion with her and her sister about sexual issues that she has encountered during the treatment for her disease and try to relieve her from the stressors of this at this time.  Her  was not present unfortunately during this discussion.      On 01/02/2015 we documented that the patient was tolerating her tamoxifen well with minimal hot flashes and she was still undergoing her radiation treatment to the site of her disease.  She was also documented to have osteoporosis in her bone density and we suggested for her to receive IV Reclast that will have a dual function of treatment of osteoporosis as well as prevention of bone metastasis.      In regard to her conjugal and personal life, the patient has very poor self-esteem.  She finds no one in her life that will be supportive to her and the relationships with her  are very minimal and just in a very low level of activity.  I asked her to bring her  for the next visit to have a conversation one on one, face to face with him and see if he can become a partner for her to help her to heal emotionally and to be supportive for her all the time.  Eventually the patient will require some counseling as well.    We did not recommend any antianxiety or antidepressant medication for her at this point.  The patient was not willing to take anything anyway.      On 01/14/2015, she was handling her tamoxifen well. She was advised to continue the medicine. Five more sessions of radiation therapy to complete her treatment.     On 02/25/2015, the patient has no symptoms related to her cancer with exception of the hot flashes induced by the tamoxifen. Physical activity has improved dramatically, her ability in regards to movement in her upper extremities and pain that she was experiencing before. She will have her expanders removed in the summer and she will have permanent implants then. In  regards to her Reclast infusion, she had a reaction to the medicine including fevers and skeletal pain that was short lasting. She will require premedication with dexamethasone for the next infusion in 1 year.     In regards to her relationship with her , this situation has substantially improved and they are back together, not completely back to normal, but in a much better situation. Obviously, this closes the loop of her life and self-esteem that has substantially improved.     On 05/15/2015 the patient was feeling terrific.  She had minimal discomfort in her shoulders and she is ready to have her expanders removed and have her implants.  Otherwise tolerance to tamoxifen was excellent. She was not having any vaginal bleeding and she was not having any menstrual flow.  Her personal life and her marriage life have further improved and she feels satisfied.  We advised her to continue tamoxifen and keeping her port flushed every couple of months.     Her physical exam was unrevealing at this time.    On 09/14/2015 she had a complete 12 point review of system, having excellent level of energy, normal appetite, normal bowel function, normal urination, no vaginal bleeding, tolerance to tamoxifen was appropriate, hot flashes were ongoing, no cardiorespiratory  or neurological or skeletal symptomatology.  Her physical exam was unrevealing.  Under these circumstances we advised her to remain on tamoxifen 20 mg a day.  She will be informed about her estradiol level, FSH and LH.  Otherwise, she will have her port flushed today and this will be done every 6 weeks with MD visit again in 3 months. She will be due to have a new Reclast infusion in January 2016 and on that occasion she will be pre-medicated with dexamethasone to avoid fever and achiness in the skeleton induced by the first one.     On 12/14/2015 Tamoxifen was continued. The patient had no clinical indications of breast cancer recurrence. She completed her  "reconstruction of her breasts with bilateral implants, looking cosmetically very pleasing to her.            ALLERGIES:  No Known Allergies    Objective      Vitals:    07/07/21 0944   BP: 105/74   Pulse: 84   Resp: 16   Temp: 97.5 °F (36.4 °C)   TempSrc: Temporal   SpO2: 98%   Weight: 63.6 kg (140 lb 4.8 oz)   Height: 170.2 cm (67.01\")   PainSc: 0-No pain     Current Status 7/7/2021   ECOG score 0             Physical Exam  I HAVE PERSONALLY REVIEWED THE HISTORY OF THE PRESENT ILLNESS, PAST MEDICAL HISTORY, FAMILY HISTORY, SOCIAL HISTORY, ALLERGIES, MEDICATIONS STATED ABOVE IN THE OFFICE NOTE FROM TODAY.        GENERAL:  Well-developed, well-nourished  Patient  in no acute distress.   SKIN:  Warm, dry ,NO rashes,NO purpura ,NO petechiae.  HEENT:  Pupils were equal and reactive to light and accomodation, conjunctivae noninjected, no pterygium, normal extraocular movements, normal visual acuity.   NECK:  Supple with good range of motion; no thyromegaly or masses, no JVD or bruits, no cervical adenopathies.No carotid artery pain, no carotid abnormal pulsation , NO arterial dance.  LYMPHATICS:  No cervical, NO supraclavicular, NO axillary,NO epitrochlear , NO inguinal adenopathy.  CARDIAC   normal rate and regular rhythm, without murmur,NO rubs NO S3 NO S4 right or left   LUNGS NORMAL BS BILATERAL.  INSPECTION of  breast documented symmetry of the tissue per se and location and size of the nipple,no retractions or inversion of the nipple, normal skin without lesions, no erythema or nodules,no peau d'orange, no prominence of superficial veins or chest wall collateral circulation.PALPATION of the breast documented normal skin turgor, no induration, alteration in local temperature, or pain, no palpable masses or nodules, normal mobility of the tissues,no fixation of the tissue or parenchyma to the chest wall, no alteration at the tail of the breasts or axillas, no adenopathies. BILATERAL IMPLANTS Surgical site was well " healed.No lymphedema in either extremity.  VASCULAR VENOUS: no cyanosis, collateral circulation, varicosities, edema, palpable cords, pain, erythema.  ABDOMEN:  Soft, nontender with no hepatomegaly, no splenomegaly,no masses, no ascites, no collateral circulation,no distention,no Joe sign, no abdominal pain, no inguinal hernias,no umbilical hernia, no abdominal bruits. Normal bowel sounds.  GENITAL: Not  Performed.  EXTREMITIES  AND SPINE:  No clubbing, cyanosis or edema, no deformities or pain .No kyphosis, scoliosis, deformities or pain in spine, ribs or pelvic bone.  NEUROLOGICAL:  Patient was awake, alert, oriented to time, person and place.                RECENT LABS:        Hematology WBC   Date Value Ref Range Status   07/07/2021 5.68 3.40 - 10.80 10*3/mm3 Final     RBC   Date Value Ref Range Status   07/07/2021 4.52 3.77 - 5.28 10*6/mm3 Final     Hemoglobin   Date Value Ref Range Status   07/07/2021 13.6 12.0 - 15.9 g/dL Final     Hematocrit   Date Value Ref Range Status   07/07/2021 41.6 34.0 - 46.6 % Final     Platelets   Date Value Ref Range Status   07/07/2021 284 140 - 450 10*3/mm3 Final          Assessment/Plan    1. History of stage IIA, [id number], ER weakly-positive, [place] negative, HER2-negative right upper outer quadrant breast cancer, status post bilateral mastectomies. The patient completed her adjuvant chemotherapy under NSABP B49 clinical trial.Patient continues on Femara, 2.5 mg a day. 1. Upon review on 05/08/2020 the patient looks free of cancer recurrence, her clinical examination is normal and she feels substantially better.   As per today I find no evidence of recurrent disease about her breast cancer on her breast examination that remains unchanged with bilateral breast implants and no skeletal pain. She remains on 100% compliance Femara with minimal side effects with the exception of constipation.    On 07/07/2021 the patient had no symptoms or signs of breast cancer recurrence. She  remains on Femara with 100% compliance and I advised her to remain on this medicine for the time being with no plans to stop this medicine in the next 5 years or so. She is almost accomplishing 5 years with this medication utilization.    ·     2. The patient has had chronic slow transit constipation. She has been on multiple medicines. Actually after our discussion the last time I suggested Milk of Magnesia that she takes on a regular basis and she goes to the bathroom nowadays much better than before. She is no longer taking Linzess or any other laxatives. Her diet is appropriate, her volume status in regard to hydration remains normal.   Bowel function with some mild improvement and I advised her to remain on the bowel regimen that she is receiving at this time. Some of this also consist in the use of laxatives and this will have an impact in regard to her breast cancer problems.    ·   3. Chronic anxiety, panic attacks and depression. The patient takes Effexor, the dose was raised. This made a big difference in regard benefit and she feels substantially better from this point of view.  Her anxiety is under better terms at this time and she remains on Neurontin that she takes intermittently for insomnia. Her Effexor remains affective to control these symptoms of depression as well and this medicine will remain ongoing.    ·   4. Insomnia. The patient takes Neurontin intermittently and this has been very helpful as well.     5. Osteoporosis. The patient continues taking physical activity, vitamin D, sun once a week for 10 minutes upper body when the sun is out and she will be due for her yearly Reclast infusion .   The patient will be a candidate to receive the next dose of Reclast at some point in the year anniversary of the previous dose.    ·   6. Port. Her port was removed with no complications. The surgical site with minimal nodularity with no implications whatsoever.    I think in summary she is doing very  well from all the issues pertinent to her care and she is very well rounded, happy and healthy at this time. The only issue that remains is her bowel activity that is related to menopause and she is working into this to control issues in the best way of possibilities.     I will review her back every 3 months with a CBC, CMP and a CA 15-3.                  7/7/2021      CC:

## 2021-07-07 NOTE — NURSING NOTE
Pt self reports port removed in May.  Review of chart shows removal on 5/21/21.  Port access removed from chart.  Pt moved to lab for venipuncture collection.

## 2021-07-08 ENCOUNTER — TELEPHONE (OUTPATIENT)
Dept: ONCOLOGY | Facility: CLINIC | Age: 46
End: 2021-07-08

## 2021-07-14 ENCOUNTER — CLINICAL SUPPORT (OUTPATIENT)
Dept: ONCOLOGY | Facility: HOSPITAL | Age: 46
End: 2021-07-14

## 2021-07-14 ENCOUNTER — INFUSION (OUTPATIENT)
Dept: ONCOLOGY | Facility: HOSPITAL | Age: 46
End: 2021-07-14

## 2021-07-14 ENCOUNTER — TELEPHONE (OUTPATIENT)
Dept: ONCOLOGY | Facility: CLINIC | Age: 46
End: 2021-07-14

## 2021-07-14 DIAGNOSIS — K59.09 OTHER CONSTIPATION: ICD-10-CM

## 2021-07-14 DIAGNOSIS — Z17.0 MALIGNANT NEOPLASM OF UPPER-OUTER QUADRANT OF RIGHT BREAST IN FEMALE, ESTROGEN RECEPTOR POSITIVE (HCC): ICD-10-CM

## 2021-07-14 DIAGNOSIS — Z78.0 POST-MENOPAUSAL: ICD-10-CM

## 2021-07-14 DIAGNOSIS — C50.411 MALIGNANT NEOPLASM OF UPPER-OUTER QUADRANT OF RIGHT BREAST IN FEMALE, ESTROGEN RECEPTOR POSITIVE (HCC): ICD-10-CM

## 2021-07-14 DIAGNOSIS — F41.9 ANXIETY: ICD-10-CM

## 2021-07-14 DIAGNOSIS — M81.8 OTHER OSTEOPOROSIS WITHOUT CURRENT PATHOLOGICAL FRACTURE: ICD-10-CM

## 2021-07-14 DIAGNOSIS — E87.5 HYPERKALEMIA: Primary | ICD-10-CM

## 2021-07-14 LAB
ANION GAP SERPL CALCULATED.3IONS-SCNC: 8.6 MMOL/L (ref 5–15)
BUN SERPL-MCNC: 13 MG/DL (ref 6–20)
BUN/CREAT SERPL: 20.3 (ref 7.3–30)
CALCIUM SPEC-SCNC: 9.4 MG/DL (ref 8.5–10.2)
CHLORIDE SERPL-SCNC: 104 MMOL/L (ref 98–107)
CO2 SERPL-SCNC: 28.4 MMOL/L (ref 22–29)
CREAT SERPL-MCNC: 0.64 MG/DL (ref 0.6–1.1)
GFR SERPL CREATININE-BSD FRML MDRD: 99 ML/MIN/1.73
GLUCOSE SERPL-MCNC: 81 MG/DL (ref 74–124)
POTASSIUM SERPL-SCNC: 5 MMOL/L (ref 3.5–4.7)
SODIUM SERPL-SCNC: 141 MMOL/L (ref 134–145)

## 2021-07-14 PROCEDURE — 80048 BASIC METABOLIC PNL TOTAL CA: CPT

## 2021-07-14 NOTE — TELEPHONE ENCOUNTER
I have talked to the patient today about her high potassium last week that has no logical explanation given the fact that she has a normal creatinine and she is not taking a diet that is rich in potassium. She is taking a supplement to help her hair to grow and there is no potassium label information in that situation. I have advised her with a potassium level of 5 we need to check another potassium level next week and a BMP and I asked her to discontinue the use of the supplement and send us a picture by Moisés in regard to the label content of the supplement. She agrees to proceed.    BIOTIN COULD BE AFFECTING POTASSIUM MEASUREMENT, STOPPED TODAY AS PER CONVERSATION WITH HER SECOND TIME

## 2021-07-14 NOTE — NURSING NOTE
Pt here for a stat bmp to review potassium results. She did not stay for results. Message sent to Delia CUETO to review K of 5.0 with Dr. [facility]. Will wait for response and call pt.

## 2021-07-20 ENCOUNTER — TELEPHONE (OUTPATIENT)
Dept: ONCOLOGY | Facility: CLINIC | Age: 46
End: 2021-07-20

## 2021-07-21 ENCOUNTER — APPOINTMENT (OUTPATIENT)
Dept: ONCOLOGY | Facility: HOSPITAL | Age: 46
End: 2021-07-21

## 2021-07-21 ENCOUNTER — LAB (OUTPATIENT)
Dept: LAB | Facility: HOSPITAL | Age: 46
End: 2021-07-21

## 2021-07-21 ENCOUNTER — APPOINTMENT (OUTPATIENT)
Dept: LAB | Facility: HOSPITAL | Age: 46
End: 2021-07-21

## 2021-07-21 DIAGNOSIS — E87.5 HYPERKALEMIA: ICD-10-CM

## 2021-07-21 LAB
ANION GAP SERPL CALCULATED.3IONS-SCNC: 10.9 MMOL/L (ref 5–15)
BUN SERPL-MCNC: 14 MG/DL (ref 6–20)
BUN/CREAT SERPL: 21.2 (ref 7.3–30)
CALCIUM SPEC-SCNC: 9.1 MG/DL (ref 8.5–10.2)
CHLORIDE SERPL-SCNC: 101 MMOL/L (ref 98–107)
CO2 SERPL-SCNC: 26.1 MMOL/L (ref 22–29)
CREAT SERPL-MCNC: 0.66 MG/DL (ref 0.6–1.1)
GFR SERPL CREATININE-BSD FRML MDRD: 96 ML/MIN/1.73
GLUCOSE SERPL-MCNC: 98 MG/DL (ref 74–124)
POTASSIUM SERPL-SCNC: 4 MMOL/L (ref 3.5–4.7)
SODIUM SERPL-SCNC: 138 MMOL/L (ref 134–145)

## 2021-07-21 PROCEDURE — 80048 BASIC METABOLIC PNL TOTAL CA: CPT

## 2021-07-21 PROCEDURE — 36415 COLL VENOUS BLD VENIPUNCTURE: CPT

## 2021-08-23 ENCOUNTER — TELEPHONE (OUTPATIENT)
Dept: ONCOLOGY | Facility: CLINIC | Age: 46
End: 2021-08-23

## 2021-08-23 NOTE — TELEPHONE ENCOUNTER
Caller: Diana Higgins    Relationship to patient: Self    Best call back number: [phone]    Chief complaint: NEEDS TO CANCEL PORT FLUSH APPT NO LONGER HAS PORT FOR 10/6 AND TO R/S FOLLOW UP WITH LABS DR MARTINEZ ON WEEK PRIOR TO 10/06 OR SEPT 27TH OR 28TH WOULD WORK BEST IS HAVING SURGERY ON 10/05    Type of visit: FOLLOW UP WITH LAB  DR MARTINEZ    Requested date: WEEK PRIOR TO 10/6 OR IF CAN DO SEPT 27TH OR 28TH     If rescheduling, when is the original appointment: 10/06    Additional notes:    CAN LEAVE VOICEMAIL WITH INFORMATION IF NO ANSWER.

## 2021-08-30 ENCOUNTER — PRE-ADMISSION TESTING (OUTPATIENT)
Dept: PREADMISSION TESTING | Facility: HOSPITAL | Age: 46
End: 2021-08-30

## 2021-08-30 VITALS
WEIGHT: 140 LBS | DIASTOLIC BLOOD PRESSURE: 66 MMHG | HEART RATE: 76 BPM | SYSTOLIC BLOOD PRESSURE: 101 MMHG | RESPIRATION RATE: 20 BRPM | OXYGEN SATURATION: 100 % | HEIGHT: 67 IN | BODY MASS INDEX: 21.97 KG/M2 | TEMPERATURE: 97.7 F

## 2021-08-30 LAB
ANION GAP SERPL CALCULATED.3IONS-SCNC: 9.7 MMOL/L (ref 5–15)
BASOPHILS # BLD AUTO: 0.04 10*3/MM3 (ref 0–0.2)
BASOPHILS NFR BLD AUTO: 0.6 % (ref 0–1.5)
BUN SERPL-MCNC: 17 MG/DL (ref 6–20)
BUN/CREAT SERPL: 28.3 (ref 7–25)
CALCIUM SPEC-SCNC: 9.1 MG/DL (ref 8.6–10.5)
CHLORIDE SERPL-SCNC: 104 MMOL/L (ref 98–107)
CO2 SERPL-SCNC: 25.3 MMOL/L (ref 22–29)
CREAT SERPL-MCNC: 0.6 MG/DL (ref 0.57–1)
DEPRECATED RDW RBC AUTO: 40.6 FL (ref 37–54)
EOSINOPHIL # BLD AUTO: 0.09 10*3/MM3 (ref 0–0.4)
EOSINOPHIL NFR BLD AUTO: 1.4 % (ref 0.3–6.2)
ERYTHROCYTE [DISTWIDTH] IN BLOOD BY AUTOMATED COUNT: 12.3 % (ref 12.3–15.4)
GFR SERPL CREATININE-BSD FRML MDRD: 107 ML/MIN/1.73
GLUCOSE SERPL-MCNC: 86 MG/DL (ref 65–99)
HCT VFR BLD AUTO: 40.2 % (ref 34–46.6)
HGB BLD-MCNC: 13.3 G/DL (ref 12–15.9)
IMM GRANULOCYTES # BLD AUTO: 0.03 10*3/MM3 (ref 0–0.05)
IMM GRANULOCYTES NFR BLD AUTO: 0.5 % (ref 0–0.5)
LYMPHOCYTES # BLD AUTO: 1.32 10*3/MM3 (ref 0.7–3.1)
LYMPHOCYTES NFR BLD AUTO: 21.2 % (ref 19.6–45.3)
MCH RBC QN AUTO: 29.9 PG (ref 26.6–33)
MCHC RBC AUTO-ENTMCNC: 33.1 G/DL (ref 31.5–35.7)
MCV RBC AUTO: 90.3 FL (ref 79–97)
MONOCYTES # BLD AUTO: 0.64 10*3/MM3 (ref 0.1–0.9)
MONOCYTES NFR BLD AUTO: 10.3 % (ref 5–12)
NEUTROPHILS NFR BLD AUTO: 4.1 10*3/MM3 (ref 1.7–7)
NEUTROPHILS NFR BLD AUTO: 66 % (ref 42.7–76)
NRBC BLD AUTO-RTO: 0 /100 WBC (ref 0–0.2)
PLATELET # BLD AUTO: 320 10*3/MM3 (ref 140–450)
PMV BLD AUTO: 9 FL (ref 6–12)
POTASSIUM SERPL-SCNC: 4.5 MMOL/L (ref 3.5–5.2)
RBC # BLD AUTO: 4.45 10*6/MM3 (ref 3.77–5.28)
SARS-COV-2 ORF1AB RESP QL NAA+PROBE: NOT DETECTED
SODIUM SERPL-SCNC: 139 MMOL/L (ref 136–145)
WBC # BLD AUTO: 6.22 10*3/MM3 (ref 3.4–10.8)

## 2021-08-30 PROCEDURE — U0004 COV-19 TEST NON-CDC HGH THRU: HCPCS

## 2021-08-30 PROCEDURE — 85025 COMPLETE CBC W/AUTO DIFF WBC: CPT

## 2021-08-30 PROCEDURE — C9803 HOPD COVID-19 SPEC COLLECT: HCPCS

## 2021-08-30 PROCEDURE — 36415 COLL VENOUS BLD VENIPUNCTURE: CPT

## 2021-08-30 PROCEDURE — 80048 BASIC METABOLIC PNL TOTAL CA: CPT

## 2021-09-01 ENCOUNTER — ANESTHESIA (OUTPATIENT)
Dept: PERIOP | Facility: HOSPITAL | Age: 46
End: 2021-09-01

## 2021-09-01 ENCOUNTER — HOSPITAL ENCOUNTER (OUTPATIENT)
Facility: HOSPITAL | Age: 46
Setting detail: HOSPITAL OUTPATIENT SURGERY
Discharge: HOME | End: 2021-09-01
Attending: SURGERY | Admitting: SURGERY

## 2021-09-01 ENCOUNTER — ANESTHESIA EVENT (OUTPATIENT)
Dept: PERIOP | Facility: HOSPITAL | Age: 46
End: 2021-09-01

## 2021-09-01 VITALS
RESPIRATION RATE: 18 BRPM | HEART RATE: 97 BPM | WEIGHT: 138 LBS | DIASTOLIC BLOOD PRESSURE: 76 MMHG | OXYGEN SATURATION: 96 % | SYSTOLIC BLOOD PRESSURE: 112 MMHG | HEIGHT: 67 IN | BODY MASS INDEX: 21.66 KG/M2 | TEMPERATURE: 97.8 F

## 2021-09-01 DIAGNOSIS — T85.44XA CAPSULAR CONTRACTURE OF BREAST IMPLANT: ICD-10-CM

## 2021-09-01 DIAGNOSIS — G89.18 POSTOPERATIVE PAIN: Primary | ICD-10-CM

## 2021-09-01 LAB
B-HCG UR QL: NEGATIVE
INTERNAL NEGATIVE CONTROL: NEGATIVE
INTERNAL POSITIVE CONTROL: POSITIVE
Lab: NORMAL

## 2021-09-01 PROCEDURE — 81025 URINE PREGNANCY TEST: CPT | Performed by: SURGERY

## 2021-09-01 PROCEDURE — 25010000002 DEXAMETHASONE PER 1 MG: Performed by: SURGERY

## 2021-09-01 PROCEDURE — 25010000002 PHENYLEPHRINE PER 1 ML: Performed by: NURSE ANESTHETIST, CERTIFIED REGISTERED

## 2021-09-01 PROCEDURE — 25010000003 CEFAZOLIN PER 500 MG: Performed by: SURGERY

## 2021-09-01 PROCEDURE — 25010000002 ROPIVACAINE PER 1 MG: Performed by: SURGERY

## 2021-09-01 PROCEDURE — 25010000002 FENTANYL CITRATE (PF) 50 MCG/ML SOLUTION: Performed by: NURSE ANESTHETIST, CERTIFIED REGISTERED

## 2021-09-01 PROCEDURE — 25010000002 PROPOFOL 10 MG/ML EMULSION: Performed by: NURSE ANESTHETIST, CERTIFIED REGISTERED

## 2021-09-01 PROCEDURE — 25010000002 MIDAZOLAM PER 1 MG: Performed by: ANESTHESIOLOGY

## 2021-09-01 RX ORDER — PROMETHAZINE HYDROCHLORIDE 12.5 MG/1
12.5 TABLET ORAL EVERY 6 HOURS PRN
Qty: 15 TABLET | Refills: 0 | Status: SHIPPED | OUTPATIENT
Start: 2021-09-01 | End: 2021-09-30

## 2021-09-01 RX ORDER — PROPOFOL 10 MG/ML
VIAL (ML) INTRAVENOUS AS NEEDED
Status: DISCONTINUED | OUTPATIENT
Start: 2021-09-01 | End: 2021-09-01 | Stop reason: SURG

## 2021-09-01 RX ORDER — SODIUM CHLORIDE 0.9 % (FLUSH) 0.9 %
3 SYRINGE (ML) INJECTION EVERY 12 HOURS SCHEDULED
Status: DISCONTINUED | OUTPATIENT
Start: 2021-09-01 | End: 2021-09-01 | Stop reason: HOSPADM

## 2021-09-01 RX ORDER — SCOLOPAMINE TRANSDERMAL SYSTEM 1 MG/1
1 PATCH, EXTENDED RELEASE TRANSDERMAL ONCE
Status: DISCONTINUED | OUTPATIENT
Start: 2021-09-01 | End: 2021-09-01 | Stop reason: HOSPADM

## 2021-09-01 RX ORDER — ACETAMINOPHEN 325 MG/1
975 TABLET ORAL ONCE
Status: COMPLETED | OUTPATIENT
Start: 2021-09-01 | End: 2021-09-01

## 2021-09-01 RX ORDER — DOCUSATE SODIUM 100 MG/1
100 CAPSULE, LIQUID FILLED ORAL ONCE
Status: COMPLETED | OUTPATIENT
Start: 2021-09-01 | End: 2021-09-01

## 2021-09-01 RX ORDER — DIPHENHYDRAMINE HYDROCHLORIDE 50 MG/ML
12.5 INJECTION INTRAMUSCULAR; INTRAVENOUS
Status: DISCONTINUED | OUTPATIENT
Start: 2021-09-01 | End: 2021-09-01 | Stop reason: HOSPADM

## 2021-09-01 RX ORDER — PROMETHAZINE HYDROCHLORIDE 25 MG/1
25 SUPPOSITORY RECTAL ONCE AS NEEDED
Status: DISCONTINUED | OUTPATIENT
Start: 2021-09-01 | End: 2021-09-01 | Stop reason: HOSPADM

## 2021-09-01 RX ORDER — HYDROMORPHONE HYDROCHLORIDE 1 MG/ML
0.25 INJECTION, SOLUTION INTRAMUSCULAR; INTRAVENOUS; SUBCUTANEOUS
Status: DISCONTINUED | OUTPATIENT
Start: 2021-09-01 | End: 2021-09-01 | Stop reason: HOSPADM

## 2021-09-01 RX ORDER — MIDAZOLAM HYDROCHLORIDE 1 MG/ML
1 INJECTION INTRAMUSCULAR; INTRAVENOUS
Status: COMPLETED | OUTPATIENT
Start: 2021-09-01 | End: 2021-09-01

## 2021-09-01 RX ORDER — HYDROCODONE BITARTRATE AND ACETAMINOPHEN 5; 325 MG/1; MG/1
1 TABLET ORAL ONCE AS NEEDED
Status: DISCONTINUED | OUTPATIENT
Start: 2021-09-01 | End: 2021-09-01 | Stop reason: HOSPADM

## 2021-09-01 RX ORDER — CYCLOBENZAPRINE HCL 10 MG
TABLET ORAL
Qty: 30 TABLET | Refills: 0 | Status: SHIPPED | OUTPATIENT
Start: 2021-09-01 | End: 2021-09-30

## 2021-09-01 RX ORDER — SODIUM CHLORIDE, SODIUM LACTATE, POTASSIUM CHLORIDE, CALCIUM CHLORIDE 600; 310; 30; 20 MG/100ML; MG/100ML; MG/100ML; MG/100ML
9 INJECTION, SOLUTION INTRAVENOUS CONTINUOUS
Status: DISCONTINUED | OUTPATIENT
Start: 2021-09-01 | End: 2021-09-01 | Stop reason: HOSPADM

## 2021-09-01 RX ORDER — DEXAMETHASONE SODIUM PHOSPHATE 10 MG/ML
8 INJECTION INTRAMUSCULAR; INTRAVENOUS ONCE
Status: COMPLETED | OUTPATIENT
Start: 2021-09-01 | End: 2021-09-01

## 2021-09-01 RX ORDER — NALOXONE HCL 0.4 MG/ML
0.2 VIAL (ML) INJECTION AS NEEDED
Status: DISCONTINUED | OUTPATIENT
Start: 2021-09-01 | End: 2021-09-01 | Stop reason: HOSPADM

## 2021-09-01 RX ORDER — LABETALOL HYDROCHLORIDE 5 MG/ML
5 INJECTION, SOLUTION INTRAVENOUS
Status: DISCONTINUED | OUTPATIENT
Start: 2021-09-01 | End: 2021-09-01 | Stop reason: HOSPADM

## 2021-09-01 RX ORDER — PROMETHAZINE HYDROCHLORIDE 25 MG/1
12.5 TABLET ORAL ONCE AS NEEDED
Status: DISCONTINUED | OUTPATIENT
Start: 2021-09-01 | End: 2021-09-01 | Stop reason: HOSPADM

## 2021-09-01 RX ORDER — ACETAMINOPHEN 650 MG
TABLET, EXTENDED RELEASE ORAL AS NEEDED
Status: DISCONTINUED | OUTPATIENT
Start: 2021-09-01 | End: 2021-09-01 | Stop reason: HOSPADM

## 2021-09-01 RX ORDER — FLUMAZENIL 0.1 MG/ML
0.2 INJECTION INTRAVENOUS AS NEEDED
Status: DISCONTINUED | OUTPATIENT
Start: 2021-09-01 | End: 2021-09-01 | Stop reason: HOSPADM

## 2021-09-01 RX ORDER — PROMETHAZINE HYDROCHLORIDE 25 MG/1
25 TABLET ORAL ONCE AS NEEDED
Status: DISCONTINUED | OUTPATIENT
Start: 2021-09-01 | End: 2021-09-01 | Stop reason: HOSPADM

## 2021-09-01 RX ORDER — EPHEDRINE SULFATE 50 MG/ML
5 INJECTION, SOLUTION INTRAVENOUS ONCE AS NEEDED
Status: DISCONTINUED | OUTPATIENT
Start: 2021-09-01 | End: 2021-09-01 | Stop reason: HOSPADM

## 2021-09-01 RX ORDER — ONDANSETRON 2 MG/ML
4 INJECTION INTRAMUSCULAR; INTRAVENOUS ONCE AS NEEDED
Status: DISCONTINUED | OUTPATIENT
Start: 2021-09-01 | End: 2021-09-01 | Stop reason: HOSPADM

## 2021-09-01 RX ORDER — FENTANYL CITRATE 50 UG/ML
50 INJECTION, SOLUTION INTRAMUSCULAR; INTRAVENOUS
Status: DISCONTINUED | OUTPATIENT
Start: 2021-09-01 | End: 2021-09-01 | Stop reason: HOSPADM

## 2021-09-01 RX ORDER — OXYCODONE AND ACETAMINOPHEN 7.5; 325 MG/1; MG/1
1 TABLET ORAL EVERY 4 HOURS PRN
Status: DISCONTINUED | OUTPATIENT
Start: 2021-09-01 | End: 2021-09-01 | Stop reason: HOSPADM

## 2021-09-01 RX ORDER — CYCLOBENZAPRINE HCL 10 MG
10 TABLET ORAL ONCE
Status: COMPLETED | OUTPATIENT
Start: 2021-09-01 | End: 2021-09-01

## 2021-09-01 RX ORDER — HYDROXYZINE PAMOATE 50 MG/1
50 CAPSULE ORAL ONCE
Status: COMPLETED | OUTPATIENT
Start: 2021-09-01 | End: 2021-09-01

## 2021-09-01 RX ORDER — SODIUM CHLORIDE 0.9 % (FLUSH) 0.9 %
3-10 SYRINGE (ML) INJECTION AS NEEDED
Status: DISCONTINUED | OUTPATIENT
Start: 2021-09-01 | End: 2021-09-01 | Stop reason: HOSPADM

## 2021-09-01 RX ORDER — CELECOXIB 200 MG/1
200 CAPSULE ORAL ONCE
Status: COMPLETED | OUTPATIENT
Start: 2021-09-01 | End: 2021-09-01

## 2021-09-01 RX ORDER — LIDOCAINE HYDROCHLORIDE 20 MG/ML
INJECTION, SOLUTION INFILTRATION; PERINEURAL AS NEEDED
Status: DISCONTINUED | OUTPATIENT
Start: 2021-09-01 | End: 2021-09-01 | Stop reason: SURG

## 2021-09-01 RX ORDER — OXYCODONE AND ACETAMINOPHEN 10; 325 MG/1; MG/1
TABLET ORAL
Qty: 15 TABLET | Refills: 0 | Status: SHIPPED | OUTPATIENT
Start: 2021-09-01 | End: 2021-09-30

## 2021-09-01 RX ORDER — CYCLOBENZAPRINE HCL 10 MG
5 TABLET ORAL ONCE
Status: COMPLETED | OUTPATIENT
Start: 2021-09-01 | End: 2021-09-01

## 2021-09-01 RX ORDER — HYDROMORPHONE HYDROCHLORIDE 1 MG/ML
0.5 INJECTION, SOLUTION INTRAMUSCULAR; INTRAVENOUS; SUBCUTANEOUS
Status: DISCONTINUED | OUTPATIENT
Start: 2021-09-01 | End: 2021-09-01 | Stop reason: HOSPADM

## 2021-09-01 RX ORDER — CELECOXIB 200 MG/1
400 CAPSULE ORAL ONCE
Status: COMPLETED | OUTPATIENT
Start: 2021-09-01 | End: 2021-09-01

## 2021-09-01 RX ORDER — DIPHENHYDRAMINE HCL 25 MG
25 CAPSULE ORAL
Status: DISCONTINUED | OUTPATIENT
Start: 2021-09-01 | End: 2021-09-01 | Stop reason: HOSPADM

## 2021-09-01 RX ORDER — OXYCODONE HYDROCHLORIDE AND ACETAMINOPHEN 5; 325 MG/1; MG/1
1 TABLET ORAL ONCE AS NEEDED
Status: DISCONTINUED | OUTPATIENT
Start: 2021-09-01 | End: 2021-09-01 | Stop reason: HOSPADM

## 2021-09-01 RX ORDER — CLINDAMYCIN PHOSPHATE 900 MG/50ML
900 INJECTION INTRAVENOUS ONCE
Status: COMPLETED | OUTPATIENT
Start: 2021-09-01 | End: 2021-09-01

## 2021-09-01 RX ORDER — HYDRALAZINE HYDROCHLORIDE 20 MG/ML
5 INJECTION INTRAMUSCULAR; INTRAVENOUS
Status: DISCONTINUED | OUTPATIENT
Start: 2021-09-01 | End: 2021-09-01 | Stop reason: HOSPADM

## 2021-09-01 RX ORDER — ONDANSETRON 4 MG/1
4 TABLET, FILM COATED ORAL ONCE AS NEEDED
Status: DISCONTINUED | OUTPATIENT
Start: 2021-09-01 | End: 2021-09-01 | Stop reason: HOSPADM

## 2021-09-01 RX ORDER — FAMOTIDINE 10 MG/ML
20 INJECTION, SOLUTION INTRAVENOUS ONCE
Status: COMPLETED | OUTPATIENT
Start: 2021-09-01 | End: 2021-09-01

## 2021-09-01 RX ADMIN — CYCLOBENZAPRINE 5 MG: 10 TABLET, FILM COATED ORAL at 14:23

## 2021-09-01 RX ADMIN — MIDAZOLAM 1 MG: 1 INJECTION INTRAMUSCULAR; INTRAVENOUS at 12:29

## 2021-09-01 RX ADMIN — DOCUSATE SODIUM 100 MG: 100 CAPSULE, LIQUID FILLED ORAL at 14:23

## 2021-09-01 RX ADMIN — CELECOXIB 200 MG: 200 CAPSULE ORAL at 14:23

## 2021-09-01 RX ADMIN — HYDROXYZINE PAMOATE 50 MG: 50 CAPSULE ORAL at 11:11

## 2021-09-01 RX ADMIN — FAMOTIDINE 20 MG: 10 INJECTION INTRAVENOUS at 11:36

## 2021-09-01 RX ADMIN — DEXAMETHASONE SODIUM PHOSPHATE 8 MG: 10 INJECTION INTRAMUSCULAR; INTRAVENOUS at 11:11

## 2021-09-01 RX ADMIN — PROPOFOL 250 MG: 10 INJECTION, EMULSION INTRAVENOUS at 12:37

## 2021-09-01 RX ADMIN — CELECOXIB 400 MG: 200 CAPSULE ORAL at 11:11

## 2021-09-01 RX ADMIN — PHENYLEPHRINE HYDROCHLORIDE 100 MCG: 10 INJECTION INTRAVENOUS at 13:00

## 2021-09-01 RX ADMIN — SCOLOPAMINE TRANSDERMAL SYSTEM 1 PATCH: 1 PATCH, EXTENDED RELEASE TRANSDERMAL at 11:11

## 2021-09-01 RX ADMIN — OXYCODONE AND ACETAMINOPHEN 1 TABLET: 5; 325 TABLET ORAL at 14:45

## 2021-09-01 RX ADMIN — PHENYLEPHRINE HYDROCHLORIDE 100 MCG: 10 INJECTION INTRAVENOUS at 13:26

## 2021-09-01 RX ADMIN — MIDAZOLAM 1 MG: 1 INJECTION INTRAMUSCULAR; INTRAVENOUS at 12:04

## 2021-09-01 RX ADMIN — CYCLOBENZAPRINE 10 MG: 10 TABLET, FILM COATED ORAL at 11:11

## 2021-09-01 RX ADMIN — SODIUM CHLORIDE, POTASSIUM CHLORIDE, SODIUM LACTATE AND CALCIUM CHLORIDE 9 ML/HR: 600; 310; 30; 20 INJECTION, SOLUTION INTRAVENOUS at 11:09

## 2021-09-01 RX ADMIN — LIDOCAINE HYDROCHLORIDE 60 MG: 20 INJECTION, SOLUTION INFILTRATION; PERINEURAL at 12:37

## 2021-09-01 RX ADMIN — FENTANYL CITRATE 50 MCG: 0.05 INJECTION, SOLUTION INTRAMUSCULAR; INTRAVENOUS at 14:12

## 2021-09-01 RX ADMIN — ACETAMINOPHEN 975 MG: 325 TABLET, FILM COATED ORAL at 11:11

## 2021-09-01 RX ADMIN — CLINDAMYCIN PHOSPHATE 900 MG: 900 INJECTION, SOLUTION INTRAVENOUS at 12:29

## 2021-09-01 RX ADMIN — PHENYLEPHRINE HYDROCHLORIDE 100 MCG: 10 INJECTION INTRAVENOUS at 13:13

## 2021-09-01 NOTE — ANESTHESIA POSTPROCEDURE EVALUATION
Patient: Diana Higgins    Procedure Summary     Date: 09/01/21 Room / Location: [facility] OR  / [facility] MAIN OR    Anesthesia Start: 1233 Anesthesia Stop: 1350    Procedure: BILATERAL REMOVAL IMPLANTS (Bilateral Breast) Diagnosis:     Surgeons: KRISTIN Aguilar MD Provider: Yang Barnes MD    Anesthesia Type: general ASA Status: 2          Anesthesia Type: general    Vitals  Vitals Value Taken Time   /85 09/01/21 1446   Temp 36.6 °C (97.8 °F) 09/01/21 1445   Pulse 82 09/01/21 1452   Resp 16 09/01/21 1445   SpO2 100 % 09/01/21 1453   Vitals shown include unvalidated device data.        Post Anesthesia Care and Evaluation    Patient location during evaluation: PACU  Patient participation: complete - patient participated  Level of consciousness: awake and alert  Pain management: adequate  Airway patency: patent  Anesthetic complications: No anesthetic complications    Cardiovascular status: acceptable  Respiratory status: acceptable  Hydration status: acceptable    Comments: ---------------------------               09/01/21                      1445         ---------------------------   BP:          119/85         Pulse:         91           Resp:          16           Temp:   36.6 °C (97.8 °F)   SpO2:          98%         ---------------------------

## 2021-09-01 NOTE — ANESTHESIA PROCEDURE NOTES
Airway  Urgency: elective    Date/Time: 9/1/2021 12:41 PM  Airway not difficult    General Information and Staff    Patient location during procedure: OR    Indications and Patient Condition  Indications for airway management: airway protection    Preoxygenated: yes  MILS maintained throughout  Mask difficulty assessment: 0 - not attempted    Final Airway Details  Final airway type: supraglottic airway      Successful airway: unique  Size 3    Number of attempts at approach: 1  Assessment: lips, teeth, and gum same as pre-op and atraumatic intubation

## 2021-09-01 NOTE — ANESTHESIA PREPROCEDURE EVALUATION
Anesthesia Evaluation     history of anesthetic complications: PONV  NPO Solid Status: > 8 hours  NPO Liquid Status: > 2 hours           Airway   Mallampati: I  Neck ROM: full  no difficulty expected  Dental - normal exam     Pulmonary - normal exam   (+) sleep apnea (mild),   (-) COPD, asthma, not a smoker    PE comment: nonlabored  Cardiovascular - normal exam    Rhythm: regular  Rate: normal    (+) dysrhythmias (h/o SVT--not recurred since ablation in 2002),   (-) hypertension, valvular problems/murmurs, past MI, CAD, angina      Neuro/Psych- negative ROS  (-) seizures, TIA, CVA  GI/Hepatic/Renal/Endo - negative ROS   (-) GERD, liver disease, no renal disease, diabetes, no thyroid disorder    Musculoskeletal (-) negative ROS    Abdominal    Substance History      OB/GYN          Other      history of cancer (breast cancer, skin cancer) remission      Other Comment: H/o COVID19 last fall--resolved                  Anesthesia Plan    ASA 2     general     intravenous induction     Anesthetic plan, all risks, benefits, and alternatives have been provided, discussed and informed consent has been obtained with: patient.

## 2021-09-02 LAB
LAB AP CASE REPORT: NORMAL
PATH REPORT.FINAL DX SPEC: NORMAL
PATH REPORT.GROSS SPEC: NORMAL

## 2021-09-30 ENCOUNTER — INFUSION (OUTPATIENT)
Dept: ONCOLOGY | Facility: HOSPITAL | Age: 46
End: 2021-09-30

## 2021-09-30 ENCOUNTER — OFFICE VISIT (OUTPATIENT)
Dept: ONCOLOGY | Facility: CLINIC | Age: 46
End: 2021-09-30

## 2021-09-30 VITALS
SYSTOLIC BLOOD PRESSURE: 90 MMHG | HEART RATE: 72 BPM | RESPIRATION RATE: 16 BRPM | TEMPERATURE: 97.1 F | OXYGEN SATURATION: 100 % | BODY MASS INDEX: 21.53 KG/M2 | DIASTOLIC BLOOD PRESSURE: 60 MMHG | HEIGHT: 67 IN | WEIGHT: 137.2 LBS

## 2021-09-30 DIAGNOSIS — C50.411 MALIGNANT NEOPLASM OF UPPER-OUTER QUADRANT OF RIGHT BREAST IN FEMALE, ESTROGEN RECEPTOR POSITIVE (HCC): Primary | ICD-10-CM

## 2021-09-30 DIAGNOSIS — Z17.0 MALIGNANT NEOPLASM OF UPPER-OUTER QUADRANT OF RIGHT BREAST IN FEMALE, ESTROGEN RECEPTOR POSITIVE (HCC): ICD-10-CM

## 2021-09-30 DIAGNOSIS — M81.8 OTHER OSTEOPOROSIS WITHOUT CURRENT PATHOLOGICAL FRACTURE: ICD-10-CM

## 2021-09-30 DIAGNOSIS — Z45.2 FITTING AND ADJUSTMENT OF VASCULAR CATHETER: Primary | ICD-10-CM

## 2021-09-30 DIAGNOSIS — Z78.0 POST-MENOPAUSAL: ICD-10-CM

## 2021-09-30 DIAGNOSIS — F41.9 ANXIETY: ICD-10-CM

## 2021-09-30 DIAGNOSIS — F32.9 REACTIVE DEPRESSION: ICD-10-CM

## 2021-09-30 DIAGNOSIS — K59.09 OTHER CONSTIPATION: ICD-10-CM

## 2021-09-30 DIAGNOSIS — C50.411 MALIGNANT NEOPLASM OF UPPER-OUTER QUADRANT OF RIGHT BREAST IN FEMALE, ESTROGEN RECEPTOR POSITIVE (HCC): ICD-10-CM

## 2021-09-30 DIAGNOSIS — Z17.0 MALIGNANT NEOPLASM OF UPPER-OUTER QUADRANT OF RIGHT BREAST IN FEMALE, ESTROGEN RECEPTOR POSITIVE (HCC): Primary | ICD-10-CM

## 2021-09-30 LAB
ALBUMIN SERPL-MCNC: 4.3 G/DL (ref 3.5–5.2)
ALBUMIN/GLOB SERPL: 1.7 G/DL (ref 1.1–2.4)
ALP SERPL-CCNC: 61 U/L (ref 38–116)
ALT SERPL W P-5'-P-CCNC: 14 U/L (ref 0–33)
ANION GAP SERPL CALCULATED.3IONS-SCNC: 10.1 MMOL/L (ref 5–15)
AST SERPL-CCNC: 25 U/L (ref 0–32)
BASOPHILS # BLD AUTO: 0.03 10*3/MM3 (ref 0–0.2)
BASOPHILS NFR BLD AUTO: 0.5 % (ref 0–1.5)
BILIRUB SERPL-MCNC: 0.3 MG/DL (ref 0.2–1.2)
BUN SERPL-MCNC: 17 MG/DL (ref 6–20)
BUN/CREAT SERPL: 26.2 (ref 7.3–30)
CALCIUM SPEC-SCNC: 9.2 MG/DL (ref 8.5–10.2)
CANCER AG15-3 SERPL-ACNC: 17.3 U/ML
CHLORIDE SERPL-SCNC: 103 MMOL/L (ref 98–107)
CO2 SERPL-SCNC: 25.9 MMOL/L (ref 22–29)
CREAT SERPL-MCNC: 0.65 MG/DL (ref 0.6–1.1)
DEPRECATED RDW RBC AUTO: 40 FL (ref 37–54)
EOSINOPHIL # BLD AUTO: 0.14 10*3/MM3 (ref 0–0.4)
EOSINOPHIL NFR BLD AUTO: 2.3 % (ref 0.3–6.2)
ERYTHROCYTE [DISTWIDTH] IN BLOOD BY AUTOMATED COUNT: 11.9 % (ref 12.3–15.4)
GFR SERPL CREATININE-BSD FRML MDRD: 98 ML/MIN/1.73
GLOBULIN UR ELPH-MCNC: 2.6 GM/DL (ref 1.8–3.5)
GLUCOSE SERPL-MCNC: 78 MG/DL (ref 74–124)
HCT VFR BLD AUTO: 39.3 % (ref 34–46.6)
HGB BLD-MCNC: 12.9 G/DL (ref 12–15.9)
IMM GRANULOCYTES # BLD AUTO: 0.03 10*3/MM3 (ref 0–0.05)
IMM GRANULOCYTES NFR BLD AUTO: 0.5 % (ref 0–0.5)
LYMPHOCYTES # BLD AUTO: 1.46 10*3/MM3 (ref 0.7–3.1)
LYMPHOCYTES NFR BLD AUTO: 23.9 % (ref 19.6–45.3)
MCH RBC QN AUTO: 30.1 PG (ref 26.6–33)
MCHC RBC AUTO-ENTMCNC: 32.8 G/DL (ref 31.5–35.7)
MCV RBC AUTO: 91.8 FL (ref 79–97)
MONOCYTES # BLD AUTO: 0.48 10*3/MM3 (ref 0.1–0.9)
MONOCYTES NFR BLD AUTO: 7.9 % (ref 5–12)
NEUTROPHILS NFR BLD AUTO: 3.97 10*3/MM3 (ref 1.7–7)
NEUTROPHILS NFR BLD AUTO: 64.9 % (ref 42.7–76)
NRBC BLD AUTO-RTO: 0 /100 WBC (ref 0–0.2)
PLATELET # BLD AUTO: 286 10*3/MM3 (ref 140–450)
PMV BLD AUTO: 8.9 FL (ref 6–12)
POTASSIUM SERPL-SCNC: 4.1 MMOL/L (ref 3.5–4.7)
PROT SERPL-MCNC: 6.9 G/DL (ref 6.3–8)
RBC # BLD AUTO: 4.28 10*6/MM3 (ref 3.77–5.28)
SODIUM SERPL-SCNC: 139 MMOL/L (ref 134–145)
WBC # BLD AUTO: 6.11 10*3/MM3 (ref 3.4–10.8)

## 2021-09-30 PROCEDURE — 36591 DRAW BLOOD OFF VENOUS DEVICE: CPT

## 2021-09-30 PROCEDURE — 85025 COMPLETE CBC W/AUTO DIFF WBC: CPT

## 2021-09-30 PROCEDURE — 80053 COMPREHEN METABOLIC PANEL: CPT

## 2021-09-30 PROCEDURE — 86300 IMMUNOASSAY TUMOR CA 15-3: CPT | Performed by: INTERNAL MEDICINE

## 2021-09-30 PROCEDURE — 99214 OFFICE O/P EST MOD 30 MIN: CPT | Performed by: INTERNAL MEDICINE

## 2021-09-30 PROCEDURE — 36415 COLL VENOUS BLD VENIPUNCTURE: CPT

## 2021-09-30 RX ORDER — HEPARIN SODIUM (PORCINE) LOCK FLUSH IV SOLN 100 UNIT/ML 100 UNIT/ML
500 SOLUTION INTRAVENOUS AS NEEDED
Status: CANCELLED | OUTPATIENT
Start: 2021-09-30

## 2021-09-30 RX ORDER — SODIUM CHLORIDE 0.9 % (FLUSH) 0.9 %
10 SYRINGE (ML) INJECTION AS NEEDED
Status: CANCELLED | OUTPATIENT
Start: 2021-09-30

## 2021-09-30 RX ORDER — SODIUM CHLORIDE 0.9 % (FLUSH) 0.9 %
10 SYRINGE (ML) INJECTION AS NEEDED
Status: DISCONTINUED | OUTPATIENT
Start: 2021-09-30 | End: 2021-09-30 | Stop reason: HOSPADM

## 2021-09-30 RX ORDER — DOXEPIN HYDROCHLORIDE 10 MG/1
10 CAPSULE ORAL NIGHTLY
Qty: 10 CAPSULE | Refills: 0 | Status: SHIPPED | OUTPATIENT
Start: 2021-09-30 | End: 2021-10-18 | Stop reason: SDUPTHER

## 2021-09-30 RX ORDER — HEPARIN SODIUM (PORCINE) LOCK FLUSH IV SOLN 100 UNIT/ML 100 UNIT/ML
500 SOLUTION INTRAVENOUS AS NEEDED
Status: DISCONTINUED | OUTPATIENT
Start: 2021-09-30 | End: 2021-09-30 | Stop reason: HOSPADM

## 2021-10-05 ENCOUNTER — OFFICE VISIT (OUTPATIENT)
Dept: INTERNAL MEDICINE | Facility: CLINIC | Age: 46
End: 2021-10-05

## 2021-10-05 VITALS
DIASTOLIC BLOOD PRESSURE: 74 MMHG | OXYGEN SATURATION: 99 % | WEIGHT: 136.8 LBS | BODY MASS INDEX: 21.47 KG/M2 | HEART RATE: 69 BPM | RESPIRATION RATE: 16 BRPM | SYSTOLIC BLOOD PRESSURE: 96 MMHG | HEIGHT: 67 IN | TEMPERATURE: 98 F

## 2021-10-05 DIAGNOSIS — K59.04 CHRONIC IDIOPATHIC CONSTIPATION: ICD-10-CM

## 2021-10-05 DIAGNOSIS — F41.9 ANXIETY: Primary | ICD-10-CM

## 2021-10-05 DIAGNOSIS — Z13.220 SCREENING FOR CHOLESTEROL LEVEL: ICD-10-CM

## 2021-10-05 PROCEDURE — 99213 OFFICE O/P EST LOW 20 MIN: CPT | Performed by: FAMILY MEDICINE

## 2021-10-05 NOTE — PROGRESS NOTES
"Chief Complaint  Establish Care and Anxiety    Subjective    History of Present Illness {CC  Problem List  Visit  Diagnosis   Encounters  Notes  Medications  Labs  Result Review Imaging  Media :23}     Diana Higgins presents to [facility] to establish care.   History of Present Illness   44 yo female present to establish care. Pt is new to me previously seeing Dr. Walters. She has a history of breast cancer s/p chemo, radiation and mastectomy, anxiety, SVT s/p ablation, and constipation.     Colonoscopy in the past. Unsure if 3 or 5 year follow up.   Epic has 5     Objective     Vital Signs:   BP 96/74 (BP Location: Left arm, Patient Position: Sitting, Cuff Size: Adult)   Pulse 69   Temp 98 °F (36.7 °C) (Temporal)   Resp 16   Ht 170.2 cm (67\")   Wt 62.1 kg (136 lb 12.8 oz)   SpO2 99%   BMI 21.43 kg/m²      Physical Exam  Vitals reviewed.   Constitutional:       General: She is not in acute distress.  HENT:      Head: Normocephalic.      Right Ear: Tympanic membrane normal.      Left Ear: Tympanic membrane normal.   Eyes:      Conjunctiva/sclera: Conjunctivae normal.   Cardiovascular:      Rate and Rhythm: Regular rhythm.      Pulses: Normal pulses.      Heart sounds: Normal heart sounds.   Pulmonary:      Effort: Pulmonary effort is normal. No respiratory distress.      Breath sounds: Normal breath sounds. No wheezing.   Abdominal:      General: Bowel sounds are normal. There is no distension.      Palpations: Abdomen is soft.      Tenderness: There is no abdominal tenderness.   Musculoskeletal:      Right lower leg: No edema.      Left lower leg: No edema.   Neurological:      Mental Status: She is alert.   Psychiatric:         Mood and Affect: Mood normal.        Result Review  Data Reviewed:{ Labs  Result Review  Imaging  Med Tab  Media :23}   The following data was reviewed by: Meaghan Colon MD on 10/05/2021  Lab Results - Last 18 Months   Lab Units " 09/30/21  1033 08/30/21  1050 08/30/21  1049 07/21/21  0959 07/14/21  1054 07/07/21  0941 05/12/21  1317 05/12/21  1317   BUN mg/dL 17  --  17 14   < > 19   < > 19   CREATININE mg/dL 0.65  --  0.60 0.66   < > 0.69   < > 0.63   EGFR IF NONAFRICN AM mL/min/1.73 98  --  107 96   < > 91   < > 101   SODIUM mmol/L 139  --  139 138   < > 140   < > 139   POTASSIUM mmol/L 4.1  --  4.5 4.0   < > 5.3*   < > 4.2   CHLORIDE mmol/L 103  --  104 101   < > 103   < > 103   CALCIUM mg/dL 9.2  --  9.1 9.1   < > 9.4   < > 9.3   ALBUMIN g/dL 4.30  --   --   --   --  4.20  --  4.20   BILIRUBIN mg/dL 0.3  --   --   --   --  0.3  --  0.2   ALK PHOS U/L 61  --   --   --   --  65  --  64   AST (SGOT) U/L 25  --   --   --   --  23  --  24   ALT (SGPT) U/L 14  --   --   --   --  16  --  16   WBC 10*3/mm3 6.11 6.22  --   --   --  5.68   < > 6.43   RBC 10*6/mm3 4.28 4.45  --   --   --  4.52   < > 4.13   HEMATOCRIT % 39.3 40.2  --   --   --  41.6   < > 36.8   MCV fL 91.8 90.3  --   --   --  92.0   < > 89.1   MCH pg 30.1 29.9  --   --   --  30.1   < > 30.3    < > = values in this interval not displayed.     Review last oncology notes          Assessment and Plan {CC Problem List  Visit Diagnosis  ROS  Review (Popup)  Health Maintenance  Quality  BestPractice  Medications  SmartSets  SnapShot Encounters  Media :23}   Problem List Items Addressed This Visit        Gastrointestinal Abdominal     Chronic idiopathic constipation    Current Assessment & Plan     Pt state issues for a long time. Discussion today about increase fiber in her diet. Will increase daily 5 serving of fruits/veggies.  May try benefiber to add to water daily as one supplement in the morning.    Will continue to monitor follow up next visit.             Mental Health    Anxiety - Primary    Current Assessment & Plan     Begin after cancer diagnosis,  On medication for about 6 years   Stable at this time  Taking medication as prescribed  Continue current meds            Other Visit Diagnoses     Screening for cholesterol level        Relevant Orders    Lipid panel        She will have labs prior to our next visit, screening cholesterol ordered      Follow Up   Return in about 4 months (around 1/24/2022) for Annual physical.  Patient was given instructions and counseling regarding her condition or for health maintenance advice. Please see specific information pulled into the AVS if appropriate.    EpicAct:[email or web address]_ORDERS,RunParams:STARTUPTYPE=FOLLOW    [email or web address]_DISCHARGE

## 2021-10-05 NOTE — ASSESSMENT & PLAN NOTE
Begin after cancer diagnosis,  On medication for about 6 years   Stable at this time  Taking medication as prescribed  Continue current meds

## 2021-10-05 NOTE — ASSESSMENT & PLAN NOTE
Pt state issues for a long time. Discussion today about increase fiber in her diet. Will increase daily 5 serving of fruits/veggies.  May try benefiber to add to water daily as one supplement in the morning.    Will continue to monitor follow up next visit.

## 2021-10-18 ENCOUNTER — OFFICE VISIT (OUTPATIENT)
Dept: ONCOLOGY | Facility: CLINIC | Age: 46
End: 2021-10-18

## 2021-10-18 ENCOUNTER — TELEPHONE (OUTPATIENT)
Dept: ONCOLOGY | Facility: CLINIC | Age: 46
End: 2021-10-18

## 2021-10-18 ENCOUNTER — APPOINTMENT (OUTPATIENT)
Dept: LAB | Facility: HOSPITAL | Age: 46
End: 2021-10-18

## 2021-10-18 VITALS
SYSTOLIC BLOOD PRESSURE: 107 MMHG | HEART RATE: 83 BPM | RESPIRATION RATE: 18 BRPM | OXYGEN SATURATION: 100 % | BODY MASS INDEX: 22 KG/M2 | HEIGHT: 67 IN | WEIGHT: 140.2 LBS | TEMPERATURE: 97.7 F | DIASTOLIC BLOOD PRESSURE: 70 MMHG

## 2021-10-18 DIAGNOSIS — Z17.0 MALIGNANT NEOPLASM OF UPPER-OUTER QUADRANT OF RIGHT BREAST IN FEMALE, ESTROGEN RECEPTOR POSITIVE (HCC): Primary | ICD-10-CM

## 2021-10-18 DIAGNOSIS — C50.411 MALIGNANT NEOPLASM OF UPPER-OUTER QUADRANT OF RIGHT BREAST IN FEMALE, ESTROGEN RECEPTOR POSITIVE (HCC): Primary | ICD-10-CM

## 2021-10-18 DIAGNOSIS — R10.9 FLANK PAIN: ICD-10-CM

## 2021-10-18 LAB
BACTERIA UR QL AUTO: NEGATIVE /HPF
BILIRUB UR QL STRIP: NEGATIVE
CLARITY UR: CLEAR
COLOR UR: YELLOW
GLUCOSE UR STRIP-MCNC: NEGATIVE MG/DL
HGB UR QL STRIP.AUTO: NEGATIVE
HYALINE CASTS UR QL AUTO: NORMAL /LPF
KETONES UR QL STRIP: NEGATIVE
LEUKOCYTE ESTERASE UR QL STRIP.AUTO: ABNORMAL
NITRITE UR QL STRIP: NEGATIVE
PH UR STRIP.AUTO: 7 [PH] (ref 4.5–8)
PROT UR QL STRIP: NEGATIVE
RBC # UR: NORMAL /HPF
REF LAB TEST METHOD: NORMAL
SP GR UR STRIP: 1.01 (ref 1–1.03)
SQUAMOUS #/AREA URNS HPF: NORMAL /HPF
UROBILINOGEN UR QL STRIP: ABNORMAL
WBC UR QL AUTO: NORMAL /HPF

## 2021-10-18 PROCEDURE — 81001 URINALYSIS AUTO W/SCOPE: CPT | Performed by: NURSE PRACTITIONER

## 2021-10-18 PROCEDURE — 99214 OFFICE O/P EST MOD 30 MIN: CPT | Performed by: NURSE PRACTITIONER

## 2021-10-18 RX ORDER — DOXEPIN HYDROCHLORIDE 10 MG/1
10 CAPSULE ORAL NIGHTLY
Qty: 10 CAPSULE | Refills: 0 | Status: SHIPPED | OUTPATIENT
Start: 2021-10-18 | End: 2021-11-29 | Stop reason: SDUPTHER

## 2021-10-18 RX ORDER — CYCLOBENZAPRINE HCL 10 MG
10 TABLET ORAL 3 TIMES DAILY PRN
Qty: 30 TABLET | Refills: 0 | Status: SHIPPED | OUTPATIENT
Start: 2021-10-18 | End: 2021-10-22

## 2021-10-18 NOTE — TELEPHONE ENCOUNTER
----- Message from Tracie Canales RN sent at 10/18/2021  9:58 AM EDT -----  Regarding: NP f/u  Dr. Hadley wants her to see on of our NP for new onset back pain. Can you please call her to schedule?

## 2021-10-18 NOTE — PROGRESS NOTES
.    Subjective .     REASONS FOR FOLLOWUP:    1. History of stage IIA, B1T4qV6, ER weakly-positive, SC negative, HER2-negative breast cancer, status post bilateral mastectomies. The patient completed her adjuvant chemotherapy under NSABP B49 clinical trial.  On 12/14/2015 she has no clinical evidence of recurrence of breast cancer and she remains on Femara for the time being. She also has completed her breast reconstruction with bilateral implants and she is very satisfied in regard to the cosmetic result of this. She will remain on Femara for the time being. She receiving at this time Effexor 37.5 mg a day for hot flashes and anxiety.   2. Bone health. The patient has osteoporosis. She  GETS Reclast infusion  on yearly bases, LAST ONE MAY 12,2021    HISTORY OF PRESENT ILLNESS:  The patient is a 46 y.o. year old female the above-mentioned history who is here today with complaints of right lower back pain.  The pain started about a week ago.  She denies any known injury or trauma to this area.  She thinks that it is musculoskeletal, but is not sure.  She states that it started in her lower back and is starting to wrap around to the front side today.  The pain does get worse when she is straining.  She has chronic constipation issues.  She denies any change in urinary habits.  No bleeding.  No fevers or chills.  She has tried heat and Motrin, both of which provided her with some relief.  She denies any change in medications.  No shortness of breath or cough.  No nausea or vomiting.  At times, the pain does radiate down her buttocks.  She had difficulty putting her pants on this morning due to the discomfort, although she is moving without difficulty now, likely related to the Motrin she took prior to her visit.        Past Medical History:   Diagnosis Date   • Anxiety    • Breast cancer (HCC)     Right, stage IIA, s/p bilateral mastectomy, XRT, and chemotherapy, followed by Dr. Hadley   • COVID-19 virus infection  09/2020    C/O back pain and loss of taste and smell   • Disease of thyroid gland     history no meds now   • Hemorrhoid    • History of cancer chemotherapy    • History of foot fracture     as a child  right   • History of mononucleosis    • History of radiation therapy    • Osteoporosis    • Paroxysmal supraventricular tachycardia (HCC)     CS ostium atrial tachycardia, ablated in 2002 by Dr. Garzon   • PONV (postoperative nausea and vomiting)    • Sinus headache     But no history of migraines   • Skin cancer    • Sleep apnea     mild no machine   • Wears glasses        ONCOLOGIC HISTORY:  (History from previous dates can be found in the separate document.)    Current Outpatient Medications on File Prior to Visit   Medication Sig Dispense Refill   • Cholecalciferol (VITAMIN D-3) 1000 UNITS capsule Take 1,000 Units by mouth Daily.     • doxepin (SINEquan) 10 MG capsule Take 1 capsule by mouth Every Night. 10 capsule 0   • letrozole (FEMARA) 2.5 MG tablet Take 1 tablet by mouth Daily. 90 tablet 3   • venlafaxine XR (EFFEXOR-XR) 75 MG 24 hr capsule Take 1 capsule by mouth Daily. 90 capsule 1   • Zoledronic Acid (RECLAST IV) Infuse  into a venous catheter. Once a year last dose 5/12/2021       No current facility-administered medications on file prior to visit.       ALLERGIES:   No Known Allergies    Social History     Socioeconomic History   • Marital status:      Spouse name: Misha   • Years of education: College   Tobacco Use   • Smoking status: Never Smoker   • Smokeless tobacco: Never Used   Vaping Use   • Vaping Use: Never used   Substance and Sexual Activity   • Alcohol use: Yes     Comment: 10 drinks yearly   • Drug use: Never   • Sexual activity: Defer         Cancer-related family history includes Breast cancer in her maternal aunt; Cancer (age of onset: 42) in her maternal grandmother; Liver cancer in her father.     Review of Systems  As per HPI    Objective      Vitals:    10/18/21 1445   BP:  "107/70   Pulse: 83   Resp: 18   Temp: 97.7 °F (36.5 °C)   TempSrc: Temporal   SpO2: 100%   Weight: 63.6 kg (140 lb 3.2 oz)   Height: 170.2 cm (67.01\")   PainSc: 0-No pain     Current Status 10/18/2021   ECOG score 0       Physical Exam  Vitals reviewed.   Constitutional:       General: She is not in acute distress.     Appearance: Normal appearance. She is well-developed.   HENT:      Head: Normocephalic and atraumatic.   Eyes:      Pupils: Pupils are equal, round, and reactive to light.   Cardiovascular:      Rate and Rhythm: Normal rate and regular rhythm.      Heart sounds: Normal heart sounds. No murmur heard.      Pulmonary:      Effort: Pulmonary effort is normal. No respiratory distress.      Breath sounds: Normal breath sounds. No wheezing, rhonchi or rales.   Abdominal:      General: Bowel sounds are normal. There is no distension.      Palpations: Abdomen is soft.   Musculoskeletal:         General: Normal range of motion.      Cervical back: Normal range of motion.      Lumbar back: No swelling, signs of trauma or bony tenderness. Normal range of motion.   Skin:     General: Skin is warm and dry.      Findings: No rash.   Neurological:      Mental Status: She is alert and oriented to person, place, and time.          RECENT LABS:  Hematology WBC   Date Value Ref Range Status   09/30/2021 6.11 3.40 - 10.80 10*3/mm3 Final     RBC   Date Value Ref Range Status   09/30/2021 4.28 3.77 - 5.28 10*6/mm3 Final     Hemoglobin   Date Value Ref Range Status   09/30/2021 12.9 12.0 - 15.9 g/dL Final     Hematocrit   Date Value Ref Range Status   09/30/2021 39.3 34.0 - 46.6 % Final     Platelets   Date Value Ref Range Status   09/30/2021 286 140 - 450 10*3/mm3 Final        Assessment/Plan     1. History of stage IIA, [id number], ER weakly-positive, MD negative, HER2-negative right upper outer quadrant breast cancer, status post bilateral mastectomies. The patient completed her adjuvant chemotherapy under NSABP B49 clinical " trial.Patient continues on Femara, 2.5 mg a day. 1. Upon review on 05/08/2020 the patient looks free of cancer recurrence, her clinical examination is normal and she feels substantially better.   As per today I find no evidence of recurrent disease about her breast cancer on her breast examination that remains unchanged with bilateral breast implants and no skeletal pain. She remains on 100% compliance Femara with minimal side effects with the exception of constipation.    · 07/07/2021 no symptoms or signs of breast cancer recurrence. She remains on Femara with 100% compliance and I advised her to remain on this medicine for the time being with no plans to stop this medicine in the next 5 years or so. She is almost accomplishing 5 years with this medication utilization.  ·  09/30/2021 the patient has no symptoms or signs of breast cancer recurrence. Her clinical examination is negative normal. In the interim of time she has had her breast prosthesis removed. This was feeling like a belt tying her chest. She has felt dramatically better since then. Healing is taking place. She remains on her Femara and I advised her to remain on this medicine for the time being.     2.  Chronic constipation: Slow transit has been on multiple medications.    3.  Chronic anxiety, panic attacks, depression: Patient is on Effexor    4.  Insomnia: Previously on gabapentin without much relief.  At that time she was also taking melatonin and ZzzQuil.  We asked her to discontinue those medications.  · 9/30/2021 patient started on a trial of doxepin 10 mg nightly.    · 10/18/2021 she reports that doxepin has helped with her sleep and is asking for refill of this.    5.  Osteoporosis: Continuing physical activity, vitamin D.  She also receives yearly Reclast last given 5/20/2021.    6.  Previously had a left subclavian port, removed 5/21/2021.    7.  Hypercalcemia: The patient had been taking biotin for Heller growth that was thought to be  causing this issue.  Biotin was discontinued and hypercalcemia resolved.    8.  Back pain: This is a new symptom in the past week in the right low back/flank area with some radiation around to the front as well as occasionally into her buttocks.  There is no skin rash, erythema, bony tenderness or tenderness at all on palpation.  We will check a urinalysis today and start her on Flexeril if needed.  We discussed she can continue ibuprofen, heat, or Salonpas patches.  If her pain does not improve by next week, or worsens to notify our office.        PLAN:  1. Check urinalysis today.  2. Start Flexeril 10 mg 1 every 8 hours if needed for muscle spasms.  3. Patient can continue ibuprofen, heat, or salon poss patch if needed to help with back pain.  4. Call if symptoms worse, or still persistent after 1 week.  5. We will refill doxepin 10 mg nightly.  6. Continue letrozole 2.5 mg daily.  7. Continue Effexor.  8. Continue vitamin D.  9. Patient due for her bone density in December.  10. Return for follow-up visit with Dr. Hadley as scheduled in January.                  Cc:  No ref. provider found

## 2021-10-20 ENCOUNTER — TELEPHONE (OUTPATIENT)
Dept: ONCOLOGY | Facility: CLINIC | Age: 46
End: 2021-10-20

## 2021-10-20 NOTE — TELEPHONE ENCOUNTER
"Spoke with patient and let her know that the \"trace\" result on the Leuk Esterase could be from not preforming a proper clean catch and the microscopic did not show any bacteria as HERMINIA Sharpe had stated as well. Pt v/u   "

## 2021-10-21 ENCOUNTER — APPOINTMENT (OUTPATIENT)
Dept: CT IMAGING | Facility: HOSPITAL | Age: 46
End: 2021-10-21

## 2021-10-21 ENCOUNTER — HOSPITAL ENCOUNTER (EMERGENCY)
Facility: HOSPITAL | Age: 46
Discharge: HOME | End: 2021-10-21
Attending: EMERGENCY MEDICINE | Admitting: EMERGENCY MEDICINE

## 2021-10-21 VITALS
RESPIRATION RATE: 18 BRPM | SYSTOLIC BLOOD PRESSURE: 110 MMHG | TEMPERATURE: 98.1 F | HEIGHT: 66 IN | WEIGHT: 140 LBS | OXYGEN SATURATION: 98 % | DIASTOLIC BLOOD PRESSURE: 71 MMHG | HEART RATE: 63 BPM | BODY MASS INDEX: 22.5 KG/M2

## 2021-10-21 DIAGNOSIS — R10.9 RIGHT FLANK PAIN: Primary | ICD-10-CM

## 2021-10-21 LAB
ALBUMIN SERPL-MCNC: 4.3 G/DL (ref 3.5–5.2)
ALBUMIN/GLOB SERPL: 1.7 G/DL
ALP SERPL-CCNC: 60 U/L (ref 39–117)
ALT SERPL W P-5'-P-CCNC: 19 U/L (ref 1–33)
ANION GAP SERPL CALCULATED.3IONS-SCNC: 11.5 MMOL/L (ref 5–15)
AST SERPL-CCNC: 26 U/L (ref 1–32)
BACTERIA UR QL AUTO: ABNORMAL /HPF
BASOPHILS # BLD AUTO: 0.03 10*3/MM3 (ref 0–0.2)
BASOPHILS NFR BLD AUTO: 0.4 % (ref 0–1.5)
BILIRUB SERPL-MCNC: 0.3 MG/DL (ref 0–1.2)
BILIRUB UR QL STRIP: NEGATIVE
BUN SERPL-MCNC: 12 MG/DL (ref 6–20)
BUN/CREAT SERPL: 18.8 (ref 7–25)
CALCIUM SPEC-SCNC: 8.9 MG/DL (ref 8.6–10.5)
CHLORIDE SERPL-SCNC: 105 MMOL/L (ref 98–107)
CLARITY UR: CLEAR
CO2 SERPL-SCNC: 22.5 MMOL/L (ref 22–29)
COLOR UR: YELLOW
CREAT SERPL-MCNC: 0.64 MG/DL (ref 0.57–1)
DEPRECATED RDW RBC AUTO: 38.4 FL (ref 37–54)
EOSINOPHIL # BLD AUTO: 0.08 10*3/MM3 (ref 0–0.4)
EOSINOPHIL NFR BLD AUTO: 1.2 % (ref 0.3–6.2)
ERYTHROCYTE [DISTWIDTH] IN BLOOD BY AUTOMATED COUNT: 12 % (ref 12.3–15.4)
GFR SERPL CREATININE-BSD FRML MDRD: 99 ML/MIN/1.73
GLOBULIN UR ELPH-MCNC: 2.6 GM/DL
GLUCOSE SERPL-MCNC: 93 MG/DL (ref 65–99)
GLUCOSE UR STRIP-MCNC: NEGATIVE MG/DL
HCT VFR BLD AUTO: 39.7 % (ref 34–46.6)
HGB BLD-MCNC: 13.7 G/DL (ref 12–15.9)
HGB UR QL STRIP.AUTO: NEGATIVE
HYALINE CASTS UR QL AUTO: ABNORMAL /LPF
IMM GRANULOCYTES # BLD AUTO: 0.02 10*3/MM3 (ref 0–0.05)
IMM GRANULOCYTES NFR BLD AUTO: 0.3 % (ref 0–0.5)
KETONES UR QL STRIP: NEGATIVE
LEUKOCYTE ESTERASE UR QL STRIP.AUTO: ABNORMAL
LIPASE SERPL-CCNC: 53 U/L (ref 13–60)
LYMPHOCYTES # BLD AUTO: 1.16 10*3/MM3 (ref 0.7–3.1)
LYMPHOCYTES NFR BLD AUTO: 17.3 % (ref 19.6–45.3)
MCH RBC QN AUTO: 30.4 PG (ref 26.6–33)
MCHC RBC AUTO-ENTMCNC: 34.5 G/DL (ref 31.5–35.7)
MCV RBC AUTO: 88 FL (ref 79–97)
MONOCYTES # BLD AUTO: 0.55 10*3/MM3 (ref 0.1–0.9)
MONOCYTES NFR BLD AUTO: 8.2 % (ref 5–12)
NEUTROPHILS NFR BLD AUTO: 4.88 10*3/MM3 (ref 1.7–7)
NEUTROPHILS NFR BLD AUTO: 72.6 % (ref 42.7–76)
NITRITE UR QL STRIP: NEGATIVE
NRBC BLD AUTO-RTO: 0 /100 WBC (ref 0–0.2)
PH UR STRIP.AUTO: 7.5 [PH] (ref 5–8)
PLATELET # BLD AUTO: 302 10*3/MM3 (ref 140–450)
PMV BLD AUTO: 8.8 FL (ref 6–12)
POTASSIUM SERPL-SCNC: 4.2 MMOL/L (ref 3.5–5.2)
PROT SERPL-MCNC: 6.9 G/DL (ref 6–8.5)
PROT UR QL STRIP: NEGATIVE
RBC # BLD AUTO: 4.51 10*6/MM3 (ref 3.77–5.28)
RBC # UR: ABNORMAL /HPF
REF LAB TEST METHOD: ABNORMAL
SODIUM SERPL-SCNC: 139 MMOL/L (ref 136–145)
SP GR UR STRIP: 1.01 (ref 1–1.03)
SQUAMOUS #/AREA URNS HPF: ABNORMAL /HPF
UROBILINOGEN UR QL STRIP: ABNORMAL
WBC # BLD AUTO: 6.72 10*3/MM3 (ref 3.4–10.8)
WBC UR QL AUTO: ABNORMAL /HPF

## 2021-10-21 PROCEDURE — 83690 ASSAY OF LIPASE: CPT | Performed by: EMERGENCY MEDICINE

## 2021-10-21 PROCEDURE — 99283 EMERGENCY DEPT VISIT LOW MDM: CPT

## 2021-10-21 PROCEDURE — 36415 COLL VENOUS BLD VENIPUNCTURE: CPT

## 2021-10-21 PROCEDURE — 81001 URINALYSIS AUTO W/SCOPE: CPT | Performed by: EMERGENCY MEDICINE

## 2021-10-21 PROCEDURE — 96375 TX/PRO/DX INJ NEW DRUG ADDON: CPT

## 2021-10-21 PROCEDURE — 25010000002 ONDANSETRON PER 1 MG: Performed by: EMERGENCY MEDICINE

## 2021-10-21 PROCEDURE — 96374 THER/PROPH/DIAG INJ IV PUSH: CPT

## 2021-10-21 PROCEDURE — 25010000002 HYDROMORPHONE PER 4 MG: Performed by: EMERGENCY MEDICINE

## 2021-10-21 PROCEDURE — 74176 CT ABD & PELVIS W/O CONTRAST: CPT

## 2021-10-21 PROCEDURE — 80053 COMPREHEN METABOLIC PANEL: CPT | Performed by: EMERGENCY MEDICINE

## 2021-10-21 PROCEDURE — 85025 COMPLETE CBC W/AUTO DIFF WBC: CPT | Performed by: EMERGENCY MEDICINE

## 2021-10-21 PROCEDURE — 25010000002 KETOROLAC TROMETHAMINE PER 15 MG: Performed by: EMERGENCY MEDICINE

## 2021-10-21 RX ORDER — SODIUM CHLORIDE 0.9 % (FLUSH) 0.9 %
10 SYRINGE (ML) INJECTION AS NEEDED
Status: DISCONTINUED | OUTPATIENT
Start: 2021-10-21 | End: 2021-10-21 | Stop reason: HOSPADM

## 2021-10-21 RX ORDER — ONDANSETRON 2 MG/ML
4 INJECTION INTRAMUSCULAR; INTRAVENOUS ONCE
Status: COMPLETED | OUTPATIENT
Start: 2021-10-21 | End: 2021-10-21

## 2021-10-21 RX ORDER — HYDROMORPHONE HYDROCHLORIDE 1 MG/ML
0.5 INJECTION, SOLUTION INTRAMUSCULAR; INTRAVENOUS; SUBCUTANEOUS ONCE
Status: COMPLETED | OUTPATIENT
Start: 2021-10-21 | End: 2021-10-21

## 2021-10-21 RX ORDER — OXYCODONE HYDROCHLORIDE AND ACETAMINOPHEN 5; 325 MG/1; MG/1
1-2 TABLET ORAL EVERY 6 HOURS PRN
Qty: 12 TABLET | Refills: 0 | Status: SHIPPED | OUTPATIENT
Start: 2021-10-21 | End: 2022-01-31 | Stop reason: SDDI

## 2021-10-21 RX ORDER — KETOROLAC TROMETHAMINE 30 MG/ML
30 INJECTION, SOLUTION INTRAMUSCULAR; INTRAVENOUS EVERY 6 HOURS PRN
Status: DISCONTINUED | OUTPATIENT
Start: 2021-10-21 | End: 2021-10-21 | Stop reason: HOSPADM

## 2021-10-21 RX ADMIN — HYDROMORPHONE HYDROCHLORIDE 0.5 MG: 1 INJECTION, SOLUTION INTRAMUSCULAR; INTRAVENOUS; SUBCUTANEOUS at 08:26

## 2021-10-21 RX ADMIN — ONDANSETRON 4 MG: 2 INJECTION INTRAMUSCULAR; INTRAVENOUS at 08:26

## 2021-10-21 RX ADMIN — KETOROLAC TROMETHAMINE 30 MG: 30 INJECTION, SOLUTION INTRAMUSCULAR at 10:01

## 2021-10-21 NOTE — ED PROVIDER NOTES
MD ATTESTATION NOTE    The NIKOLE and I have discussed this patient's history, physical exam, and treatment plan.  I have reviewed the documentation and personally had a face to face interaction with the patient. I affirm the documentation and agree with the treatment and plan.  The attached note describes my personal findings.      History  46-year-old female presents with right flank pain off and on over 1 week.  Pain worsened last night around 2 AM.    Physical Exam  Vital Signs reviewed  Alert, Well Appearing in NAD  Abdomen-no significant tenderness to palpation    Disposition  I discussed treatment evaluation this patient with BENJAMIN Mcintosh.  CT scan of the abdomen discussed with Dr. Chelsea Russo shows no obvious acute pathology-no kidney stone, colitis or appendicitis noted.  We will check CBC chemistries and urinalysis for further assessment and to rule out other acute medical problems causing right flank pain.     Arya Wellington MD  10/21/21 0639

## 2021-10-21 NOTE — DISCHARGE INSTRUCTIONS
CT scan did not show any evidence of kidney stone, appendicitis or other serious intra-abdominal pathology.  Blood work and urinalysis was also fairly unremarkable.  Cause of right flank pain is unclear based on ED testing but we found no obvious emergent cause.  If should have increased pain, fever or as needed, do not hesitate to return for further evaluation.  I have sent a prescription for pain medication, Percocet, to your pharmacy which you can use as needed to help with pain.

## 2021-10-21 NOTE — ED PROVIDER NOTES
EMERGENCY DEPARTMENT ENCOUNTER    Room Number:  09/09  Date of encounter:  10/21/2021  PCP: Meaghan Colon MD  Historian: Patient, family      I used full protective equipment while examining this patient.  This includes face mask, gloves and protective eyewear.  I washed my hands before entering the room and immediately upon leaving the room      HPI:  Chief Complaint: Right flank pain  A complete HPI/ROS/PMH/PSH/SH/FH are unobtainable due to: Nothing    Context: Diana Higgins is a 46 y.o. female who presents to the ED c/o intermittent right flank pain times several days.  This pain got significantly worse last night.  Patient states that she has a constant, moderate, tight sensation over her right flank.  This has been present over several days.  She did see her oncologist for this 2 days ago and was started on Flexeril.  She states this has not really improved her pain.  Last night patient states her pain became significantly worse.  She now describes intermittent, stabbing, sharp, severe pains in the right flank.  These symptoms come on without any precipitating or alleviating factors.  The lasts between 4 and 7 seconds and resolve.  She denies any dysuria, hematuria, nausea, vomiting.  She denies any prior history of kidney stones.    Review of Medical Records  I reviewed oncology office visit from 10/18/2021.    PAST MEDICAL HISTORY  Active Ambulatory Problems     Diagnosis Date Noted   • Malignant neoplasm of upper-outer quadrant of right breast in female, estrogen receptor positive (HCC) 03/14/2016   • Other osteoporosis without current pathological fracture 12/21/2016   • Fitting and adjustment of vascular catheter 03/09/2017   • Post-menopausal 03/13/2017   • Chronic idiopathic constipation 10/05/2018   • Anxiety 10/05/2018   • Psychophysiological insomnia 07/10/2019   • Other constipation 07/29/2019   • Reactive depression 05/08/2020   • Cyst of right ovary 12/04/2020   • Sinus tachycardia  04/05/2021   • Vasovagal episode 04/05/2021     Resolved Ambulatory Problems     Diagnosis Date Noted   • Rectal bleed 07/29/2019     Past Medical History:   Diagnosis Date   • Breast cancer (HCC)    • COVID-19 virus infection 09/2020   • Disease of thyroid gland    • Hemorrhoid    • History of cancer chemotherapy    • History of foot fracture    • History of mononucleosis    • History of radiation therapy    • Osteoporosis    • Paroxysmal supraventricular tachycardia (HCC)    • PONV (postoperative nausea and vomiting)    • Sinus headache    • Skin cancer    • Sleep apnea    • Wears glasses          PAST SURGICAL HISTORY  Past Surgical History:   Procedure Laterality Date   • BREAST BIOPSY  2014   • BREAST IMPLANT SURGERY Bilateral 9/1/2021    Procedure: BILATERAL REMOVAL IMPLANTS;  Surgeon: KRISTIN Aguilar MD;  Location: [facility] OR;  Service: Plastics;  Laterality: Bilateral;   • BREAST RECONSTRUCTION Bilateral    • CARDIAC ABLATION  2002    Dr. Garzon 11 years ago tachycardia   • CARDIAC CATHETERIZATION     • COLONOSCOPY N/A    • COLONOSCOPY N/A 8/1/2019    Procedure: COLONOSCOPY TO CECUM WITH HOT POLYPECTOMY;  Surgeon: Pato Mac MD;  Location: [facility] ENDOSCOPY;  Service: General   • ENDOMETRIAL ABLATION  03/2013    Dr. Young   • EYE SURGERY      lasik   • INSERTION OF TISSUE EXPANDER AFTER MASTECTOMY Bilateral    • KNEE ARTHROSCOPY Right     as a teenager   • LASIK     • MASTECTOMY Bilateral 2014   • MASTECTOMY Left    • MASTECTOMY WITH SENTINEL NODE BIOPSY AND AXILLARY NODE DISSECTION Right    • TISSUE EXPANDER  REMOVAL W/ REPLACEMENT OF IMPLANT Bilateral    • TONSILLECTOMY N/A    • VENOUS ACCESS DEVICE (PORT) INSERTION     • VENOUS ACCESS DEVICE (PORT) REMOVAL Left 5/21/2021    Procedure: REMOVAL VENOUS ACCESS DEVICE;  Surgeon: Pato Mca MD;  Location: [facility];  Service: General;  Laterality: Left;         FAMILY HISTORY  Family History   Problem Relation Age of Onset   • Coronary  artery disease Mother    • Heart disease Mother    • Thrombophlebitis Mother         2016   • Heart attack Mother    • Hepatitis Father    • Liver cancer Father    • Crohn's disease Sister    • Drug abuse Brother         Drug overdose   • Cancer Maternal Grandmother 42        Breast   • Thyroid disease Sister    • Other Sister         kidney problems   • Breast cancer Maternal Aunt    • Malig Hyperthermia Neg Hx          SOCIAL HISTORY  Social History     Socioeconomic History   • Marital status:      Spouse name: Misha   • Years of education: College   Tobacco Use   • Smoking status: Never Smoker   • Smokeless tobacco: Never Used   Vaping Use   • Vaping Use: Never used   Substance and Sexual Activity   • Alcohol use: Yes     Comment: 10 drinks yearly   • Drug use: Never   • Sexual activity: Defer         ALLERGIES  Patient has no known allergies.        REVIEW OF SYSTEMS  All systems reviewed and negative except for those discussed in HPI.       PHYSICAL EXAM    I have reviewed the triage vital signs and nursing notes.    ED Triage Vitals [10/21/21 0723]   Temp Heart Rate Resp BP SpO2   97.1 °F (36.2 °C) 119 18 -- 97 %      Temp src Heart Rate Source Patient Position BP Location FiO2 (%)   -- -- -- -- --       Physical Exam  GENERAL: Alert, oriented, nontoxic-appearing, not distressed  HENT: head atraumatic, no nuchal rigidity  EYES: no scleral icterus, EOMI  CV: regular rhythm, regular rate, no murmur  RESPIRATORY: normal effort, CTA  ABDOMEN: soft, nontender.  Mild right CVA tenderness.  MUSCULOSKELETAL: no deformity, FROM, no calf swelling or tenderness  NEURO: alert, moves all extremities, follows commands  SKIN: warm, dry.  No rash.        LAB RESULTS  Recent Results (from the past 24 hour(s))   Urinalysis With Microscopic If Indicated (No Culture) - Urine, Clean Catch    Collection Time: 10/21/21  8:30 AM    Specimen: Urine, Clean Catch   Result Value Ref Range    Color, UA Yellow Yellow, Straw     Appearance, UA Clear Clear    pH, UA 7.5 5.0 - 8.0    Specific Gravity, UA 1.012 1.005 - 1.030    Glucose, UA Negative Negative    Ketones, UA Negative Negative    Bilirubin, UA Negative Negative    Blood, UA Negative Negative    Protein, UA Negative Negative    Leuk Esterase, UA Trace (A) Negative    Nitrite, UA Negative Negative    Urobilinogen, UA 0.2 E.U./dL 0.2 - 1.0 E.U./dL   Urinalysis, Microscopic Only - Urine, Clean Catch    Collection Time: 10/21/21  8:30 AM    Specimen: Urine, Clean Catch   Result Value Ref Range    RBC, UA 0-2 None Seen, 0-2 /HPF    WBC, UA 3-5 (A) None Seen, 0-2 /HPF    Bacteria, UA None Seen None Seen /HPF    Squamous Epithelial Cells, UA 0-2 None Seen, 0-2 /HPF    Hyaline Casts, UA 0-2 None Seen /LPF    Methodology Automated Microscopy    Comprehensive Metabolic Panel    Collection Time: 10/21/21  9:07 AM    Specimen: Blood   Result Value Ref Range    Glucose 93 65 - 99 mg/dL    BUN 12 6 - 20 mg/dL    Creatinine 0.64 0.57 - 1.00 mg/dL    Sodium 139 136 - 145 mmol/L    Potassium 4.2 3.5 - 5.2 mmol/L    Chloride 105 98 - 107 mmol/L    CO2 22.5 22.0 - 29.0 mmol/L    Calcium 8.9 8.6 - 10.5 mg/dL    Total Protein 6.9 6.0 - 8.5 g/dL    Albumin 4.30 3.50 - 5.20 g/dL    ALT (SGPT) 19 1 - 33 U/L    AST (SGOT) 26 1 - 32 U/L    Alkaline Phosphatase 60 39 - 117 U/L    Total Bilirubin 0.3 0.0 - 1.2 mg/dL    eGFR Non African Amer 99 >60 mL/min/1.73    Globulin 2.6 gm/dL    A/G Ratio 1.7 g/dL    BUN/Creatinine Ratio 18.8 7.0 - 25.0    Anion Gap 11.5 5.0 - 15.0 mmol/L   Lipase    Collection Time: 10/21/21  9:07 AM    Specimen: Blood   Result Value Ref Range    Lipase 53 13 - 60 U/L   CBC Auto Differential    Collection Time: 10/21/21  9:07 AM    Specimen: Blood   Result Value Ref Range    WBC 6.72 3.40 - 10.80 10*3/mm3    RBC 4.51 3.77 - 5.28 10*6/mm3    Hemoglobin 13.7 12.0 - 15.9 g/dL    Hematocrit 39.7 34.0 - 46.6 %    MCV 88.0 79.0 - 97.0 fL    MCH 30.4 26.6 - 33.0 pg    MCHC 34.5 31.5 - 35.7  g/dL    RDW 12.0 (L) 12.3 - 15.4 %    RDW-SD 38.4 37.0 - 54.0 fl    MPV 8.8 6.0 - 12.0 fL    Platelets 302 140 - 450 10*3/mm3    Neutrophil % 72.6 42.7 - 76.0 %    Lymphocyte % 17.3 (L) 19.6 - 45.3 %    Monocyte % 8.2 5.0 - 12.0 %    Eosinophil % 1.2 0.3 - 6.2 %    Basophil % 0.4 0.0 - 1.5 %    Immature Grans % 0.3 0.0 - 0.5 %    Neutrophils, Absolute 4.88 1.70 - 7.00 10*3/mm3    Lymphocytes, Absolute 1.16 0.70 - 3.10 10*3/mm3    Monocytes, Absolute 0.55 0.10 - 0.90 10*3/mm3    Eosinophils, Absolute 0.08 0.00 - 0.40 10*3/mm3    Basophils, Absolute 0.03 0.00 - 0.20 10*3/mm3    Immature Grans, Absolute 0.02 0.00 - 0.05 10*3/mm3    nRBC 0.0 0.0 - 0.2 /100 WBC       Ordered the above labs and independently reviewed the results.        RADIOLOGY  CT Abdomen Pelvis Without Contrast    Result Date: 10/21/2021  CT ABDOMEN AND PELVIS WITHOUT IV CONTRAST  HISTORY: 46-year-old female with right-sided flank pain.  TECHNIQUE: Radiation dose reduction techniques were utilized, including automated exposure control and exposure modulation based on body size. 3 mm images were obtained through the abdomen and pelvis without the administration of IV contrast. Compared with previous CT 02/18/2020.  FINDINGS: There are no renal or ureteral stones and there is no hydronephrosis bilaterally. There are no stones within the urinary bladder. Noncontrasted liver, gallbladder, spleen, pancreas, adrenals appear unremarkable. No acute bowel abnormality is seen. The appendix appears within normal limits. Uterus and adnexa appear unremarkable. There is no free fluid or lymphadenopathy.      No acute abnormality is seen. Please follow-up as clinically indicated.  Discussed with Dr. Wellington.        I ordered the above noted radiological studies. Reviewed by me and discussed with radiologist.  See dictation for official radiology interpretation.      MEDICATIONS GIVEN IN ER    Medications   sodium chloride 0.9 % flush 10 mL (has no administration in  time range)   ketorolac (TORADOL) injection 30 mg (has no administration in time range)   ondansetron (ZOFRAN) injection 4 mg (4 mg Intravenous Given 10/21/21 0826)   HYDROmorphone (DILAUDID) injection 0.5 mg (0.5 mg Intravenous Given 10/21/21 0826)         PROGRESS, DATA ANALYSIS, CONSULTS, AND MEDICAL DECISION MAKING    All labs have been independently reviewed by me.  All radiology studies have been reviewed by me and discussed with radiologist dictating the report.   EKG's independently viewed and interpreted by me.  Discussion below represents my analysis of pertinent findings related to patient's condition, differential diagnosis, treatment plan and final disposition.    I have discussed case with Dr. Wellington, emergency room physician.  He has performed his own bedside examination and agrees with treatment plan.    ED Course as of 10/21/21 0958   u Oct 21, 2021   0740 Patient presents with several day history of right flank pain with recent worsening.  Differential diagnoses include not limited to UTI, ureterolithiasis, muscular strain.  For pain control, CT scan. [EE]   0930 Blood, UA: Negative [EE]   0930 Hemoglobin: 13.7 [EE]   0930 WBC: 6.72 [EE]   0957 Patient has reassuring lab work and a negative CT scan.  No evidence of ureterolithiasis.  Suspect musculoskeletal pain.  No evidence of metastases.  Plan for pain control and discharge. [EE]      ED Course User Index  [EE] Perez Mcintosh PA       AS OF 09:58 EDT VITALS:    BP -    HR - 119  TEMP - 97.1 °F (36.2 °C)  O2 SATS - 97%        DIAGNOSIS  Final diagnoses:   Right flank pain         DISPOSITION  Discharged           Perez Mcintosh PA  10/21/21 0958

## 2021-10-22 ENCOUNTER — OFFICE VISIT (OUTPATIENT)
Dept: ONCOLOGY | Facility: CLINIC | Age: 46
End: 2021-10-22

## 2021-10-22 ENCOUNTER — LAB (OUTPATIENT)
Dept: LAB | Facility: HOSPITAL | Age: 46
End: 2021-10-22

## 2021-10-22 VITALS
DIASTOLIC BLOOD PRESSURE: 69 MMHG | WEIGHT: 140.7 LBS | OXYGEN SATURATION: 99 % | RESPIRATION RATE: 18 BRPM | HEART RATE: 82 BPM | BODY MASS INDEX: 22.61 KG/M2 | SYSTOLIC BLOOD PRESSURE: 103 MMHG | TEMPERATURE: 97.3 F | HEIGHT: 66 IN

## 2021-10-22 DIAGNOSIS — Z17.0 MALIGNANT NEOPLASM OF UPPER-OUTER QUADRANT OF RIGHT BREAST IN FEMALE, ESTROGEN RECEPTOR POSITIVE (HCC): ICD-10-CM

## 2021-10-22 DIAGNOSIS — M81.8 OTHER OSTEOPOROSIS WITHOUT CURRENT PATHOLOGICAL FRACTURE: ICD-10-CM

## 2021-10-22 DIAGNOSIS — F41.9 ANXIETY: ICD-10-CM

## 2021-10-22 DIAGNOSIS — M54.50 ACUTE RIGHT-SIDED LOW BACK PAIN WITHOUT SCIATICA: Primary | ICD-10-CM

## 2021-10-22 DIAGNOSIS — F32.9 REACTIVE DEPRESSION: ICD-10-CM

## 2021-10-22 DIAGNOSIS — C50.411 MALIGNANT NEOPLASM OF UPPER-OUTER QUADRANT OF RIGHT BREAST IN FEMALE, ESTROGEN RECEPTOR POSITIVE (HCC): ICD-10-CM

## 2021-10-22 DIAGNOSIS — Z78.0 POST-MENOPAUSAL: ICD-10-CM

## 2021-10-22 DIAGNOSIS — K59.09 OTHER CONSTIPATION: ICD-10-CM

## 2021-10-22 PROCEDURE — 99213 OFFICE O/P EST LOW 20 MIN: CPT | Performed by: INTERNAL MEDICINE

## 2021-10-22 NOTE — PROGRESS NOTES
.    Subjective .     REASONS FOR FOLLOWUP:    1. History of stage IIA, B2S1nP2, ER weakly-positive, [place] negative, HER2-negative breast cancer, status post bilateral mastectomies. The patient completed her adjuvant chemotherapy under NSABP B49 clinical trial.  On 12/14/2015 she has no clinical evidence of recurrence of breast cancer and she remains on Femara for the time being. She also has completed her breast reconstruction with bilateral implants and she is very satisfied in regard to the cosmetic result of this. She will remain on Femara for the time being. She receiving at this time Effexor 37.5 mg a day for hot flashes and anxiety.   2. Bone health. The patient has osteoporosis. She  GETS Reclast infusion  on yearly bases, LAST ONE MAY 12,2021    HISTORY OF PRESENT ILLNESS:    This patient has been brought to the office today after she called again complaining of very severe pain in the back in the lumbar area on the right side with no propagation in absence of any trauma or alterations in the skin. She actually was seen in the emergency room. Radiological assessment was done of her CT scan of the abdomen and pelvis that failed to demonstrate any abnormalities. She has not had any urinary or bowel symptomatology. She has not had any fever, chills or rash. The pain was very acute, incapacitating for any activity and partially controlled with narcotic medication. Muscle relaxer did not phase the problem. The patient besides this is just unable to function because of the intensity of the pain and the spasm associated with this.        Past Medical History:   Diagnosis Date   • Anxiety    • Breast cancer (HCC)     Right, stage IIA, s/p bilateral mastectomy, XRT, and chemotherapy, followed by Dr. Hadley   • COVID-19 virus infection 09/2020    C/O back pain and loss of taste and smell   • Disease of thyroid gland     history no meds now   • Hemorrhoid    • History of cancer chemotherapy    • History of foot fracture      as a child  right   • History of mononucleosis    • History of radiation therapy    • Osteoporosis    • Paroxysmal supraventricular tachycardia (HCC)     CS ostium atrial tachycardia, ablated in 2002 by Dr. Garzon   • PONV (postoperative nausea and vomiting)    • Sinus headache     But no history of migraines   • Skin cancer    • Sleep apnea     mild no machine   • Wears glasses        ONCOLOGIC HISTORY:  (History from previous dates can be found in the separate document.)    Current Outpatient Medications on File Prior to Visit   Medication Sig Dispense Refill   • Cholecalciferol (VITAMIN D-3) 1000 UNITS capsule Take 1,000 Units by mouth Daily.     • cyclobenzaprine (FLEXERIL) 10 MG tablet Take 1 tablet by mouth 3 (Three) Times a Day As Needed for Muscle Spasms. 30 tablet 0   • doxepin (SINEquan) 10 MG capsule Take 1 capsule by mouth Every Night. 10 capsule 0   • letrozole (FEMARA) 2.5 MG tablet Take 1 tablet by mouth Daily. 90 tablet 3   • oxyCODONE-acetaminophen (PERCOCET) 5-325 MG per tablet Take 1-2 tablets by mouth Every 6 (Six) Hours As Needed (pain). 12 tablet 0   • venlafaxine XR (EFFEXOR-XR) 75 MG 24 hr capsule Take 1 capsule by mouth Daily. 90 capsule 1   • Zoledronic Acid (RECLAST IV) Infuse  into a venous catheter. Once a year last dose 5/12/2021       No current facility-administered medications on file prior to visit.       ALLERGIES:   No Known Allergies    Social History     Socioeconomic History   • Marital status:      Spouse name: Misha   • Years of education: College   Tobacco Use   • Smoking status: Never Smoker   • Smokeless tobacco: Never Used   Vaping Use   • Vaping Use: Never used   Substance and Sexual Activity   • Alcohol use: Yes     Comment: 10 drinks yearly   • Drug use: Never   • Sexual activity: Defer         Cancer-related family history includes Breast cancer in her maternal aunt; Cancer (age of onset: 42) in her maternal grandmother; Liver cancer in her father.  "    Review of Systems  As per HPI    Objective      Vitals:    10/22/21 1520   BP: 103/69   Pulse: 82   Resp: 18   Temp: 97.3 °F (36.3 °C)   TempSrc: Temporal   SpO2: 99%   Weight: 63.8 kg (140 lb 11.2 oz)   Height: 167.6 cm (65.98\")   PainSc:   8   PainLoc: Back     Current Status 10/22/2021   ECOG score 0       EXAM:She has a very severe right paravertebral muscle spasm associated with tremendous pain, discomfort and pulling in the muscle upon any activity. She has no tenderness in the rib cage, no tenderness in the spine, no tenderness in the sacroiliac joint, no tenderness in the buttock. No obvious sciatica. Her gait is appropriate but any movement triggers severe spasm.      RECENT LABS:  Hematology WBC   Date Value Ref Range Status   10/21/2021 6.72 3.40 - 10.80 10*3/mm3 Final     RBC   Date Value Ref Range Status   10/21/2021 4.51 3.77 - 5.28 10*6/mm3 Final     Hemoglobin   Date Value Ref Range Status   10/21/2021 13.7 12.0 - 15.9 g/dL Final     Hematocrit   Date Value Ref Range Status   10/21/2021 39.7 34.0 - 46.6 % Final     Platelets   Date Value Ref Range Status   10/21/2021 302 140 - 450 10*3/mm3 Final      CT ABDOMEN AND PELVIS WITHOUT IV CONTRAST     HISTORY: 46-year-old female with right-sided flank pain.     TECHNIQUE: Radiation dose reduction techniques were utilized, including  automated exposure control and exposure modulation based on body size.   3 mm images were obtained through the abdomen and pelvis without the  administration of IV contrast. Compared with previous CT 02/18/2020.     FINDINGS: There are no renal or ureteral stones and there is no  hydronephrosis bilaterally. There are no stones within the urinary  bladder. Noncontrasted liver, gallbladder, spleen, pancreas, adrenals  appear unremarkable. No acute bowel abnormality is seen. The appendix  appears within normal limits. Uterus and adnexa appear unremarkable.  There is no free fluid or lymphadenopathy.     IMPRESSION:  No acute " abnormality is seen. Please follow-up as clinically  indicated.     Discussed with Dr. Wellington.     This report was finalized on 10/21/2021 4:21 PM by Dr. Chelsea Russo M.D.       Assessment/Plan     1. History of stage IIA, [id number], ER weakly-positive, [place] negative, HER2-negative right upper outer quadrant breast cancer, status post bilateral mastectomies. The patient completed her adjuvant chemotherapy under NSABP B49 clinical trial.Patient continues on Femara, 2.5 mg a day. 1. Upon review on 05/08/2020 the patient looks free of cancer recurrence, her clinical examination is normal and she feels substantially better.   As per today I find no evidence of recurrent disease about her breast cancer on her breast examination that remains unchanged with bilateral breast implants and no skeletal pain. She remains on 100% compliance Femara with minimal side effects with the exception of constipation.    · 07/07/2021 no symptoms or signs of breast cancer recurrence. She remains on Femara with 100% compliance and I advised her to remain on this medicine for the time being with no plans to stop this medicine in the next 5 years or so. She is almost accomplishing 5 years with this medication utilization.  ·  09/30/2021 the patient has no symptoms or signs of breast cancer recurrence. Her clinical examination is negative normal. In the interim of time she has had her breast prosthesis removed. This was feeling like a belt tying her chest. She has felt dramatically better since then. Healing is taking place. She remains on her Femara and I advised her to remain on this medicine for the time being.     2.  Chronic constipation: Slow transit has been on multiple medications.    3.  Chronic anxiety, panic attacks, depression: Patient is on Effexor    4.  Insomnia: Previously on gabapentin without much relief.  At that time she was also taking melatonin and ZzzQuil.  We asked her to discontinue those medications.  · 9/30/2021 patient  started on a trial of doxepin 10 mg nightly.    · 10/18/2021 she reports that doxepin has helped with her sleep and is asking for refill of this.    5.  Osteoporosis: Continuing physical activity, vitamin D.  She also receives yearly Reclast last given 5/20/2021.    6.  Previously had a left subclavian port, removed 5/21/2021.    7.  Hypercalcemia: The patient had been taking biotin for Heller growth that was thought to be causing this issue.  Biotin was discontinued and hypercalcemia resolved.    8.  Back pain: This is a new symptom in the past week in the right low back/flank area with some radiation around to the front as well as occasionally into her buttocks.  There is no skin rash, erythema, bony tenderness or tenderness at all on palpation.  We will check a urinalysis today and start her on Flexeril if needed.  We discussed she can continue ibuprofen, heat, or Salonpas patches.  If her pain does not improve by next week, or worsens to notify our office.    The patient requested to be seen again in the office on 10/22/2021 in regard to her back pain. The muscle relaxers have not had an impact. Pain medicine has had some improvement but she has very severe right paravertebral muscle spasm. Radiological assessment done yesterday that I personally reviewed disclosed normal liver and pancreas anatomies, normal kidney anatomies, no kidney stones, no retroperitoneal masses, no adenopathies, no ascites, normal inferior vena cava, normal aorta, normal bowel full of stool, normal bladder. No alterations in the bone structures in the vertebrae or into the disks. No lytic or blastic lesions in her pelvic bone.     Given all these facts and the clinical assessment it is obvious that the patient has a very acute severe back pain with paravertebral muscle spasm.     There is no evidence of shingles, there is no evidence of metastasis, there is no clinical evidence of herniated disk. The patient has requested steroids and I  did not want to pursue this avenue because I find not too much benefit of this intervention under the present circumstances.     My recommendation has been for her to take away her muscle relaxer, to proceed with continuation of her narcotic medication that was provided to her in the emergency room and continue using her laxatives. She will be referred for physical therapy including dry needle stretching exercises, ultrasound and local heat. I suggested stretch exercises with simple flexion of her abdomen muscles and extension of her muscles in the back. I also suggested local heat and massage. She should be better in a few days.    The rest of her other issues in regard to her breast cancer will remain the same management as we described during the previous visit weeks ago.    I personally reviewed the CT scan done in the emergency room yesterday. The above is my report.

## 2021-11-29 RX ORDER — DOXEPIN HYDROCHLORIDE 10 MG/1
10 CAPSULE ORAL NIGHTLY
Qty: 10 CAPSULE | Refills: 0 | Status: SHIPPED | OUTPATIENT
Start: 2021-11-29 | End: 2021-12-22 | Stop reason: SDUPTHER

## 2021-12-22 RX ORDER — DOXEPIN HYDROCHLORIDE 10 MG/1
10 CAPSULE ORAL NIGHTLY
Qty: 10 CAPSULE | Refills: 0 | Status: SHIPPED | OUTPATIENT
Start: 2021-12-22 | End: 2022-01-31 | Stop reason: SDUPTHER

## 2021-12-23 ENCOUNTER — HOSPITAL ENCOUNTER (OUTPATIENT)
Dept: BONE DENSITY | Facility: HOSPITAL | Age: 46
Discharge: HOME | End: 2021-12-23
Admitting: INTERNAL MEDICINE

## 2021-12-23 DIAGNOSIS — F32.9 REACTIVE DEPRESSION: ICD-10-CM

## 2021-12-23 DIAGNOSIS — Z17.0 MALIGNANT NEOPLASM OF UPPER-OUTER QUADRANT OF RIGHT BREAST IN FEMALE, ESTROGEN RECEPTOR POSITIVE (HCC): ICD-10-CM

## 2021-12-23 DIAGNOSIS — F41.9 ANXIETY: ICD-10-CM

## 2021-12-23 DIAGNOSIS — M81.8 OTHER OSTEOPOROSIS WITHOUT CURRENT PATHOLOGICAL FRACTURE: ICD-10-CM

## 2021-12-23 DIAGNOSIS — Z78.0 POST-MENOPAUSAL: ICD-10-CM

## 2021-12-23 DIAGNOSIS — C50.411 MALIGNANT NEOPLASM OF UPPER-OUTER QUADRANT OF RIGHT BREAST IN FEMALE, ESTROGEN RECEPTOR POSITIVE (HCC): ICD-10-CM

## 2021-12-23 DIAGNOSIS — K59.09 OTHER CONSTIPATION: ICD-10-CM

## 2021-12-23 PROCEDURE — 77080 DXA BONE DENSITY AXIAL: CPT

## 2022-01-21 ENCOUNTER — APPOINTMENT (OUTPATIENT)
Dept: LAB | Facility: HOSPITAL | Age: 47
End: 2022-01-21

## 2022-01-25 ENCOUNTER — TELEPHONE (OUTPATIENT)
Dept: ONCOLOGY | Facility: CLINIC | Age: 47
End: 2022-01-25

## 2022-01-31 ENCOUNTER — OFFICE VISIT (OUTPATIENT)
Dept: ONCOLOGY | Facility: CLINIC | Age: 47
End: 2022-01-31

## 2022-01-31 ENCOUNTER — LAB (OUTPATIENT)
Dept: LAB | Facility: HOSPITAL | Age: 47
End: 2022-01-31

## 2022-01-31 VITALS
WEIGHT: 135.4 LBS | BODY MASS INDEX: 21.76 KG/M2 | TEMPERATURE: 97.1 F | HEART RATE: 89 BPM | DIASTOLIC BLOOD PRESSURE: 66 MMHG | RESPIRATION RATE: 16 BRPM | HEIGHT: 66 IN | SYSTOLIC BLOOD PRESSURE: 96 MMHG | OXYGEN SATURATION: 100 %

## 2022-01-31 DIAGNOSIS — C50.411 MALIGNANT NEOPLASM OF UPPER-OUTER QUADRANT OF RIGHT BREAST IN FEMALE, ESTROGEN RECEPTOR POSITIVE: Primary | ICD-10-CM

## 2022-01-31 DIAGNOSIS — F41.9 ANXIETY: ICD-10-CM

## 2022-01-31 DIAGNOSIS — Z78.0 POST-MENOPAUSAL: ICD-10-CM

## 2022-01-31 DIAGNOSIS — F32.9 REACTIVE DEPRESSION: ICD-10-CM

## 2022-01-31 DIAGNOSIS — K59.04 CHRONIC IDIOPATHIC CONSTIPATION: ICD-10-CM

## 2022-01-31 DIAGNOSIS — Z17.0 MALIGNANT NEOPLASM OF UPPER-OUTER QUADRANT OF RIGHT BREAST IN FEMALE, ESTROGEN RECEPTOR POSITIVE: Primary | ICD-10-CM

## 2022-01-31 DIAGNOSIS — K59.09 OTHER CONSTIPATION: ICD-10-CM

## 2022-01-31 DIAGNOSIS — F51.04 PSYCHOPHYSIOLOGICAL INSOMNIA: ICD-10-CM

## 2022-01-31 DIAGNOSIS — M81.8 OTHER OSTEOPOROSIS WITHOUT CURRENT PATHOLOGICAL FRACTURE: ICD-10-CM

## 2022-01-31 LAB
25(OH)D3 SERPL-MCNC: 34.2 NG/ML (ref 30–100)
ALBUMIN SERPL-MCNC: 4.5 G/DL (ref 3.5–5.2)
ALBUMIN/GLOB SERPL: 1.6 G/DL (ref 1.1–2.4)
ALP SERPL-CCNC: 65 U/L (ref 38–116)
ALT SERPL W P-5'-P-CCNC: 14 U/L (ref 0–33)
ANION GAP SERPL CALCULATED.3IONS-SCNC: 12 MMOL/L (ref 5–15)
AST SERPL-CCNC: 22 U/L (ref 0–32)
BASOPHILS # BLD AUTO: 0.04 10*3/MM3 (ref 0–0.2)
BASOPHILS NFR BLD AUTO: 0.8 % (ref 0–1.5)
BILIRUB SERPL-MCNC: 0.3 MG/DL (ref 0.2–1.2)
BUN SERPL-MCNC: 16 MG/DL (ref 6–20)
BUN/CREAT SERPL: 21.1 (ref 7.3–30)
CALCIUM SPEC-SCNC: 9.3 MG/DL (ref 8.5–10.2)
CANCER AG15-3 SERPL-ACNC: 19.5 U/ML
CHLORIDE SERPL-SCNC: 99 MMOL/L (ref 98–107)
CO2 SERPL-SCNC: 27 MMOL/L (ref 22–29)
CREAT SERPL-MCNC: 0.76 MG/DL (ref 0.6–1.1)
DEPRECATED RDW RBC AUTO: 39.7 FL (ref 37–54)
EOSINOPHIL # BLD AUTO: 0.17 10*3/MM3 (ref 0–0.4)
EOSINOPHIL NFR BLD AUTO: 3.4 % (ref 0.3–6.2)
ERYTHROCYTE [DISTWIDTH] IN BLOOD BY AUTOMATED COUNT: 11.8 % (ref 12.3–15.4)
GFR SERPL CREATININE-BSD FRML MDRD: 81 ML/MIN/1.73
GLOBULIN UR ELPH-MCNC: 2.8 GM/DL (ref 1.8–3.5)
GLUCOSE SERPL-MCNC: 64 MG/DL (ref 74–124)
HCT VFR BLD AUTO: 43.3 % (ref 34–46.6)
HGB BLD-MCNC: 14.1 G/DL (ref 12–15.9)
IMM GRANULOCYTES # BLD AUTO: 0.02 10*3/MM3 (ref 0–0.05)
IMM GRANULOCYTES NFR BLD AUTO: 0.4 % (ref 0–0.5)
LYMPHOCYTES # BLD AUTO: 1.29 10*3/MM3 (ref 0.7–3.1)
LYMPHOCYTES NFR BLD AUTO: 26 % (ref 19.6–45.3)
MCH RBC QN AUTO: 29.9 PG (ref 26.6–33)
MCHC RBC AUTO-ENTMCNC: 32.6 G/DL (ref 31.5–35.7)
MCV RBC AUTO: 91.9 FL (ref 79–97)
MONOCYTES # BLD AUTO: 0.54 10*3/MM3 (ref 0.1–0.9)
MONOCYTES NFR BLD AUTO: 10.9 % (ref 5–12)
NEUTROPHILS NFR BLD AUTO: 2.9 10*3/MM3 (ref 1.7–7)
NEUTROPHILS NFR BLD AUTO: 58.5 % (ref 42.7–76)
NRBC BLD AUTO-RTO: 0 /100 WBC (ref 0–0.2)
PLATELET # BLD AUTO: 311 10*3/MM3 (ref 140–450)
PMV BLD AUTO: 8.7 FL (ref 6–12)
POTASSIUM SERPL-SCNC: 3.9 MMOL/L (ref 3.5–4.7)
PROT SERPL-MCNC: 7.3 G/DL (ref 6.3–8)
RBC # BLD AUTO: 4.71 10*6/MM3 (ref 3.77–5.28)
SODIUM SERPL-SCNC: 138 MMOL/L (ref 134–145)
WBC NRBC COR # BLD: 4.96 10*3/MM3 (ref 3.4–10.8)

## 2022-01-31 PROCEDURE — 85025 COMPLETE CBC W/AUTO DIFF WBC: CPT

## 2022-01-31 PROCEDURE — 86300 IMMUNOASSAY TUMOR CA 15-3: CPT | Performed by: INTERNAL MEDICINE

## 2022-01-31 PROCEDURE — 80053 COMPREHEN METABOLIC PANEL: CPT

## 2022-01-31 PROCEDURE — 82306 VITAMIN D 25 HYDROXY: CPT | Performed by: INTERNAL MEDICINE

## 2022-01-31 PROCEDURE — 99215 OFFICE O/P EST HI 40 MIN: CPT | Performed by: INTERNAL MEDICINE

## 2022-01-31 PROCEDURE — 36415 COLL VENOUS BLD VENIPUNCTURE: CPT

## 2022-01-31 RX ORDER — DOXEPIN HYDROCHLORIDE 10 MG/1
10 CAPSULE ORAL NIGHTLY
Qty: 30 CAPSULE | Refills: 6 | Status: SHIPPED | OUTPATIENT
Start: 2022-01-31 | End: 2022-08-30

## 2022-01-31 RX ORDER — CLOBETASOL PROPIONATE 0.46 MG/ML
SOLUTION TOPICAL
COMMUNITY
Start: 2022-01-10

## 2022-02-07 RX ORDER — VENLAFAXINE HYDROCHLORIDE 75 MG/1
CAPSULE, EXTENDED RELEASE ORAL
Qty: 90 CAPSULE | Refills: 1 | Status: SHIPPED | OUTPATIENT
Start: 2022-02-07 | End: 2022-08-17

## 2022-04-05 RX ORDER — LETROZOLE 2.5 MG/1
TABLET, FILM COATED ORAL
Qty: 30 TABLET | Refills: 1 | Status: SHIPPED | OUTPATIENT
Start: 2022-04-05 | End: 2022-05-31

## 2022-05-25 ENCOUNTER — INFUSION (OUTPATIENT)
Dept: ONCOLOGY | Facility: HOSPITAL | Age: 47
End: 2022-05-25

## 2022-05-25 ENCOUNTER — OFFICE VISIT (OUTPATIENT)
Dept: ONCOLOGY | Facility: CLINIC | Age: 47
End: 2022-05-25

## 2022-05-25 VITALS
HEART RATE: 77 BPM | HEIGHT: 66 IN | RESPIRATION RATE: 16 BRPM | BODY MASS INDEX: 22.05 KG/M2 | SYSTOLIC BLOOD PRESSURE: 99 MMHG | WEIGHT: 137.2 LBS | OXYGEN SATURATION: 98 % | TEMPERATURE: 96.9 F | DIASTOLIC BLOOD PRESSURE: 68 MMHG

## 2022-05-25 DIAGNOSIS — Z17.0 MALIGNANT NEOPLASM OF UPPER-OUTER QUADRANT OF RIGHT BREAST IN FEMALE, ESTROGEN RECEPTOR POSITIVE: ICD-10-CM

## 2022-05-25 DIAGNOSIS — C50.411 MALIGNANT NEOPLASM OF UPPER-OUTER QUADRANT OF RIGHT BREAST IN FEMALE, ESTROGEN RECEPTOR POSITIVE: ICD-10-CM

## 2022-05-25 DIAGNOSIS — F32.9 REACTIVE DEPRESSION: ICD-10-CM

## 2022-05-25 DIAGNOSIS — F51.04 PSYCHOPHYSIOLOGICAL INSOMNIA: ICD-10-CM

## 2022-05-25 DIAGNOSIS — M81.8 OTHER OSTEOPOROSIS WITHOUT CURRENT PATHOLOGICAL FRACTURE: ICD-10-CM

## 2022-05-25 DIAGNOSIS — M81.8 OTHER OSTEOPOROSIS WITHOUT CURRENT PATHOLOGICAL FRACTURE: Primary | ICD-10-CM

## 2022-05-25 DIAGNOSIS — K59.04 CHRONIC IDIOPATHIC CONSTIPATION: ICD-10-CM

## 2022-05-25 DIAGNOSIS — Z78.0 POST-MENOPAUSAL: ICD-10-CM

## 2022-05-25 LAB
25(OH)D3 SERPL-MCNC: 34.8 NG/ML (ref 30–100)
ALBUMIN SERPL-MCNC: 4.4 G/DL (ref 3.5–5.2)
ALBUMIN/GLOB SERPL: 1.6 G/DL (ref 1.1–2.4)
ALP SERPL-CCNC: 68 U/L (ref 38–116)
ALT SERPL W P-5'-P-CCNC: 18 U/L (ref 0–33)
ANION GAP SERPL CALCULATED.3IONS-SCNC: 10 MMOL/L (ref 5–15)
AST SERPL-CCNC: 26 U/L (ref 0–32)
BASOPHILS # BLD AUTO: 0.03 10*3/MM3 (ref 0–0.2)
BASOPHILS NFR BLD AUTO: 0.4 % (ref 0–1.5)
BILIRUB SERPL-MCNC: 0.4 MG/DL (ref 0.2–1.2)
BUN SERPL-MCNC: 16 MG/DL (ref 6–20)
BUN/CREAT SERPL: 23.9 (ref 7.3–30)
CALCIUM SPEC-SCNC: 9.9 MG/DL (ref 8.5–10.2)
CANCER AG15-3 SERPL-ACNC: 17.8 U/ML
CHLORIDE SERPL-SCNC: 104 MMOL/L (ref 98–107)
CO2 SERPL-SCNC: 26 MMOL/L (ref 22–29)
CREAT SERPL-MCNC: 0.67 MG/DL (ref 0.6–1.1)
DEPRECATED RDW RBC AUTO: 41.3 FL (ref 37–54)
EGFRCR SERPLBLD CKD-EPI 2021: 108 ML/MIN/1.73
EOSINOPHIL # BLD AUTO: 0.1 10*3/MM3 (ref 0–0.4)
EOSINOPHIL NFR BLD AUTO: 1.4 % (ref 0.3–6.2)
ERYTHROCYTE [DISTWIDTH] IN BLOOD BY AUTOMATED COUNT: 12.2 % (ref 12.3–15.4)
GLOBULIN UR ELPH-MCNC: 2.7 GM/DL (ref 1.8–3.5)
GLUCOSE SERPL-MCNC: 82 MG/DL (ref 74–124)
HCT VFR BLD AUTO: 42.8 % (ref 34–46.6)
HGB BLD-MCNC: 13.7 G/DL (ref 12–15.9)
IMM GRANULOCYTES # BLD AUTO: 0.04 10*3/MM3 (ref 0–0.05)
IMM GRANULOCYTES NFR BLD AUTO: 0.6 % (ref 0–0.5)
LYMPHOCYTES # BLD AUTO: 1.1 10*3/MM3 (ref 0.7–3.1)
LYMPHOCYTES NFR BLD AUTO: 15.3 % (ref 19.6–45.3)
MAGNESIUM SERPL-MCNC: 1.9 MG/DL (ref 1.8–2.5)
MCH RBC QN AUTO: 30 PG (ref 26.6–33)
MCHC RBC AUTO-ENTMCNC: 32 G/DL (ref 31.5–35.7)
MCV RBC AUTO: 93.7 FL (ref 79–97)
MONOCYTES # BLD AUTO: 0.55 10*3/MM3 (ref 0.1–0.9)
MONOCYTES NFR BLD AUTO: 7.6 % (ref 5–12)
NEUTROPHILS NFR BLD AUTO: 5.38 10*3/MM3 (ref 1.7–7)
NEUTROPHILS NFR BLD AUTO: 74.7 % (ref 42.7–76)
NRBC BLD AUTO-RTO: 0 /100 WBC (ref 0–0.2)
PHOSPHATE SERPL-MCNC: 4.3 MG/DL (ref 2.5–4.5)
PLATELET # BLD AUTO: 337 10*3/MM3 (ref 140–450)
PMV BLD AUTO: 8.9 FL (ref 6–12)
POTASSIUM SERPL-SCNC: 4.5 MMOL/L (ref 3.5–4.7)
PROT SERPL-MCNC: 7.1 G/DL (ref 6.3–8)
RBC # BLD AUTO: 4.57 10*6/MM3 (ref 3.77–5.28)
SODIUM SERPL-SCNC: 140 MMOL/L (ref 134–145)
WBC NRBC COR # BLD: 7.2 10*3/MM3 (ref 3.4–10.8)

## 2022-05-25 PROCEDURE — 80053 COMPREHEN METABOLIC PANEL: CPT

## 2022-05-25 PROCEDURE — 82306 VITAMIN D 25 HYDROXY: CPT | Performed by: INTERNAL MEDICINE

## 2022-05-25 PROCEDURE — 86300 IMMUNOASSAY TUMOR CA 15-3: CPT | Performed by: INTERNAL MEDICINE

## 2022-05-25 PROCEDURE — 96374 THER/PROPH/DIAG INJ IV PUSH: CPT

## 2022-05-25 PROCEDURE — 84100 ASSAY OF PHOSPHORUS: CPT

## 2022-05-25 PROCEDURE — 99214 OFFICE O/P EST MOD 30 MIN: CPT | Performed by: INTERNAL MEDICINE

## 2022-05-25 PROCEDURE — 36415 COLL VENOUS BLD VENIPUNCTURE: CPT

## 2022-05-25 PROCEDURE — 83735 ASSAY OF MAGNESIUM: CPT

## 2022-05-25 PROCEDURE — 25010000002 ZOLEDRONIC ACID 5 MG/100ML SOLUTION: Performed by: INTERNAL MEDICINE

## 2022-05-25 PROCEDURE — 85025 COMPLETE CBC W/AUTO DIFF WBC: CPT

## 2022-05-25 RX ORDER — ZOLEDRONIC ACID 5 MG/100ML
5 INJECTION, SOLUTION INTRAVENOUS ONCE
Status: COMPLETED | OUTPATIENT
Start: 2022-05-25 | End: 2022-05-25

## 2022-05-25 RX ORDER — ZOLEDRONIC ACID 5 MG/100ML
5 INJECTION, SOLUTION INTRAVENOUS ONCE
Status: CANCELLED | OUTPATIENT
Start: 2022-05-25

## 2022-05-25 RX ORDER — CEFDINIR 300 MG/1
CAPSULE ORAL
COMMUNITY
Start: 2022-05-04 | End: 2022-09-29

## 2022-05-25 RX ORDER — SODIUM CHLORIDE 9 MG/ML
250 INJECTION, SOLUTION INTRAVENOUS ONCE
Status: COMPLETED | OUTPATIENT
Start: 2022-05-25 | End: 2022-05-25

## 2022-05-25 RX ORDER — SODIUM CHLORIDE 9 MG/ML
250 INJECTION, SOLUTION INTRAVENOUS ONCE
Status: CANCELLED | OUTPATIENT
Start: 2022-05-25

## 2022-05-25 RX ORDER — FLUCONAZOLE 100 MG/1
TABLET ORAL
COMMUNITY
Start: 2022-05-04 | End: 2022-09-29

## 2022-05-25 RX ORDER — ONDANSETRON 4 MG/1
TABLET, ORALLY DISINTEGRATING ORAL
COMMUNITY
Start: 2022-02-24 | End: 2022-10-25

## 2022-05-25 RX ADMIN — ZOLEDRONIC ACID 5 MG: 0.05 INJECTION, SOLUTION INTRAVENOUS at 11:06

## 2022-05-25 RX ADMIN — SODIUM CHLORIDE 250 ML: 9 INJECTION, SOLUTION INTRAVENOUS at 11:05

## 2022-05-25 NOTE — PROGRESS NOTES
.    Subjective .     REASONS FOR FOLLOWUP:    1. History of stage IIA, [id number], ER weakly-positive, MA negative, HER2-negative breast cancer, status post bilateral mastectomies. The patient completed her adjuvant chemotherapy under NSABP B49 clinical trial.  On 12/14/2015 she has no clinical evidence of recurrence of breast cancer and she remains on Femara for the time being. She also has completed her breast reconstruction with bilateral implants and she is very satisfied in regard to the cosmetic result of this. She will remain on Femara for the time being. She receiving at this time Effexor 37.5 mg a day for hot flashes and anxiety.   2. Bone health. The patient has osteoporosis. She  GETS Reclast infusion  on yearly bases, LAST ONE MAY 12,2021    HISTORY OF PRESENT ILLNESS:      DURING THE VISIT WITH THE PATIENT TODAY , PATIENT HAD FACE MASK, I HAD PROPER PROTECTIVE EQUIPMENT, AND I DID HAND HYGIENE WITH SOAP AND WATER BEFORE AND AFTER THE VISIT.    This patient returns today to the office for follow up of the above diagnosis. In the meantime she has had bronchitis triggered by allergies requiring antibiotic and subsequently an antifungal. These symptoms have resolved. She was tested for COVID on at least 3 occasions being negative. She has no fever, no chills, no wheezing, no shortness of breath, no pleuritic pain. Her appetite is back to normal, bowel activity as usual sluggish. Urination is normal. No vaginal bleeding or discharge. No bone pain.     Her daughter is going to be  in a few weeks, she has bought a new house, she is in the process of moving and she is extremely busy with all of these endeavors. She also has quit her job.         Past Medical History:   Diagnosis Date   • Anxiety    • Breast cancer (HCC)     Right, stage IIA, s/p bilateral mastectomy, XRT, and chemotherapy, followed by Dr. Hadley   • COVID-19 virus infection 09/2020    C/O back pain and loss of taste and smell   • Disease  of thyroid gland     history no meds now   • Hemorrhoid    • History of cancer chemotherapy    • History of foot fracture     as a child  right   • History of mononucleosis    • History of radiation therapy    • Osteoporosis    • Paroxysmal supraventricular tachycardia (HCC)     CS ostium atrial tachycardia, ablated in 2002 by Dr. Garzon   • PONV (postoperative nausea and vomiting)    • Sinus headache     But no history of migraines   • Skin cancer    • Sleep apnea     mild no machine   • Wears glasses        ONCOLOGIC HISTORY:  (History from previous dates can be found in the separate document.)    Current Outpatient Medications on File Prior to Visit   Medication Sig Dispense Refill   • albuterol sulfate  (90 Base) MCG/ACT inhaler Inhale 2 puffs Every 4 (Four) Hours As Needed for Wheezing for up to 30 days. 18 g 0   • cefdinir (OMNICEF) 300 MG capsule TAKE 1 CAPSULE BY MOUTH EVERY 12 HOURS FOR 10 DAYS     • Cholecalciferol (VITAMIN D-3) 1000 UNITS capsule Take 1,000 Units by mouth Daily.     • clobetasol (TEMOVATE) 0.05 % external solution      • doxepin (SINEquan) 10 MG capsule Take 1 capsule by mouth Every Night. 30 capsule 6   • fluconazole (DIFLUCAN) 100 MG tablet TAKE 1 TABLET BY MOUTH ONCE A DAY 2 DAYS     • letrozole (FEMARA) 2.5 MG tablet TAKE 1 TABLET BY MOUTH EVERY DAY 30 tablet 1   • ondansetron ODT (ZOFRAN-ODT) 4 MG disintegrating tablet TAKE 1 TABLET BY MOUTH 3 TIMES A DAY AS NEEDED FOR NAUSEA/VOMITING     • venlafaxine XR (EFFEXOR-XR) 75 MG 24 hr capsule TAKE 1 CAPSULE BY MOUTH EVERY DAY 90 capsule 1   • Zoledronic Acid (RECLAST IV) Infuse  into a venous catheter. Once a year last dose 5/12/2021       No current facility-administered medications on file prior to visit.       ALLERGIES:   No Known Allergies    Social History     Socioeconomic History   • Marital status:      Spouse name: Misha   • Years of education: College   Tobacco Use   • Smoking status: Never Smoker   •  "Smokeless tobacco: Never Used   Vaping Use   • Vaping Use: Never used   Substance and Sexual Activity   • Alcohol use: Yes     Comment: 10 drinks yearly   • Drug use: Never   • Sexual activity: Defer         Cancer-related family history includes Breast cancer in her maternal aunt; Cancer (age of onset: 42) in her maternal grandmother; Liver cancer in her father.         Objective      Vitals:    05/25/22 0936   BP: 99/68   Pulse: 77   Resp: 16   Temp: 96.9 °F (36.1 °C)   TempSrc: Temporal   SpO2: 98%   Weight: 62.2 kg (137 lb 3.2 oz)   Height: 167.6 cm (65.98\")   PainSc: 0-No pain     Current Status 10/22/2021   ECOG score 0       EXAM:    I HAVE PERSONALLY REVIEWED THE HISTORY OF THE PRESENT ILLNESS, PAST MEDICAL HISTORY, FAMILY HISTORY, SOCIAL HISTORY, ALLERGIES, MEDICATIONS STATED ABOVE IN THE  NOTE FROM TODAY.        GENERAL:  Well-developed, well-nourished  Patient  in no acute distress.   SKIN:  Warm, dry ,NO purpura ,NO petechiae, no rash.  HEENT:  Pupils were equal and reactive to light and accomodation, conjunctivae noninjected, no pterygium, normal extraocular movements, normal visual acuity.   NECK:  Supple with good range of motion; no thyromegaly , no other masses, no JVD or bruits,.No carotid artery pain, no carotid abnormal pulsation , NO arterial dance.  LYMPHATICS:  No cervical, NO supraclavicular, NO axillary,NO epitrochlear , NO inguinal adenopathies.  CARDIAC   normal rate , regular rhythm, without murmur,NO rubs NO S3 NO S4   LUNGS: normal breath sounds bilateral, no wheezing, NO rhonchi, NO crackles ,NO rubs.  VASCULAR VENOUS: no cyanosis, NO collateral circulation, NO varicosities, NO edema, NO palpable cords, NO pain,NO erythema, NO pigmentation of the skin.  ABDOMEN:  Soft, NO pain,no hepatomegaly, no splenomegaly,no masses, no ascites, no collateral circulation,no distention,no Otis sign.  EXTREMITIES  AND SPINE:  No clubbing, no cyanosis ,no deformities , no pain .No kyphosis,  no pain " in spine, no pain in ribs , no pain in pelvic bone.  NEUROLOGICAL:  Patient was awake, alert, oriented to time, person and place.            RECENT LABS:  Hematology WBC   Date Value Ref Range Status   05/25/2022 7.20 3.40 - 10.80 10*3/mm3 Final     RBC   Date Value Ref Range Status   05/25/2022 4.57 3.77 - 5.28 10*6/mm3 Final     Hemoglobin   Date Value Ref Range Status   05/25/2022 13.7 12.0 - 15.9 g/dL Final     Hematocrit   Date Value Ref Range Status   05/25/2022 42.8 34.0 - 46.6 % Final     Platelets   Date Value Ref Range Status   05/25/2022 337 140 - 450 10*3/mm3 Final                  Assessment & Plan     1. History of stage IIA, [id number], ER weakly-positive, NC negative, HER2-negative right upper outer quadrant breast cancer, status post bilateral mastectomies. The patient completed her adjuvant chemotherapy under NSABP B49 clinical trial.Patient continues on Femara, 2.5 mg a day. 1. Upon review on 05/08/2020 the patient looks free of cancer recurrence, her clinical examination is normal and she feels substantially better.   As per today I find no evidence of recurrent disease about her breast cancer on her breast examination that remains unchanged with bilateral breast implants and no skeletal pain. She remains on 100% compliance Femara with minimal side effects with the exception of constipation.    · 07/07/2021 no symptoms or signs of breast cancer recurrence. She remains on Femara with 100% compliance and I advised her to remain on this medicine for the time being with no plans to stop this medicine in the next 5 years or so. She is almost accomplishing 5 years with this medication utilization.  ·  09/30/2021 the patient has no symptoms or signs of breast cancer recurrence. Her clinical examination is negative normal. In the interim of time she has had her breast prosthesis removed. This was feeling like a belt tying her chest. She has felt dramatically better since then. Healing is taking place. She  remains on her Femara and I advised her to remain on this medicine for the time being.   The patient was reviewed on 01/31/2022. As far as I can tell on clinical grounds, I do not find any evidence of cancer recurrence. Her clinical examination is normal and she remains on letrozole adjuvant therapy for the time being. We discussed the possibility of eventually removing letrozole out of her of medicines. She feels terrified if we stop this medicine and I pointed out to her that need to.     I pointed out that could be technology that could be using the original tumor in order to determine the need of continuation of hormonal therapy. She believes that it will be better off for her to stay on the medicine for the time being.  On 05/25/2022 the patient has no clinical symptoms or signs of breast cancer recurrence. Her clinical examination is normal. Her white count, hemoglobin and platelets are normal, her chemistry profile is normal. We advised her to go ahead and continue her Femara for the time being.       ·   2.  Chronic constipation: Slow transit has been on multiple medications.  Her chronic constipation remains an ongoing issue. She is taking Dulcolax and sometimes she needs to have an enema. She has large bowel activities but not too much propulsive activity in her intestine. Her diet is appropriate and she drinks plenty of liquids.  Her chronic constipation remains a problem. She continues using her laxatives as previous described and she has a bowel movement every 2-3 days. She has proper hydration and she tries to eat a lot of vegetables and fruit.      ·   3.  Chronic anxiety, panic attacks, depression: Patient is on Effexor  The chronic anxiety remains an ongoing issue that is not a new problem and there is no need for medication at this time besides the continuation of her Effexor that will be utilized for depression at the same time.  Her depression is under good control using Effexor. I encouraged her  to remain on this medicine for the time being. She has a lot of other stressors and anxiety triggering factors including the wedding of her daughter, buying a new house, packing and moving and quitting her job. I encouraged her to remain on this medicine.       ·   4.  Insomnia: Previously on gabapentin without much relief.  At that time she was also taking melatonin and ZzzQuil.  We asked her to discontinue those medications.  · 9/30/2021 patient started on a trial of doxepin 10 mg nightly.    · 10/18/2021 she reports that doxepin has helped with her sleep and is asking for refill of this.  She has found that 1 small dose of doxepin 10 mg in the evening is tremendously beneficial to help her to sleep. She has requested a new refill and it will be sent to her pharmacy.  On 05/25/2022 she has found that minimal dose of doxepin is very good for controlling her insomnia. She uses the medicine as needed. I told her to use it every night especially with the multiple stressors that she has at this time. This medicine will have no impact in regard any addiction or anything of that nature.       ·   5.  Osteoporosis: Continuing physical activity, vitamin D.  She also receives yearly Reclast last given 5/20/2021.  I discussed with her on 01/31/2022 that her bone density documents still further bone loss, especially in the lumbar area and on the left hip. I do believe that she will continue the Reclast and the next dose is scheduled for 05/2022. I encouraged her to continue taking her vitamin D and encouraged her to take a magnesium supplementation. I encouraged her to jump the rope that could have a positive impact in regard to her ability to further help bone formation. She is walking probably 7000 to 10,000 steps a day.  On 05/25/2022 given her osteoporosis documented through bone density in the background of aromatase inhibitor use the patient will proceed today with her Reclast that will remain on yearly basis. In  anticipation of that magnesium and phosphorus levels and vitamin D levels will be done.       ·   6.  Previously had a left subclavian port, removed 5/21/2021.    7.  Hypercalcemia: The patient had been taking biotin for Heller growth that was thought to be causing this issue.  Biotin was discontinued and hypercalcemia resolved.  No issues related to hypercalcemia on 01/31/2022 with normal chemistry profile.    ·       RECOMMENDATIONS:   1. The patient will remain on her Femara 2.5 mg a day.  2. The patient will proceed with Reclast today 5 mg IV.  3. The patient will continue checking with us every 4 months with a CBC, CMP and CA 15-3.  4. I encouraged her to remain on her Effexor for anxiety and depression.  5. I encouraged her to remain on doxepin 10 mg oral nightly for insomnia.  6. I encouraged her to remain with proper hydration and proper fiber consumption to minimize constipation.

## 2022-05-31 RX ORDER — LETROZOLE 2.5 MG/1
TABLET, FILM COATED ORAL
Qty: 30 TABLET | Refills: 1 | Status: SHIPPED | OUTPATIENT
Start: 2022-05-31 | End: 2022-08-30

## 2022-07-19 ENCOUNTER — TELEPHONE (OUTPATIENT)
Dept: GASTROENTEROLOGY | Facility: CLINIC | Age: 47
End: 2022-07-19

## 2022-07-19 NOTE — TELEPHONE ENCOUNTER
Caller: Diana Higgins    Relationship to patient: Self    Best call back number: [phone]    Chief complaint: PATIENT WANTS TO SCHEDULE COLONOSCOPY    Type of visit: COLONOSCOPY    Requested date:   FIRST AVAILABLE

## 2022-08-02 ENCOUNTER — TELEPHONE (OUTPATIENT)
Dept: GASTROENTEROLOGY | Facility: CLINIC | Age: 47
End: 2022-08-02

## 2022-08-02 NOTE — TELEPHONE ENCOUNTER
Caller: Diana Higgins    Relationship to patient: Self    Best call back number: [phone]    Chief complaint: 3-YEAR ROUTINE CHECK UP - PRIOR POLYPS REMOVED    Type of visit: COLONOSCOPY     Requested date: PLEASE CALL PATIENT TO SCHEDULE

## 2022-08-17 RX ORDER — VENLAFAXINE HYDROCHLORIDE 75 MG/1
CAPSULE, EXTENDED RELEASE ORAL
Qty: 30 CAPSULE | Refills: 2 | Status: SHIPPED | OUTPATIENT
Start: 2022-08-17 | End: 2022-11-14

## 2022-08-30 RX ORDER — DOXEPIN HYDROCHLORIDE 10 MG/1
CAPSULE ORAL
Qty: 30 CAPSULE | Refills: 6 | Status: SHIPPED | OUTPATIENT
Start: 2022-08-30 | End: 2023-02-09 | Stop reason: SDUPTHER

## 2022-08-30 RX ORDER — LETROZOLE 2.5 MG/1
TABLET, FILM COATED ORAL
Qty: 90 TABLET | Refills: 1 | Status: SHIPPED | OUTPATIENT
Start: 2022-08-30 | End: 2022-09-29 | Stop reason: SINTOL

## 2022-09-01 ENCOUNTER — TELEPHONE (OUTPATIENT)
Dept: GASTROENTEROLOGY | Facility: CLINIC | Age: 47
End: 2022-09-01

## 2022-09-01 NOTE — TELEPHONE ENCOUNTER
Caller: GALILEO    Best call back number: [phone]    Who are you requesting to speak with (clinical staff, provider,  specific staff member): FROILAN JARVIS    What was the call regarding: CALLING FROM DR RACHELL GARCIA OFFICE [facility]. WONDERING WHY PT CANNOT GET A CALL BACK TO SCHEDULE HER COLONOSCOPY. WAITED MONTHS WITH NO REPLY    Do you require a callback: YES

## 2022-09-22 ENCOUNTER — PRE-PROCEDURE SCREENING (OUTPATIENT)
Dept: GASTROENTEROLOGY | Facility: CLINIC | Age: 47
End: 2022-09-22

## 2022-09-29 ENCOUNTER — LAB (OUTPATIENT)
Dept: LAB | Facility: HOSPITAL | Age: 47
End: 2022-09-29

## 2022-09-29 ENCOUNTER — OFFICE VISIT (OUTPATIENT)
Dept: ONCOLOGY | Facility: CLINIC | Age: 47
End: 2022-09-29

## 2022-09-29 VITALS
DIASTOLIC BLOOD PRESSURE: 73 MMHG | WEIGHT: 138 LBS | BODY MASS INDEX: 22.18 KG/M2 | RESPIRATION RATE: 16 BRPM | TEMPERATURE: 97.8 F | SYSTOLIC BLOOD PRESSURE: 111 MMHG | HEART RATE: 77 BPM | HEIGHT: 66 IN

## 2022-09-29 DIAGNOSIS — Z17.0 MALIGNANT NEOPLASM OF UPPER-OUTER QUADRANT OF RIGHT BREAST IN FEMALE, ESTROGEN RECEPTOR POSITIVE: ICD-10-CM

## 2022-09-29 DIAGNOSIS — M81.8 OTHER OSTEOPOROSIS WITHOUT CURRENT PATHOLOGICAL FRACTURE: ICD-10-CM

## 2022-09-29 DIAGNOSIS — Z17.0 MALIGNANT NEOPLASM OF UPPER-OUTER QUADRANT OF RIGHT BREAST IN FEMALE, ESTROGEN RECEPTOR POSITIVE: Primary | ICD-10-CM

## 2022-09-29 DIAGNOSIS — C50.411 MALIGNANT NEOPLASM OF UPPER-OUTER QUADRANT OF RIGHT BREAST IN FEMALE, ESTROGEN RECEPTOR POSITIVE: Primary | ICD-10-CM

## 2022-09-29 DIAGNOSIS — K59.04 CHRONIC IDIOPATHIC CONSTIPATION: ICD-10-CM

## 2022-09-29 DIAGNOSIS — Z78.0 POST-MENOPAUSAL: ICD-10-CM

## 2022-09-29 DIAGNOSIS — C50.411 MALIGNANT NEOPLASM OF UPPER-OUTER QUADRANT OF RIGHT BREAST IN FEMALE, ESTROGEN RECEPTOR POSITIVE: ICD-10-CM

## 2022-09-29 PROBLEM — M54.50 ACUTE RIGHT-SIDED LOW BACK PAIN WITHOUT SCIATICA: Status: RESOLVED | Noted: 2021-10-22 | Resolved: 2022-09-29

## 2022-09-29 LAB
ALBUMIN SERPL-MCNC: 4.5 G/DL (ref 3.5–5.2)
ALBUMIN/GLOB SERPL: 1.7 G/DL (ref 1.1–2.4)
ALP SERPL-CCNC: 63 U/L (ref 38–116)
ALT SERPL W P-5'-P-CCNC: 17 U/L (ref 0–33)
ANION GAP SERPL CALCULATED.3IONS-SCNC: 12.9 MMOL/L (ref 5–15)
AST SERPL-CCNC: 26 U/L (ref 0–32)
BASOPHILS # BLD AUTO: 0.03 10*3/MM3 (ref 0–0.2)
BASOPHILS NFR BLD AUTO: 0.5 % (ref 0–1.5)
BILIRUB SERPL-MCNC: 0.2 MG/DL (ref 0.2–1.2)
BUN SERPL-MCNC: 13 MG/DL (ref 6–20)
BUN/CREAT SERPL: 17.3 (ref 7.3–30)
CALCIUM SPEC-SCNC: 9.6 MG/DL (ref 8.5–10.2)
CANCER AG15-3 SERPL-ACNC: 17.8 U/ML
CHLORIDE SERPL-SCNC: 105 MMOL/L (ref 98–107)
CO2 SERPL-SCNC: 23.1 MMOL/L (ref 22–29)
CREAT SERPL-MCNC: 0.75 MG/DL (ref 0.6–1.1)
DEPRECATED RDW RBC AUTO: 40.9 FL (ref 37–54)
EGFRCR SERPLBLD CKD-EPI 2021: 98.3 ML/MIN/1.73
EOSINOPHIL # BLD AUTO: 0.23 10*3/MM3 (ref 0–0.4)
EOSINOPHIL NFR BLD AUTO: 3.7 % (ref 0.3–6.2)
ERYTHROCYTE [DISTWIDTH] IN BLOOD BY AUTOMATED COUNT: 11.9 % (ref 12.3–15.4)
GLOBULIN UR ELPH-MCNC: 2.7 GM/DL (ref 1.8–3.5)
GLUCOSE SERPL-MCNC: 111 MG/DL (ref 74–124)
HCT VFR BLD AUTO: 43.3 % (ref 34–46.6)
HGB BLD-MCNC: 13.8 G/DL (ref 12–15.9)
IMM GRANULOCYTES # BLD AUTO: 0.02 10*3/MM3 (ref 0–0.05)
IMM GRANULOCYTES NFR BLD AUTO: 0.3 % (ref 0–0.5)
LYMPHOCYTES # BLD AUTO: 1.67 10*3/MM3 (ref 0.7–3.1)
LYMPHOCYTES NFR BLD AUTO: 27 % (ref 19.6–45.3)
MCH RBC QN AUTO: 29.7 PG (ref 26.6–33)
MCHC RBC AUTO-ENTMCNC: 31.9 G/DL (ref 31.5–35.7)
MCV RBC AUTO: 93.3 FL (ref 79–97)
MONOCYTES # BLD AUTO: 0.48 10*3/MM3 (ref 0.1–0.9)
MONOCYTES NFR BLD AUTO: 7.8 % (ref 5–12)
NEUTROPHILS NFR BLD AUTO: 3.75 10*3/MM3 (ref 1.7–7)
NEUTROPHILS NFR BLD AUTO: 60.7 % (ref 42.7–76)
NRBC BLD AUTO-RTO: 0 /100 WBC (ref 0–0.2)
PLATELET # BLD AUTO: 300 10*3/MM3 (ref 140–450)
PMV BLD AUTO: 8.7 FL (ref 6–12)
POTASSIUM SERPL-SCNC: 3.9 MMOL/L (ref 3.5–4.7)
PROT SERPL-MCNC: 7.2 G/DL (ref 6.3–8)
RBC # BLD AUTO: 4.64 10*6/MM3 (ref 3.77–5.28)
SODIUM SERPL-SCNC: 141 MMOL/L (ref 134–145)
WBC NRBC COR # BLD: 6.18 10*3/MM3 (ref 3.4–10.8)

## 2022-09-29 PROCEDURE — 80053 COMPREHEN METABOLIC PANEL: CPT

## 2022-09-29 PROCEDURE — 36415 COLL VENOUS BLD VENIPUNCTURE: CPT

## 2022-09-29 PROCEDURE — 86300 IMMUNOASSAY TUMOR CA 15-3: CPT | Performed by: INTERNAL MEDICINE

## 2022-09-29 PROCEDURE — 85025 COMPLETE CBC W/AUTO DIFF WBC: CPT

## 2022-09-29 PROCEDURE — 99214 OFFICE O/P EST MOD 30 MIN: CPT | Performed by: INTERNAL MEDICINE

## 2022-09-29 RX ORDER — EXEMESTANE 25 MG/1
25 TABLET ORAL DAILY
Qty: 30 TABLET | Refills: 6 | Status: SHIPPED | OUTPATIENT
Start: 2022-09-29

## 2022-09-29 NOTE — PROGRESS NOTES
.    Subjective .     REASONS FOR FOLLOWUP:    1. History of stage IIA, L7P3oK1, ER weakly-positive, TX negative, HER2-negative breast cancer, status post bilateral mastectomies. The patient completed her adjuvant chemotherapy under NSABP B49 clinical trial.  On 12/14/2015 she has no clinical evidence of recurrence of breast cancer and she remains on Femara for the time being. She also has completed her breast reconstruction with bilateral implants and she is very satisfied in regard to the cosmetic result of this. She will remain on Femara for the time being. She receiving at this time Effexor 37.5 mg a day for hot flashes and anxiety.   2. Bone health. The patient has osteoporosis. She  GETS Reclast infusion  on yearly bases, LAST ONE MAY 12,2021    HISTORY OF PRESENT ILLNESS:      DURING THE VISIT WITH THE PATIENT TODAY , PATIENT HAD FACE MASK, I HAD PROPER PROTECTIVE EQUIPMENT, AND I DID HAND HYGIENE WITH SOAP AND WATER BEFORE AND AFTER THE VISIT.    This patient returns today to the office for followup. The main symptom that he still has is slow transit constipation and unless that she takes Dulcolax she does not go to the bathroom. This independent of her diet and her ability to eat vegetables and fiber content in her diet besides liquid consumption. The patient is not having any passage of blood in the stool. Besides her previous history of breast cancer for which she remains on adjuvant hormonal therapy she has not had any new health problems. Her appetite is good. Her weight is stable. Urination is normal with some frequency. No urgency. No vaginal bleeding. No cardiac or respiratory issues. No bone pain. No neurological symptoms. Her insomnia remains under some degree of control and her anxiety, depression as well with medication provided by me. We discussed today issues pertinent to the need for a colonoscopy and also issues pertinent to the consideration of changing her Femara for a different medicine to  see if the constipation issue improves. Please review below.        Past Medical History:   Diagnosis Date   • Anxiety    • Breast cancer (HCC)     Right, stage IIA, s/p bilateral mastectomy, XRT, and chemotherapy, followed by Dr. Hadley   • COVID-19 virus infection 09/2020    C/O back pain and loss of taste and smell   • Disease of thyroid gland     history no meds now   • Hemorrhoid    • History of cancer chemotherapy    • History of foot fracture     as a child  right   • History of mononucleosis    • History of radiation therapy    • Osteoporosis    • Paroxysmal supraventricular tachycardia (HCC)     CS ostium atrial tachycardia, ablated in 2002 by Dr. Garozn   • PONV (postoperative nausea and vomiting)    • Sinus headache     But no history of migraines   • Skin cancer    • Sleep apnea     mild no machine   • Wears glasses        ONCOLOGIC HISTORY:  (History from previous dates can be found in the separate document.)    Current Outpatient Medications on File Prior to Visit   Medication Sig Dispense Refill   • Cholecalciferol (VITAMIN D-3) 1000 UNITS capsule Take 1,000 Units by mouth Daily.     • clobetasol (TEMOVATE) 0.05 % external solution      • doxepin (SINEquan) 10 MG capsule TAKE 1 CAPSULE BY MOUTH EVERY DAY AT NIGHT 30 capsule 6   • ondansetron ODT (ZOFRAN-ODT) 4 MG disintegrating tablet TAKE 1 TABLET BY MOUTH 3 TIMES A DAY AS NEEDED FOR NAUSEA/VOMITING     • venlafaxine XR (EFFEXOR-XR) 75 MG 24 hr capsule TAKE 1 CAPSULE BY MOUTH EVERY DAY 30 capsule 2   • Zoledronic Acid (RECLAST IV) Infuse  into a venous catheter. Once a year last dose 5/12/2021     • [DISCONTINUED] letrozole (FEMARA) 2.5 MG tablet TAKE 1 TABLET BY MOUTH EVERY DAY 90 tablet 1   • [DISCONTINUED] cefdinir (OMNICEF) 300 MG capsule TAKE 1 CAPSULE BY MOUTH EVERY 12 HOURS FOR 10 DAYS     • [DISCONTINUED] fluconazole (DIFLUCAN) 100 MG tablet TAKE 1 TABLET BY MOUTH ONCE A DAY 2 DAYS       No current facility-administered medications on file  "prior to visit.       ALLERGIES:   No Known Allergies    Social History     Socioeconomic History   • Marital status:      Spouse name: Misha   • Years of education: College   Tobacco Use   • Smoking status: Never Smoker   • Smokeless tobacco: Never Used   Vaping Use   • Vaping Use: Never used   Substance and Sexual Activity   • Alcohol use: Yes     Comment: 10 drinks yearly   • Drug use: Never   • Sexual activity: Defer         Cancer-related family history includes Breast cancer in her maternal aunt; Cancer (age of onset: 42) in her maternal grandmother; Liver cancer in her father.         Objective      Vitals:    09/29/22 1423   BP: 111/73   Pulse: 77   Resp: 16   Temp: 97.8 °F (36.6 °C)   TempSrc: Temporal   Weight: 62.6 kg (138 lb)   Height: 167.6 cm (65.98\")   PainSc: 0-No pain     Current Status 10/22/2021   ECOG score 0       EXAM:          GENERAL:  Well-developed, well-nourished  Patient  in no acute distress.   SKIN:  Warm, dry ,NO purpura ,no rash.  HEENT:  Pupils were equal and reactive to light and accomodation, conjunctivae noninjected, normal extraocular movements, normal visual acuity.   NECK:  Supple with good range of motion; no thyromegaly , no JVD or bruits,.No carotid artery pain, no carotid abnormal pulsation   LYMPHATICS:  No cervical, NO supraclavicular, NO axillary, NO inguinal adenopathies.  CARDIAC   normal rate , regular rhythm, without murmur,NO rubs NO S3 NO S4   LUNGS: normal breath sounds bilateral, no wheezing, NO rhonchi, NO crackles ,NO rubs.  CHEST WALL BILATERAL REMOVAL OF IMPLANTS NO NODULES OR MASES  VASCULAR VENOUS: no cyanosis, NO collateral circulation, NO varicosities, NO edema, NO palpable cords, NO pain,NO erythema, NO pigmentation of the skin.  ABDOMEN:  Soft, NO pain,no hepatomegaly, no splenomegaly,no masses, no ascites, no collateral circulation,no distention.  EXTREMITIES  AND SPINE:  No clubbing, no cyanosis ,no deformities , no pain .No kyphosis,  no " pain in spine, no pain in ribs , no pain in pelvic bone.  NEUROLOGICAL:  Patient was awake, alert, oriented to time, person and place.              RECENT LABS:  Hematology WBC   Date Value Ref Range Status   09/29/2022 6.18 3.40 - 10.80 10*3/mm3 Final     RBC   Date Value Ref Range Status   09/29/2022 4.64 3.77 - 5.28 10*6/mm3 Final     Hemoglobin   Date Value Ref Range Status   09/29/2022 13.8 12.0 - 15.9 g/dL Final     Hematocrit   Date Value Ref Range Status   09/29/2022 43.3 34.0 - 46.6 % Final     Platelets   Date Value Ref Range Status   09/29/2022 300 140 - 450 10*3/mm3 Final                  Assessment & Plan     1. History of stage IIA, L3A3tG5, ER weakly-positive, NH negative, HER2-negative right upper outer quadrant breast cancer, status post bilateral mastectomies. The patient completed her adjuvant chemotherapy under NSABP B49 clinical trial.Patient continues on Femara, 2.5 mg a day. 1. Upon review on 05/08/2020 the patient looks free of cancer recurrence, her clinical examination is normal and she feels substantially better.   As per today I find no evidence of recurrent disease about her breast cancer on her breast examination that remains unchanged with bilateral breast implants and no skeletal pain. She remains on 100% compliance Femara with minimal side effects with the exception of constipation.    · 07/07/2021 no symptoms or signs of breast cancer recurrence. She remains on Femara with 100% compliance and I advised her to remain on this medicine for the time being with no plans to stop this medicine in the next 5 years or so. She is almost accomplishing 5 years with this medication utilization.  ·  09/30/2021 the patient has no symptoms or signs of breast cancer recurrence. Her clinical examination is negative normal. In the interim of time she has had her breast prosthesis removed. This was feeling like a belt tying her chest. She has felt dramatically better since then. Healing is taking  place. She remains on her Femara and I advised her to remain on this medicine for the time being.   The patient was reviewed on 01/31/2022. As far as I can tell on clinical grounds, I do not find any evidence of cancer recurrence. Her clinical examination is normal and she remains on letrozole adjuvant therapy for the time being. We discussed the possibility of eventually removing letrozole out of her of medicines. She feels terrified if we stop this medicine and I pointed out to her that need to.     I pointed out that could be technology that could be using the original tumor in order to determine the need of continuation of hormonal therapy. She believes that it will be better off for her to stay on the medicine for the time being.  On 05/25/2022 the patient has no clinical symptoms or signs of breast cancer recurrence. Her clinical examination is normal. Her white count, hemoglobin and platelets are normal, her chemistry profile is normal. We advised her to go ahead and continue her Femara for the time being.   On 09/29/2022, given the persistency of the constipation and having nothing else to blame besides the Femara I advised the patient to discontinue this medicine and move into Aromasin at the dose of 25 mg a day. I asked her to give this medicine at least 3 or 4 weeks to see what happens in regard to bowel activity and if everything works okay I asked her to call my nurse, Elaine Hendrickson, to notify us about the status of this. In the meantime it will be okay for her to remain on her Dulcolax for the time being. On clinical grounds she has no symptoms or signs of breast cancer recurrence. Her implants have been removed. Her chest wall is unremarkable. She has occasional kind of phantom pain in the right chest wall but there is no nodularity or alterations otherwise. There is no lymphedema in upper extremities. She will be watched in absence of any other intervention, returning to see us back every 4 months  with a CBC, CMP and a CA 15-3.        ·   2.  Chronic constipation: Slow transit has been on multiple medications.  Her chronic constipation remains an ongoing issue. She is taking Dulcolax and sometimes she needs to have an enema. She has large bowel activities but not too much propulsive activity in her intestine. Her diet is appropriate and she drinks plenty of liquids.  Her chronic constipation remains a problem. She continues using her laxatives as previous described and she has a bowel movement every 2-3 days. She has proper hydration and she tries to eat a lot of vegetables and fruit.  On 09/29/2022 Dulcolax was advised on her twice a day if necessary and in order to try to remove medications to see if this makes a difference in regard to her bowel activity, I discontinued the Femara and moved into Aromasin at the dose of 25 mg a day. I asked her to call and notify us if she sees any changes with this modification.        ·   3.  Chronic anxiety, panic attacks, depression: Patient is on Effexor  The chronic anxiety remains an ongoing issue that is not a new problem and there is no need for medication at this time besides the continuation of her Effexor that will be utilized for depression at the same time.  Her depression is under good control using Effexor. I encouraged her to remain on this medicine for the time being. She has a lot of other stressors and anxiety triggering factors including the wedding of her daughter, buying a new house, packing and moving and quitting her job. I encouraged her to remain on this medicine.   On 09/29/2022 she feels fine with the Effexor that she takes and the doxepin that she takes for insomnia. I asked her to continue these medicines for the time being.        ·   4.  Insomnia: Previously on gabapentin without much relief.  At that time she was also taking melatonin and ZzzQuil.  We asked her to discontinue those medications.  · 9/30/2021 patient started on a trial of  doxepin 10 mg nightly.    · 10/18/2021 she reports that doxepin has helped with her sleep and is asking for refill of this.  She has found that 1 small dose of doxepin 10 mg in the evening is tremendously beneficial to help her to sleep. She has requested a new refill and it will be sent to her pharmacy.  On 05/25/2022 she has found that minimal dose of doxepin is very good for controlling her insomnia. She uses the medicine as needed. I told her to use it every night especially with the multiple stressors that she has at this time. This medicine will have no impact in regard any addiction or anything of that nature.       ·   5.  Osteoporosis: Continuing physical activity, vitamin D.  She also receives yearly Reclast last given 5/20/2021.  I discussed with her on 01/31/2022 that her bone density documents still further bone loss, especially in the lumbar area and on the left hip. I do believe that she will continue the Reclast and the next dose is scheduled for 05/2022. I encouraged her to continue taking her vitamin D and encouraged her to take a magnesium supplementation. I encouraged her to jump the rope that could have a positive impact in regard to her ability to further help bone formation. She is walking probably 7000 to 10,000 steps a day.  On 05/25/2022 given her osteoporosis documented through bone density in the background of aromatase inhibitor use the patient will proceed today with her Reclast that will remain on yearly basis. In anticipation of that magnesium and phosphorus levels and vitamin D levels will be done.   On 09/29/2022 doxepin will remain at a dose of 10 mg at bedtime. It is helpful in regard to her insomnia.        ·   6.  Previously had a left subclavian port, removed 5/21/2021.    7.  Hypercalcemia: The patient had been taking biotin for Heller growth that was thought to be causing this issue.  Biotin was discontinued and hypercalcemia resolved.  No issues related to hypercalcemia on  01/31/2022 with normal chemistry profile.  On 09/29/2022 her calcium level today is completely normal. She is not taking any other multivitamins containing calcium or biotin.      ·       RECOMMENDATIONS:   Each one of the points have been clearly discussed and stated above. The patient will be seen again in 4 months with a CBC, CMP, CA 15-3. No need for radiological assessment at this time. I asked her to call if any need or any other concerns.

## 2022-09-30 ENCOUNTER — TELEPHONE (OUTPATIENT)
Dept: ONCOLOGY | Facility: CLINIC | Age: 47
End: 2022-09-30

## 2022-09-30 NOTE — TELEPHONE ENCOUNTER
----- Message from Alf Hadley MD sent at 9/30/2022  9:53 AM EDT -----  Call all her labs are fine

## 2022-10-25 ENCOUNTER — OFFICE VISIT (OUTPATIENT)
Dept: GASTROENTEROLOGY | Facility: CLINIC | Age: 47
End: 2022-10-25

## 2022-10-25 VITALS
WEIGHT: 137 LBS | SYSTOLIC BLOOD PRESSURE: 99 MMHG | HEIGHT: 67 IN | DIASTOLIC BLOOD PRESSURE: 67 MMHG | TEMPERATURE: 97.3 F | BODY MASS INDEX: 21.5 KG/M2

## 2022-10-25 DIAGNOSIS — Z86.010 HISTORY OF ADENOMATOUS POLYP OF COLON: ICD-10-CM

## 2022-10-25 DIAGNOSIS — K59.04 CHRONIC IDIOPATHIC CONSTIPATION: Primary | ICD-10-CM

## 2022-10-25 PROCEDURE — 99204 OFFICE O/P NEW MOD 45 MIN: CPT | Performed by: INTERNAL MEDICINE

## 2022-10-25 RX ORDER — PLECANATIDE 3 MG/1
3 TABLET ORAL DAILY
Qty: 30 TABLET | Refills: 3 | Status: SHIPPED | OUTPATIENT
Start: 2022-10-25 | End: 2022-11-17

## 2022-10-25 NOTE — PROGRESS NOTES
Subjective   Chief Complaint   Patient presents with   • Constipation       Diana Higgins is a  47 y.o. female here for constipation.  All problems are new to me today.    She has a history of chronic constipation which has worsened over the past several years.  She is tried over-the-counter remedies including MiraLAX with no improvement.  She is tried Linzess which caused bloating.  She only has a bowel movement if she takes 2 Dulcolax and she does this about twice a week.  She goes 3 to 4 days before she takes any medication but she feels miserable.    Patient has a history of polyp on her 2019 colonoscopy-prep was good at that time.  Path revealed tubular adenoma and 5-year follow-up was recommended by Dr. Mac.      HPI  Past Medical History:   Diagnosis Date   • Anxiety    • Breast cancer (HCC)     Right, stage IIA, s/p bilateral mastectomy, XRT, and chemotherapy, followed by Dr. Hadley   • Colon polyp 08/2019   • COVID-19 virus infection 09/2020    C/O back pain and loss of taste and smell   • Disease of thyroid gland     history no meds now   • Hemorrhoid    • History of cancer chemotherapy    • History of foot fracture     as a child  right   • History of mononucleosis    • History of radiation therapy    • Osteoporosis    • Paroxysmal supraventricular tachycardia (HCC)     CS ostium atrial tachycardia, ablated in 2002 by Dr. Garzon   • PONV (postoperative nausea and vomiting)    • Sinus headache     But no history of migraines   • Skin cancer    • Sleep apnea     mild no machine   • Wears glasses      Past Surgical History:   Procedure Laterality Date   • BREAST BIOPSY  2014   • BREAST IMPLANT SURGERY Bilateral 09/01/2021    Procedure: BILATERAL REMOVAL IMPLANTS;  Surgeon: KRISTIN Aguilar MD;  Location: [facility];  Service: Plastics;  Laterality: Bilateral;   • BREAST RECONSTRUCTION Bilateral    • CARDIAC ABLATION  2002    Dr. Garzon 11 years ago tachycardia   • CARDIAC CATHETERIZATION     •  COLONOSCOPY N/A    • COLONOSCOPY N/A 08/01/2019    Procedure: COLONOSCOPY TO CECUM WITH HOT POLYPECTOMY;  Surgeon: Pato Mac MD;  Location: [facility] ENDOSCOPY;  Service: General   • ENDOMETRIAL ABLATION  03/2013    Dr. Young   • EYE SURGERY      lasik   • INSERTION OF TISSUE EXPANDER AFTER MASTECTOMY Bilateral    • KNEE ARTHROSCOPY Right     as a teenager   • LASIK     • MASTECTOMY Bilateral 2014   • MASTECTOMY Left    • MASTECTOMY WITH SENTINEL NODE BIOPSY AND AXILLARY NODE DISSECTION Right    • TISSUE EXPANDER  REMOVAL W/ REPLACEMENT OF IMPLANT Bilateral    • TONSILLECTOMY N/A    • VENOUS ACCESS DEVICE (PORT) INSERTION     • VENOUS ACCESS DEVICE (PORT) REMOVAL Left 05/21/2021    Procedure: REMOVAL VENOUS ACCESS DEVICE;  Surgeon: Pato Mac MD;  Location:  [facility];  Service: General;  Laterality: Left;       Current Outpatient Medications:   •  Cholecalciferol (VITAMIN D-3) 1000 UNITS capsule, Take 1,000 Units by mouth Daily., Disp: , Rfl:   •  clobetasol (TEMOVATE) 0.05 % external solution, , Disp: , Rfl:   •  doxepin (SINEquan) 10 MG capsule, TAKE 1 CAPSULE BY MOUTH EVERY DAY AT NIGHT, Disp: 30 capsule, Rfl: 6  •  exemestane (Aromasin) 25 MG chemo tablet, Take 1 tablet by mouth Daily., Disp: 30 tablet, Rfl: 6  •  venlafaxine XR (EFFEXOR-XR) 75 MG 24 hr capsule, TAKE 1 CAPSULE BY MOUTH EVERY DAY, Disp: 30 capsule, Rfl: 2  •  Zoledronic Acid (RECLAST IV), Infuse  into a venous catheter. Once a year last dose 5/12/2021, Disp: , Rfl:   •  Plecanatide (Trulance) 3 MG tablet, Take 1 tablet by mouth Daily., Disp: 30 tablet, Rfl: 3  PRN Meds:.  No Known Allergies  Social History     Socioeconomic History   • Marital status:      Spouse name: Misha   • Years of education: College   Tobacco Use   • Smoking status: Never   • Smokeless tobacco: Never   Vaping Use   • Vaping Use: Never used   Substance and Sexual Activity   • Alcohol use: Not Currently     Comment: 10 drinks yearly   • Drug use:  Never   • Sexual activity: Defer     Family History   Problem Relation Age of Onset   • Coronary artery disease Mother    • Heart disease Mother    • Thrombophlebitis Mother         2016   • Heart attack Mother    • Liver cancer Mother    • Hepatitis Father    • Liver cancer Father    • Crohn's disease Sister    • Drug abuse Brother         Drug overdose   • Cancer Maternal Grandmother 42        Breast   • Thyroid disease Sister    • Other Sister         kidney problems   • Breast cancer Maternal Aunt    • Crohn's disease Sister    • Malig Hyperthermia Neg Hx      Review of Systems   Constitutional: Negative for appetite change and unexpected weight change.   Gastrointestinal: Positive for constipation. Negative for abdominal pain.     Vitals:    10/25/22 1444   BP: 99/67   Temp: 97.3 °F (36.3 °C)         10/25/22  1444   Weight: 62.1 kg (137 lb)       Objective   Physical Exam  Vitals and nursing note reviewed.   Constitutional:       Appearance: She is well-developed.   HENT:      Head: Normocephalic and atraumatic.   Eyes:      General: No scleral icterus.     Conjunctiva/sclera: Conjunctivae normal.   Cardiovascular:      Rate and Rhythm: Normal rate and regular rhythm.   Pulmonary:      Effort: Pulmonary effort is normal.   Abdominal:      General: Bowel sounds are normal. There is no distension.      Palpations: Abdomen is soft.   Musculoskeletal:      Cervical back: Normal range of motion and neck supple.   Skin:     General: Skin is warm and dry.   Neurological:      Mental Status: She is alert and oriented to person, place, and time.       No radiology results for the last 7 days    Assessment & Plan   Diagnoses and all orders for this visit:    Chronic idiopathic constipation    History of adenomatous polyp of colon    Other orders  -     Plecanatide (Trulance) 3 MG tablet; Take 1 tablet by mouth Daily.      Plan:  · Recommend trial Trulance daily for constipation.  She can add MiraLAX as needed.  Continue  adequate fluid consumption, high-fiber diet, regular physical activity.  She is fairly Orthodox about these things.  · Repeat colonoscopy in 2024 due to small adenomatous polyp found on her exam in 2019.

## 2022-10-27 ENCOUNTER — PREP FOR SURGERY (OUTPATIENT)
Dept: OTHER | Facility: HOSPITAL | Age: 47
End: 2022-10-27

## 2022-10-27 ENCOUNTER — TELEPHONE (OUTPATIENT)
Dept: GASTROENTEROLOGY | Facility: CLINIC | Age: 47
End: 2022-10-27

## 2022-10-27 DIAGNOSIS — Z86.010 HISTORY OF ADENOMATOUS POLYP OF COLON: Primary | ICD-10-CM

## 2022-10-27 PROBLEM — Z86.0101 HISTORY OF ADENOMATOUS POLYP OF COLON: Status: ACTIVE | Noted: 2022-10-27

## 2022-10-27 NOTE — TELEPHONE ENCOUNTER
JESUS Greene for COLONOSCOPY on 2/1/23 arrive at 815am.Prep instructions mailed to address on file.

## 2022-10-27 NOTE — TELEPHONE ENCOUNTER
----- Message from Diana Higgins sent at 10/26/2022  2:10 PM EDT -----  Regarding: Colonoscopy  Contact: [phone]  Good afternoon Dr. Lopez,  I emailed Dr. Hadley about the colonoscopy we talked about yesterday.  He said he would like for me to have one at this time rather than waiting for the 5-year time.      Thank you,  Diana Higgins

## 2022-11-01 ENCOUNTER — TELEPHONE (OUTPATIENT)
Dept: GASTROENTEROLOGY | Facility: CLINIC | Age: 47
End: 2022-11-01

## 2022-11-01 NOTE — TELEPHONE ENCOUNTER
----- Message from Diana Higgins sent at 10/31/2022  4:14 PM EDT -----  Regarding: new Medicine  Contact: [phone]  Hi,  When I spoke to you last week at my appointment you wanted me to stop taking the Dulcolax to help me use the bathroom.  I started taking the Trulance on Thursday.  I have not gone to the bathroom since and I have been putting miralax in my water 2-3 times a day the past couple of days.  Do you have any suggestions on what I can do?  I am going on day 5 without a bowel movement.

## 2022-11-09 ENCOUNTER — OFFICE VISIT (OUTPATIENT)
Dept: FAMILY MEDICINE CLINIC | Facility: CLINIC | Age: 47
End: 2022-11-09

## 2022-11-09 VITALS
DIASTOLIC BLOOD PRESSURE: 62 MMHG | OXYGEN SATURATION: 98 % | HEIGHT: 67 IN | BODY MASS INDEX: 21.97 KG/M2 | SYSTOLIC BLOOD PRESSURE: 100 MMHG | HEART RATE: 72 BPM | TEMPERATURE: 97.8 F | WEIGHT: 140 LBS

## 2022-11-09 DIAGNOSIS — Z00.00 ROUTINE GENERAL MEDICAL EXAMINATION AT A HEALTH CARE FACILITY: Primary | ICD-10-CM

## 2022-11-09 DIAGNOSIS — Z23 FLU VACCINE NEED: ICD-10-CM

## 2022-11-09 DIAGNOSIS — K59.04 CHRONIC IDIOPATHIC CONSTIPATION: ICD-10-CM

## 2022-11-09 PROBLEM — B37.31 CANDIDAL VULVOVAGINITIS: Status: ACTIVE | Noted: 2022-11-09

## 2022-11-09 PROBLEM — J30.2 SEASONAL ALLERGIC RHINITIS: Status: ACTIVE | Noted: 2022-11-09

## 2022-11-09 PROBLEM — J01.90 ACUTE SINUSITIS: Status: ACTIVE | Noted: 2022-11-09

## 2022-11-09 PROCEDURE — 99396 PREV VISIT EST AGE 40-64: CPT | Performed by: NURSE PRACTITIONER

## 2022-11-09 PROCEDURE — 90471 IMMUNIZATION ADMIN: CPT | Performed by: NURSE PRACTITIONER

## 2022-11-09 PROCEDURE — 90686 IIV4 VACC NO PRSV 0.5 ML IM: CPT | Performed by: NURSE PRACTITIONER

## 2022-11-09 RX ORDER — LANOLIN ALCOHOL/MO/W.PET/CERES
CREAM (GRAM) TOPICAL EVERY 24 HOURS
COMMUNITY

## 2022-11-09 NOTE — PROGRESS NOTES
"Chief Complaint  Establish Care (New pt, physical )    Subjective        Diana Higgins presents to [facility]  History of Present Illness new patient here to establish care.  Just moved closer to this office and wanting to establish care closer to home.  Patient is  x25 years, adult daughters.  Has dog she lives.  Works as  which she is enjoying.    She was diagnosed in May 2014 with breast cancer.  Had lateral mastectomy.  She completed 16 rounds of chemo and radiation.  She was BRCA negative.  She is now postmenopausal.  Follows with heme-onc as directed for surveillance.  Currently on Aromasin.    She has chronic constipation but she describes as terrible.  Recently started Trulance by GI, so far not helpful.  Continues other usual OTC meds for management.  She does find that many times she will need laxative and has been trying to avoid taking it but sometimes it is not avoidable.  Denies melena.  She recently had colonoscopy and is on 5-year recall.    She has osteoporosis and on Reclast.  Doing well.    Anxiety and depression: Controlled with Effexor daily.  Denies side effects.  She uses doxepin for sleep which seems to be working well.    She had episode of heart palpitations with syncope that worsened after COVID in 2021.  Was seen in ER in March 2021 for vasovagal episode.  She had a Zio patch placed which was normal.  She was seen in follow-up by cardiology.  She had an echo that was normal.  Her Holter monitor showed some occasional SVT and PACs no V. tach.  She has been relatively asymptomatic since.    Eats healthy diet overall and is active.    She is a steady participant at  for ovarian cancer.    Objective   Vital Signs:  /62   Pulse 72   Temp 97.8 °F (36.6 °C)   Ht 170.2 cm (67\")   Wt 63.5 kg (140 lb)   SpO2 98%   BMI 21.93 kg/m²   Estimated body mass index is 21.93 kg/m² as calculated from the following:    Height as of this " "encounter: 170.2 cm (67\").    Weight as of this encounter: 63.5 kg (140 lb).    BMI is within normal parameters. No other follow-up for BMI required.      Physical Exam  Vitals and nursing note reviewed.   Constitutional:       General: She is not in acute distress.     Appearance: She is well-developed. She is not ill-appearing.   HENT:      Head: Normocephalic and atraumatic.      Right Ear: Tympanic membrane, ear canal and external ear normal.      Left Ear: Tympanic membrane, ear canal and external ear normal.      Mouth/Throat:      Mouth: Mucous membranes are moist.      Pharynx: Uvula midline. No posterior oropharyngeal erythema.   Eyes:      General: No scleral icterus.        Right eye: No discharge.         Left eye: No discharge.      Conjunctiva/sclera: Conjunctivae normal.      Pupils: Pupils are equal, round, and reactive to light.   Neck:      Thyroid: No thyromegaly.   Cardiovascular:      Rate and Rhythm: Normal rate and regular rhythm.      Heart sounds: Normal heart sounds. No murmur heard.  Pulmonary:      Effort: Pulmonary effort is normal.      Breath sounds: Normal breath sounds.   Abdominal:      General: Bowel sounds are normal.      Palpations: Abdomen is soft.      Tenderness: There is no abdominal tenderness.   Musculoskeletal:         General: No deformity.      Cervical back: Neck supple.      Comments: Gait smooth and steady   Lymphadenopathy:      Cervical: No cervical adenopathy.   Skin:     General: Skin is warm and dry.   Neurological:      General: No focal deficit present.      Mental Status: She is alert and oriented to person, place, and time.   Psychiatric:         Attention and Perception: Attention and perception normal.         Mood and Affect: Mood and affect normal.         Speech: Speech normal.         Behavior: Behavior normal. Behavior is cooperative.         Thought Content: Thought content normal.         Cognition and Memory: Cognition normal.      Comments: Very " pleasant, conversant, excellent medical story        Result Review :                Assessment and Plan   Diagnoses and all orders for this visit:    1. Routine general medical examination at a health care facility (Primary)    2. Chronic idiopathic constipation    3. Flu vaccine need  -     FluLaval/Fluzone >6 mos (2254-6025)    Appropriate health maintenance and prevention topics specific for this patient were discussed today.  Additionally, health goals, and health concerns addressed as appropriate.  Pt was encouraged to stay up to date on recommended screenings and vaccines based on USPSTF guidelines.     Chronic constipation: Followed by GI and oncology.  Not well controlled with new medication.  Recommend continued follow-up and communication with GI for symptom management.    Breast cancer is currently in remission.  Surveillance per oncology.  Currently on maintenance medication.    Osteoporosis: No recent fractures.  On Reclast.    Anxiety and depression, insomnia are controlled with current medications.           Follow Up   Return in about 1 week (around 11/16/2022) for Annual physical.  Patient was given instructions and counseling regarding her condition or for health maintenance advice. Please see specific information pulled into the AVS if appropriate.

## 2022-11-10 ENCOUNTER — PATIENT ROUNDING (BHMG ONLY) (OUTPATIENT)
Dept: FAMILY MEDICINE CLINIC | Facility: CLINIC | Age: 47
End: 2022-11-10

## 2022-11-10 NOTE — PROGRESS NOTES
A My-Chart message has been sent to the patient for [place] [facility] [place]

## 2022-11-14 RX ORDER — VENLAFAXINE HYDROCHLORIDE 75 MG/1
CAPSULE, EXTENDED RELEASE ORAL
Qty: 30 CAPSULE | Refills: 1 | Status: SHIPPED | OUTPATIENT
Start: 2022-11-14 | End: 2023-01-11

## 2022-11-15 ENCOUNTER — TELEPHONE (OUTPATIENT)
Dept: GASTROENTEROLOGY | Facility: CLINIC | Age: 47
End: 2022-11-15

## 2022-11-15 NOTE — TELEPHONE ENCOUNTER
Other options include amitiza, zelnorm and motegrity - pls have her check with her insurance or pharmacy to see what would be covered - these are all good medications but will need to see what is covered

## 2022-11-17 ENCOUNTER — TELEPHONE (OUTPATIENT)
Dept: GASTROENTEROLOGY | Facility: CLINIC | Age: 47
End: 2022-11-17

## 2022-11-17 RX ORDER — LUBIPROSTONE 24 UG/1
24 CAPSULE ORAL 2 TIMES DAILY WITH MEALS
Qty: 60 CAPSULE | Refills: 1 | Status: SHIPPED | OUTPATIENT
Start: 2022-11-17

## 2022-11-17 NOTE — TELEPHONE ENCOUNTER
----- Message from Marisela Lopez RN sent at 11/16/2022  3:15 PM EST -----  Regarding: FW: recommendations  Contact: [phone]    ----- Message -----  From: Diana Higgins  Sent: 11/16/2022   3:01 PM EST  To: [facility] Clinical Pool  Subject: recommendations                                  Hi  My insurance will cover any of them

## 2022-11-17 NOTE — TELEPHONE ENCOUNTER
Recommend trial of amitiza.  I have sent this to her pharmacy this is for chronic constipation and constipation predominant irritable bowel.  Take 1 twice daily with food.

## 2022-11-28 ENCOUNTER — TELEPHONE (OUTPATIENT)
Dept: ONCOLOGY | Facility: CLINIC | Age: 47
End: 2022-11-28

## 2023-01-09 ENCOUNTER — TELEPHONE (OUTPATIENT)
Dept: GASTROENTEROLOGY | Facility: CLINIC | Age: 48
End: 2023-01-09

## 2023-01-09 NOTE — TELEPHONE ENCOUNTER
Caller: Diana Higgins    Relationship to patient: Self    Best call back number: [phone] CONTACT ANYTIME     Patient is needing: PT CALLED INTO RESCHEDULE COLONOSCOPY. PLEASE ADVISE.            Seen by Cards- Demand ischemia- no ACS

## 2023-01-11 RX ORDER — VENLAFAXINE HYDROCHLORIDE 75 MG/1
CAPSULE, EXTENDED RELEASE ORAL
Qty: 30 CAPSULE | Refills: 1 | Status: SHIPPED | OUTPATIENT
Start: 2023-01-11 | End: 2023-02-10

## 2023-01-12 ENCOUNTER — TELEPHONE (OUTPATIENT)
Dept: GASTROENTEROLOGY | Facility: CLINIC | Age: 48
End: 2023-01-12
Payer: COMMERCIAL

## 2023-01-12 NOTE — TELEPHONE ENCOUNTER
JESUS patient via telephone for COLONOSCOPY. Scheduled 02/02/2023 with arrival time of PREMI[facility] WILL CALL WITH TIME. Prep paperwork mailed to verified address on file. Patient advised arrival time may change based on [facility] guidelines. JESUS APRIL TREVOR

## 2023-01-17 ENCOUNTER — TELEPHONE (OUTPATIENT)
Dept: ONCOLOGY | Facility: CLINIC | Age: 48
End: 2023-01-17
Payer: COMMERCIAL

## 2023-01-17 NOTE — TELEPHONE ENCOUNTER
Caller: Bruna Higgins    Relationship: Self    Best call back number: [phone]    What was the call regarding: BRUNA CALLED REGARDING HER LAB APPT FOR TOMORROW. SHE SAYS IT WAS SUPPOSED TO HAVE BEEN R/S TO 02/01. SHE NEEDS A CALLBACK TO CONFIRM.    Do you require a callback: YES

## 2023-02-01 ENCOUNTER — OFFICE VISIT (OUTPATIENT)
Dept: ONCOLOGY | Facility: CLINIC | Age: 48
End: 2023-02-01
Payer: COMMERCIAL

## 2023-02-01 ENCOUNTER — LAB (OUTPATIENT)
Dept: LAB | Facility: HOSPITAL | Age: 48
End: 2023-02-01
Payer: COMMERCIAL

## 2023-02-01 VITALS
OXYGEN SATURATION: 100 % | SYSTOLIC BLOOD PRESSURE: 92 MMHG | HEIGHT: 67 IN | RESPIRATION RATE: 18 BRPM | WEIGHT: 145.1 LBS | HEART RATE: 74 BPM | TEMPERATURE: 96.8 F | BODY MASS INDEX: 22.78 KG/M2 | DIASTOLIC BLOOD PRESSURE: 63 MMHG

## 2023-02-01 DIAGNOSIS — Z78.0 POST-MENOPAUSAL: ICD-10-CM

## 2023-02-01 DIAGNOSIS — F32.9 REACTIVE DEPRESSION: ICD-10-CM

## 2023-02-01 DIAGNOSIS — K59.04 CHRONIC IDIOPATHIC CONSTIPATION: ICD-10-CM

## 2023-02-01 DIAGNOSIS — C50.411 MALIGNANT NEOPLASM OF UPPER-OUTER QUADRANT OF RIGHT BREAST IN FEMALE, ESTROGEN RECEPTOR POSITIVE: Primary | ICD-10-CM

## 2023-02-01 DIAGNOSIS — M81.8 OTHER OSTEOPOROSIS WITHOUT CURRENT PATHOLOGICAL FRACTURE: ICD-10-CM

## 2023-02-01 DIAGNOSIS — Z17.0 MALIGNANT NEOPLASM OF UPPER-OUTER QUADRANT OF RIGHT BREAST IN FEMALE, ESTROGEN RECEPTOR POSITIVE: ICD-10-CM

## 2023-02-01 DIAGNOSIS — Z17.0 MALIGNANT NEOPLASM OF UPPER-OUTER QUADRANT OF RIGHT BREAST IN FEMALE, ESTROGEN RECEPTOR POSITIVE: Primary | ICD-10-CM

## 2023-02-01 DIAGNOSIS — C50.411 MALIGNANT NEOPLASM OF UPPER-OUTER QUADRANT OF RIGHT BREAST IN FEMALE, ESTROGEN RECEPTOR POSITIVE: ICD-10-CM

## 2023-02-01 LAB
ALBUMIN SERPL-MCNC: 4.3 G/DL (ref 3.5–5.2)
ALBUMIN/GLOB SERPL: 1.7 G/DL (ref 1.1–2.4)
ALP SERPL-CCNC: 60 U/L (ref 38–116)
ALT SERPL W P-5'-P-CCNC: 19 U/L (ref 0–33)
ANION GAP SERPL CALCULATED.3IONS-SCNC: 11.7 MMOL/L (ref 5–15)
AST SERPL-CCNC: 25 U/L (ref 0–32)
BASOPHILS # BLD AUTO: 0.04 10*3/MM3 (ref 0–0.2)
BASOPHILS NFR BLD AUTO: 0.9 % (ref 0–1.5)
BILIRUB SERPL-MCNC: 0.2 MG/DL (ref 0.2–1.2)
BUN SERPL-MCNC: 18 MG/DL (ref 6–20)
BUN/CREAT SERPL: 25.4 (ref 7.3–30)
CALCIUM SPEC-SCNC: 9.9 MG/DL (ref 8.5–10.2)
CANCER AG15-3 SERPL-ACNC: 18.2 U/ML
CHLORIDE SERPL-SCNC: 102 MMOL/L (ref 98–107)
CO2 SERPL-SCNC: 27.3 MMOL/L (ref 22–29)
CREAT SERPL-MCNC: 0.71 MG/DL (ref 0.6–1.1)
DEPRECATED RDW RBC AUTO: 41.2 FL (ref 37–54)
EGFRCR SERPLBLD CKD-EPI 2021: 104.4 ML/MIN/1.73
EOSINOPHIL # BLD AUTO: 0.18 10*3/MM3 (ref 0–0.4)
EOSINOPHIL NFR BLD AUTO: 3.9 % (ref 0.3–6.2)
ERYTHROCYTE [DISTWIDTH] IN BLOOD BY AUTOMATED COUNT: 11.9 % (ref 12.3–15.4)
GLOBULIN UR ELPH-MCNC: 2.6 GM/DL (ref 1.8–3.5)
GLUCOSE SERPL-MCNC: 81 MG/DL (ref 74–124)
HCT VFR BLD AUTO: 42 % (ref 34–46.6)
HGB BLD-MCNC: 13.3 G/DL (ref 12–15.9)
IMM GRANULOCYTES # BLD AUTO: 0.02 10*3/MM3 (ref 0–0.05)
IMM GRANULOCYTES NFR BLD AUTO: 0.4 % (ref 0–0.5)
LYMPHOCYTES # BLD AUTO: 1.38 10*3/MM3 (ref 0.7–3.1)
LYMPHOCYTES NFR BLD AUTO: 29.9 % (ref 19.6–45.3)
MCH RBC QN AUTO: 29.8 PG (ref 26.6–33)
MCHC RBC AUTO-ENTMCNC: 31.7 G/DL (ref 31.5–35.7)
MCV RBC AUTO: 94 FL (ref 79–97)
MONOCYTES # BLD AUTO: 0.51 10*3/MM3 (ref 0.1–0.9)
MONOCYTES NFR BLD AUTO: 11.1 % (ref 5–12)
NEUTROPHILS NFR BLD AUTO: 2.48 10*3/MM3 (ref 1.7–7)
NEUTROPHILS NFR BLD AUTO: 53.8 % (ref 42.7–76)
NRBC BLD AUTO-RTO: 0 /100 WBC (ref 0–0.2)
PLATELET # BLD AUTO: 313 10*3/MM3 (ref 140–450)
PMV BLD AUTO: 8.9 FL (ref 6–12)
POTASSIUM SERPL-SCNC: 4.4 MMOL/L (ref 3.5–4.7)
PROT SERPL-MCNC: 6.9 G/DL (ref 6.3–8)
RBC # BLD AUTO: 4.47 10*6/MM3 (ref 3.77–5.28)
SODIUM SERPL-SCNC: 141 MMOL/L (ref 134–145)
WBC NRBC COR # BLD: 4.61 10*3/MM3 (ref 3.4–10.8)

## 2023-02-01 PROCEDURE — 36415 COLL VENOUS BLD VENIPUNCTURE: CPT

## 2023-02-01 PROCEDURE — 80053 COMPREHEN METABOLIC PANEL: CPT

## 2023-02-01 PROCEDURE — 86300 IMMUNOASSAY TUMOR CA 15-3: CPT | Performed by: INTERNAL MEDICINE

## 2023-02-01 PROCEDURE — 99214 OFFICE O/P EST MOD 30 MIN: CPT | Performed by: INTERNAL MEDICINE

## 2023-02-01 PROCEDURE — 85025 COMPLETE CBC W/AUTO DIFF WBC: CPT

## 2023-02-01 NOTE — PROGRESS NOTES
.    Subjective .     REASONS FOR FOLLOWUP:    1. History of stage IIA, K3L4hH2, ER weakly-positive, CT negative, HER2-negative breast cancer, status post bilateral mastectomies. The patient completed her adjuvant chemotherapy under NSABP B49 clinical trial.  On 12/14/2015 she has no clinical evidence of recurrence of breast cancer and she remains on Femara for the time being. She also has completed her breast reconstruction with bilateral implants and she is very satisfied in regard to the cosmetic result of this. She will remain on Femara for the time being. She receiving at this time Effexor 37.5 mg a day for hot flashes and anxiety.   2. Bone health. The patient has osteoporosis. She  GETS Reclast infusion  on yearly bases, LAST ONE MAY 12,2021    HISTORY OF PRESENT ILLNESS:      DURING THE VISIT WITH THE PATIENT TODAY , PATIENT HAD FACE MASK, I HAD PROPER PROTECTIVE EQUIPMENT, AND I DID HAND HYGIENE WITH SOAP AND WATER BEFORE AND AFTER THE VISIT.    On 02/01/2023 this 47-year-old female returns to the office for follow of her adjuvant treatment of her breast cancer undergoing therapy with Aromasin at this time. Besides this the patient has not had any new health issues besides her chronic constipation and she will proceed with an official colonoscopy tomorrow by Haley Lopez MD. She has had minimal passage of blood in the stool because of tremendous constipation and difficulty going through straining. She denies any abdominal pain and she believes that she is bloated in her abdomen all of the time. Her weight remains stable. Her urination is normal, no cardiovascular or respiratory issues, no bone pain, no joint pain. She is extremely happy because she is going to become a grandmother in 08/2023, her daughter is pregnant with her first baby.          Past Medical History:   Diagnosis Date   • Anxiety    • Breast cancer (HCC)     Right, stage IIA, s/p bilateral mastectomy, XRT, and chemotherapy, followed by  Dr. Hadley   • Colon polyp 08/2019   • COVID-19 virus infection 09/2020    C/O back pain and loss of taste and smell   • Disease of thyroid gland     history no meds now   • Hemorrhoid    • History of cancer chemotherapy    • History of foot fracture     as a child  right   • History of mononucleosis    • History of radiation therapy    • Osteoporosis    • Paroxysmal supraventricular tachycardia (HCC)     CS ostium atrial tachycardia, ablated in 2002 by Dr. Garzon   • PONV (postoperative nausea and vomiting)    • Sinus headache     But no history of migraines   • Skin cancer    • Sleep apnea     mild no machine   • Wears glasses        ONCOLOGIC HISTORY:  (History from previous dates can be found in the separate document.)    Current Outpatient Medications on File Prior to Visit   Medication Sig Dispense Refill   • Biotin 300 MCG tablet Daily.     • Cholecalciferol (VITAMIN D-3) 1000 UNITS capsule Take 1,000 Units by mouth Daily.     • clobetasol (TEMOVATE) 0.05 % external solution      • doxepin (SINEquan) 10 MG capsule TAKE 1 CAPSULE BY MOUTH EVERY DAY AT NIGHT 30 capsule 6   • exemestane (Aromasin) 25 MG chemo tablet Take 1 tablet by mouth Daily. 30 tablet 6   • lubiprostone (Amitiza) 24 MCG capsule Take 1 capsule by mouth 2 (Two) Times a Day With Meals. 60 capsule 1   • venlafaxine XR (EFFEXOR-XR) 75 MG 24 hr capsule TAKE 1 CAPSULE BY MOUTH EVERY DAY 30 capsule 1   • Zoledronic Acid (RECLAST IV) Infuse  into a venous catheter. Once a year last dose 5/12/2021       No current facility-administered medications on file prior to visit.       ALLERGIES:   No Known Allergies    Social History     Socioeconomic History   • Marital status:      Spouse name: Misha   • Years of education: College   Tobacco Use   • Smoking status: Never   • Smokeless tobacco: Never   Vaping Use   • Vaping Use: Never used   Substance and Sexual Activity   • Alcohol use: Not Currently     Comment: 10 drinks yearly   • Drug use:  "Never   • Sexual activity: Defer         Cancer-related family history includes Breast cancer in her maternal aunt; Cancer in her maternal grandmother; Liver cancer in her father and mother.         Objective      Vitals:    02/01/23 0811   BP: 92/63   Pulse: 74   Resp: 18   Temp: 96.8 °F (36 °C)   TempSrc: Temporal   SpO2: 100%   Weight: 65.8 kg (145 lb 1.6 oz)   Height: 170.2 cm (67.01\")   PainSc: 0-No pain     Current Status 2/1/2023   ECOG score 0       EXAM:            GENERAL:  Well-developed, Patient  in no acute distress.   SKIN:  Warm, dry ,NO purpura ,no rash.  HEENT:  Pupils were equal and reactive to light and accomodation, conjunctivae noninjected,  normal visual acuity.   NECK:  Supple with good range of motion; no thyromegaly , no JVD or bruits,.No carotid artery pain, no carotid abnormal pulsation   LYMPHATICS:  No cervical, NO supraclavicular, NO axillary, NO inguinal adenopathies.  CARDIAC   normal rate , regular rhythm, without murmur,NO rubs NO S3 NO S4   LUNGS: normal breath sounds bilateral, no wheezing, NO rhonchi, NO crackles ,NO rubs.  VASCULAR VENOUS: no cyanosis, NO collateral circulation, NO varicosities, NO edema, NO palpable cords, NO pain,NO erythema, NO pigmentation of the skin.  ABDOMEN:  Soft, NO pain,no hepatomegaly, no splenomegaly,no masses, no ascites, no collateral circulation,no distention.  EXTREMITIES  AND SPINE:  No clubbing, no cyanosis ,no deformities , no pain .No kyphosis,  no pain in spine, no pain in ribs , no pain in pelvic bone.  NEUROLOGICAL:  Patient was awake, alert, oriented to time, person and place.                RECENT LABS:  Hematology WBC   Date Value Ref Range Status   02/01/2023 4.61 3.40 - 10.80 10*3/mm3 Final     RBC   Date Value Ref Range Status   02/01/2023 4.47 3.77 - 5.28 10*6/mm3 Final     Hemoglobin   Date Value Ref Range Status   02/01/2023 13.3 12.0 - 15.9 g/dL Final     Hematocrit   Date Value Ref Range Status   02/01/2023 42.0 34.0 - 46.6 % " Final     Platelets   Date Value Ref Range Status   02/01/2023 313 140 - 450 10*3/mm3 Final                  Assessment & Plan     1. History of stage IIA, [id number], ER weakly-positive, NV negative, HER2-negative right upper outer quadrant breast cancer, status post bilateral mastectomies. The patient completed her adjuvant chemotherapy under NSABP B49 clinical trial.Patient continues on Femara, 2.5 mg a day. 1. Upon review on 05/08/2020 the patient looks free of cancer recurrence, her clinical examination is normal and she feels substantially better.   As per today I find no evidence of recurrent disease about her breast cancer on her breast examination that remains unchanged with bilateral breast implants and no skeletal pain. She remains on 100% compliance Femara with minimal side effects with the exception of constipation.    · 07/07/2021 no symptoms or signs of breast cancer recurrence. She remains on Femara with 100% compliance and I advised her to remain on this medicine for the time being with no plans to stop this medicine in the next 5 years or so. She is almost accomplishing 5 years with this medication utilization.  ·  09/30/2021 the patient has no symptoms or signs of breast cancer recurrence. Her clinical examination is negative normal. In the interim of time she has had her breast prosthesis removed. This was feeling like a belt tying her chest. She has felt dramatically better since then. Healing is taking place. She remains on her Femara and I advised her to remain on this medicine for the time being.   The patient was reviewed on 01/31/2022. As far as I can tell on clinical grounds, I do not find any evidence of cancer recurrence. Her clinical examination is normal and she remains on letrozole adjuvant therapy for the time being. We discussed the possibility of eventually removing letrozole out of her of medicines. She feels terrified if we stop this medicine and I pointed out to her that need to.      I pointed out that could be technology that could be using the original tumor in order to determine the need of continuation of hormonal therapy. She believes that it will be better off for her to stay on the medicine for the time being.  On 05/25/2022 the patient has no clinical symptoms or signs of breast cancer recurrence. Her clinical examination is normal. Her white count, hemoglobin and platelets are normal, her chemistry profile is normal. We advised her to go ahead and continue her Femara for the time being.   On 09/29/2022, given the persistency of the constipation and having nothing else to blame besides the Femara I advised the patient to discontinue this medicine and move into Aromasin at the dose of 25 mg a day. I asked her to give this medicine at least 3 or 4 weeks to see what happens in regard to bowel activity and if everything works okay I asked her to call my nurse, Elaine Hendrickson, to notify us about the status of this. In the meantime it will be okay for her to remain on her Dulcolax for the time being. On clinical grounds she has no symptoms or signs of breast cancer recurrence. Her implants have been removed. Her chest wall is unremarkable. She has occasional kind of phantom pain in the right chest wall but there is no nodularity or alterations otherwise. There is no lymphedema in upper extremities. She will be watched in absence of any other intervention, returning to see us back every 4 months with a CBC, CMP and a CA 15-3.  On 02/01/2023 the patient was further reviewed. Since the previous visit she has not had any changes clinically besides her chronic constipation. Her clinical examination today is negative in regard to breast cancer. Her breast implants have been removed, the patient is not having any issues there whatsoever and the rest of her clinical examination is benign, negative, normal. Her white count, hemoglobin and platelets are normal, her chemistry profile is normal. I  advised the patient to remain on Aromasin for the time being not planning to this medication anytime soon.     She will return to see us back in 4 months with a CBC, CMP and a CA 15-3.           ·   2.  Chronic constipation: Slow transit has been on multiple medications.  Her chronic constipation remains an ongoing issue. She is taking Dulcolax and sometimes she needs to have an enema. She has large bowel activities but not too much propulsive activity in her intestine. Her diet is appropriate and she drinks plenty of liquids.  Her chronic constipation remains a problem. She continues using her laxatives as previous described and she has a bowel movement every 2-3 days. She has proper hydration and she tries to eat a lot of vegetables and fruit.  On 09/29/2022 Dulcolax was advised on her twice a day if necessary and in order to try to remove medications to see if this makes a difference in regard to her bowel activity, I discontinued the Femara and moved into Aromasin at the dose of 25 mg a day. I asked her to call and notify us if she sees any changes with this modification.  On 02/01/2023 the patient will proceed with a colonoscopy on 02/02/2023 by Haley Lopez MD, Gastroenterologist.           ·   3.  Chronic anxiety, panic attacks, depression: Patient is on Effexor  The chronic anxiety remains an ongoing issue that is not a new problem and there is no need for medication at this time besides the continuation of her Effexor that will be utilized for depression at the same time.  Her depression is under good control using Effexor. I encouraged her to remain on this medicine for the time being. She has a lot of other stressors and anxiety triggering factors including the wedding of her daughter, buying a new house, packing and moving and quitting her job. I encouraged her to remain on this medicine.   On 09/29/2022 she feels fine with the Effexor that she takes and the doxepin that she takes for insomnia. I asked  her to continue these medicines for the time being.  Her chronic anxiety and depression remains under therapy by me. These medicines will remain ongoing for the time being. These symptoms are stable at this time and she is happy because she is going to be a grandmother.          ·   4.  Insomnia: Previously on gabapentin without much relief.  At that time she was also taking melatonin and ZzzQuil.  We asked her to discontinue those medications.  · 9/30/2021 patient started on a trial of doxepin 10 mg nightly.    · 10/18/2021 she reports that doxepin has helped with her sleep and is asking for refill of this.  She has found that 1 small dose of doxepin 10 mg in the evening is tremendously beneficial to help her to sleep. She has requested a new refill and it will be sent to her pharmacy.  On 05/25/2022 she has found that minimal dose of doxepin is very good for controlling her insomnia. She uses the medicine as needed. I told her to use it every night especially with the multiple stressors that she has at this time. This medicine will have no impact in regard any addiction or anything of that nature.   On 02/01/2023 her insomnia remains an issue. She is now more grateful, she is preparing to be a grandmother. Hopefully this will trigger benefits in regard to all of her life and including ability to sleep.         ·   5.  Osteoporosis: Continuing physical activity, vitamin D.  She also receives yearly Reclast last given 5/20/2021.  I discussed with her on 01/31/2022 that her bone density documents still further bone loss, especially in the lumbar area and on the left hip. I do believe that she will continue the Reclast and the next dose is scheduled for 05/2022. I encouraged her to continue taking her vitamin D and encouraged her to take a magnesium supplementation. I encouraged her to jump the rope that could have a positive impact in regard to her ability to further help bone formation. She is walking probably 7000  to 10,000 steps a day.  On 05/25/2022 given her osteoporosis documented through bone density in the background of aromatase inhibitor use the patient will proceed today with her Reclast that will remain on yearly basis. In anticipation of that magnesium and phosphorus levels and vitamin D levels will be done.   On 09/29/2022 doxepin will remain at a dose of 10 mg at bedtime. It is helpful in regard to her insomnia.  On 02/01/2023 the patient should be ready for her Reclast this year in 4 months from now and in anticipation of that she will proceed with a bone density test in 12 weeks.          ·   6.  Previously had a left subclavian port, removed 5/21/2021.    7.  Hypercalcemia: The patient had been taking biotin for Heller growth that was thought to be causing this issue.  Biotin was discontinued and hypercalcemia resolved.  No issues related to hypercalcemia on 01/31/2022 with normal chemistry profile.  On 09/29/2022 her calcium level today is completely normal. She is not taking any other multivitamins containing calcium or biotin.  On 02/01/2023 she has not had any further hypercalcemia since discontinuation of previous supplements.         ·   RECOMMENDATIONS: The patient has been advised to continue therapy as posted above in each one of the points addressed. She will return to see us back in 4 months. She will proceed with colonoscopy tomorrow.

## 2023-02-02 ENCOUNTER — OUTSIDE FACILITY SERVICE (OUTPATIENT)
Dept: GASTROENTEROLOGY | Facility: CLINIC | Age: 48
End: 2023-02-02
Payer: COMMERCIAL

## 2023-02-02 PROCEDURE — 45378 DIAGNOSTIC COLONOSCOPY: CPT | Performed by: INTERNAL MEDICINE

## 2023-02-09 RX ORDER — DOXEPIN HYDROCHLORIDE 10 MG/1
10 CAPSULE ORAL NIGHTLY
Qty: 30 CAPSULE | Refills: 6 | Status: SHIPPED | OUTPATIENT
Start: 2023-02-09

## 2023-02-10 RX ORDER — VENLAFAXINE HYDROCHLORIDE 75 MG/1
CAPSULE, EXTENDED RELEASE ORAL
Qty: 30 CAPSULE | Refills: 1 | Status: SHIPPED | OUTPATIENT
Start: 2023-02-10

## 2023-02-22 ENCOUNTER — OFFICE VISIT (OUTPATIENT)
Dept: FAMILY MEDICINE CLINIC | Facility: CLINIC | Age: 48
End: 2023-02-22
Payer: COMMERCIAL

## 2023-02-22 VITALS
WEIGHT: 147 LBS | SYSTOLIC BLOOD PRESSURE: 109 MMHG | BODY MASS INDEX: 23.07 KG/M2 | HEART RATE: 81 BPM | OXYGEN SATURATION: 97 % | TEMPERATURE: 97.5 F | HEIGHT: 67 IN | DIASTOLIC BLOOD PRESSURE: 68 MMHG

## 2023-02-22 DIAGNOSIS — M71.22: Primary | ICD-10-CM

## 2023-02-22 PROCEDURE — 99213 OFFICE O/P EST LOW 20 MIN: CPT | Performed by: NURSE PRACTITIONER

## 2023-02-22 NOTE — PROGRESS NOTES
"Answers for HPI/ROS submitted by the patient on 2/22/2023  Please describe your symptoms.: knot on back of my left knee  Have you had these symptoms before?: No  How long have you been having these symptoms?: 1-2 weeks  What is the primary reason for your visit?: Other    Chief Complaint  Mass (Knot on back of left knee , couple weeks , not painful)    Subjective        Diana Higgins presents to [facility]  History of Present Illness   Patient is here for some notes that she has noticed on the back of her left knee.  They seem to be getting smaller.  She noted them a couple weeks ago.  She uses that treadmill on an incline regularly and has had some left knee pain with that which has not been significant.  She reports that her left knee pain does not bother her when she is not using an incline on her treadmill.  When she notices knee pain she will go several days without using the incline and pain will resolve and then will restart the incline.  She denies history of arthritis.  Has not noticed any leg swelling or weakness.  No instability of her knee noted.  She noted no gross popliteal swelling prior to masses on left posterior knee.  She has full range of motion and they are not painful or red.  Denies leg swelling.  No history of Baker's cyst or significant known arthritis of her knees.    Due to her history of cancer she is concerned about them.      She is otherwise well feeling.    Objective   Vital Signs:  /68   Pulse 81   Temp 97.5 °F (36.4 °C) (Temporal)   Ht 170.2 cm (67\")   Wt 66.7 kg (147 lb)   SpO2 97%   BMI 23.02 kg/m²   Estimated body mass index is 23.02 kg/m² as calculated from the following:    Height as of this encounter: 170.2 cm (67\").    Weight as of this encounter: 66.7 kg (147 lb).       BMI is within normal parameters. No other follow-up for BMI required.      Physical Exam  Vitals and nursing note reviewed.   Constitutional:       General: She is " not in acute distress.     Appearance: She is well-developed. She is not ill-appearing or diaphoretic.      Comments: Well-appearing   HENT:      Head: Normocephalic and atraumatic.   Eyes:      General:         Right eye: No discharge.         Left eye: No discharge.      Conjunctiva/sclera: Conjunctivae normal.   Cardiovascular:      Rate and Rhythm: Normal rate and regular rhythm.      Heart sounds: Normal heart sounds.   Pulmonary:      Effort: Pulmonary effort is normal.      Breath sounds: Normal breath sounds.   Abdominal:      General: Bowel sounds are normal.      Palpations: Abdomen is soft.      Tenderness: There is no abdominal tenderness.   Musculoskeletal:         General: No deformity.      Left knee: No swelling, deformity, erythema, bony tenderness or crepitus. Normal range of motion. No tenderness.      Comments: Approximately 0.5 cm, firm, mobile, nontender cystic feeling mass medial popliteal space  Second very soft poorly defined cystic mass left popliteal space which is a very mobile   Skin:     General: Skin is warm and dry.   Neurological:      Mental Status: She is alert and oriented to person, place, and time.     Answers for HPI/ROS submitted by the patient on 2/22/2023  Please describe your symptoms.: knot on back of my left knee  Have you had these symptoms before?: No  How long have you been having these symptoms?: 1-2 weeks  What is the primary reason for your visit?: Other       Result Review :                   Assessment and Plan   Diagnoses and all orders for this visit:    1. Cyst, synovial, popliteal space, left (Primary)    Exam is normal other than popliteal space cystic feeling masses.  I suspect that she may have had a synovial cyst that is now resolving.  Recommend continued monitoring and avoiding incline but can continue treadmill exercise without the incline.  She will let me know in 3 weeks if they are still present and can get ultrasound or sports medicine evaluation.   She will let me know sooner if they worsen.         Follow Up   No follow-ups on file.  Patient was given instructions and counseling regarding her condition or for health maintenance advice. Please see specific information pulled into the AVS if appropriate.

## 2023-04-28 RX ORDER — EXEMESTANE 25 MG/1
TABLET ORAL
Qty: 30 TABLET | Refills: 6 | Status: SHIPPED | OUTPATIENT
Start: 2023-04-28

## 2023-05-03 RX ORDER — VENLAFAXINE HYDROCHLORIDE 75 MG/1
CAPSULE, EXTENDED RELEASE ORAL
Qty: 30 CAPSULE | Refills: 1 | Status: SHIPPED | OUTPATIENT
Start: 2023-05-03

## 2023-06-07 ENCOUNTER — INFUSION (OUTPATIENT)
Dept: ONCOLOGY | Facility: HOSPITAL | Age: 48
End: 2023-06-07
Payer: COMMERCIAL

## 2023-06-07 ENCOUNTER — OFFICE VISIT (OUTPATIENT)
Dept: ONCOLOGY | Facility: CLINIC | Age: 48
End: 2023-06-07
Payer: COMMERCIAL

## 2023-06-07 VITALS
TEMPERATURE: 98.2 F | OXYGEN SATURATION: 98 % | BODY MASS INDEX: 22.93 KG/M2 | SYSTOLIC BLOOD PRESSURE: 101 MMHG | HEART RATE: 79 BPM | DIASTOLIC BLOOD PRESSURE: 63 MMHG | HEIGHT: 67 IN | WEIGHT: 146.1 LBS

## 2023-06-07 DIAGNOSIS — F32.9 REACTIVE DEPRESSION: ICD-10-CM

## 2023-06-07 DIAGNOSIS — C50.411 MALIGNANT NEOPLASM OF UPPER-OUTER QUADRANT OF RIGHT BREAST IN FEMALE, ESTROGEN RECEPTOR POSITIVE: ICD-10-CM

## 2023-06-07 DIAGNOSIS — K59.04 CHRONIC IDIOPATHIC CONSTIPATION: ICD-10-CM

## 2023-06-07 DIAGNOSIS — M81.8 OTHER OSTEOPOROSIS WITHOUT CURRENT PATHOLOGICAL FRACTURE: ICD-10-CM

## 2023-06-07 DIAGNOSIS — C50.411 MALIGNANT NEOPLASM OF UPPER-OUTER QUADRANT OF RIGHT BREAST IN FEMALE, ESTROGEN RECEPTOR POSITIVE: Primary | ICD-10-CM

## 2023-06-07 DIAGNOSIS — M81.8 OTHER OSTEOPOROSIS WITHOUT CURRENT PATHOLOGICAL FRACTURE: Primary | ICD-10-CM

## 2023-06-07 DIAGNOSIS — Z78.0 POST-MENOPAUSAL: ICD-10-CM

## 2023-06-07 DIAGNOSIS — Z17.0 MALIGNANT NEOPLASM OF UPPER-OUTER QUADRANT OF RIGHT BREAST IN FEMALE, ESTROGEN RECEPTOR POSITIVE: ICD-10-CM

## 2023-06-07 DIAGNOSIS — Z17.0 MALIGNANT NEOPLASM OF UPPER-OUTER QUADRANT OF RIGHT BREAST IN FEMALE, ESTROGEN RECEPTOR POSITIVE: Primary | ICD-10-CM

## 2023-06-07 DIAGNOSIS — F41.9 ANXIETY: ICD-10-CM

## 2023-06-07 LAB
ALBUMIN SERPL-MCNC: 4.5 G/DL (ref 3.5–5.2)
ALBUMIN/GLOB SERPL: 1.8 G/DL (ref 1.1–2.4)
ALP SERPL-CCNC: 69 U/L (ref 38–116)
ALT SERPL W P-5'-P-CCNC: 19 U/L (ref 0–33)
ANION GAP SERPL CALCULATED.3IONS-SCNC: 11.1 MMOL/L (ref 5–15)
AST SERPL-CCNC: 29 U/L (ref 0–32)
BASOPHILS # BLD AUTO: 0.05 10*3/MM3 (ref 0–0.2)
BASOPHILS NFR BLD AUTO: 0.9 % (ref 0–1.5)
BILIRUB SERPL-MCNC: 0.3 MG/DL (ref 0.2–1.2)
BUN SERPL-MCNC: 16 MG/DL (ref 6–20)
BUN/CREAT SERPL: 22.9 (ref 7.3–30)
CALCIUM SPEC-SCNC: 9.9 MG/DL (ref 8.5–10.2)
CHLORIDE SERPL-SCNC: 102 MMOL/L (ref 98–107)
CO2 SERPL-SCNC: 24.9 MMOL/L (ref 22–29)
CREAT SERPL-MCNC: 0.7 MG/DL (ref 0.6–1.1)
DEPRECATED RDW RBC AUTO: 41 FL (ref 37–54)
EGFRCR SERPLBLD CKD-EPI 2021: 106.2 ML/MIN/1.73
EOSINOPHIL # BLD AUTO: 0.2 10*3/MM3 (ref 0–0.4)
EOSINOPHIL NFR BLD AUTO: 3.6 % (ref 0.3–6.2)
ERYTHROCYTE [DISTWIDTH] IN BLOOD BY AUTOMATED COUNT: 12 % (ref 12.3–15.4)
GLOBULIN UR ELPH-MCNC: 2.5 GM/DL (ref 1.8–3.5)
GLUCOSE SERPL-MCNC: 78 MG/DL (ref 74–124)
HCT VFR BLD AUTO: 44.3 % (ref 34–46.6)
HGB BLD-MCNC: 14.5 G/DL (ref 12–15.9)
IMM GRANULOCYTES # BLD AUTO: 0.01 10*3/MM3 (ref 0–0.05)
IMM GRANULOCYTES NFR BLD AUTO: 0.2 % (ref 0–0.5)
LYMPHOCYTES # BLD AUTO: 1.56 10*3/MM3 (ref 0.7–3.1)
LYMPHOCYTES NFR BLD AUTO: 28.5 % (ref 19.6–45.3)
MAGNESIUM SERPL-MCNC: 2.1 MG/DL (ref 1.8–2.5)
MCH RBC QN AUTO: 30.3 PG (ref 26.6–33)
MCHC RBC AUTO-ENTMCNC: 32.7 G/DL (ref 31.5–35.7)
MCV RBC AUTO: 92.7 FL (ref 79–97)
MONOCYTES # BLD AUTO: 0.5 10*3/MM3 (ref 0.1–0.9)
MONOCYTES NFR BLD AUTO: 9.1 % (ref 5–12)
NEUTROPHILS NFR BLD AUTO: 3.16 10*3/MM3 (ref 1.7–7)
NEUTROPHILS NFR BLD AUTO: 57.7 % (ref 42.7–76)
NRBC BLD AUTO-RTO: 0 /100 WBC (ref 0–0.2)
PHOSPHATE SERPL-MCNC: 4 MG/DL (ref 2.5–4.5)
PLATELET # BLD AUTO: 319 10*3/MM3 (ref 140–450)
PMV BLD AUTO: 8.9 FL (ref 6–12)
POTASSIUM SERPL-SCNC: 4.6 MMOL/L (ref 3.5–4.7)
PROT SERPL-MCNC: 7 G/DL (ref 6.3–8)
RBC # BLD AUTO: 4.78 10*6/MM3 (ref 3.77–5.28)
SODIUM SERPL-SCNC: 138 MMOL/L (ref 134–145)
WBC NRBC COR # BLD: 5.48 10*3/MM3 (ref 3.4–10.8)

## 2023-06-07 PROCEDURE — 25010000002 ZOLEDRONIC ACID 5 MG/100ML SOLUTION: Performed by: INTERNAL MEDICINE

## 2023-06-07 PROCEDURE — 86300 IMMUNOASSAY TUMOR CA 15-3: CPT | Performed by: INTERNAL MEDICINE

## 2023-06-07 PROCEDURE — 85025 COMPLETE CBC W/AUTO DIFF WBC: CPT

## 2023-06-07 PROCEDURE — 83735 ASSAY OF MAGNESIUM: CPT

## 2023-06-07 PROCEDURE — 80053 COMPREHEN METABOLIC PANEL: CPT

## 2023-06-07 PROCEDURE — 84100 ASSAY OF PHOSPHORUS: CPT

## 2023-06-07 PROCEDURE — 36415 COLL VENOUS BLD VENIPUNCTURE: CPT

## 2023-06-07 PROCEDURE — 96374 THER/PROPH/DIAG INJ IV PUSH: CPT

## 2023-06-07 RX ORDER — SODIUM CHLORIDE 9 MG/ML
250 INJECTION, SOLUTION INTRAVENOUS ONCE
Status: CANCELLED | OUTPATIENT
Start: 2023-06-07

## 2023-06-07 RX ORDER — ORAL SEMAGLUTIDE 3 MG/1
TABLET ORAL
COMMUNITY
Start: 2023-05-31

## 2023-06-07 RX ORDER — ZOLEDRONIC ACID 5 MG/100ML
5 INJECTION, SOLUTION INTRAVENOUS ONCE
Status: CANCELLED | OUTPATIENT
Start: 2023-06-07

## 2023-06-07 RX ORDER — ZOLEDRONIC ACID 5 MG/100ML
5 INJECTION, SOLUTION INTRAVENOUS ONCE
Status: COMPLETED | OUTPATIENT
Start: 2023-06-07 | End: 2023-06-07

## 2023-06-07 RX ORDER — SODIUM CHLORIDE 9 MG/ML
250 INJECTION, SOLUTION INTRAVENOUS ONCE
Status: COMPLETED | OUTPATIENT
Start: 2023-06-07 | End: 2023-06-07

## 2023-06-07 RX ADMIN — ZOLEDRONIC ACID 5 MG: 0.05 INJECTION, SOLUTION INTRAVENOUS at 10:56

## 2023-06-07 RX ADMIN — SODIUM CHLORIDE 250 ML: 9 INJECTION, SOLUTION INTRAVENOUS at 10:55

## 2023-06-08 ENCOUNTER — TELEPHONE (OUTPATIENT)
Dept: ONCOLOGY | Facility: CLINIC | Age: 48
End: 2023-06-08
Payer: COMMERCIAL

## 2023-06-09 LAB — CANCER AG15-3 SERPL-ACNC: 18.4 U/ML

## 2023-06-09 NOTE — PROGRESS NOTES
.    Subjective .     REASONS FOR FOLLOWUP:    History of stage IIA, [id number], ER weakly-positive, SD negative, HER2-negative breast cancer, status post bilateral mastectomies. The patient completed her adjuvant chemotherapy under NSABP B49 clinical trial.  On 12/14/2015 she has no clinical evidence of recurrence of breast cancer and she remains on Femara for the time being. She also has completed her breast reconstruction with bilateral implants and she is very satisfied in regard to the cosmetic result of this. She will remain on Femara for the time being. She receiving at this time Effexor 37.5 mg a day for hot flashes and anxiety.   Bone health. The patient has osteoporosis. She  GETS Reclast infusion  on yearly bases, LAST ONE MAY 12,2021    HISTORY OF PRESENT ILLNESS:      On 06/07/2023 this 47-year-old female returns to the office for follow up still complaining of severe constipation associated with very likely aromatase inhibition. In the meantime she has had a lot of stressors in her family with her daughter that is having premature labor. Her appetite remains acceptable. She is upset with herself because she has gained 20 pounds now that she is post menopausal. Her diet is not rich in calories or sugars. She exercises on routine basis. Bowel activity only happens once a week unless she uses Dulcolax. Urination is normal. She has no skeletal pain, cough, sputum production, shortness of breath or cardiac issues. Mentally she is under a lot of stress as posted.     She continues using doxepin for insomnia and she also uses Effexor for hot flashes and depression.         Past Medical History:   Diagnosis Date    Anxiety     Breast cancer     Right, stage IIA, s/p bilateral mastectomy, XRT, and chemotherapy, followed by Dr. Hadley    Colon polyp 08/2019    COVID-19 virus infection 09/2020    C/O back pain and loss of taste and smell    Disease of thyroid gland     history no meds now    Hemorrhoid     History  of cancer chemotherapy     History of foot fracture     as a child  right    History of mononucleosis     History of radiation therapy     Osteoporosis     Paroxysmal supraventricular tachycardia     CS ostium atrial tachycardia, ablated in 2002 by Dr. Ryann MILLER (postoperative nausea and vomiting)     Sinus headache     But no history of migraines    Skin cancer     Sleep apnea     mild no machine    Wears glasses        ONCOLOGIC HISTORY:  (History from previous dates can be found in the separate document.)    Current Outpatient Medications on File Prior to Visit   Medication Sig Dispense Refill    Biotin 300 MCG tablet Daily.      Cholecalciferol (VITAMIN D-3) 1000 UNITS capsule Take 1,000 Units by mouth Daily.      clobetasol (TEMOVATE) 0.05 % external solution       doxepin (SINEquan) 10 MG capsule Take 1 capsule by mouth Every Night. 30 capsule 6    exemestane (AROMASIN) 25 MG tablet TAKE 1 TABLET BY MOUTH EVERY DAY 30 tablet 6    Rybelsus 3 MG tablet PLEASE SEE ATTACHED FOR DETAILED DIRECTIONS      venlafaxine XR (EFFEXOR-XR) 75 MG 24 hr capsule TAKE 1 CAPSULE BY MOUTH EVERY DAY 30 capsule 1    Zoledronic Acid (RECLAST IV) Infuse  into a venous catheter. Once a year last dose 5/12/2021       No current facility-administered medications on file prior to visit.       ALLERGIES:   No Known Allergies    Social History     Socioeconomic History    Marital status:      Spouse name: Misha    Years of education: College   Tobacco Use    Smoking status: Never    Smokeless tobacco: Never   Vaping Use    Vaping Use: Never used   Substance and Sexual Activity    Alcohol use: Not Currently     Comment: 10 drinks yearly    Drug use: Never    Sexual activity: Defer         Cancer-related family history includes Breast cancer in her maternal aunt; Cancer in her maternal grandmother; Liver cancer in her father and mother.         Objective      Vitals:    06/07/23 0938   BP: 101/63   Pulse: 79   Temp: 98.2 °F  "(36.8 °C)   TempSrc: Temporal   SpO2: 98%   Weight: 66.3 kg (146 lb 1.6 oz)   Height: 170.2 cm (67.01\")   PainSc: 0-No pain         6/7/2023     9:37 AM   Current Status   ECOG score 0       EXAM:                  GENERAL:  Well-developed, Patient  in no acute distress.   SKIN:  Warm, dry ,NO purpura ,no rash.  HEENT:  Pupils were equal and reactive to light and accomodation, conjunctivae noninjected,  normal visual acuity.   NECK:  Supple with good range of motion; no thyromegaly , no JVD or bruits,.No carotid artery pain, no carotid abnormal pulsation   LYMPHATICS:  No cervical, NO supraclavicular, NO axillary, NO inguinal adenopathies.  CARDIAC   normal rate , regular rhythm, without murmur,NO rubs NO S3 NO S4   LUNGS: normal breath sounds bilateral, no wheezing, NO rhonchi, NO crackles ,NO rubs.  VASCULAR VENOUS: no cyanosis, NO collateral circulation, NO varicosities, NO edema, NO palpable cords, NO pain,NO erythema, NO pigmentation of the skin.  ABDOMEN:  Soft, NO pain,no hepatomegaly, no splenomegaly,no masses, no ascites, no collateral circulation,no distention.  EXTREMITIES  AND SPINE:  No clubbing, no cyanosis ,no deformities , no pain .No kyphosis,  no pain in spine, no pain in ribs , no pain in pelvic bone.  NEUROLOGICAL:  Patient was awake, alert, oriented to time, person and place.              RECENT LABS:  Hematology WBC   Date Value Ref Range Status   06/07/2023 5.48 3.40 - 10.80 10*3/mm3 Final     RBC   Date Value Ref Range Status   06/07/2023 4.78 3.77 - 5.28 10*6/mm3 Final     Hemoglobin   Date Value Ref Range Status   06/07/2023 14.5 12.0 - 15.9 g/dL Final     Hematocrit   Date Value Ref Range Status   06/07/2023 44.3 34.0 - 46.6 % Final     Platelets   Date Value Ref Range Status   06/07/2023 319 140 - 450 10*3/mm3 Final                  Assessment & Plan     1. History of stage IIA, [id number], ER weakly-positive, MD negative, HER2-negative right upper outer quadrant breast cancer, status post " bilateral mastectomies. The patient completed her adjuvant chemotherapy under NSABP B49 clinical trial.Patient continues on Femara, 2.5 mg a day. 1. Upon review on 05/08/2020 the patient looks free of cancer recurrence, her clinical examination is normal and she feels substantially better.   As per today I find no evidence of recurrent disease about her breast cancer on her breast examination that remains unchanged with bilateral breast implants and no skeletal pain. She remains on 100% compliance Femara with minimal side effects with the exception of constipation.    07/07/2021 no symptoms or signs of breast cancer recurrence. She remains on Femara with 100% compliance and I advised her to remain on this medicine for the time being with no plans to stop this medicine in the next 5 years or so. She is almost accomplishing 5 years with this medication utilization.   09/30/2021 the patient has no symptoms or signs of breast cancer recurrence. Her clinical examination is negative normal. In the interim of time she has had her breast prosthesis removed. This was feeling like a belt tying her chest. She has felt dramatically better since then. Healing is taking place. She remains on her Femara and I advised her to remain on this medicine for the time being.   The patient was reviewed on 01/31/2022. As far as I can tell on clinical grounds, I do not find any evidence of cancer recurrence. Her clinical examination is normal and she remains on letrozole adjuvant therapy for the time being. We discussed the possibility of eventually removing letrozole out of her of medicines. She feels terrified if we stop this medicine and I pointed out to her that need to.     I pointed out that could be technology that could be using the original tumor in order to determine the need of continuation of hormonal therapy. She believes that it will be better off for her to stay on the medicine for the time being.  On 05/25/2022 the patient  has no clinical symptoms or signs of breast cancer recurrence. Her clinical examination is normal. Her white count, hemoglobin and platelets are normal, her chemistry profile is normal. We advised her to go ahead and continue her Femara for the time being.   On 09/29/2022, given the persistency of the constipation and having nothing else to blame besides the Femara I advised the patient to discontinue this medicine and move into Aromasin at the dose of 25 mg a day. I asked her to give this medicine at least 3 or 4 weeks to see what happens in regard to bowel activity and if everything works okay I asked her to call my nurse, Elaine Hendrickson, to notify us about the status of this. In the meantime it will be okay for her to remain on her Dulcolax for the time being. On clinical grounds she has no symptoms or signs of breast cancer recurrence. Her implants have been removed. Her chest wall is unremarkable. She has occasional kind of phantom pain in the right chest wall but there is no nodularity or alterations otherwise. There is no lymphedema in upper extremities. She will be watched in absence of any other intervention, returning to see us back every 4 months with a CBC, CMP and a CA 15-3.  On 02/01/2023 the patient was further reviewed. Since the previous visit she has not had any changes clinically besides her chronic constipation. Her clinical examination today is negative in regard to breast cancer. Her breast implants have been removed, the patient is not having any issues there whatsoever and the rest of her clinical examination is benign, negative, normal. Her white count, hemoglobin and platelets are normal, her chemistry profile is normal. I advised the patient to remain on Aromasin for the time being not planning to this medication anytime soon.     She will return to see us back in 4 months with a CBC, CMP and a CA 15-3.   On 06/07/2023 the patient has no symptoms or signs of breast cancer recurrence. Her  clinical examination is negative normal in this regard. Her white count, hemoglobin and platelets are normal. Her chemistry profile is normal. Given these facts I do believe that it is prudent to give her 6 weeks of aromatase inhibitor to see if this has an impact in regard to her bowel situation. Her bowel situation is getting to the point that it is unsupportable for her in regard to her constipation.therefore I do not believe that this will have a negative impact in regard protection in regard to her breast cancer risk of recurrence and 6 weeks will not have too much to do in this regard. I would like to review her back at that time. The patient will continue her diet in the best way possible rich in fiber and liquids.               2.  Chronic constipation: Slow transit has been on multiple medications.  Her chronic constipation remains an ongoing issue. She is taking Dulcolax and sometimes she needs to have an enema. She has large bowel activities but not too much propulsive activity in her intestine. Her diet is appropriate and she drinks plenty of liquids.  Her chronic constipation remains a problem. She continues using her laxatives as previous described and she has a bowel movement every 2-3 days. She has proper hydration and she tries to eat a lot of vegetables and fruit.  On 09/29/2022 Dulcolax was advised on her twice a day if necessary and in order to try to remove medications to see if this makes a difference in regard to her bowel activity, I discontinued the Femara and moved into Aromasin at the dose of 25 mg a day. I asked her to call and notify us if she sees any changes with this modification.  On 02/01/2023 the patient will proceed with a colonoscopy on 02/02/2023 by Haley Lopez MD, Gastroenterologist.   On 06/07/2023 the patient will remain on Dulcolax. We will see if aromatase inhibition holding off for 6 weeks has an impact on this symptom at all.               3.  Chronic anxiety, panic  attacks, depression: Patient is on Effexor  The chronic anxiety remains an ongoing issue that is not a new problem and there is no need for medication at this time besides the continuation of her Effexor that will be utilized for depression at the same time.  Her depression is under good control using Effexor. I encouraged her to remain on this medicine for the time being. She has a lot of other stressors and anxiety triggering factors including the wedding of her daughter, buying a new house, packing and moving and quitting her job. I encouraged her to remain on this medicine.   On 09/29/2022 she feels fine with the Effexor that she takes and the doxepin that she takes for insomnia. I asked her to continue these medicines for the time being.  Her chronic anxiety and depression remains under therapy by me. These medicines will remain ongoing for the time being. These symptoms are stable at this time and she is happy because she is going to be a grandmother.  On 06/07/2023 the patient has further issues in regard to stressors. Her daughter is 29 weeks pregnant and is having premature labor with her 1st baby. She has been assisting her and taking care of her at [facility].               4.  Insomnia: Previously on gabapentin without much relief.  At that time she was also taking melatonin and ZzzQuil.  We asked her to discontinue those medications.  9/30/2021 patient started on a trial of doxepin 10 mg nightly.    10/18/2021 she reports that doxepin has helped with her sleep and is asking for refill of this.  She has found that 1 small dose of doxepin 10 mg in the evening is tremendously beneficial to help her to sleep. She has requested a new refill and it will be sent to her pharmacy.  On 05/25/2022 she has found that minimal dose of doxepin is very good for controlling her insomnia. She uses the medicine as needed. I told her to use it every night especially with the multiple stressors  that she has at this time. This medicine will have no impact in regard any addiction or anything of that nature.   On 02/01/2023 her insomnia remains an issue. She is now more grateful, she is preparing to be a grandmother. Hopefully this will trigger benefits in regard to all of her life and including ability to sleep.   On 06/07/2023 she will remain on a minimal dose of doxepin for insomnia that is effective.             5.  Osteoporosis: Continuing physical activity, vitamin D.  She also receives yearly Reclast last given 5/20/2021.  I discussed with her on 01/31/2022 that her bone density documents still further bone loss, especially in the lumbar area and on the left hip. I do believe that she will continue the Reclast and the next dose is scheduled for 05/2022. I encouraged her to continue taking her vitamin D and encouraged her to take a magnesium supplementation. I encouraged her to jump the rope that could have a positive impact in regard to her ability to further help bone formation. She is walking probably 7000 to 10,000 steps a day.  On 05/25/2022 given her osteoporosis documented through bone density in the background of aromatase inhibitor use the patient will proceed today with her Reclast that will remain on yearly basis. In anticipation of that magnesium and phosphorus levels and vitamin D levels will be done.   On 09/29/2022 doxepin will remain at a dose of 10 mg at bedtime. It is helpful in regard to her insomnia.  On 02/01/2023 the patient should be ready for her Reclast this year in 4 months from now and in anticipation of that she will proceed with a bone density test in 12 weeks.  On 06/07/2023 the patient is due to receive her Reclast today. She has been advised to take some Tylenol also in the next couple of days because of probably achiness associated with the administration of medicine. She will be due to have new bone density test in 2024.               6.  Previously had a left  subclavian port, removed 5/21/2021.    7.  Hypercalcemia: The patient had been taking biotin for Heller growth that was thought to be causing this issue.  Biotin was discontinued and hypercalcemia resolved.  No issues related to hypercalcemia on 01/31/2022 with normal chemistry profile.  On 09/29/2022 her calcium level today is completely normal. She is not taking any other multivitamins containing calcium or biotin.  On 02/01/2023 she has not had any further hypercalcemia since discontinuation of previous supplements.   On 06/07/2023 no issues pertinent to hypercalcemia since discontinuation of supplements.             RECOMMENDATIONS:  Each one of the issues have been analyzed, described and posted above in detail. We will review her back in 6 weeks with no laboratory testing.

## 2023-06-09 NOTE — TELEPHONE ENCOUNTER
Discussed with both Dr. Hadley and patient. Dr. Hadley not concerned at this time. Patient states it has improved significantly. Iva Hendrickson RN

## 2023-08-31 RX ORDER — VENLAFAXINE HYDROCHLORIDE 75 MG/1
CAPSULE, EXTENDED RELEASE ORAL
Qty: 30 CAPSULE | Refills: 1 | Status: SHIPPED | OUTPATIENT
Start: 2023-08-31

## 2023-10-19 ENCOUNTER — TELEPHONE (OUTPATIENT)
Dept: ONCOLOGY | Facility: CLINIC | Age: 48
End: 2023-10-19
Payer: COMMERCIAL

## 2023-10-19 NOTE — TELEPHONE ENCOUNTER
Caller: Diana Higgins    Relationship to patient: Self    Best call back number: [phone]     Chief complaint: R/S    Type of visit: LAB & FOLLOW UP    Requested date: NOV.6TH, 9TH, 14TH OR 16TH.  PLEASE CALL PT TO RESCHEDULE OR TO ADVISE IF NONE OF THESE DAYS ARE AVAILABLE.     If rescheduling, when is the original appointment: NOV.15

## 2023-10-24 ENCOUNTER — TELEPHONE (OUTPATIENT)
Dept: ONCOLOGY | Facility: CLINIC | Age: 48
End: 2023-10-24
Payer: COMMERCIAL

## 2023-10-24 DIAGNOSIS — C50.411 MALIGNANT NEOPLASM OF UPPER-OUTER QUADRANT OF RIGHT BREAST IN FEMALE, ESTROGEN RECEPTOR POSITIVE: Primary | ICD-10-CM

## 2023-10-24 DIAGNOSIS — Z17.0 MALIGNANT NEOPLASM OF UPPER-OUTER QUADRANT OF RIGHT BREAST IN FEMALE, ESTROGEN RECEPTOR POSITIVE: Primary | ICD-10-CM

## 2023-10-24 NOTE — TELEPHONE ENCOUNTER
"Called the patient back and she stated that she has been having \"ovary pain\" and increase of vaginal d/c since Saturday. She states her OB could not get her in for a long time and she wanted to see what Dr. Hadley thought as this was symptoms she would have when she was ovulating 10+ years ago when she was pre menopausal. I talked with Dr. Hadley and he stated he thought he would need to do a pelvic exam first and test the fluid and then potentially do an US. Patient stated this was fine but that she is also experiencing pressure and urinary frequency. I let her know I would run the new concerns by him and get back to her. She v/u.   "

## 2023-10-24 NOTE — TELEPHONE ENCOUNTER
Talked with Dr. Hadley again about new symptoms of pressure and urinary frequency and he did not believe they were related and said she could do a urine sample when she was here. I let her know Heather in scheduling would reach out to her with a new appt if available. She v/u.

## 2023-10-24 NOTE — TELEPHONE ENCOUNTER
Provider: Jomar  Caller: patient  Relationship to Patient: self  Call Back Phone Number: [phone]  Reason for Call: Pt has some abdominal pain and discharge.

## 2023-10-25 ENCOUNTER — LAB (OUTPATIENT)
Dept: LAB | Facility: HOSPITAL | Age: 48
End: 2023-10-25
Payer: COMMERCIAL

## 2023-10-25 ENCOUNTER — OFFICE VISIT (OUTPATIENT)
Dept: ONCOLOGY | Facility: CLINIC | Age: 48
End: 2023-10-25
Payer: COMMERCIAL

## 2023-10-25 VITALS
HEART RATE: 92 BPM | SYSTOLIC BLOOD PRESSURE: 103 MMHG | BODY MASS INDEX: 22.37 KG/M2 | TEMPERATURE: 97.5 F | DIASTOLIC BLOOD PRESSURE: 71 MMHG | WEIGHT: 142.5 LBS | RESPIRATION RATE: 18 BRPM | OXYGEN SATURATION: 100 % | HEIGHT: 67 IN

## 2023-10-25 DIAGNOSIS — C50.411 MALIGNANT NEOPLASM OF UPPER-OUTER QUADRANT OF RIGHT BREAST IN FEMALE, ESTROGEN RECEPTOR POSITIVE: ICD-10-CM

## 2023-10-25 DIAGNOSIS — Z17.0 MALIGNANT NEOPLASM OF UPPER-OUTER QUADRANT OF RIGHT BREAST IN FEMALE, ESTROGEN RECEPTOR POSITIVE: Primary | ICD-10-CM

## 2023-10-25 DIAGNOSIS — F41.9 ANXIETY: ICD-10-CM

## 2023-10-25 DIAGNOSIS — Z17.0 MALIGNANT NEOPLASM OF UPPER-OUTER QUADRANT OF RIGHT BREAST IN FEMALE, ESTROGEN RECEPTOR POSITIVE: ICD-10-CM

## 2023-10-25 DIAGNOSIS — F51.04 PSYCHOPHYSIOLOGICAL INSOMNIA: ICD-10-CM

## 2023-10-25 DIAGNOSIS — K59.04 CHRONIC IDIOPATHIC CONSTIPATION: ICD-10-CM

## 2023-10-25 DIAGNOSIS — M81.8 OTHER OSTEOPOROSIS WITHOUT CURRENT PATHOLOGICAL FRACTURE: ICD-10-CM

## 2023-10-25 DIAGNOSIS — F32.9 REACTIVE DEPRESSION: ICD-10-CM

## 2023-10-25 DIAGNOSIS — Z78.0 POST-MENOPAUSAL: ICD-10-CM

## 2023-10-25 DIAGNOSIS — C50.411 MALIGNANT NEOPLASM OF UPPER-OUTER QUADRANT OF RIGHT BREAST IN FEMALE, ESTROGEN RECEPTOR POSITIVE: Primary | ICD-10-CM

## 2023-10-25 LAB
ALBUMIN SERPL-MCNC: 4.1 G/DL (ref 3.5–5.2)
ALBUMIN/GLOB SERPL: 1.7 G/DL
ALP SERPL-CCNC: 55 U/L (ref 39–117)
ALT SERPL W P-5'-P-CCNC: 21 U/L (ref 1–33)
ANION GAP SERPL CALCULATED.3IONS-SCNC: 12.8 MMOL/L (ref 5–15)
AST SERPL-CCNC: 30 U/L (ref 1–32)
BASOPHILS # BLD AUTO: 0.03 10*3/MM3 (ref 0–0.2)
BASOPHILS NFR BLD AUTO: 0.5 % (ref 0–1.5)
BILIRUB SERPL-MCNC: 0.2 MG/DL (ref 0–1.2)
BILIRUB UR QL STRIP: NEGATIVE
BUN SERPL-MCNC: 10 MG/DL (ref 6–20)
BUN/CREAT SERPL: 15.6 (ref 7–25)
CALCIUM SPEC-SCNC: 9 MG/DL (ref 8.6–10.5)
CANCER AG15-3 SERPL-ACNC: 15.6 U/ML
CHLORIDE SERPL-SCNC: 103 MMOL/L (ref 98–107)
CLARITY UR: CLEAR
CO2 SERPL-SCNC: 22.2 MMOL/L (ref 22–29)
COLOR UR: YELLOW
CREAT SERPL-MCNC: 0.64 MG/DL (ref 0.6–1.1)
DEPRECATED RDW RBC AUTO: 40.5 FL (ref 37–54)
EGFRCR SERPLBLD CKD-EPI 2021: 107.8 ML/MIN/1.73
EOSINOPHIL # BLD AUTO: 0.14 10*3/MM3 (ref 0–0.4)
EOSINOPHIL NFR BLD AUTO: 2.1 % (ref 0.3–6.2)
ERYTHROCYTE [DISTWIDTH] IN BLOOD BY AUTOMATED COUNT: 11.9 % (ref 12.3–15.4)
GLOBULIN UR ELPH-MCNC: 2.4 GM/DL
GLUCOSE SERPL-MCNC: 56 MG/DL (ref 65–99)
GLUCOSE UR STRIP-MCNC: NEGATIVE MG/DL
HCT VFR BLD AUTO: 42 % (ref 34–46.6)
HGB BLD-MCNC: 13.8 G/DL (ref 12–15.9)
HGB UR QL STRIP.AUTO: NEGATIVE
IMM GRANULOCYTES # BLD AUTO: 0.02 10*3/MM3 (ref 0–0.05)
IMM GRANULOCYTES NFR BLD AUTO: 0.3 % (ref 0–0.5)
KETONES UR QL STRIP: NEGATIVE
LEUKOCYTE ESTERASE UR QL STRIP.AUTO: NEGATIVE
LYMPHOCYTES # BLD AUTO: 1.49 10*3/MM3 (ref 0.7–3.1)
LYMPHOCYTES NFR BLD AUTO: 22.7 % (ref 19.6–45.3)
MCH RBC QN AUTO: 30.4 PG (ref 26.6–33)
MCHC RBC AUTO-ENTMCNC: 32.9 G/DL (ref 31.5–35.7)
MCV RBC AUTO: 92.5 FL (ref 79–97)
MONOCYTES # BLD AUTO: 0.56 10*3/MM3 (ref 0.1–0.9)
MONOCYTES NFR BLD AUTO: 8.5 % (ref 5–12)
NEUTROPHILS NFR BLD AUTO: 4.31 10*3/MM3 (ref 1.7–7)
NEUTROPHILS NFR BLD AUTO: 65.9 % (ref 42.7–76)
NITRITE UR QL STRIP: NEGATIVE
NRBC BLD AUTO-RTO: 0 /100 WBC (ref 0–0.2)
PH UR STRIP.AUTO: 7 [PH] (ref 4.5–8)
PLATELET # BLD AUTO: 330 10*3/MM3 (ref 140–450)
PMV BLD AUTO: 8.7 FL (ref 6–12)
POTASSIUM SERPL-SCNC: 4.2 MMOL/L (ref 3.5–5.2)
PROT SERPL-MCNC: 6.5 G/DL (ref 6–8.5)
PROT UR QL STRIP: NEGATIVE
RBC # BLD AUTO: 4.54 10*6/MM3 (ref 3.77–5.28)
SODIUM SERPL-SCNC: 138 MMOL/L (ref 136–145)
SP GR UR STRIP: 1.01 (ref 1–1.03)
UROBILINOGEN UR QL STRIP: NORMAL
WBC NRBC COR # BLD: 6.55 10*3/MM3 (ref 3.4–10.8)

## 2023-10-25 PROCEDURE — 85025 COMPLETE CBC W/AUTO DIFF WBC: CPT

## 2023-10-25 PROCEDURE — 81003 URINALYSIS AUTO W/O SCOPE: CPT

## 2023-10-25 PROCEDURE — 36415 COLL VENOUS BLD VENIPUNCTURE: CPT

## 2023-10-25 PROCEDURE — 86300 IMMUNOASSAY TUMOR CA 15-3: CPT | Performed by: INTERNAL MEDICINE

## 2023-10-25 PROCEDURE — 80053 COMPREHEN METABOLIC PANEL: CPT

## 2023-10-25 PROCEDURE — 87086 URINE CULTURE/COLONY COUNT: CPT | Performed by: INTERNAL MEDICINE

## 2023-10-25 RX ORDER — METRONIDAZOLE 250 MG/1
TABLET ORAL
Qty: 21 TABLET | Refills: 0 | Status: SHIPPED | OUTPATIENT
Start: 2023-10-25

## 2023-10-25 RX ORDER — ESTRADIOL 0.1 MG/G
2 CREAM VAGINAL EVERY OTHER DAY
Qty: 42.5 G | Refills: 0 | Status: SHIPPED | OUTPATIENT
Start: 2023-10-25

## 2023-10-25 NOTE — PROGRESS NOTES
.    Subjective .     REASONS FOR FOLLOWUP:    History of stage IIA, [id number], ER weakly-positive, ME negative, HER2-negative breast cancer, status post bilateral mastectomies. The patient completed her adjuvant chemotherapy under NSABP B49 clinical trial.  On 12/14/2015 she has no clinical evidence of recurrence of breast cancer and she remains on Femara for the time being. She also has completed her breast reconstruction with bilateral implants and she is very satisfied in regard to the cosmetic result of this. She will remain on Femara for the time being. She receiving at this time Effexor 37.5 mg a day for hot flashes and anxiety.   Bone health. The patient has osteoporosis. She  GETS Reclast infusion  on yearly bases, LAST ONE MAY 2023    HISTORY OF PRESENT ILLNESS:    On 10/25/2023, this patient has been seen on urgent basis because she has called the office stating that she has had a centrally located pelvic pain, intensity 5 out of 10, and she has developed in the last week or so, a yellowish, whitish vaginal discharge with no odor and no itching. She has not had any fevers or chills. She has occasional discomfort upon urination but no hematuria. This is unusual symptom. Besides the pelvic pain no other symptoms including normal bowel activity now that she is taking a liquid magnesium supplement. She has not had any nausea or vomiting. No respiratory symptomatology. She is very worried because she called her gynecologist and they could not see her for 2 or 3 months in this regard and she decided to come here. We will review her below.     In regard to her previous history of breast cancer, she remains on her Aromasin with no obvious symptoms that would suggest recurrent disease. Her appetite and weight are stable. Her stressors associated with her family are very much overcome and she feels much better in this regard.          Past Medical History:   Diagnosis Date    Anxiety     Breast cancer     Right,  stage IIA, s/p bilateral mastectomy, XRT, and chemotherapy, followed by Dr. Hadley    Colon polyp 08/2019    COVID-19 virus infection 09/2020    C/O back pain and loss of taste and smell    Disease of thyroid gland     history no meds now    Hemorrhoid     History of cancer chemotherapy     History of foot fracture     as a child  right    History of mononucleosis     History of radiation therapy     Osteoporosis     Paroxysmal supraventricular tachycardia     CS ostium atrial tachycardia, ablated in 2002 by Dr. Ryann MILLER (postoperative nausea and vomiting)     Sinus headache     But no history of migraines    Skin cancer     Sleep apnea     mild no machine    Wears glasses        ONCOLOGIC HISTORY:  (History from previous dates can be found in the separate document.)    Current Outpatient Medications on File Prior to Visit   Medication Sig Dispense Refill    Biotin 300 MCG tablet Daily.      Cholecalciferol (VITAMIN D-3) 1000 UNITS capsule Take 1,000 Units by mouth Daily.      clobetasol (TEMOVATE) 0.05 % external solution       doxepin (SINEquan) 10 MG capsule Take 1 capsule by mouth Every Night. 30 capsule 6    exemestane (AROMASIN) 25 MG tablet TAKE 1 TABLET BY MOUTH EVERY DAY 30 tablet 6    venlafaxine XR (EFFEXOR-XR) 75 MG 24 hr capsule TAKE 1 CAPSULE BY MOUTH EVERY DAY 30 capsule 1    Zoledronic Acid (RECLAST IV) Infuse  into a venous catheter. Once a year last dose 5/12/2021       No current facility-administered medications on file prior to visit.       ALLERGIES:   No Known Allergies    Social History     Socioeconomic History    Marital status:      Spouse name: Misha    Years of education: College   Tobacco Use    Smoking status: Never    Smokeless tobacco: Never   Vaping Use    Vaping Use: Never used   Substance and Sexual Activity    Alcohol use: Not Currently     Comment: 10 drinks yearly    Drug use: Never    Sexual activity: Defer         Cancer-related family history includes Breast  "cancer in her maternal aunt; Cancer (age of onset: 42) in her maternal grandmother; Liver cancer in her father and mother.         Objective      Vitals:    10/25/23 1310   BP: 103/71   Pulse: 92   Resp: 18   Temp: 97.5 °F (36.4 °C)   TempSrc: Temporal   SpO2: 100%   Weight: 64.6 kg (142 lb 8 oz)   Height: 170.2 cm (67.01\")   PainSc: 0-No pain           10/25/2023    12:59 PM   Current Status   ECOG score 0       EXAM:              GENERAL:  Well-developed, Patient  in no acute distress.   SKIN:  Warm, dry ,NO purpura ,no rash.  HEENT:  Pupils were equal and reactive to light and accomodation, conjunctivae noninjected,  normal visual acuity.   NECK:  Supple with good range of motion; no thyromegaly , no JVD or bruits,.No carotid artery pain, no carotid abnormal pulsation   LYMPHATICS:  No cervical, NO supraclavicular, NO axillary, NO inguinal adenopathies.  CARDIAC   normal rate , regular rhythm, without murmur,NO rubs NO S3 NO S4   LUNGS: normal breath sounds bilateral, no wheezing, NO rhonchi, NO crackles ,NO rubs.  VASCULAR VENOUS: no cyanosis, NO collateral circulation, NO varicosities, NO edema, NO palpable cords, NO pain,NO erythema, NO pigmentation of the skin.  ABDOMEN:  Soft, NO pain,no hepatomegaly, no splenomegaly,no masses, no ascites, no collateral circulation,no distention.  EXTREMITIES  AND SPINE:  No clubbing, no cyanosis ,no deformities , no pain .No kyphosis,  no pain in spine, no pain in ribs , no pain in pelvic bone.  NEUROLOGICAL:  Patient was awake, alert, oriented to time, person and place.  The patient has consented with a pelvic examination today. My nurse, Iva Hendrickson, helped me through the process. The patient was placed on examining table and she was further reviewed. Her labia is completely normal. Her clitoris is normal. There are no alterations in the groins or adenopathies. She has minimal whitish, yellowish discharge with no bubbles. The vagina looks with atrophy and no lesions " in the roof base or lateral walls. The cervix was visible with minimal erythema and minimal discharge. There was no whitish creamy discharge or any other alteration to speak of.     The speculum was removed. Digital examination was performed. Mobilization of the cervix was mildly tender with no rebound and no guarding. Her bladder was empty. No obvious palpation of pelvic anatomy abnormalities was obtained. No rectal exam was performed. No samples were obtained.              RECENT LABS:  Hematology WBC   Date Value Ref Range Status   10/25/2023 6.55 3.40 - 10.80 10*3/mm3 Final     RBC   Date Value Ref Range Status   10/25/2023 4.54 3.77 - 5.28 10*6/mm3 Final     Hemoglobin   Date Value Ref Range Status   10/25/2023 13.8 12.0 - 15.9 g/dL Final     Hematocrit   Date Value Ref Range Status   10/25/2023 42.0 34.0 - 46.6 % Final     Platelets   Date Value Ref Range Status   10/25/2023 330 140 - 450 10*3/mm3 Final                  Assessment & Plan     1. History of stage IIA, [id number], ER weakly-positive, WI negative, HER2-negative right upper outer quadrant breast cancer, status post bilateral mastectomies. The patient completed her adjuvant chemotherapy under NSABP B49 clinical trial.Patient continues on Femara, 2.5 mg a day. 1. Upon review on 05/08/2020 the patient looks free of cancer recurrence, her clinical examination is normal and she feels substantially better.   As per today I find no evidence of recurrent disease about her breast cancer on her breast examination that remains unchanged with bilateral breast implants and no skeletal pain. She remains on 100% compliance Femara with minimal side effects with the exception of constipation.    07/07/2021 no symptoms or signs of breast cancer recurrence. She remains on Femara with 100% compliance and I advised her to remain on this medicine for the time being with no plans to stop this medicine in the next 5 years or so. She is almost accomplishing 5 years with this  medication utilization.   09/30/2021 the patient has no symptoms or signs of breast cancer recurrence. Her clinical examination is negative normal. In the interim of time she has had her breast prosthesis removed. This was feeling like a belt tying her chest. She has felt dramatically better since then. Healing is taking place. She remains on her Femara and I advised her to remain on this medicine for the time being.   The patient was reviewed on 01/31/2022. As far as I can tell on clinical grounds, I do not find any evidence of cancer recurrence. Her clinical examination is normal and she remains on letrozole adjuvant therapy for the time being. We discussed the possibility of eventually removing letrozole out of her of medicines. She feels terrified if we stop this medicine and I pointed out to her that need to.     I pointed out that could be technology that could be using the original tumor in order to determine the need of continuation of hormonal therapy. She believes that it will be better off for her to stay on the medicine for the time being.  On 05/25/2022 the patient has no clinical symptoms or signs of breast cancer recurrence. Her clinical examination is normal. Her white count, hemoglobin and platelets are normal, her chemistry profile is normal. We advised her to go ahead and continue her Femara for the time being.   On 09/29/2022, given the persistency of the constipation and having nothing else to blame besides the Femara I advised the patient to discontinue this medicine and move into Aromasin at the dose of 25 mg a day. I asked her to give this medicine at least 3 or 4 weeks to see what happens in regard to bowel activity and if everything works okay I asked her to call my nurse, Elaine Hendrickson, to notify us about the status of this. In the meantime it will be okay for her to remain on her Dulcolax for the time being. On clinical grounds she has no symptoms or signs of breast cancer recurrence.  Her implants have been removed. Her chest wall is unremarkable. She has occasional kind of phantom pain in the right chest wall but there is no nodularity or alterations otherwise. There is no lymphedema in upper extremities. She will be watched in absence of any other intervention, returning to see us back every 4 months with a CBC, CMP and a CA 15-3.  On 02/01/2023 the patient was further reviewed. Since the previous visit she has not had any changes clinically besides her chronic constipation. Her clinical examination today is negative in regard to breast cancer. Her breast implants have been removed, the patient is not having any issues there whatsoever and the rest of her clinical examination is benign, negative, normal. Her white count, hemoglobin and platelets are normal, her chemistry profile is normal. I advised the patient to remain on Aromasin for the time being not planning to this medication anytime soon.     She will return to see us back in 4 months with a CBC, CMP and a CA 15-3.   On 06/07/2023 the patient has no symptoms or signs of breast cancer recurrence. Her clinical examination is negative normal in this regard. Her white count, hemoglobin and platelets are normal. Her chemistry profile is normal. Given these facts I do believe that it is prudent to give her 6 weeks of aromatase inhibitor to see if this has an impact in regard to her bowel situation. Her bowel situation is getting to the point that it is unsupportable for her in regard to her constipation.therefore I do not believe that this will have a negative impact in regard protection in regard to her breast cancer risk of recurrence and 6 weeks will not have too much to do in this regard. I would like to review her back at that time. The patient will continue her diet in the best way possible rich in fiber and liquids.   On 07/19/2023 the patient has requested permission to resume her aromatase inhibitor and I have no problems with that.  Stopping the medicine for 6 weeks had no impact whatsoever on her bowel activity proving the point that this is a completely independent phenomenon of her situation. She has chronic idiopathic constipation. Therefore I went ahead and advised her to proceed with her aromatase inhibitor and we will review her back in 4 months with a CBC, CMP and a CA 15-3. She has no symptoms or signs of breast cancer recurrence at this time.    On 10/25/2023, the patient was further reviewed in regard to her history of breast cancer. She has no symptoms or signs of breast cancer recurrence. Her clinical examination pertinent to this is normal. Her white count, hemoglobin and platelets are normal. Her chemistry profile is pending as well as her CA 15-3.     The patient will remain on Aromasin for the time being. She will have follow-up appointment as per previously scheduled.                2.  Chronic constipation: Slow transit has been on multiple medications.  Her chronic constipation remains an ongoing issue. She is taking Dulcolax and sometimes she needs to have an enema. She has large bowel activities but not too much propulsive activity in her intestine. Her diet is appropriate and she drinks plenty of liquids.  Her chronic constipation remains a problem. She continues using her laxatives as previous described and she has a bowel movement every 2-3 days. She has proper hydration and she tries to eat a lot of vegetables and fruit.  On 09/29/2022 Dulcolax was advised on her twice a day if necessary and in order to try to remove medications to see if this makes a difference in regard to her bowel activity, I discontinued the Femara and moved into Aromasin at the dose of 25 mg a day. I asked her to call and notify us if she sees any changes with this modification.  On 02/01/2023 the patient will proceed with a colonoscopy on 02/02/2023 by Haley Lopez MD, Gastroenterologist.   On 06/07/2023 the patient will remain on Dulcolax. We  will see if aromatase inhibition holding off for 6 weeks has an impact on this symptom at all.   On 07/19/2023, we have proven the point that her aromatase inhibitor has nothing to do with her chronic idiopathic constipation. She will continue her Dulcolax that she uses at least 2 or 3 times a week to make bowel activity to happen. We have tried hydration, Linzess. We have tried other laxatives and we have tried multiple other medicines and modification in her diet, none of these have had an impact on her.    On 10/25/2023, the patient is now taking a supplement of magnesium, vitamin C and calcium. This is helping her to have a bowel movement on regular basis that has made a big difference in regard to her ability to function. I encouraged her to continue this.                3.  Chronic anxiety, panic attacks, depression: Patient is on Effexor  The chronic anxiety remains an ongoing issue that is not a new problem and there is no need for medication at this time besides the continuation of her Effexor that will be utilized for depression at the same time.  Her depression is under good control using Effexor. I encouraged her to remain on this medicine for the time being. She has a lot of other stressors and anxiety triggering factors including the wedding of her daughter, buying a new house, packing and moving and quitting her job. I encouraged her to remain on this medicine.   On 09/29/2022 she feels fine with the Effexor that she takes and the doxepin that she takes for insomnia. I asked her to continue these medicines for the time being.  Her chronic anxiety and depression remains under therapy by me. These medicines will remain ongoing for the time being. These symptoms are stable at this time and she is happy because she is going to be a grandmother.  On 06/07/2023 the patient has further issues in regard to stressors. Her daughter is 29 weeks pregnant and is having premature labor with her 1st baby. She has  been assisting her and taking care of her at [facility].   On 07/19/2023 in spite of having a lot of improvement in regard to her stressors including her daughter who is keeping the baby now almost 36 weeks into her pregnancy after having premature contractions and having to be in the hospital. She remains in bed but the patient, Ms. Higgins, is going to her house to help her out to clean the house and take care of business in the best of possibilities. Again her stressors are better but not completely so. She remains on medicine for insomnia and she remains on medicine for anxiety.    On 10/25/2023, no issues pertinent to this at this time. A lot of her family issues are very much under control.                4.  Insomnia: Previously on gabapentin without much relief.  At that time she was also taking melatonin and ZzzQuil.  We asked her to discontinue those medications.  9/30/2021 patient started on a trial of doxepin 10 mg nightly.    10/18/2021 she reports that doxepin has helped with her sleep and is asking for refill of this.  She has found that 1 small dose of doxepin 10 mg in the evening is tremendously beneficial to help her to sleep. She has requested a new refill and it will be sent to her pharmacy.  On 05/25/2022 she has found that minimal dose of doxepin is very good for controlling her insomnia. She uses the medicine as needed. I told her to use it every night especially with the multiple stressors that she has at this time. This medicine will have no impact in regard any addiction or anything of that nature.   On 02/01/2023 her insomnia remains an issue. She is now more grateful, she is preparing to be a grandmother. Hopefully this will trigger benefits in regard to all of her life and including ability to sleep.   On 06/07/2023 she will remain on a minimal dose of doxepin for insomnia that is effective.   On 07/19/2023 we reintroduced sleep hygiene as a must including  stopping watching television, iPad, and telephone at least 1-1/2 hours before she goes to bed. I asked her to use some melatonin or melatonin-containing foods including pistachios to see if that introduces her to a better night of sleep. She is welcome to use her doxepin still at the present dose before she goes to bed or at times that she wakes up at 2 a.m.    On 10/25/2023, she continues taking a minimal dose of doxepin at nighttime that is perfect to help her to sleep.              5.  Osteoporosis: Continuing physical activity, vitamin D.  She also receives yearly Reclast last given 5/20/2021.  I discussed with her on 01/31/2022 that her bone density documents still further bone loss, especially in the lumbar area and on the left hip. I do believe that she will continue the Reclast and the next dose is scheduled for 05/2022. I encouraged her to continue taking her vitamin D and encouraged her to take a magnesium supplementation. I encouraged her to jump the rope that could have a positive impact in regard to her ability to further help bone formation. She is walking probably 7000 to 10,000 steps a day.  On 05/25/2022 given her osteoporosis documented through bone density in the background of aromatase inhibitor use the patient will proceed today with her Reclast that will remain on yearly basis. In anticipation of that magnesium and phosphorus levels and vitamin D levels will be done.   On 09/29/2022 doxepin will remain at a dose of 10 mg at bedtime. It is helpful in regard to her insomnia.  On 02/01/2023 the patient should be ready for her Reclast this year in 4 months from now and in anticipation of that she will proceed with a bone density test in 12 weeks.  On 06/07/2023 the patient is due to receive her Reclast today. She has been advised to take some Tylenol also in the next couple of days because of probably achiness associated with the administration of medicine. She will be due to have new bone density  test in 2024.   On 07/19/2023 the patient has proceeded with Reclast in 06/2023. She will have another dose in 06/2024.    On 10/25/2023, she remains on Reclast on yearly basis.                6.  Previously had a left subclavian port, removed 5/21/2021.    7.  Hypercalcemia: The patient had been taking biotin for Heller growth that was thought to be causing this issue.  Biotin was discontinued and hypercalcemia resolved.  No issues related to hypercalcemia on 01/31/2022 with normal chemistry profile.  On 09/29/2022 her calcium level today is completely normal. She is not taking any other multivitamins containing calcium or biotin.  On 02/01/2023 she has not had any further hypercalcemia since discontinuation of previous supplements.   On 06/07/2023 no issues pertinent to hypercalcemia since discontinuation of supplements.   On 07/19/2023 her calcium level has remained normal after she stopped a multitude of supplements.    On 10/25/2023, no issues pertinent.    8.On 10/25/2023, the patient has called with pelvic pain and vaginal discharge. Her gynecologist cannot see her for 2 months. Previous Pap smear in 07/2023 was normal. The symptoms that she has now is discharge and pelvic pain. The clinical examination is consistent with cervicitis and bacterial vaginosis. No candida was found. No samples were obtained. Given the present circumstances, I would like for her to utilize metronidazole 250 mg tablets, 1 tablet t.i.d. for 7 days. The patient was instructed in regard to the 2 main side effects of this medicine including metallic taste in the mouth that could be overcome with bubble gum or small pieces of peppermint candy and the 2nd, she cannot drink any alcohol while taking this medicine. She does not drink anyway.     Also given the atrophy in the vagina, I would like for her to take Estrace, half an applicator in the vagina every other day until the tube is gone with no refills.     I think the utilization of  these 2 methodologies will take care of her problem without any further need for radiological assessment.     If she wants to follow up with her gynecologist in the future she is more than welcome but I instructed her to call my nurse, Elaine Hendrickson, in the next few days to tell her how things are going.              RECOMMENDATIONS:  Each one of the recommendations have been clearly posted above. The last one in the last problem documented #8 is clearly stated.

## 2023-10-27 ENCOUNTER — TELEPHONE (OUTPATIENT)
Dept: OTHER | Facility: HOSPITAL | Age: 48
End: 2023-10-27
Payer: COMMERCIAL

## 2023-10-27 LAB — BACTERIA SPEC AEROBE CULT: NORMAL

## 2023-10-27 NOTE — TELEPHONE ENCOUNTER
Patient called in and asked to review labs. Relayed urinalysis and cancer antigen results. Pt v/u. Iva Hendrickson RN

## 2023-11-03 RX ORDER — VENLAFAXINE HYDROCHLORIDE 75 MG/1
CAPSULE, EXTENDED RELEASE ORAL
Qty: 30 CAPSULE | Refills: 1 | Status: SHIPPED | OUTPATIENT
Start: 2023-11-03

## 2023-12-01 ENCOUNTER — TELEPHONE (OUTPATIENT)
Dept: ONCOLOGY | Facility: CLINIC | Age: 48
End: 2023-12-01
Payer: COMMERCIAL

## 2023-12-01 NOTE — TELEPHONE ENCOUNTER
Called the patient to let her know that per Dr. Hadley her estrogen was high r/t the estrogen cream and that he did not want to make any changes at this time and she v/u.

## 2023-12-01 NOTE — TELEPHONE ENCOUNTER
Provider: Dr Hadley  Caller: Diana Higgins  Relationship to Patient: Self  Call Back Phone Number: [phone]  Reason for Call: Pt stated that she called yesterday and left a message for Katarzyna. Pt stated she went to her PCP and her labs show Estrogen increase from 7 to 37.. wants to know if Dr. Hadley thinks she should go back on old chemo oral medication. Marked high priority, per pt request due to pharmacy issues in the past.

## 2023-12-19 RX ORDER — DOXEPIN HYDROCHLORIDE 10 MG/1
10 CAPSULE ORAL NIGHTLY
Qty: 30 CAPSULE | Refills: 6 | Status: SHIPPED | OUTPATIENT
Start: 2023-12-19

## 2023-12-26 RX ORDER — EXEMESTANE 25 MG/1
TABLET ORAL
Qty: 30 TABLET | Refills: 6 | Status: SHIPPED | OUTPATIENT
Start: 2023-12-26

## 2023-12-27 ENCOUNTER — HOSPITAL ENCOUNTER (OUTPATIENT)
Dept: BONE DENSITY | Facility: HOSPITAL | Age: 48
Discharge: HOME | End: 2023-12-27
Admitting: INTERNAL MEDICINE
Payer: COMMERCIAL

## 2023-12-27 DIAGNOSIS — K59.04 CHRONIC IDIOPATHIC CONSTIPATION: ICD-10-CM

## 2023-12-27 DIAGNOSIS — C50.411 MALIGNANT NEOPLASM OF UPPER-OUTER QUADRANT OF RIGHT BREAST IN FEMALE, ESTROGEN RECEPTOR POSITIVE: ICD-10-CM

## 2023-12-27 DIAGNOSIS — Z17.0 MALIGNANT NEOPLASM OF UPPER-OUTER QUADRANT OF RIGHT BREAST IN FEMALE, ESTROGEN RECEPTOR POSITIVE: ICD-10-CM

## 2023-12-27 DIAGNOSIS — Z78.0 POST-MENOPAUSAL: ICD-10-CM

## 2023-12-27 DIAGNOSIS — F32.9 REACTIVE DEPRESSION: ICD-10-CM

## 2023-12-27 DIAGNOSIS — M81.8 OTHER OSTEOPOROSIS WITHOUT CURRENT PATHOLOGICAL FRACTURE: ICD-10-CM

## 2023-12-27 PROCEDURE — 77080 DXA BONE DENSITY AXIAL: CPT

## 2024-01-22 RX ORDER — VENLAFAXINE HYDROCHLORIDE 75 MG/1
CAPSULE, EXTENDED RELEASE ORAL
Qty: 30 CAPSULE | Refills: 1 | Status: SHIPPED | OUTPATIENT
Start: 2024-01-22

## 2024-02-28 ENCOUNTER — OFFICE VISIT (OUTPATIENT)
Dept: ONCOLOGY | Facility: CLINIC | Age: 49
End: 2024-02-28
Payer: COMMERCIAL

## 2024-02-28 ENCOUNTER — LAB (OUTPATIENT)
Dept: LAB | Facility: HOSPITAL | Age: 49
End: 2024-02-28
Payer: COMMERCIAL

## 2024-02-28 VITALS
HEIGHT: 67 IN | WEIGHT: 136.4 LBS | DIASTOLIC BLOOD PRESSURE: 60 MMHG | BODY MASS INDEX: 21.41 KG/M2 | RESPIRATION RATE: 14 BRPM | SYSTOLIC BLOOD PRESSURE: 96 MMHG | HEART RATE: 101 BPM | TEMPERATURE: 96.4 F | OXYGEN SATURATION: 99 %

## 2024-02-28 DIAGNOSIS — Z17.0 MALIGNANT NEOPLASM OF UPPER-OUTER QUADRANT OF RIGHT BREAST IN FEMALE, ESTROGEN RECEPTOR POSITIVE: Primary | ICD-10-CM

## 2024-02-28 DIAGNOSIS — C50.411 MALIGNANT NEOPLASM OF UPPER-OUTER QUADRANT OF RIGHT BREAST IN FEMALE, ESTROGEN RECEPTOR POSITIVE: ICD-10-CM

## 2024-02-28 DIAGNOSIS — C50.411 MALIGNANT NEOPLASM OF UPPER-OUTER QUADRANT OF RIGHT BREAST IN FEMALE, ESTROGEN RECEPTOR POSITIVE: Primary | ICD-10-CM

## 2024-02-28 DIAGNOSIS — Z17.0 MALIGNANT NEOPLASM OF UPPER-OUTER QUADRANT OF RIGHT BREAST IN FEMALE, ESTROGEN RECEPTOR POSITIVE: ICD-10-CM

## 2024-02-28 LAB
ALBUMIN SERPL-MCNC: 4.6 G/DL (ref 3.5–5.2)
ALBUMIN/GLOB SERPL: 1.5 G/DL
ALP SERPL-CCNC: 70 U/L (ref 39–117)
ALT SERPL W P-5'-P-CCNC: 17 U/L (ref 1–33)
ANION GAP SERPL CALCULATED.3IONS-SCNC: 11.3 MMOL/L (ref 5–15)
AST SERPL-CCNC: 30 U/L (ref 1–32)
BASOPHILS # BLD AUTO: 0.05 10*3/MM3 (ref 0–0.2)
BASOPHILS NFR BLD AUTO: 0.7 % (ref 0–1.5)
BILIRUB SERPL-MCNC: 0.2 MG/DL (ref 0–1.2)
BUN SERPL-MCNC: 15 MG/DL (ref 6–20)
BUN/CREAT SERPL: 22.4 (ref 7–25)
CALCIUM SPEC-SCNC: 9.4 MG/DL (ref 8.6–10.5)
CANCER AG15-3 SERPL-ACNC: 17 U/ML
CHLORIDE SERPL-SCNC: 101 MMOL/L (ref 98–107)
CO2 SERPL-SCNC: 25.7 MMOL/L (ref 22–29)
CREAT SERPL-MCNC: 0.67 MG/DL (ref 0.57–1)
DEPRECATED RDW RBC AUTO: 41.7 FL (ref 37–54)
EGFRCR SERPLBLD CKD-EPI 2021: 106.6 ML/MIN/1.73
EOSINOPHIL # BLD AUTO: 0.18 10*3/MM3 (ref 0–0.4)
EOSINOPHIL NFR BLD AUTO: 2.5 % (ref 0.3–6.2)
ERYTHROCYTE [DISTWIDTH] IN BLOOD BY AUTOMATED COUNT: 12 % (ref 12.3–15.4)
GLOBULIN UR ELPH-MCNC: 3 GM/DL
GLUCOSE SERPL-MCNC: 107 MG/DL (ref 65–99)
HCT VFR BLD AUTO: 42.5 % (ref 34–46.6)
HGB BLD-MCNC: 14.1 G/DL (ref 12–15.9)
IMM GRANULOCYTES # BLD AUTO: 0.02 10*3/MM3 (ref 0–0.05)
IMM GRANULOCYTES NFR BLD AUTO: 0.3 % (ref 0–0.5)
LYMPHOCYTES # BLD AUTO: 1.33 10*3/MM3 (ref 0.7–3.1)
LYMPHOCYTES NFR BLD AUTO: 18.2 % (ref 19.6–45.3)
MCH RBC QN AUTO: 31.1 PG (ref 26.6–33)
MCHC RBC AUTO-ENTMCNC: 33.2 G/DL (ref 31.5–35.7)
MCV RBC AUTO: 93.6 FL (ref 79–97)
MONOCYTES # BLD AUTO: 0.54 10*3/MM3 (ref 0.1–0.9)
MONOCYTES NFR BLD AUTO: 7.4 % (ref 5–12)
NEUTROPHILS NFR BLD AUTO: 5.19 10*3/MM3 (ref 1.7–7)
NEUTROPHILS NFR BLD AUTO: 70.9 % (ref 42.7–76)
NRBC BLD AUTO-RTO: 0 /100 WBC (ref 0–0.2)
PLATELET # BLD AUTO: 352 10*3/MM3 (ref 140–450)
PMV BLD AUTO: 8.7 FL (ref 6–12)
POTASSIUM SERPL-SCNC: 4.1 MMOL/L (ref 3.5–5.2)
PROT SERPL-MCNC: 7.6 G/DL (ref 6–8.5)
RBC # BLD AUTO: 4.54 10*6/MM3 (ref 3.77–5.28)
SODIUM SERPL-SCNC: 138 MMOL/L (ref 136–145)
WBC NRBC COR # BLD AUTO: 7.31 10*3/MM3 (ref 3.4–10.8)

## 2024-02-28 PROCEDURE — 85025 COMPLETE CBC W/AUTO DIFF WBC: CPT

## 2024-02-28 PROCEDURE — 36415 COLL VENOUS BLD VENIPUNCTURE: CPT

## 2024-02-28 PROCEDURE — 86300 IMMUNOASSAY TUMOR CA 15-3: CPT | Performed by: INTERNAL MEDICINE

## 2024-02-28 PROCEDURE — 80053 COMPREHEN METABOLIC PANEL: CPT

## 2024-02-28 NOTE — PROGRESS NOTES
.    Subjective .     REASONS FOR FOLLOWUP:    History of stage IIA, [id number], ER weakly-positive, IA negative, HER2-negative breast cancer, status post bilateral mastectomies. The patient completed her adjuvant chemotherapy under NSABP B49 clinical trial.  On 12/14/2015 she has no clinical evidence of recurrence of breast cancer and she remains on Femara for the time being. She also has completed her breast reconstruction with bilateral implants and she is very satisfied in regard to the cosmetic result of this. She will remain on Femara for the time being. She receiving at this time Effexor 37.5 mg a day for hot flashes and anxiety.   Bone health. The patient has osteoporosis. She  GETS Reclast infusion  on yearly bases, LAST ONE MAY 2023    HISTORY OF PRESENT ILLNESS:    On 02/28/2024, this 48-year-old female returns to the office for follow-up in regard to her previous history of breast cancer. She remains on Aromasin adjuvant therapy with no plans to stop anytime soon. She has had also pelvic issues. She has been found to have an ovarian cyst and she will follow up with her gynecologist in this regard. She has no pelvic discharge or vaginal discharge at this time. No vaginal bleeding. She has minimal pelvic pain in the right side. The good news is that her magnesium and calcium supplement is helping her to have bowel activity and she is going to the bathroom on routine basis. Urination is normal. No nausea or vomiting. No cardiac or respiratory issues. No neurological or mental issues at all.     She is happy with her granddaughter and she is enjoying her company .          Past Medical History:   Diagnosis Date    Anxiety     Breast cancer     Right, stage IIA, s/p bilateral mastectomy, XRT, and chemotherapy, followed by Dr. Hadley    Colon polyp 08/2019    COVID-19 virus infection 09/2020    C/O back pain and loss of taste and smell    Disease of thyroid gland     history no meds now    Hemorrhoid     History  of cancer chemotherapy     History of foot fracture     as a child  right    History of mononucleosis     History of radiation therapy     Osteoporosis     Paroxysmal supraventricular tachycardia     CS ostium atrial tachycardia, ablated in 2002 by Dr. Ryann MILLER (postoperative nausea and vomiting)     Sinus headache     But no history of migraines    Skin cancer     Sleep apnea     mild no machine    Wears glasses        ONCOLOGIC HISTORY:  (History from previous dates can be found in the separate document.)    Current Outpatient Medications on File Prior to Visit   Medication Sig Dispense Refill    Biotin 300 MCG tablet Daily.      Cholecalciferol (VITAMIN D-3) 1000 UNITS capsule Take 1,000 Units by mouth Daily.      clobetasol (TEMOVATE) 0.05 % external solution       doxepin (SINEquan) 10 MG capsule TAKE 1 CAPSULE BY MOUTH EVERY DAY AT NIGHT 30 capsule 6    exemestane (AROMASIN) 25 MG tablet TAKE 1 TABLET BY MOUTH EVERY DAY 30 tablet 6    venlafaxine XR (EFFEXOR-XR) 75 MG 24 hr capsule TAKE 1 CAPSULE BY MOUTH EVERY DAY 30 capsule 1    Zoledronic Acid (RECLAST IV) Infuse  into a venous catheter. Once a year last dose 5/12/2021      [DISCONTINUED] estradiol (ESTRACE VAGINAL) 0.1 MG/GM vaginal cream Insert 2 g into the vagina Every Other Day. 42.5 g 0    [DISCONTINUED] metroNIDAZOLE (FLAGYL) 250 MG tablet One tablet 3 times a day with food, do not drink any alcohol while taking this medication 21 tablet 0     No current facility-administered medications on file prior to visit.       ALLERGIES:   No Known Allergies    Social History     Socioeconomic History    Marital status:      Spouse name: Misha    Years of education: College   Tobacco Use    Smoking status: Never    Smokeless tobacco: Never   Vaping Use    Vaping Use: Never used   Substance and Sexual Activity    Alcohol use: Not Currently     Comment: 10 drinks yearly    Drug use: Never    Sexual activity: Defer         Cancer-related family  "history includes Breast cancer in her maternal aunt; Cancer (age of onset: 42) in her maternal grandmother; Liver cancer in her father and mother.         Objective      Vitals:    02/28/24 1250   BP: 96/60   Pulse: 101   Resp: 14   Temp: 96.4 °F (35.8 °C)   TempSrc: Temporal   SpO2: 99%   Weight: 61.9 kg (136 lb 6.4 oz)   Height: 170.2 cm (67.01\")   PainSc: 0-No pain           2/28/2024    12:47 PM   Current Status   ECOG score 0       EXAM:      GENERAL:  Well-developed, Patient  in no acute distress.   SKIN:  Warm, dry ,NO purpura ,no rash.  HEENT:  Pupils were equal and reactive to light and accomodation, conjunctivae noninjected,  normal visual acuity.   NECK:  Supple with good range of motion; no thyromegaly , no JVD or bruits,.No carotid artery pain, no carotid abnormal pulsation   LYMPHATICS:  No cervical, NO supraclavicular, NO axillary, NO inguinal adenopathies.  CARDIAC   normal rate , regular rhythm, without murmur,NO rubs NO S3 NO S4   LUNGS: normal breath sounds bilateral, no wheezing, NO rhonchi, NO crackles ,NO rubs.  VASCULAR VENOUS: no cyanosis, NO collateral circulation, NO varicosities, NO edema, NO palpable cords, NO pain,NO erythema, NO pigmentation of the skin.  ABDOMEN:  Soft, NO pain,no hepatomegaly, no splenomegaly,no masses, no ascites, no collateral circulation,no distention.  EXTREMITIES  AND SPINE:  No clubbing, no cyanosis ,no deformities , no pain .No kyphosis,  no pain in spine, no pain in ribs , no pain in pelvic bone.  NEUROLOGICAL:  Patient was awake, alert, oriented to time, person and place.        RECENT LABS:  Hematology WBC   Date Value Ref Range Status   02/28/2024 7.31 3.40 - 10.80 10*3/mm3 Final     RBC   Date Value Ref Range Status   02/28/2024 4.54 3.77 - 5.28 10*6/mm3 Final     Hemoglobin   Date Value Ref Range Status   02/28/2024 14.1 12.0 - 15.9 g/dL Final     Hematocrit   Date Value Ref Range Status   02/28/2024 42.5 34.0 - 46.6 % Final     Platelets   Date Value Ref " Range Status   02/28/2024 352 140 - 450 10*3/mm3 Final                  Assessment & Plan     1. History of stage IIA, [id number], ER weakly-positive, MD negative, HER2-negative right upper outer quadrant breast cancer, status post bilateral mastectomies. The patient completed her adjuvant chemotherapy under NSABP B49 clinical trial.Patient continues on Femara, 2.5 mg a day. 1. Upon review on 05/08/2020 the patient looks free of cancer recurrence, her clinical examination is normal and she feels substantially better.   As per today I find no evidence of recurrent disease about her breast cancer on her breast examination that remains unchanged with bilateral breast implants and no skeletal pain. She remains on 100% compliance Femara with minimal side effects with the exception of constipation.    07/07/2021 no symptoms or signs of breast cancer recurrence. She remains on Femara with 100% compliance and I advised her to remain on this medicine for the time being with no plans to stop this medicine in the next 5 years or so. She is almost accomplishing 5 years with this medication utilization.   09/30/2021 the patient has no symptoms or signs of breast cancer recurrence. Her clinical examination is negative normal. In the interim of time she has had her breast prosthesis removed. This was feeling like a belt tying her chest. She has felt dramatically better since then. Healing is taking place. She remains on her Femara and I advised her to remain on this medicine for the time being.   The patient was reviewed on 01/31/2022. As far as I can tell on clinical grounds, I do not find any evidence of cancer recurrence. Her clinical examination is normal and she remains on letrozole adjuvant therapy for the time being. We discussed the possibility of eventually removing letrozole out of her of medicines. She feels terrified if we stop this medicine and I pointed out to her that need to.     I pointed out that could be technology  that could be using the original tumor in order to determine the need of continuation of hormonal therapy. She believes that it will be better off for her to stay on the medicine for the time being.  On 05/25/2022 the patient has no clinical symptoms or signs of breast cancer recurrence. Her clinical examination is normal. Her white count, hemoglobin and platelets are normal, her chemistry profile is normal. We advised her to go ahead and continue her Femara for the time being.   On 09/29/2022, given the persistency of the constipation and having nothing else to blame besides the Femara I advised the patient to discontinue this medicine and move into Aromasin at the dose of 25 mg a day. I asked her to give this medicine at least 3 or 4 weeks to see what happens in regard to bowel activity and if everything works okay I asked her to call my nurse, Elaine Hendrickson, to notify us about the status of this. In the meantime it will be okay for her to remain on her Dulcolax for the time being. On clinical grounds she has no symptoms or signs of breast cancer recurrence. Her implants have been removed. Her chest wall is unremarkable. She has occasional kind of phantom pain in the right chest wall but there is no nodularity or alterations otherwise. There is no lymphedema in upper extremities. She will be watched in absence of any other intervention, returning to see us back every 4 months with a CBC, CMP and a CA 15-3.  On 02/01/2023 the patient was further reviewed. Since the previous visit she has not had any changes clinically besides her chronic constipation. Her clinical examination today is negative in regard to breast cancer. Her breast implants have been removed, the patient is not having any issues there whatsoever and the rest of her clinical examination is benign, negative, normal. Her white count, hemoglobin and platelets are normal, her chemistry profile is normal. I advised the patient to remain on Aromasin for  the time being not planning to this medication anytime soon.     She will return to see us back in 4 months with a CBC, CMP and a CA 15-3.   On 06/07/2023 the patient has no symptoms or signs of breast cancer recurrence. Her clinical examination is negative normal in this regard. Her white count, hemoglobin and platelets are normal. Her chemistry profile is normal. Given these facts I do believe that it is prudent to give her 6 weeks of aromatase inhibitor to see if this has an impact in regard to her bowel situation. Her bowel situation is getting to the point that it is unsupportable for her in regard to her constipation.therefore I do not believe that this will have a negative impact in regard protection in regard to her breast cancer risk of recurrence and 6 weeks will not have too much to do in this regard. I would like to review her back at that time. The patient will continue her diet in the best way possible rich in fiber and liquids.   On 07/19/2023 the patient has requested permission to resume her aromatase inhibitor and I have no problems with that. Stopping the medicine for 6 weeks had no impact whatsoever on her bowel activity proving the point that this is a completely independent phenomenon of her situation. She has chronic idiopathic constipation. Therefore I went ahead and advised her to proceed with her aromatase inhibitor and we will review her back in 4 months with a CBC, CMP and a CA 15-3. She has no symptoms or signs of breast cancer recurrence at this time.    On 10/25/2023, the patient was further reviewed in regard to her history of breast cancer. She has no symptoms or signs of breast cancer recurrence. Her clinical examination pertinent to this is normal. Her white count, hemoglobin and platelets are normal. Her chemistry profile is pending as well as her CA 15-3.     The patient will remain on Aromasin for the time being. She will have follow-up appointment as per previously  scheduled.    On 02/28/2024, the patient has no symptoms or signs of breast cancer recurrence. She will remain on Aromasin 25 mg for the time being. No plans to stop this medication for the time being.                2.  Chronic constipation: Slow transit has been on multiple medications.  Her chronic constipation remains an ongoing issue. She is taking Dulcolax and sometimes she needs to have an enema. She has large bowel activities but not too much propulsive activity in her intestine. Her diet is appropriate and she drinks plenty of liquids.  Her chronic constipation remains a problem. She continues using her laxatives as previous described and she has a bowel movement every 2-3 days. She has proper hydration and she tries to eat a lot of vegetables and fruit.  On 09/29/2022 Dulcolax was advised on her twice a day if necessary and in order to try to remove medications to see if this makes a difference in regard to her bowel activity, I discontinued the Femara and moved into Aromasin at the dose of 25 mg a day. I asked her to call and notify us if she sees any changes with this modification.  On 02/01/2023 the patient will proceed with a colonoscopy on 02/02/2023 by Haley Lopez MD, Gastroenterologist.   On 06/07/2023 the patient will remain on Dulcolax. We will see if aromatase inhibition holding off for 6 weeks has an impact on this symptom at all.   On 07/19/2023, we have proven the point that her aromatase inhibitor has nothing to do with her chronic idiopathic constipation. She will continue her Dulcolax that she uses at least 2 or 3 times a week to make bowel activity to happen. We have tried hydration, Linzess. We have tried other laxatives and we have tried multiple other medicines and modification in her diet, none of these have had an impact on her.    On 10/25/2023, the patient is now taking a supplement of magnesium, vitamin C and calcium. This is helping her to have a bowel movement on regular basis  that has made a big difference in regard to her ability to function. I encouraged her to continue this.    On 02/28/2024, the combination of magnesium and calcium supplement is helping her to go to the bathroom. I encouraged her to continue this.                3.  Chronic anxiety, panic attacks, depression: Patient is on Effexor  The chronic anxiety remains an ongoing issue that is not a new problem and there is no need for medication at this time besides the continuation of her Effexor that will be utilized for depression at the same time.  Her depression is under good control using Effexor. I encouraged her to remain on this medicine for the time being. She has a lot of other stressors and anxiety triggering factors including the wedding of her daughter, buying a new house, packing and moving and quitting her job. I encouraged her to remain on this medicine.   On 09/29/2022 she feels fine with the Effexor that she takes and the doxepin that she takes for insomnia. I asked her to continue these medicines for the time being.  Her chronic anxiety and depression remains under therapy by me. These medicines will remain ongoing for the time being. These symptoms are stable at this time and she is happy because she is going to be a grandmother.  On 06/07/2023 the patient has further issues in regard to stressors. Her daughter is 29 weeks pregnant and is having premature labor with her 1st baby. She has been assisting her and taking care of her at [facility].   On 07/19/2023 in spite of having a lot of improvement in regard to her stressors including her daughter who is keeping the baby now almost 36 weeks into her pregnancy after having premature contractions and having to be in the hospital. She remains in bed but the patient, Ms. Higgins, is going to her house to help her out to clean the house and take care of business in the best of possibilities. Again her stressors are better but not  completely so. She remains on medicine for insomnia and she remains on medicine for anxiety.    On 10/25/2023, no issues pertinent to this at this time. A lot of her family issues are very much under control.    On 02/28/2024, her anxiety and depression are under good control with Effexor and also a low-dose medication for insomnia. She has not encountered any side effects of the medicines and she believes there is real benefit on them.                4.  Insomnia: Previously on gabapentin without much relief.  At that time she was also taking melatonin and ZzzQuil.  We asked her to discontinue those medications.  9/30/2021 patient started on a trial of doxepin 10 mg nightly.    10/18/2021 she reports that doxepin has helped with her sleep and is asking for refill of this.  She has found that 1 small dose of doxepin 10 mg in the evening is tremendously beneficial to help her to sleep. She has requested a new refill and it will be sent to her pharmacy.  On 05/25/2022 she has found that minimal dose of doxepin is very good for controlling her insomnia. She uses the medicine as needed. I told her to use it every night especially with the multiple stressors that she has at this time. This medicine will have no impact in regard any addiction or anything of that nature.   On 02/01/2023 her insomnia remains an issue. She is now more grateful, she is preparing to be a grandmother. Hopefully this will trigger benefits in regard to all of her life and including ability to sleep.   On 06/07/2023 she will remain on a minimal dose of doxepin for insomnia that is effective.   On 07/19/2023 we reintroduced sleep hygiene as a must including stopping watching television, iPad, and telephone at least 1-1/2 hours before she goes to bed. I asked her to use some melatonin or melatonin-containing foods including pistachios to see if that introduces her to a better night of sleep. She is welcome to use her doxepin still at the present  dose before she goes to bed or at times that she wakes up at 2 a.m.    On 10/25/2023, she continues taking a minimal dose of doxepin at nighttime that is perfect to help her to sleep.    On 02/28/2024, as posted she will remain on low-dose doxepin 10 mg at nighttime.               5.  Osteoporosis: Continuing physical activity, vitamin D.  She also receives yearly Reclast last given 5/20/2021.  I discussed with her on 01/31/2022 that her bone density documents still further bone loss, especially in the lumbar area and on the left hip. I do believe that she will continue the Reclast and the next dose is scheduled for 05/2022. I encouraged her to continue taking her vitamin D and encouraged her to take a magnesium supplementation. I encouraged her to jump the rope that could have a positive impact in regard to her ability to further help bone formation. She is walking probably 7000 to 10,000 steps a day.  On 05/25/2022 given her osteoporosis documented through bone density in the background of aromatase inhibitor use the patient will proceed today with her Reclast that will remain on yearly basis. In anticipation of that magnesium and phosphorus levels and vitamin D levels will be done.   On 09/29/2022 doxepin will remain at a dose of 10 mg at bedtime. It is helpful in regard to her insomnia.  On 02/01/2023 the patient should be ready for her Reclast this year in 4 months from now and in anticipation of that she will proceed with a bone density test in 12 weeks.  On 06/07/2023 the patient is due to receive her Reclast today. She has been advised to take some Tylenol also in the next couple of days because of probably achiness associated with the administration of medicine. She will be due to have new bone density test in 2024.   On 07/19/2023 the patient has proceeded with Reclast in 06/2023. She will have another dose in 06/2024.    On 10/25/2023, she remains on Reclast on yearly basis.    On 02/28/2024, the patient  will be due for her Reclast in 06/2024. In preparation for that therapy, she will have magnesium and phosphorus level besides CMP. She was encouraged to continue taking her vitamin D supplement.                6.  Previously had a left subclavian port, removed 5/21/2021.    7.  Hypercalcemia: The patient had been taking biotin for Heller growth that was thought to be causing this issue.  Biotin was discontinued and hypercalcemia resolved.  No issues related to hypercalcemia on 01/31/2022 with normal chemistry profile.  On 09/29/2022 her calcium level today is completely normal. She is not taking any other multivitamins containing calcium or biotin.  On 02/01/2023 she has not had any further hypercalcemia since discontinuation of previous supplements.   On 06/07/2023 no issues pertinent to hypercalcemia since discontinuation of supplements.   On 07/19/2023 her calcium level has remained normal after she stopped a multitude of supplements.    On 10/25/2023, no issues pertinent.    8.On 10/25/2023, the patient has called with pelvic pain and vaginal discharge. Her gynecologist cannot see her for 2 months. Previous Pap smear in 07/2023 was normal. The symptoms that she has now is discharge and pelvic pain. The clinical examination is consistent with cervicitis and bacterial vaginosis. No candida was found. No samples were obtained. Given the present circumstances, I would like for her to utilize metronidazole 250 mg tablets, 1 tablet t.i.d. for 7 days. The patient was instructed in regard to the 2 main side effects of this medicine including metallic taste in the mouth that could be overcome with bubble gum or small pieces of peppermint candy and the 2nd, she cannot drink any alcohol while taking this medicine. She does not drink anyway.     Also given the atrophy in the vagina, I would like for her to take Estrace, half an applicator in the vagina every other day until the tube is gone with no refills.     I think the  utilization of these 2 methodologies will take care of her problem without any further need for radiological assessment.     If she wants to follow up with her gynecologist in the future she is more than welcome but I instructed her to call my nurse, Elaine Hendrickson, in the next few days to tell her how things are going.      On 02/28/2024, she will follow up with her gynecologist in regard to her ovarian cyst to decide how to proceed thereafter. She still has some element of pelvic pain. She has no vaginal discharge.    On 02/28/2024, in summary each one of the elements have been discussed in detail and that is the summary of this visit today. She will be returning to see us back in 06/2024 when she will be due for her Reclast infusion. In preparation for that magnesium and phosphorus level will be done along with a CMP.     She has no need for any refills on her prescription medications at this time.

## 2024-02-29 ENCOUNTER — TELEPHONE (OUTPATIENT)
Dept: ONCOLOGY | Facility: CLINIC | Age: 49
End: 2024-02-29
Payer: COMMERCIAL

## 2024-02-29 NOTE — TELEPHONE ENCOUNTER
----- Message from Alf Hadley MD sent at 2/29/2024  7:59 AM EST -----  CALL HER CA 15-3 NORMAL GREAT

## 2024-03-25 RX ORDER — VENLAFAXINE HYDROCHLORIDE 75 MG/1
CAPSULE, EXTENDED RELEASE ORAL
Qty: 30 CAPSULE | Refills: 1 | Status: SHIPPED | OUTPATIENT
Start: 2024-03-25

## 2024-04-29 ENCOUNTER — TELEPHONE (OUTPATIENT)
Dept: ONCOLOGY | Facility: CLINIC | Age: 49
End: 2024-04-29
Payer: COMMERCIAL

## 2024-04-29 NOTE — TELEPHONE ENCOUNTER
Returned call from [facility] left on my direct line with details of some lower back/flank pain she has been experiencing. Advised she f/u with PCP. Left direct line [phone] should she have any questions. Iva Hendrickson RN

## 2024-05-20 RX ORDER — VENLAFAXINE HYDROCHLORIDE 75 MG/1
CAPSULE, EXTENDED RELEASE ORAL
Qty: 30 CAPSULE | Refills: 1 | Status: SHIPPED | OUTPATIENT
Start: 2024-05-20

## 2024-06-18 ENCOUNTER — LAB (OUTPATIENT)
Dept: ONCOLOGY | Facility: HOSPITAL | Age: 49
End: 2024-06-18
Payer: COMMERCIAL

## 2024-06-18 ENCOUNTER — OFFICE VISIT (OUTPATIENT)
Dept: ONCOLOGY | Facility: CLINIC | Age: 49
End: 2024-06-18
Payer: COMMERCIAL

## 2024-06-18 ENCOUNTER — APPOINTMENT (OUTPATIENT)
Dept: ONCOLOGY | Facility: HOSPITAL | Age: 49
End: 2024-06-18
Payer: COMMERCIAL

## 2024-06-18 VITALS
BODY MASS INDEX: 20.94 KG/M2 | HEART RATE: 80 BPM | HEIGHT: 67 IN | TEMPERATURE: 98.1 F | OXYGEN SATURATION: 100 % | SYSTOLIC BLOOD PRESSURE: 103 MMHG | DIASTOLIC BLOOD PRESSURE: 68 MMHG | WEIGHT: 133.4 LBS | RESPIRATION RATE: 14 BRPM

## 2024-06-18 DIAGNOSIS — M81.8 OTHER OSTEOPOROSIS WITHOUT CURRENT PATHOLOGICAL FRACTURE: Primary | ICD-10-CM

## 2024-06-18 DIAGNOSIS — C50.411 MALIGNANT NEOPLASM OF UPPER-OUTER QUADRANT OF RIGHT BREAST IN FEMALE, ESTROGEN RECEPTOR POSITIVE: ICD-10-CM

## 2024-06-18 DIAGNOSIS — Z17.0 MALIGNANT NEOPLASM OF UPPER-OUTER QUADRANT OF RIGHT BREAST IN FEMALE, ESTROGEN RECEPTOR POSITIVE: ICD-10-CM

## 2024-06-18 DIAGNOSIS — M81.8 OTHER OSTEOPOROSIS WITHOUT CURRENT PATHOLOGICAL FRACTURE: ICD-10-CM

## 2024-06-18 LAB
BASOPHILS # BLD AUTO: 0.04 10*3/MM3 (ref 0–0.2)
BASOPHILS NFR BLD AUTO: 0.6 % (ref 0–1.5)
DEPRECATED RDW RBC AUTO: 39.5 FL (ref 37–54)
EOSINOPHIL # BLD AUTO: 0.26 10*3/MM3 (ref 0–0.4)
EOSINOPHIL NFR BLD AUTO: 3.9 % (ref 0.3–6.2)
ERYTHROCYTE [DISTWIDTH] IN BLOOD BY AUTOMATED COUNT: 11.9 % (ref 12.3–15.4)
HCT VFR BLD AUTO: 39.3 % (ref 34–46.6)
HGB BLD-MCNC: 13.3 G/DL (ref 12–15.9)
IMM GRANULOCYTES # BLD AUTO: 0.02 10*3/MM3 (ref 0–0.05)
IMM GRANULOCYTES NFR BLD AUTO: 0.3 % (ref 0–0.5)
LYMPHOCYTES # BLD AUTO: 1.54 10*3/MM3 (ref 0.7–3.1)
LYMPHOCYTES NFR BLD AUTO: 23 % (ref 19.6–45.3)
MCH RBC QN AUTO: 30.7 PG (ref 26.6–33)
MCHC RBC AUTO-ENTMCNC: 33.8 G/DL (ref 31.5–35.7)
MCV RBC AUTO: 90.8 FL (ref 79–97)
MONOCYTES # BLD AUTO: 0.64 10*3/MM3 (ref 0.1–0.9)
MONOCYTES NFR BLD AUTO: 9.5 % (ref 5–12)
NEUTROPHILS NFR BLD AUTO: 4.21 10*3/MM3 (ref 1.7–7)
NEUTROPHILS NFR BLD AUTO: 62.7 % (ref 42.7–76)
NRBC BLD AUTO-RTO: 0 /100 WBC (ref 0–0.2)
PLATELET # BLD AUTO: 242 10*3/MM3 (ref 140–450)
PMV BLD AUTO: 9.7 FL (ref 6–12)
RBC # BLD AUTO: 4.33 10*6/MM3 (ref 3.77–5.28)
WBC NRBC COR # BLD AUTO: 6.71 10*3/MM3 (ref 3.4–10.8)

## 2024-06-18 PROCEDURE — 99214 OFFICE O/P EST MOD 30 MIN: CPT | Performed by: INTERNAL MEDICINE

## 2024-06-18 PROCEDURE — 85025 COMPLETE CBC W/AUTO DIFF WBC: CPT

## 2024-06-18 NOTE — PROGRESS NOTES
.    Subjective .     REASONS FOR FOLLOWUP:    History of stage IIA, [id number], ER weakly-positive, MT negative, HER2-negative breast cancer, status post bilateral mastectomies. The patient completed her adjuvant chemotherapy under NSABP B49 clinical trial.  On 12/14/2015 she has no clinical evidence of recurrence of breast cancer and she remains on Femara for the time being. She also has completed her breast reconstruction with bilateral implants and she is very satisfied in regard to the cosmetic result of this. She will remain on Femara for the time being. She receiving at this time Effexor 37.5 mg a day for hot flashes and anxiety. IMPLANTS REMOVED AS PER PT REQUEST IN 2023.  Bone health. The patient has osteoporosis. She  GETS Reclast infusion  on yearly bases, LAST ONE MAY 2023    HISTORY OF PRESENT ILLNESS:      On 06/18/2024 this 48-year-old female who has history of breast cancer stage IIA in 2015 returns to the office for follow up. She is almost in her 9th year of Femara adjuvant therapy with plans to stay on this until year 10 and maybe even beyond. In any even so far the patient has not had any evidence of recurrent disease. Her appetite is good, her weight is stable, her bowel activity surprising enough remains good and her magnesium and calcium supplementation. She has proper urination. She has aches and pains here and there but nothing permanent. She has not had any cardiac or respiratory issues, no supraventricular tachycardia anymore and she is enjoying very much the presence of her granddaughter in her life. She is doing babysitting 2 days a week.     Her ovarian cyst has been left alone by her gynecologist. It is very small and no need for intervention, not producing any symptoms.           Past Medical History:   Diagnosis Date    Anxiety     Breast cancer     Right, stage IIA, s/p bilateral mastectomy, XRT, and chemotherapy, followed by Dr. Hadley    Colon polyp 08/2019    COVID-19 virus  infection 09/2020    C/O back pain and loss of taste and smell    Disease of thyroid gland     history no meds now    Hemorrhoid     History of cancer chemotherapy     History of foot fracture     as a child  right    History of mononucleosis     History of radiation therapy     Osteoporosis     Paroxysmal supraventricular tachycardia     CS ostium atrial tachycardia, ablated in 2002 by Dr. Ryann MILLER (postoperative nausea and vomiting)     Sinus headache     But no history of migraines    Skin cancer     Sleep apnea     mild no machine    Wears glasses        ONCOLOGIC HISTORY:  (History from previous dates can be found in the separate document.)    Current Outpatient Medications on File Prior to Visit   Medication Sig Dispense Refill    amoxicillin (AMOXIL) 875 MG tablet Take 1 tablet by mouth 2 (Two) Times a Day for 7 days. 14 tablet 0    Biotin 300 MCG tablet Daily.      Cholecalciferol (VITAMIN D-3) 1000 UNITS capsule Take 1,000 Units by mouth Daily.      doxepin (SINEquan) 10 MG capsule TAKE 1 CAPSULE BY MOUTH EVERY DAY AT NIGHT 30 capsule 6    exemestane (AROMASIN) 25 MG tablet TAKE 1 TABLET BY MOUTH EVERY DAY 30 tablet 6    venlafaxine XR (EFFEXOR-XR) 75 MG 24 hr capsule TAKE 1 CAPSULE BY MOUTH EVERY DAY 30 capsule 1    Zoledronic Acid (RECLAST IV) Infuse  into a venous catheter. Once a year last dose 5/12/2021      [DISCONTINUED] pseudoephedrine (Sudafed) 30 MG tablet Take 1 tablet by mouth 3 (Three) Times a Day As Needed for Congestion. 20 tablet 0    [DISCONTINUED] clobetasol (TEMOVATE) 0.05 % external solution        No current facility-administered medications on file prior to visit.       ALLERGIES:   No Known Allergies    Social History     Socioeconomic History    Marital status:      Spouse name: Misha    Years of education: College   Tobacco Use    Smoking status: Never    Smokeless tobacco: Never   Vaping Use    Vaping status: Never Used   Substance and Sexual Activity    Alcohol  "use: Not Currently     Comment: 10 drinks yearly    Drug use: Never    Sexual activity: Defer         Cancer-related family history includes Breast cancer in her maternal aunt; Cancer (age of onset: 42) in her maternal grandmother; Liver cancer in her father and mother.         Objective      Vitals:    06/18/24 1125   BP: 103/68   Pulse: 80   Resp: 14   Temp: 98.1 °F (36.7 °C)   TempSrc: Oral   SpO2: 100%   Weight: 60.5 kg (133 lb 6.4 oz)   Height: 170.2 cm (67.01\")   PainSc: 0-No pain           6/18/2024    11:20 AM   Current Status   ECOG score 0       EXAM:            GENERAL:  Well-developed, Patient  in no acute distress.   SKIN:  Warm, dry ,NO purpura ,no rash.  HEENT:  Pupils were equal and reactive to light and accomodation, conjunctivae noninjected,  normal visual acuity.   NECK:  Supple with good range of motion; no thyromegaly , no JVD or bruits,.No carotid artery pain, no carotid abnormal pulsation   LYMPHATICS:  No cervical, NO supraclavicular, NO axillary, NO inguinal adenopathies.  CARDIAC   normal rate , regular rhythm, without murmur,NO rubs NO S3 NO S4   LUNGS: normal breath sounds bilateral, no wheezing, NO rhonchi, NO crackles ,NO rubs.  VASCULAR VENOUS: no cyanosis, NO collateral circulation, NO varicosities, NO edema, NO palpable cords, NO pain,NO erythema, NO pigmentation of the skin.  ABDOMEN:  Soft, NO pain,no hepatomegaly, no splenomegaly,no masses, no ascites, no collateral circulation,no distention.  EXTREMITIES  AND SPINE:  No clubbing, no cyanosis ,no deformities , no pain .No kyphosis,  no pain in spine, no pain in ribs , no pain in pelvic bone.  NEUROLOGICAL:  Patient was awake, alert, oriented to time, person and place.      RECENT LABS:  Hematology WBC   Date Value Ref Range Status   06/18/2024 6.71 3.40 - 10.80 10*3/mm3 Final     RBC   Date Value Ref Range Status   06/18/2024 4.33 3.77 - 5.28 10*6/mm3 Final     Hemoglobin   Date Value Ref Range Status   06/18/2024 13.3 12.0 - 15.9 " g/dL Final     Hematocrit   Date Value Ref Range Status   06/18/2024 39.3 34.0 - 46.6 % Final     Platelets   Date Value Ref Range Status   06/18/2024 242 140 - 450 10*3/mm3 Final                  Assessment & Plan     1. History of stage IIA, [id number], ER weakly-positive, DC negative, HER2-negative right upper outer quadrant breast cancer, status post bilateral mastectomies. The patient completed her adjuvant chemotherapy under NSABP B49 clinical trial.Patient continues on Femara, 2.5 mg a day. 1. Upon review on 05/08/2020 the patient looks free of cancer recurrence, her clinical examination is normal and she feels substantially better.   As per today I find no evidence of recurrent disease about her breast cancer on her breast examination that remains unchanged with bilateral breast implants and no skeletal pain. She remains on 100% compliance Femara with minimal side effects with the exception of constipation.    07/07/2021 no symptoms or signs of breast cancer recurrence. She remains on Femara with 100% compliance and I advised her to remain on this medicine for the time being with no plans to stop this medicine in the next 5 years or so. She is almost accomplishing 5 years with this medication utilization.   09/30/2021 the patient has no symptoms or signs of breast cancer recurrence. Her clinical examination is negative normal. In the interim of time she has had her breast prosthesis removed. This was feeling like a belt tying her chest. She has felt dramatically better since then. Healing is taking place. She remains on her Femara and I advised her to remain on this medicine for the time being.   The patient was reviewed on 01/31/2022. As far as I can tell on clinical grounds, I do not find any evidence of cancer recurrence. Her clinical examination is normal and she remains on letrozole adjuvant therapy for the time being. We discussed the possibility of eventually removing letrozole out of her of medicines.  She feels terrified if we stop this medicine and I pointed out to her that need to.     I pointed out that could be technology that could be using the original tumor in order to determine the need of continuation of hormonal therapy. She believes that it will be better off for her to stay on the medicine for the time being.  On 05/25/2022 the patient has no clinical symptoms or signs of breast cancer recurrence. Her clinical examination is normal. Her white count, hemoglobin and platelets are normal, her chemistry profile is normal. We advised her to go ahead and continue her Femara for the time being.   On 09/29/2022, given the persistency of the constipation and having nothing else to blame besides the Femara I advised the patient to discontinue this medicine and move into Aromasin at the dose of 25 mg a day. I asked her to give this medicine at least 3 or 4 weeks to see what happens in regard to bowel activity and if everything works okay I asked her to call my nurse, Elaine Hendrickson, to notify us about the status of this. In the meantime it will be okay for her to remain on her Dulcolax for the time being. On clinical grounds she has no symptoms or signs of breast cancer recurrence. Her implants have been removed. Her chest wall is unremarkable. She has occasional kind of phantom pain in the right chest wall but there is no nodularity or alterations otherwise. There is no lymphedema in upper extremities. She will be watched in absence of any other intervention, returning to see us back every 4 months with a CBC, CMP and a CA 15-3.  On 02/01/2023 the patient was further reviewed. Since the previous visit she has not had any changes clinically besides her chronic constipation. Her clinical examination today is negative in regard to breast cancer. Her breast implants have been removed, the patient is not having any issues there whatsoever and the rest of her clinical examination is benign, negative, normal. Her  white count, hemoglobin and platelets are normal, her chemistry profile is normal. I advised the patient to remain on Aromasin for the time being not planning to this medication anytime soon.     She will return to see us back in 4 months with a CBC, CMP and a CA 15-3.   On 06/07/2023 the patient has no symptoms or signs of breast cancer recurrence. Her clinical examination is negative normal in this regard. Her white count, hemoglobin and platelets are normal. Her chemistry profile is normal. Given these facts I do believe that it is prudent to give her 6 weeks of aromatase inhibitor to see if this has an impact in regard to her bowel situation. Her bowel situation is getting to the point that it is unsupportable for her in regard to her constipation.therefore I do not believe that this will have a negative impact in regard protection in regard to her breast cancer risk of recurrence and 6 weeks will not have too much to do in this regard. I would like to review her back at that time. The patient will continue her diet in the best way possible rich in fiber and liquids.   On 07/19/2023 the patient has requested permission to resume her aromatase inhibitor and I have no problems with that. Stopping the medicine for 6 weeks had no impact whatsoever on her bowel activity proving the point that this is a completely independent phenomenon of her situation. She has chronic idiopathic constipation. Therefore I went ahead and advised her to proceed with her aromatase inhibitor and we will review her back in 4 months with a CBC, CMP and a CA 15-3. She has no symptoms or signs of breast cancer recurrence at this time.    On 10/25/2023, the patient was further reviewed in regard to her history of breast cancer. She has no symptoms or signs of breast cancer recurrence. Her clinical examination pertinent to this is normal. Her white count, hemoglobin and platelets are normal. Her chemistry profile is pending as well as her CA  15-3.     The patient will remain on Aromasin for the time being. She will have follow-up appointment as per previously scheduled.    On 02/28/2024, the patient has no symptoms or signs of breast cancer recurrence. She will remain on Aromasin 25 mg for the time being. No plans to stop this medication for the time being.    On 06/18/2024 the patient was returning to see us for visit with no new symptoms of any nature. She has occasional aches and pains here and there but nothing permanent. Her clinical examination is very much benign, normal. Her white count, hemoglobin and platelets are normal.     My advice to her is to remain on Femara. She will be in 2025 on 10 years of the medicine and maybe the patient would like to take the medication beyond that given the characteristics of her disease. Given my long-term the patient will be seen by, Bimal Brown MD, in the future repeating laboratory assessment every 6 months CBC, CMP and CA 15-3.                 2.  Chronic constipation: Slow transit has been on multiple medications.  Her chronic constipation remains an ongoing issue. She is taking Dulcolax and sometimes she needs to have an enema. She has large bowel activities but not too much propulsive activity in her intestine. Her diet is appropriate and she drinks plenty of liquids.  Her chronic constipation remains a problem. She continues using her laxatives as previous described and she has a bowel movement every 2-3 days. She has proper hydration and she tries to eat a lot of vegetables and fruit.  On 09/29/2022 Dulcolax was advised on her twice a day if necessary and in order to try to remove medications to see if this makes a difference in regard to her bowel activity, I discontinued the Femara and moved into Aromasin at the dose of 25 mg a day. I asked her to call and notify us if she sees any changes with this modification.  On 02/01/2023 the patient will proceed with a colonoscopy on 02/02/2023 by Haley  MD Jessica, Gastroenterologist.   On 06/07/2023 the patient will remain on Dulcolax. We will see if aromatase inhibition holding off for 6 weeks has an impact on this symptom at all.   On 07/19/2023, we have proven the point that her aromatase inhibitor has nothing to do with her chronic idiopathic constipation. She will continue her Dulcolax that she uses at least 2 or 3 times a week to make bowel activity to happen. We have tried hydration, Linzess. We have tried other laxatives and we have tried multiple other medicines and modification in her diet, none of these have had an impact on her.    On 10/25/2023, the patient is now taking a supplement of magnesium, vitamin C and calcium. This is helping her to have a bowel movement on regular basis that has made a big difference in regard to her ability to function. I encouraged her to continue this.    On 02/28/2024, the combination of magnesium and calcium supplement is helping her to go to the bathroom. I encouraged her to continue this.    On 06/18/2024 since the time of initiation of magnesium and calcium supplementation no further issues at this time.                3.  Chronic anxiety, panic attacks, depression: Patient is on Effexor  The chronic anxiety remains an ongoing issue that is not a new problem and there is no need for medication at this time besides the continuation of her Effexor that will be utilized for depression at the same time.  Her depression is under good control using Effexor. I encouraged her to remain on this medicine for the time being. She has a lot of other stressors and anxiety triggering factors including the wedding of her daughter, buying a new house, packing and moving and quitting her job. I encouraged her to remain on this medicine.   On 09/29/2022 she feels fine with the Effexor that she takes and the doxepin that she takes for insomnia. I asked her to continue these medicines for the time being.  Her chronic anxiety and  depression remains under therapy by me. These medicines will remain ongoing for the time being. These symptoms are stable at this time and she is happy because she is going to be a grandmother.  On 06/07/2023 the patient has further issues in regard to stressors. Her daughter is 29 weeks pregnant and is having premature labor with her 1st baby. She has been assisting her and taking care of her at [facility].   On 07/19/2023 in spite of having a lot of improvement in regard to her stressors including her daughter who is keeping the baby now almost 36 weeks into her pregnancy after having premature contractions and having to be in the hospital. She remains in bed but the patient, Ms. Higgins, is going to her house to help her out to clean the house and take care of business in the best of possibilities. Again her stressors are better but not completely so. She remains on medicine for insomnia and she remains on medicine for anxiety.    On 10/25/2023, no issues pertinent to this at this time. A lot of her family issues are very much under control.    On 02/28/2024, her anxiety and depression are under good control with Effexor and also a low-dose medication for insomnia. She has not encountered any side effects of the medicines and she believes there is real benefit on them.    On 06/18/2024 Effexor remains active in controlling the symptoms and she will remain on this medicine for the time being.                4.  Insomnia: Previously on gabapentin without much relief.  At that time she was also taking melatonin and ZzzQuil.  We asked her to discontinue those medications.  9/30/2021 patient started on a trial of doxepin 10 mg nightly.    10/18/2021 she reports that doxepin has helped with her sleep and is asking for refill of this.  She has found that 1 small dose of doxepin 10 mg in the evening is tremendously beneficial to help her to sleep. She has requested a new refill and it will be  sent to her pharmacy.  On 05/25/2022 she has found that minimal dose of doxepin is very good for controlling her insomnia. She uses the medicine as needed. I told her to use it every night especially with the multiple stressors that she has at this time. This medicine will have no impact in regard any addiction or anything of that nature.   On 02/01/2023 her insomnia remains an issue. She is now more grateful, she is preparing to be a grandmother. Hopefully this will trigger benefits in regard to all of her life and including ability to sleep.   On 06/07/2023 she will remain on a minimal dose of doxepin for insomnia that is effective.   On 07/19/2023 we reintroduced sleep hygiene as a must including stopping watching television, iPad, and telephone at least 1-1/2 hours before she goes to bed. I asked her to use some melatonin or melatonin-containing foods including pistachios to see if that introduces her to a better night of sleep. She is welcome to use her doxepin still at the present dose before she goes to bed or at times that she wakes up at 2 a.m.    On 10/25/2023, she continues taking a minimal dose of doxepin at nighttime that is perfect to help her to sleep.    On 02/28/2024, as posted she will remain on low-dose doxepin 10 mg at nighttime.     On 06/18/2024 low doxepin helps her with her insomnia. This medicine at this dose will remain ongoing.              5.  Osteoporosis: Continuing physical activity, vitamin D.  She also receives yearly Reclast last given 5/20/2021.  I discussed with her on 01/31/2022 that her bone density documents still further bone loss, especially in the lumbar area and on the left hip. I do believe that she will continue the Reclast and the next dose is scheduled for 05/2022. I encouraged her to continue taking her vitamin D and encouraged her to take a magnesium supplementation. I encouraged her to jump the rope that could have a positive impact in regard to her ability to  further help bone formation. She is walking probably 7000 to 10,000 steps a day.  On 05/25/2022 given her osteoporosis documented through bone density in the background of aromatase inhibitor use the patient will proceed today with her Reclast that will remain on yearly basis. In anticipation of that magnesium and phosphorus levels and vitamin D levels will be done.   On 09/29/2022 doxepin will remain at a dose of 10 mg at bedtime. It is helpful in regard to her insomnia.  On 02/01/2023 the patient should be ready for her Reclast this year in 4 months from now and in anticipation of that she will proceed with a bone density test in 12 weeks.  On 06/07/2023 the patient is due to receive her Reclast today. She has been advised to take some Tylenol also in the next couple of days because of probably achiness associated with the administration of medicine. She will be due to have new bone density test in 2024.   On 07/19/2023 the patient has proceeded with Reclast in 06/2023. She will have another dose in 06/2024.    On 10/25/2023, she remains on Reclast on yearly basis.    On 02/28/2024, the patient will be due for her Reclast in 06/2024. In preparation for that therapy, she will have magnesium and phosphorus level besides CMP. She was encouraged to continue taking her vitamin D supplement.    On 06/18/2024 given the fact that the patient no longer has a port we were not able to obtain IV site to start an IV for Reclast infusion. Given this fact the patient almost passed out from so much digging and we decided to abort the Reclast. From now on the patient will be on Prolia 60 mg IM every 6 months and the next injection will be next week. In preparation for that CMP, magnesium and phosphorus levels will be done and she will remain on her vitamin D supplementation.                6.  Previously had a left subclavian port, removed 5/21/2021.    7.  Hypercalcemia: The patient had been taking biotin for Heller growth that  was thought to be causing this issue.  Biotin was discontinued and hypercalcemia resolved.  No issues related to hypercalcemia on 01/31/2022 with normal chemistry profile.  On 09/29/2022 her calcium level today is completely normal. She is not taking any other multivitamins containing calcium or biotin.  On 02/01/2023 she has not had any further hypercalcemia since discontinuation of previous supplements.   On 06/07/2023 no issues pertinent to hypercalcemia since discontinuation of supplements.   On 07/19/2023 her calcium level has remained normal after she stopped a multitude of supplements.    On 10/25/2023, no issues pertinent.  On 06/18/2024 no measurement of this.      8.On 10/25/2023, the patient has called with pelvic pain and vaginal discharge. Her gynecologist cannot see her for 2 months. Previous Pap smear in 07/2023 was normal. The symptoms that she has now is discharge and pelvic pain. The clinical examination is consistent with cervicitis and bacterial vaginosis. No candida was found. No samples were obtained. Given the present circumstances, I would like for her to utilize metronidazole 250 mg tablets, 1 tablet t.i.d. for 7 days. The patient was instructed in regard to the 2 main side effects of this medicine including metallic taste in the mouth that could be overcome with bubble gum or small pieces of peppermint candy and the 2nd, she cannot drink any alcohol while taking this medicine. She does not drink anyway.     Also given the atrophy in the vagina, I would like for her to take Estrace, half an applicator in the vagina every other day until the tube is gone with no refills.     I think the utilization of these 2 methodologies will take care of her problem without any further need for radiological assessment.     If she wants to follow up with her gynecologist in the future she is more than welcome but I instructed her to call my nurse, Elaine Hendrickson, in the next few days to tell her how things  are going.      On 02/28/2024, she will follow up with her gynecologist in regard to her ovarian cyst to decide how to proceed thereafter. She still has some element of pelvic pain. She has no vaginal discharge.    On 02/28/2024, in summary each one of the elements have been discussed in detail and that is the summary of this visit today. She will be returning to see us back in 06/2024 when she will be due for her Reclast infusion. In preparation for that magnesium and phosphorus level will be done along with a CMP.     She has no need for any refills on her prescription medications at this time.    On 06/18/2024 her ovarian cyst is being left alone by her gynecologist, it is small, has no pain, no implications at this time.     The plan of care is clearly established above.

## 2024-06-18 NOTE — NURSING NOTE
PATIENT MOVED TO PORT CHAIR FOR IV ACCESS FOR RECLAST. UNABLE TO GET FIRST IV ATTEMPT AND PATIENT BECAME DIAPHORETIC AND BLURRED VISION. FELT SHE WAS GOING TO PASS OUT. PT LAID BACK, COLD RAG APPLIED AND JUICE GIVEN. PT NEVER LOST CONSCIOUSNESS. BP 98/64. SECOND IV ATTEMPT UNSUCCESSFUL AS WELL. DISCUSSED WITH  POSSIBILITY OF GETTING XGEVA OR PROLIA APPROVED FOR PATIENT DUE TO VERY HARD IV ACCESS. SHELDON RN TO WORK ON THIS. PER MD, JUST DO FINGERSTICK CBC NOW AND HE WILL SEE PATIENT AND MAKE FURTHER PLANS. PT AGREEABLE.

## 2024-06-24 ENCOUNTER — TELEPHONE (OUTPATIENT)
Dept: ONCOLOGY | Facility: CLINIC | Age: 49
End: 2024-06-24
Payer: COMMERCIAL

## 2024-06-24 NOTE — TELEPHONE ENCOUNTER
Received VM from patient on direct line asking if she is approved for Prolia. Corrected plan date, checked approval, and returned call to let patient know she is approved and we will see her on the 27th. Pt v/u and asked if we could draw her tumor marker as well since they were unable to successfully stick patient when she was here last week. Added that to the appt line on the 27th. Pt v/u. Iva Hendrickson RN

## 2024-06-27 ENCOUNTER — TELEPHONE (OUTPATIENT)
Dept: ONCOLOGY | Facility: CLINIC | Age: 49
End: 2024-06-27
Payer: COMMERCIAL

## 2024-06-27 ENCOUNTER — LAB (OUTPATIENT)
Dept: LAB | Facility: HOSPITAL | Age: 49
End: 2024-06-27
Payer: COMMERCIAL

## 2024-06-27 ENCOUNTER — INFUSION (OUTPATIENT)
Dept: ONCOLOGY | Facility: HOSPITAL | Age: 49
End: 2024-06-27
Payer: COMMERCIAL

## 2024-06-27 DIAGNOSIS — C50.411 MALIGNANT NEOPLASM OF UPPER-OUTER QUADRANT OF RIGHT BREAST IN FEMALE, ESTROGEN RECEPTOR POSITIVE: ICD-10-CM

## 2024-06-27 DIAGNOSIS — M81.8 OTHER OSTEOPOROSIS WITHOUT CURRENT PATHOLOGICAL FRACTURE: ICD-10-CM

## 2024-06-27 DIAGNOSIS — Z17.0 MALIGNANT NEOPLASM OF UPPER-OUTER QUADRANT OF RIGHT BREAST IN FEMALE, ESTROGEN RECEPTOR POSITIVE: ICD-10-CM

## 2024-06-27 DIAGNOSIS — M81.8 OTHER OSTEOPOROSIS WITHOUT CURRENT PATHOLOGICAL FRACTURE: Primary | ICD-10-CM

## 2024-06-27 LAB
ALBUMIN SERPL-MCNC: 4.5 G/DL (ref 3.5–5.2)
ALBUMIN/GLOB SERPL: 1.7 G/DL
ALP SERPL-CCNC: 64 U/L (ref 39–117)
ALT SERPL W P-5'-P-CCNC: 19 U/L (ref 1–33)
ANION GAP SERPL CALCULATED.3IONS-SCNC: 12.7 MMOL/L (ref 5–15)
AST SERPL-CCNC: 32 U/L (ref 1–32)
BASOPHILS # BLD AUTO: 0.05 10*3/MM3 (ref 0–0.2)
BASOPHILS NFR BLD AUTO: 0.7 % (ref 0–1.5)
BILIRUB SERPL-MCNC: 0.2 MG/DL (ref 0–1.2)
BUN SERPL-MCNC: 20 MG/DL (ref 6–20)
BUN/CREAT SERPL: 28.2 (ref 7–25)
CALCIUM SPEC-SCNC: 9.6 MG/DL (ref 8.6–10.5)
CANCER AG15-3 SERPL-ACNC: 16.7 U/ML
CHLORIDE SERPL-SCNC: 103 MMOL/L (ref 98–107)
CO2 SERPL-SCNC: 24.3 MMOL/L (ref 22–29)
CREAT SERPL-MCNC: 0.71 MG/DL (ref 0.57–1)
DEPRECATED RDW RBC AUTO: 41.1 FL (ref 37–54)
EGFRCR SERPLBLD CKD-EPI 2021: 103.7 ML/MIN/1.73
EOSINOPHIL # BLD AUTO: 0.29 10*3/MM3 (ref 0–0.4)
EOSINOPHIL NFR BLD AUTO: 4.3 % (ref 0.3–6.2)
ERYTHROCYTE [DISTWIDTH] IN BLOOD BY AUTOMATED COUNT: 11.9 % (ref 12.3–15.4)
GLOBULIN UR ELPH-MCNC: 2.6 GM/DL
GLUCOSE SERPL-MCNC: 90 MG/DL (ref 65–99)
HCT VFR BLD AUTO: 42.3 % (ref 34–46.6)
HGB BLD-MCNC: 13.8 G/DL (ref 12–15.9)
IMM GRANULOCYTES # BLD AUTO: 0.02 10*3/MM3 (ref 0–0.05)
IMM GRANULOCYTES NFR BLD AUTO: 0.3 % (ref 0–0.5)
LYMPHOCYTES # BLD AUTO: 1.65 10*3/MM3 (ref 0.7–3.1)
LYMPHOCYTES NFR BLD AUTO: 24.4 % (ref 19.6–45.3)
MAGNESIUM SERPL-MCNC: 2.2 MG/DL (ref 1.6–2.6)
MCH RBC QN AUTO: 30.7 PG (ref 26.6–33)
MCHC RBC AUTO-ENTMCNC: 32.6 G/DL (ref 31.5–35.7)
MCV RBC AUTO: 94 FL (ref 79–97)
MONOCYTES # BLD AUTO: 0.6 10*3/MM3 (ref 0.1–0.9)
MONOCYTES NFR BLD AUTO: 8.9 % (ref 5–12)
NEUTROPHILS NFR BLD AUTO: 4.14 10*3/MM3 (ref 1.7–7)
NEUTROPHILS NFR BLD AUTO: 61.4 % (ref 42.7–76)
NRBC BLD AUTO-RTO: 0 /100 WBC (ref 0–0.2)
PHOSPHATE SERPL-MCNC: 3.5 MG/DL (ref 2.5–4.5)
PLATELET # BLD AUTO: 362 10*3/MM3 (ref 140–450)
PMV BLD AUTO: 8.7 FL (ref 6–12)
POTASSIUM SERPL-SCNC: 4.9 MMOL/L (ref 3.5–5.2)
PROT SERPL-MCNC: 7.1 G/DL (ref 6–8.5)
RBC # BLD AUTO: 4.5 10*6/MM3 (ref 3.77–5.28)
SODIUM SERPL-SCNC: 140 MMOL/L (ref 136–145)
WBC NRBC COR # BLD AUTO: 6.75 10*3/MM3 (ref 3.4–10.8)

## 2024-06-27 PROCEDURE — 96372 THER/PROPH/DIAG INJ SC/IM: CPT

## 2024-06-27 PROCEDURE — 80053 COMPREHEN METABOLIC PANEL: CPT

## 2024-06-27 PROCEDURE — 36415 COLL VENOUS BLD VENIPUNCTURE: CPT

## 2024-06-27 PROCEDURE — 86300 IMMUNOASSAY TUMOR CA 15-3: CPT | Performed by: INTERNAL MEDICINE

## 2024-06-27 PROCEDURE — 84100 ASSAY OF PHOSPHORUS: CPT

## 2024-06-27 PROCEDURE — 83735 ASSAY OF MAGNESIUM: CPT

## 2024-06-27 PROCEDURE — 25010000002 DENOSUMAB 60 MG/ML SOLUTION PREFILLED SYRINGE: Performed by: INTERNAL MEDICINE

## 2024-06-27 PROCEDURE — 85025 COMPLETE CBC W/AUTO DIFF WBC: CPT

## 2024-06-27 RX ADMIN — DENOSUMAB 60 MG: 60 INJECTION SUBCUTANEOUS at 12:20

## 2024-06-27 NOTE — TELEPHONE ENCOUNTER
----- Message from Alf Hadley sent at 6/27/2024  3:53 PM EDT -----  Call normal ce 15-3 great    Called patient to inform Dr Hadley' msg.  lower (16.7) than 4mos ago (17.0)  CA 15-3  <=25.0 U/mL 16.7 17.0   She acknowledge msg.

## 2024-07-23 RX ORDER — DOXEPIN HYDROCHLORIDE 10 MG/1
10 CAPSULE ORAL NIGHTLY
Qty: 30 CAPSULE | Refills: 6 | Status: SHIPPED | OUTPATIENT
Start: 2024-07-23

## 2024-07-23 RX ORDER — EXEMESTANE 25 MG/1
TABLET ORAL
Qty: 30 TABLET | Refills: 6 | Status: SHIPPED | OUTPATIENT
Start: 2024-07-23

## 2024-07-26 ENCOUNTER — TELEPHONE (OUTPATIENT)
Dept: ONCOLOGY | Facility: CLINIC | Age: 49
End: 2024-07-26
Payer: COMMERCIAL

## 2024-07-26 RX ORDER — VENLAFAXINE HYDROCHLORIDE 75 MG/1
75 CAPSULE, EXTENDED RELEASE ORAL DAILY
Qty: 30 CAPSULE | Refills: 1 | Status: SHIPPED | OUTPATIENT
Start: 2024-07-26

## 2024-07-26 NOTE — TELEPHONE ENCOUNTER
Informed patient we did get refill request, but it was not signed yet. We will sign and send in today, she v/u.

## 2024-07-26 NOTE — TELEPHONE ENCOUNTER
Caller: Diana Higgins    Relationship: Self    Best call back number: [phone]    Requested Prescriptions:   venlafaxine XR (EFFEXOR-XR) 75 MG 24 hr capsule        Pharmacy where request should be sent:    [facility]/pharmacy #[id number] [place], [place] - [address] AT [facility] [phone] [facility] [phone] [facility]       Last office visit with prescribing clinician: Visit date not found   Last telemedicine visit with prescribing clinician: Visit date not found   Next office visit with prescribing clinician: 1/6/2025     Additional details provided by patient: PHARMACY STATES THEY HAVE TRIED TO REACH THE OFFICE WITH NO RESPONSE ON THIS MEDICATION     Does the patient have less than a 3 day supply:  [] Yes  [x] No      Lizette Case Rep   07/26/24 11:25 EDT

## 2024-09-24 RX ORDER — VENLAFAXINE HYDROCHLORIDE 75 MG/1
75 CAPSULE, EXTENDED RELEASE ORAL DAILY
Qty: 30 CAPSULE | Refills: 3 | Status: SHIPPED | OUTPATIENT
Start: 2024-09-24

## 2024-10-28 ENCOUNTER — TELEPHONE (OUTPATIENT)
Dept: ONCOLOGY | Facility: CLINIC | Age: 49
End: 2024-10-28
Payer: COMMERCIAL

## 2024-10-28 NOTE — TELEPHONE ENCOUNTER
Caller: Diana Higgins    Relationship to patient: Self    Best call back number: [phone]     Chief complaint: WOULD LIKE [facility] IF AVAILABLE CLOSE TO SAME DATE    Type of visit: LAB/FOLLOW UP/INJECTION    Requested date: PLEASE CALL PT TO ADVISE IF ANY [facility] APPTS AVAILABLE AROUND THE SAME JAN.6 DATE.  SHE WILL KEEP AS IS IF NONE AVAILABLE AROUND SAME DATE, DOES NOT WANT TO PUSH IT OUT TOO FAR, BUT CHECKING IF AVAILABLE FOR [facility] OF [employer]     If rescheduling, when is the original appointment: 1/6

## 2025-01-07 ENCOUNTER — OFFICE VISIT (OUTPATIENT)
Dept: ONCOLOGY | Facility: CLINIC | Age: 50
End: 2025-01-07
Payer: COMMERCIAL

## 2025-01-07 ENCOUNTER — LAB (OUTPATIENT)
Dept: OTHER | Facility: HOSPITAL | Age: 50
End: 2025-01-07
Payer: COMMERCIAL

## 2025-01-07 ENCOUNTER — INFUSION (OUTPATIENT)
Dept: ONCOLOGY | Facility: HOSPITAL | Age: 50
End: 2025-01-07
Payer: COMMERCIAL

## 2025-01-07 VITALS
BODY MASS INDEX: 22.18 KG/M2 | SYSTOLIC BLOOD PRESSURE: 95 MMHG | HEART RATE: 88 BPM | DIASTOLIC BLOOD PRESSURE: 65 MMHG | TEMPERATURE: 98.6 F | HEIGHT: 67 IN | OXYGEN SATURATION: 99 % | RESPIRATION RATE: 16 BRPM | WEIGHT: 141.3 LBS

## 2025-01-07 DIAGNOSIS — M81.8 OTHER OSTEOPOROSIS WITHOUT CURRENT PATHOLOGICAL FRACTURE: ICD-10-CM

## 2025-01-07 DIAGNOSIS — Z17.0 MALIGNANT NEOPLASM OF UPPER-OUTER QUADRANT OF RIGHT BREAST IN FEMALE, ESTROGEN RECEPTOR POSITIVE: Primary | ICD-10-CM

## 2025-01-07 DIAGNOSIS — Z17.0 MALIGNANT NEOPLASM OF UPPER-OUTER QUADRANT OF RIGHT BREAST IN FEMALE, ESTROGEN RECEPTOR POSITIVE: ICD-10-CM

## 2025-01-07 DIAGNOSIS — M81.8 OTHER OSTEOPOROSIS WITHOUT CURRENT PATHOLOGICAL FRACTURE: Primary | ICD-10-CM

## 2025-01-07 DIAGNOSIS — C50.411 MALIGNANT NEOPLASM OF UPPER-OUTER QUADRANT OF RIGHT BREAST IN FEMALE, ESTROGEN RECEPTOR POSITIVE: Primary | ICD-10-CM

## 2025-01-07 DIAGNOSIS — C50.411 MALIGNANT NEOPLASM OF UPPER-OUTER QUADRANT OF RIGHT BREAST IN FEMALE, ESTROGEN RECEPTOR POSITIVE: ICD-10-CM

## 2025-01-07 LAB
ALBUMIN SERPL-MCNC: 4.5 G/DL (ref 3.5–5.2)
ALBUMIN/GLOB SERPL: 1.5 G/DL
ALP SERPL-CCNC: 67 U/L (ref 39–117)
ALT SERPL W P-5'-P-CCNC: 17 U/L (ref 1–33)
ANION GAP SERPL CALCULATED.3IONS-SCNC: 8.2 MMOL/L (ref 5–15)
AST SERPL-CCNC: 27 U/L (ref 1–32)
BASOPHILS # BLD AUTO: 0.06 10*3/MM3 (ref 0–0.2)
BASOPHILS NFR BLD AUTO: 0.7 % (ref 0–1.5)
BILIRUB SERPL-MCNC: 0.2 MG/DL (ref 0–1.2)
BUN SERPL-MCNC: 19 MG/DL (ref 6–20)
BUN/CREAT SERPL: 29.7 (ref 7–25)
CALCIUM SPEC-SCNC: 9.2 MG/DL (ref 8.6–10.5)
CHLORIDE SERPL-SCNC: 100 MMOL/L (ref 98–107)
CO2 SERPL-SCNC: 29.8 MMOL/L (ref 22–29)
CREAT SERPL-MCNC: 0.64 MG/DL (ref 0.57–1)
DEPRECATED RDW RBC AUTO: 40.1 FL (ref 37–54)
EGFRCR SERPLBLD CKD-EPI 2021: 107.1 ML/MIN/1.73
EOSINOPHIL # BLD AUTO: 0.2 10*3/MM3 (ref 0–0.4)
EOSINOPHIL NFR BLD AUTO: 2.5 % (ref 0.3–6.2)
ERYTHROCYTE [DISTWIDTH] IN BLOOD BY AUTOMATED COUNT: 11.9 % (ref 12.3–15.4)
GLOBULIN UR ELPH-MCNC: 3 GM/DL
GLUCOSE SERPL-MCNC: 101 MG/DL (ref 65–99)
HCT VFR BLD AUTO: 41.3 % (ref 34–46.6)
HGB BLD-MCNC: 13.4 G/DL (ref 12–15.9)
IMM GRANULOCYTES # BLD AUTO: 0.04 10*3/MM3 (ref 0–0.05)
IMM GRANULOCYTES NFR BLD AUTO: 0.5 % (ref 0–0.5)
LYMPHOCYTES # BLD AUTO: 1.64 10*3/MM3 (ref 0.7–3.1)
LYMPHOCYTES NFR BLD AUTO: 20.4 % (ref 19.6–45.3)
MAGNESIUM SERPL-MCNC: 2.3 MG/DL (ref 1.6–2.6)
MCH RBC QN AUTO: 29.8 PG (ref 26.6–33)
MCHC RBC AUTO-ENTMCNC: 32.4 G/DL (ref 31.5–35.7)
MCV RBC AUTO: 92 FL (ref 79–97)
MONOCYTES # BLD AUTO: 0.66 10*3/MM3 (ref 0.1–0.9)
MONOCYTES NFR BLD AUTO: 8.2 % (ref 5–12)
NEUTROPHILS NFR BLD AUTO: 5.44 10*3/MM3 (ref 1.7–7)
NEUTROPHILS NFR BLD AUTO: 67.7 % (ref 42.7–76)
NRBC BLD AUTO-RTO: 0 /100 WBC (ref 0–0.2)
PHOSPHATE SERPL-MCNC: 3 MG/DL (ref 2.5–4.5)
PLATELET # BLD AUTO: 439 10*3/MM3 (ref 140–450)
PMV BLD AUTO: 8.6 FL (ref 6–12)
POTASSIUM SERPL-SCNC: 4.1 MMOL/L (ref 3.5–5.2)
PROT SERPL-MCNC: 7.5 G/DL (ref 6–8.5)
RBC # BLD AUTO: 4.49 10*6/MM3 (ref 3.77–5.28)
SODIUM SERPL-SCNC: 138 MMOL/L (ref 136–145)
WBC NRBC COR # BLD AUTO: 8.04 10*3/MM3 (ref 3.4–10.8)

## 2025-01-07 PROCEDURE — 96372 THER/PROPH/DIAG INJ SC/IM: CPT

## 2025-01-07 PROCEDURE — 83735 ASSAY OF MAGNESIUM: CPT | Performed by: INTERNAL MEDICINE

## 2025-01-07 PROCEDURE — 25010000002 DENOSUMAB 60 MG/ML SOLUTION PREFILLED SYRINGE: Performed by: INTERNAL MEDICINE

## 2025-01-07 PROCEDURE — 80053 COMPREHEN METABOLIC PANEL: CPT | Performed by: INTERNAL MEDICINE

## 2025-01-07 PROCEDURE — 85025 COMPLETE CBC W/AUTO DIFF WBC: CPT | Performed by: INTERNAL MEDICINE

## 2025-01-07 PROCEDURE — 36415 COLL VENOUS BLD VENIPUNCTURE: CPT

## 2025-01-07 PROCEDURE — 84100 ASSAY OF PHOSPHORUS: CPT | Performed by: INTERNAL MEDICINE

## 2025-01-07 RX ORDER — MULTIPLE VITAMINS W/ MINERALS TAB 9MG-400MCG
1 TAB ORAL DAILY
COMMUNITY

## 2025-01-07 RX ORDER — BETAMETHASONE DIPROPIONATE 0.5 MG/G
LOTION TOPICAL
COMMUNITY
Start: 2024-12-18

## 2025-01-07 RX ADMIN — DENOSUMAB 60 MG: 60 INJECTION SUBCUTANEOUS at 14:02

## 2025-01-07 NOTE — PROGRESS NOTES
.        REASONS FOR FOLLOWUP:    History of stage IIA, H2D4dA0, ER weakly-positive, TN negative, HER2-negative breast cancer, status post bilateral mastectomies. The patient completed her adjuvant chemotherapy under NSABP B49 clinical trial.  On 12/14/2015 she has no clinical evidence of recurrence of breast cancer and she remains on Femara for the time being. She also has completed her breast reconstruction with bilateral implants and she is very satisfied in regard to the cosmetic result of this. She will remain on Femara for the time being. She receiving at this time Effexor 37.5 mg a day for hot flashes and anxiety. IMPLANTS REMOVED AS PER PT REQUEST IN 2023.  Bone health. The patient has osteoporosis. She  GETS Reclast infusion  on yearly bases, LAST ONE MAY 2023    HISTORY OF PRESENT ILLNESS:      Patient is a 49-year-old female last seen by Dr. Hadley 6/18/2024.    She has a history of breast cancer stage IIA in 2015 returns to the office for follow up. She is now in her 10 th year of Femara adjuvant therapy.  Thus she again describes right pelvic, right lower quadrant pain far the patient has not had any evidence of recurrent disease. Her appetite is good, her weight is stable, her bowel activity surprising enough remains good and her magnesium and calcium supplementation. She has proper urination. She has aches and pains here and there but nothing permanent that has been present for many years slowly escalating.  She has been advised by gynecology that this is related, potentially, to ovarian cysts and she plans new gynecologic follow-up in the next several weeks to months.         She is seen back to continue therapy for her osteoporosis  with Prolia with her DEXA 12/27/2023 showing lumbar spine T-score -1.5, left hip T-score -2.0 and right hip -2.6.  We have discussed assessing her pelvic pain via CT as well as obtaining a guardant reveal particular at the 10th year of AI therapy and, if negative,  allowing her to discontinue this treatment with some confidence.    Past Medical History:   Diagnosis Date    Anxiety     Breast cancer     Right, stage IIA, s/p bilateral mastectomy, XRT, and chemotherapy, followed by Dr. Hadley    Colon polyp 08/2019    COVID-19 virus infection 09/2020    C/O back pain and loss of taste and smell    Disease of thyroid gland     history no meds now    Hemorrhoid     History of cancer chemotherapy     History of foot fracture     as a child  right    History of mononucleosis     History of radiation therapy     Osteoporosis     Paroxysmal supraventricular tachycardia     CS ostium atrial tachycardia, ablated in 2002 by Dr. Ryann MILLER (postoperative nausea and vomiting)     Sinus headache     But no history of migraines    Skin cancer     Sleep apnea     mild no machine    Wears glasses        ONCOLOGIC HISTORY:  (History from previous dates can be found in the separate document.)    Current Outpatient Medications on File Prior to Visit   Medication Sig Dispense Refill    betamethasone dipropionate 0.05 % lotion APPLY ONCE DAILY TO AFFECTED AREA ON SCALP AND EARS AS NEEDED      Biotin 300 MCG tablet Daily.      Cholecalciferol (VITAMIN D-3) 1000 UNITS capsule Take 1,000 Units by mouth Daily.      doxepin (SINEquan) 10 MG capsule TAKE 1 CAPSULE BY MOUTH EVERY NIGHT 30 capsule 6    exemestane (AROMASIN) 25 MG tablet TAKE 1 TABLET BY MOUTH EVERY DAY 30 tablet 6    venlafaxine XR (EFFEXOR-XR) 75 MG 24 hr capsule TAKE 1 CAPSULE BY MOUTH EVERY DAY 30 capsule 3    Zoledronic Acid (RECLAST IV) Infuse  into a venous catheter. Once a year last dose 5/12/2021 (Patient not taking: Reported on 1/7/2025)       No current facility-administered medications on file prior to visit.       ALLERGIES:   No Known Allergies    Social History     Socioeconomic History    Marital status:      Spouse name: Misha    Years of education: College   Tobacco Use    Smoking status: Never    Smokeless  "tobacco: Never   Vaping Use    Vaping status: Never Used   Substance and Sexual Activity    Alcohol use: Not Currently     Comment: 10 drinks yearly    Drug use: Never    Sexual activity: Defer         Cancer-related family history includes Breast cancer in her maternal aunt; Cancer (age of onset: 42) in her maternal grandmother; Liver cancer in her father and mother.         Objective      Vitals:    01/07/25 1316   BP: 95/65   Pulse: 88   Resp: 16   Temp: 98.6 °F (37 °C)   TempSrc: Oral   SpO2: 99%   Weight: 64.1 kg (141 lb 4.8 oz)   Height: 170 cm (66.93\")   PainSc: 0-No pain             1/7/2025     1:18 PM   Current Status   ECOG score 0       EXAM:  GENERAL:  Well-developed, Patient  in no acute distress.   SKIN:  Warm, dry ,NO purpura ,no rash.  HEENT:  Pupils were equal and reactive to light and accomodation, conjunctivae noninjected,  normal visual acuity.   NECK:  Supple with good range of motion; no thyromegaly , no JVD or bruits,.No carotid artery pain, no carotid abnormal pulsation   LYMPHATICS:  No cervical, NO supraclavicular, NO axillary, NO inguinal adenopathies.  CARDIAC   normal rate , regular rhythm, without murmur,NO rubs NO S3 NO S4   LUNGS: normal breath sounds bilateral, no wheezing, NO rhonchi, NO crackles ,NO rubs.  VASCULAR VENOUS: no cyanosis, NO collateral circulation, NO varicosities, NO edema, NO palpable cords, NO pain,NO erythema, NO pigmentation of the skin.  ABDOMEN:  Soft, NO pain,no hepatomegaly, no splenomegaly,no masses, no ascites, no collateral circulation,no distention.  EXTREMITIES  AND SPINE:  No clubbing, no cyanosis ,no deformities , no pain .No kyphosis,  no pain in spine, no pain in ribs , no pain in pelvic bone.  NEUROLOGICAL:  Patient was awake, alert, oriented to time, person and place.      RECENT LABS:  Hematology WBC   Date Value Ref Range Status   01/07/2025 8.04 3.40 - 10.80 10*3/mm3 Final     RBC   Date Value Ref Range Status   01/07/2025 4.49 3.77 - 5.28 " 10*6/mm3 Final     Hemoglobin   Date Value Ref Range Status   01/07/2025 13.4 12.0 - 15.9 g/dL Final     Hematocrit   Date Value Ref Range Status   01/07/2025 41.3 34.0 - 46.6 % Final     Platelets   Date Value Ref Range Status   01/07/2025 439 140 - 450 10*3/mm3 Final                  Assessment & Plan     1. History of stage IIA, [id number], ER weakly-positive, WI negative, HER2-negative right upper outer quadrant breast cancer, status post bilateral mastectomies. The patient completed her adjuvant chemotherapy under NSABP B49 clinical trial.Patient continues on Femara, 2.5 mg a day. 1. Upon review on 05/08/2020 the patient looks free of cancer recurrence, her clinical examination is normal and she feels substantially better.   As per today I find no evidence of recurrent disease about her breast cancer on her breast examination that remains unchanged with bilateral breast implants and no skeletal pain. She remains on 100% compliance Femara with minimal side effects with the exception of constipation.    07/07/2021 no symptoms or signs of breast cancer recurrence. She remains on Femara with 100% compliance and I advised her to remain on this medicine for the time being with no plans to stop this medicine in the next 5 years or so. She is almost accomplishing 5 years with this medication utilization.   09/30/2021 the patient has no symptoms or signs of breast cancer recurrence. Her clinical examination is negative normal. In the interim of time she has had her breast prosthesis removed. This was feeling like a belt tying her chest. She has felt dramatically better since then. Healing is taking place. She remains on her Femara and I advised her to remain on this medicine for the time being.   The patient was reviewed on 01/31/2022. As far as I can tell on clinical grounds, I do not find any evidence of cancer recurrence. Her clinical examination is normal and she remains on letrozole adjuvant therapy for the time  being. We discussed the possibility of eventually removing letrozole out of her of medicines. She feels terrified if we stop this medicine and I pointed out to her that need to.   I pointed out that could be technology that could be using the original tumor in order to determine the need of continuation of hormonal therapy. She believes that it will be better off for her to stay on the medicine for the time being.  On 05/25/2022 the patient has no clinical symptoms or signs of breast cancer recurrence. Her clinical examination is normal. Her white count, hemoglobin and platelets are normal, her chemistry profile is normal. We advised her to go ahead and continue her Femara for the time being.   On 09/29/2022, given the persistency of the constipation and having nothing else to blame besides the Femara I advised the patient to discontinue this medicine and move into Aromasin at the dose of 25 mg a day. I asked her to give this medicine at least 3 or 4 weeks to see what happens in regard to bowel activity and if everything works okay I asked her to call my nurse, Elaine Hendrickson, to notify us about the status of this. In the meantime it will be okay for her to remain on her Dulcolax for the time being. On clinical grounds she has no symptoms or signs of breast cancer recurrence. Her implants have been removed. Her chest wall is unremarkable. She has occasional kind of phantom pain in the right chest wall but there is no nodularity or alterations otherwise. There is no lymphedema in upper extremities. She will be watched in absence of any other intervention, returning to see us back every 4 months with a CBC, CMP and a CA 15-3.  On 02/01/2023 the patient was further reviewed. Since the previous visit she has not had any changes clinically besides her chronic constipation. Her clinical examination today is negative in regard to breast cancer. Her breast implants have been removed, the patient is not having any issues  there whatsoever and the rest of her clinical examination is benign, negative, normal. Her white count, hemoglobin and platelets are normal, her chemistry profile is normal. I advised the patient to remain on Aromasin for the time being not planning to this medication anytime soon.   She will return to see us back in 4 months with a CBC, CMP and a CA 15-3.   On 06/07/2023 the patient has no symptoms or signs of breast cancer recurrence. Her clinical examination is negative normal in this regard. Her white count, hemoglobin and platelets are normal. Her chemistry profile is normal. Given these facts I do believe that it is prudent to give her 6 weeks of aromatase inhibitor to see if this has an impact in regard to her bowel situation. Her bowel situation is getting to the point that it is unsupportable for her in regard to her constipation.therefore I do not believe that this will have a negative impact in regard protection in regard to her breast cancer risk of recurrence and 6 weeks will not have too much to do in this regard. I would like to review her back at that time. The patient will continue her diet in the best way possible rich in fiber and liquids.   On 07/19/2023 the patient has requested permission to resume her aromatase inhibitor and I have no problems with that. Stopping the medicine for 6 weeks had no impact whatsoever on her bowel activity proving the point that this is a completely independent phenomenon of her situation. She has chronic idiopathic constipation. Therefore I went ahead and advised her to proceed with her aromatase inhibitor and we will review her back in 4 months with a CBC, CMP and a CA 15-3. She has no symptoms or signs of breast cancer recurrence at this time.  The patient will remain on Aromasin for the time being. She will have follow-up appointment as per previously scheduled.  On 02/28/2024, the patient has no symptoms or signs of breast cancer recurrence. She will remain on  Aromasin 25 mg for the time being. No plans to stop this medication for the time being.  On 06/18/2024 the patient was returning to see us for visit with no new symptoms of any nature. She has occasional aches and pains here and there but nothing permanent. Her clinical examination is very much benign, normal. Her white count, hemoglobin and platelets are normal.   My advice to her is to remain on Femara. She will be in 2025 on 10 years of the medicine and maybe the patient would like to take the medication beyond that given the characteristics of her disease. Given my residential the patient will be seen by, Bimal Brown MD, in the future repeating laboratory assessment every 6 months CBC, CMP and CA 15-3.   She is seen back to continue therapy for her osteoporosis  with Prolia with her DEXA 12/27/2023 showing lumbar spine T-score -1.5, left hip T-score -2.0 and right hip -2.6.  We have discussed assessing her pelvic pain via CT as well as obtaining a guardant reveal particular at the 10th year of AI therapy and, if negative, allowing her to discontinue this treatment with some confidence.      2.  Chronic constipation: Slow transit has been on multiple medications.  Her chronic constipation remains an ongoing issue. She is taking Dulcolax and sometimes she needs to have an enema. She has large bowel activities but not too much propulsive activity in her intestine. Her diet is appropriate and she drinks plenty of liquids.  Her chronic constipation remains a problem. She continues using her laxatives as previous described and she has a bowel movement every 2-3 days. She has proper hydration and she tries to eat a lot of vegetables and fruit.  On 09/29/2022 Dulcolax was advised on her twice a day if necessary and in order to try to remove medications to see if this makes a difference in regard to her bowel activity, I discontinued the Femara and moved into Aromasin at the dose of 25 mg a day. I asked her to call  and notify us if she sees any changes with this modification.  On 02/01/2023 the patient will proceed with a colonoscopy on 02/02/2023 by Haley Lopez MD, Gastroenterologist.   On 06/07/2023 the patient will remain on Dulcolax. We will see if aromatase inhibition holding off for 6 weeks has an impact on this symptom at all. On 07/19/2023, we have proven the point that her aromatase inhibitor has nothing to do with her chronic idiopathic constipation. She will continue her Dulcolax that she uses at least 2 or 3 times a week to make bowel activity to happen. We have tried hydration, Linzess. We have tried other laxatives and we have tried multiple other medicines and modification in her diet, none of these have had an impact on her.  On 10/25/2023, the patient is now taking a supplement of magnesium, vitamin C and calcium. This is helping her to have a bowel movement on regular basis that has made a big difference in regard to her ability to function. I encouraged her to continue this  On 02/28/2024, the combination of magnesium and calcium supplement is helping her to go to the bathroom. I encouraged her to continue this.  On 06/18/2024 since the time of initiation of magnesium and calcium supplementation no further issues at this time.    3.  Chronic anxiety, panic attacks, depression: Patient is on Effexor  The chronic anxiety remains an ongoing issue that is not a new problem and there is no need for medication at this time besides the continuation of her Effexor that will be utilized for depression at the same time.  Her depression is under good control using Effexor. I encouraged her to remain on this medicine for the time being. She has a lot of other stressors and anxiety triggering factors including the wedding of her daughter, buying a new house, packing and moving and quitting her job. I encouraged her to remain on this medicine.   On 09/29/2022 she feels fine with the Effexor that she takes and the  doxepin that she takes for insomnia. I asked her to continue these medicines for the time being.  Her chronic anxiety and depression remains under therapy by me. These medicines will remain ongoing for the time being. These symptoms are stable at this time and she is happy because she is going to be a grandmother.  On 06/07/2023 the patient has further issues in regard to stressors. Her daughter is 29 weeks pregnant and is having premature labor with her 1st baby. She has been assisting her and taking care of her at [facility].   On 07/19/2023 in spite of having a lot of improvement in regard to her stressors including her daughter who is keeping the baby now almost 36 weeks into her pregnancy after having premature contractions and having to be in the hospital. She remains in bed but the patient, Ms. Higgins, is going to her house to help her out to clean the house and take care of business in the best of possibilities. Again her stressors are better but not completely so. She remains on medicine for insomnia and she remains on medicine for anxiety  On 10/25/2023, no issues pertinent to this at this time. A lot of her family issues are very much under control.  On 02/28/2024, her anxiety and depression are under good control with Effexor and also a low-dose medication for insomnia. She has not encountered any side effects of the medicines and she believes there is real benefit on them.  On 06/18/2024 Effexor remains active in controlling the symptoms and she will remain on this medicine for the time being.    4.  Insomnia: Previously on gabapentin without much relief.  At that time she was also taking melatonin and ZzzQuil.  We asked her to discontinue those medications.  9/30/2021 patient started on a trial of doxepin 10 mg nightly.    10/18/2021 she reports that doxepin has helped with her sleep and is asking for refill of this.  She has found that 1 small dose of doxepin 10 mg in the  evening is tremendously beneficial to help her to sleep. She has requested a new refill and it will be sent to her pharmacy.  On 05/25/2022 she has found that minimal dose of doxepin is very good for controlling her insomnia. She uses the medicine as needed. I told her to use it every night especially with the multiple stressors that she has at this time. This medicine will have no impact in regard any addiction or anything of that nature.   On 02/01/2023 her insomnia remains an issue. She is now more grateful, she is preparing to be a grandmother. Hopefully this will trigger benefits in regard to all of her life and including ability to sleep.   On 06/07/2023 she will remain on a minimal dose of doxepin for insomnia that is effective.   On 07/19/2023 we reintroduced sleep hygiene as a must including stopping watching television, iPad, and telephone at least 1-1/2 hours before she goes to bed. I asked her to use some melatonin or melatonin-containing foods including pistachios to see if that introduces her to a better night of sleep. She is welcome to use her doxepin still at the present dose before she goes to bed or at times that she wakes up at 2 a.m.  On 10/25/2023, she continues taking a minimal dose of doxepin at nighttime that is perfect to help her to sleep.  On 02/28/2024, as posted she will remain on low-dose doxepin 10 mg at nighttime.   On 06/18/2024 low doxepin helps her with her insomnia. This medicine at this dose will remain ongoing.      5.  Osteoporosis: Continuing physical activity, vitamin D.  She also receives yearly Reclast last given 5/20/2021.  I discussed with her on 01/31/2022 that her bone density documents still further bone loss, especially in the lumbar area and on the left hip. I do believe that she will continue the Reclast and the next dose is scheduled for 05/2022. I encouraged her to continue taking her vitamin D and encouraged her to take a magnesium supplementation. I encouraged  her to jump the rope that could have a positive impact in regard to her ability to further help bone formation. She is walking probably 7000 to 10,000 steps a day.  On 05/25/2022 given her osteoporosis documented through bone density in the background of aromatase inhibitor use the patient will proceed today with her Reclast that will remain on yearly basis. In anticipation of that magnesium and phosphorus levels and vitamin D levels will be done.   On 09/29/2022 doxepin will remain at a dose of 10 mg at bedtime. It is helpful in regard to her insomnia.  On 02/01/2023 the patient should be ready for her Reclast this year in 4 months from now and in anticipation of that she will proceed with a bone density test in 12 weeks.  On 06/07/2023 the patient is due to receive her Reclast today. She has been advised to take some Tylenol also in the next couple of days because of probably achiness associated with the administration of medicine. She will be due to have new bone density test in 2024.   On 07/19/2023 the patient has proceeded with Reclast in 06/2023. She will have another dose in 06/2024.  On 10/25/2023, she remains on Reclast on yearly basis.  On 02/28/2024, the patient will be due for her Reclast in 06/2024. In preparation for that therapy, she will have magnesium and phosphorus level besides CMP. She was encouraged to continue taking her vitamin D supplement.  On 06/18/2024 given the fact that the patient no longer has a port we were not able to obtain IV site to start an IV for Reclast infusion. Given this fact the patient almost passed out from so much digging and we decided to abort the Reclast. From now on the patient will be on Prolia 60 mg IM every 6 months and the next injection will be next week. In preparation for that CMP, magnesium and phosphorus levels will be done and she will remain on her vitamin D supplementation.    6.  Previously had a left subclavian port, removed 5/21/2021.    7.   Hypercalcemia: The patient had been taking biotin for Heller growth that was thought to be causing this issue.  Biotin was discontinued and hypercalcemia resolved.  No issues related to hypercalcemia on 01/31/2022 with normal chemistry profile.  On 09/29/2022 her calcium level today is completely normal. She is not taking any other multivitamins containing calcium or biotin.  On 02/01/2023 she has not had any further hypercalcemia since discontinuation of previous supplements.   On 06/07/2023 no issues pertinent to hypercalcemia since discontinuation of supplements.   On 07/19/2023 her calcium level has remained normal after she stopped a multitude of supplements.  On 10/25/2023, no issues pertinent.  On 06/18/2024 no measurement of this.  Assessment 1/7/2025 calcium 9.2, albumin 4.5      8.On 10/25/2023, the patient has called with pelvic pain and vaginal discharge. Her gynecologist cannot see her for 2 months. Previous Pap smear in 07/2023 was normal. The symptoms that she has now is discharge and pelvic pain. The clinical examination is consistent with cervicitis and bacterial vaginosis. No candida was found. No samples were obtained. Given the present circumstances, I would like for her to utilize metronidazole 250 mg tablets, 1 tablet t.i.d. for 7 days. The patient was instructed in regard to the 2 main side effects of this medicine including metallic taste in the mouth that could be overcome with bubble gum or small pieces of peppermint candy and the 2nd, she cannot drink any alcohol while taking this medicine. She does not drink anyway.     Also given the atrophy in the vagina, I would like for her to take Estrace, half an applicator in the vagina every other day until the tube is gone with no refills.     I think the utilization of these 2 methodologies will take care of her problem without any further need for radiological assessment.     If she wants to follow up with her gynecologist in the future she is  more than welcome but I instructed her to call my nurse, Elaine Hendrickson, in the next few days to tell her how things are going.      On 02/28/2024, she will follow up with her gynecologist in regard to her ovarian cyst to decide how to proceed thereafter. She still has some element of pelvic pain. She has no vaginal discharge.    On 02/28/2024, in summary each one of the elements have been discussed in detail and that is the summary of this visit today. She will be returning to see us back in 06/2024 when she will be due for her Reclast infusion. In preparation for that magnesium and phosphorus level will be done along with a CMP.    On 06/18/2024 her ovarian cyst is being left alone by her gynecologist, it is small, has no pain, no implications at this time.  Patient seen by this physician 1 7/25  Patient seen 1/7/2025, disposition, plans to evaluate by CT of abdomen and pelvis, CA 15-3, guardant reveal and follow-up in 4 to 5 weeks.  She has gynecologic follow-up in the interval with Dr. Cotton.  A copy of this note will be sent to the gynecologist.      I spent 60 minutes caring for Diana on this date of service. This time includes time spent by me in the following activities: preparing for the visit, reviewing tests, obtaining and/or reviewing a separately obtained history, performing a medically appropriate examination and/or evaluation, counseling and educating the patient/family/caregiver, ordering medications, tests, or procedures, referring and communicating with other health care professionals, documenting information in the medical record, independently interpreting results and communicating that information with the patient/family/caregiver, and care coordination.

## 2025-01-07 NOTE — LETTER
January 7, 2025     HERMINIA Lewis  [address]  [address]  [facility] [address]    Patient: Diana Higgins   YOB: 1975   Date of Visit: 1/7/2025     Dear HERMINIA Lewis:       Thank you for referring Diana Higgins to me for evaluation. Below are the relevant portions of my assessment and plan of care.    If you have questions, please do not hesitate to call me. I look forward to following Diana along with you.         Sincerely,        Bimal Brown MD        CC: MD Kevin Lundberg Michael D., MD  01/07/25 1402  Sign when Signing Visit  .        REASONS FOR FOLLOWUP:    History of stage IIA, [id number], ER weakly-positive, MT negative, HER2-negative breast cancer, status post bilateral mastectomies. The patient completed her adjuvant chemotherapy under NSABP B49 clinical trial.  On 12/14/2015 she has no clinical evidence of recurrence of breast cancer and she remains on Femara for the time being. She also has completed her breast reconstruction with bilateral implants and she is very satisfied in regard to the cosmetic result of this. She will remain on Femara for the time being. She receiving at this time Effexor 37.5 mg a day for hot flashes and anxiety. IMPLANTS REMOVED AS PER PT REQUEST IN 2023.  Bone health. The patient has osteoporosis. She  GETS Reclast infusion  on yearly bases, LAST ONE MAY 2023    HISTORY OF PRESENT ILLNESS:      Patient is a 49-year-old female last seen by Dr. Hadley 6/18/2024.    She has a history of breast cancer stage IIA in 2015 returns to the office for follow up. She is now in her 10 th year of Femara adjuvant therapy.  Thus she again describes right pelvic, right lower quadrant pain far the patient has not had any evidence of recurrent disease. Her appetite is good, her weight is stable, her bowel activity surprising enough remains good and her magnesium and calcium supplementation. She has proper urination. She has aches and  pains here and there but nothing permanent that has been present for many years slowly escalating.  She has been advised by gynecology that this is related, potentially, to ovarian cysts and she plans new gynecologic follow-up in the next several weeks to months.         She is seen back to continue therapy for her osteoporosis  with Prolia with her DEXA 12/27/2023 showing lumbar spine T-score -1.5, left hip T-score -2.0 and right hip -2.6.  We have discussed assessing her pelvic pain via CT as well as obtaining a guardant reveal particular at the 10th year of AI therapy and, if negative, allowing her to discontinue this treatment with some confidence.    Past Medical History:   Diagnosis Date   • Anxiety    • Breast cancer     Right, stage IIA, s/p bilateral mastectomy, XRT, and chemotherapy, followed by Dr. Hadley   • Colon polyp 08/2019   • COVID-19 virus infection 09/2020    C/O back pain and loss of taste and smell   • Disease of thyroid gland     history no meds now   • Hemorrhoid    • History of cancer chemotherapy    • History of foot fracture     as a child  right   • History of mononucleosis    • History of radiation therapy    • Osteoporosis    • Paroxysmal supraventricular tachycardia     CS ostium atrial tachycardia, ablated in 2002 by Dr. Garzon   • PONV (postoperative nausea and vomiting)    • Sinus headache     But no history of migraines   • Skin cancer    • Sleep apnea     mild no machine   • Wears glasses        ONCOLOGIC HISTORY:  (History from previous dates can be found in the separate document.)    Current Outpatient Medications on File Prior to Visit   Medication Sig Dispense Refill   • betamethasone dipropionate 0.05 % lotion APPLY ONCE DAILY TO AFFECTED AREA ON SCALP AND EARS AS NEEDED     • Biotin 300 MCG tablet Daily.     • Cholecalciferol (VITAMIN D-3) 1000 UNITS capsule Take 1,000 Units by mouth Daily.     • doxepin (SINEquan) 10 MG capsule TAKE 1 CAPSULE BY MOUTH EVERY NIGHT 30  "capsule 6   • exemestane (AROMASIN) 25 MG tablet TAKE 1 TABLET BY MOUTH EVERY DAY 30 tablet 6   • venlafaxine XR (EFFEXOR-XR) 75 MG 24 hr capsule TAKE 1 CAPSULE BY MOUTH EVERY DAY 30 capsule 3   • Zoledronic Acid (RECLAST IV) Infuse  into a venous catheter. Once a year last dose 5/12/2021 (Patient not taking: Reported on 1/7/2025)       No current facility-administered medications on file prior to visit.       ALLERGIES:   No Known Allergies    Social History     Socioeconomic History   • Marital status:      Spouse name: Misha   • Years of education: College   Tobacco Use   • Smoking status: Never   • Smokeless tobacco: Never   Vaping Use   • Vaping status: Never Used   Substance and Sexual Activity   • Alcohol use: Not Currently     Comment: 10 drinks yearly   • Drug use: Never   • Sexual activity: Defer         Cancer-related family history includes Breast cancer in her maternal aunt; Cancer (age of onset: 42) in her maternal grandmother; Liver cancer in her father and mother.         Objective     Vitals:    01/07/25 1316   BP: 95/65   Pulse: 88   Resp: 16   Temp: 98.6 °F (37 °C)   TempSrc: Oral   SpO2: 99%   Weight: 64.1 kg (141 lb 4.8 oz)   Height: 170 cm (66.93\")   PainSc: 0-No pain             1/7/2025     1:18 PM   Current Status   ECOG score 0       EXAM:  GENERAL:  Well-developed, Patient  in no acute distress.   SKIN:  Warm, dry ,NO purpura ,no rash.  HEENT:  Pupils were equal and reactive to light and accomodation, conjunctivae noninjected,  normal visual acuity.   NECK:  Supple with good range of motion; no thyromegaly , no JVD or bruits,.No carotid artery pain, no carotid abnormal pulsation   LYMPHATICS:  No cervical, NO supraclavicular, NO axillary, NO inguinal adenopathies.  CARDIAC   normal rate , regular rhythm, without murmur,NO rubs NO S3 NO S4   LUNGS: normal breath sounds bilateral, no wheezing, NO rhonchi, NO crackles ,NO rubs.  VASCULAR VENOUS: no cyanosis, NO collateral " circulation, NO varicosities, NO edema, NO palpable cords, NO pain,NO erythema, NO pigmentation of the skin.  ABDOMEN:  Soft, NO pain,no hepatomegaly, no splenomegaly,no masses, no ascites, no collateral circulation,no distention.  EXTREMITIES  AND SPINE:  No clubbing, no cyanosis ,no deformities , no pain .No kyphosis,  no pain in spine, no pain in ribs , no pain in pelvic bone.  NEUROLOGICAL:  Patient was awake, alert, oriented to time, person and place.      RECENT LABS:  Hematology WBC   Date Value Ref Range Status   01/07/2025 8.04 3.40 - 10.80 10*3/mm3 Final     RBC   Date Value Ref Range Status   01/07/2025 4.49 3.77 - 5.28 10*6/mm3 Final     Hemoglobin   Date Value Ref Range Status   01/07/2025 13.4 12.0 - 15.9 g/dL Final     Hematocrit   Date Value Ref Range Status   01/07/2025 41.3 34.0 - 46.6 % Final     Platelets   Date Value Ref Range Status   01/07/2025 439 140 - 450 10*3/mm3 Final                  Assessment & Plan    1. History of stage IIA, [id number], ER weakly-positive, AK negative, HER2-negative right upper outer quadrant breast cancer, status post bilateral mastectomies. The patient completed her adjuvant chemotherapy under NSABP B49 clinical trial.Patient continues on Femara, 2.5 mg a day. 1. Upon review on 05/08/2020 the patient looks free of cancer recurrence, her clinical examination is normal and she feels substantially better.   As per today I find no evidence of recurrent disease about her breast cancer on her breast examination that remains unchanged with bilateral breast implants and no skeletal pain. She remains on 100% compliance Femara with minimal side effects with the exception of constipation.    07/07/2021 no symptoms or signs of breast cancer recurrence. She remains on Femara with 100% compliance and I advised her to remain on this medicine for the time being with no plans to stop this medicine in the next 5 years or so. She is almost accomplishing 5 years with this medication  utilization.   09/30/2021 the patient has no symptoms or signs of breast cancer recurrence. Her clinical examination is negative normal. In the interim of time she has had her breast prosthesis removed. This was feeling like a belt tying her chest. She has felt dramatically better since then. Healing is taking place. She remains on her Femara and I advised her to remain on this medicine for the time being.   The patient was reviewed on 01/31/2022. As far as I can tell on clinical grounds, I do not find any evidence of cancer recurrence. Her clinical examination is normal and she remains on letrozole adjuvant therapy for the time being. We discussed the possibility of eventually removing letrozole out of her of medicines. She feels terrified if we stop this medicine and I pointed out to her that need to.   I pointed out that could be technology that could be using the original tumor in order to determine the need of continuation of hormonal therapy. She believes that it will be better off for her to stay on the medicine for the time being.  On 05/25/2022 the patient has no clinical symptoms or signs of breast cancer recurrence. Her clinical examination is normal. Her white count, hemoglobin and platelets are normal, her chemistry profile is normal. We advised her to go ahead and continue her Femara for the time being.   On 09/29/2022, given the persistency of the constipation and having nothing else to blame besides the Femara I advised the patient to discontinue this medicine and move into Aromasin at the dose of 25 mg a day. I asked her to give this medicine at least 3 or 4 weeks to see what happens in regard to bowel activity and if everything works okay I asked her to call my nurse, Elaine Hendrickson, to notify us about the status of this. In the meantime it will be okay for her to remain on her Dulcolax for the time being. On clinical grounds she has no symptoms or signs of breast cancer recurrence. Her implants  have been removed. Her chest wall is unremarkable. She has occasional kind of phantom pain in the right chest wall but there is no nodularity or alterations otherwise. There is no lymphedema in upper extremities. She will be watched in absence of any other intervention, returning to see us back every 4 months with a CBC, CMP and a CA 15-3.  On 02/01/2023 the patient was further reviewed. Since the previous visit she has not had any changes clinically besides her chronic constipation. Her clinical examination today is negative in regard to breast cancer. Her breast implants have been removed, the patient is not having any issues there whatsoever and the rest of her clinical examination is benign, negative, normal. Her white count, hemoglobin and platelets are normal, her chemistry profile is normal. I advised the patient to remain on Aromasin for the time being not planning to this medication anytime soon.   She will return to see us back in 4 months with a CBC, CMP and a CA 15-3.   On 06/07/2023 the patient has no symptoms or signs of breast cancer recurrence. Her clinical examination is negative normal in this regard. Her white count, hemoglobin and platelets are normal. Her chemistry profile is normal. Given these facts I do believe that it is prudent to give her 6 weeks of aromatase inhibitor to see if this has an impact in regard to her bowel situation. Her bowel situation is getting to the point that it is unsupportable for her in regard to her constipation.therefore I do not believe that this will have a negative impact in regard protection in regard to her breast cancer risk of recurrence and 6 weeks will not have too much to do in this regard. I would like to review her back at that time. The patient will continue her diet in the best way possible rich in fiber and liquids.   On 07/19/2023 the patient has requested permission to resume her aromatase inhibitor and I have no problems with that. Stopping the  medicine for 6 weeks had no impact whatsoever on her bowel activity proving the point that this is a completely independent phenomenon of her situation. She has chronic idiopathic constipation. Therefore I went ahead and advised her to proceed with her aromatase inhibitor and we will review her back in 4 months with a CBC, CMP and a CA 15-3. She has no symptoms or signs of breast cancer recurrence at this time.  The patient will remain on Aromasin for the time being. She will have follow-up appointment as per previously scheduled.  On 02/28/2024, the patient has no symptoms or signs of breast cancer recurrence. She will remain on Aromasin 25 mg for the time being. No plans to stop this medication for the time being.  On 06/18/2024 the patient was returning to see us for visit with no new symptoms of any nature. She has occasional aches and pains here and there but nothing permanent. Her clinical examination is very much benign, normal. Her white count, hemoglobin and platelets are normal.   My advice to her is to remain on Femara. She will be in 2025 on 10 years of the medicine and maybe the patient would like to take the medication beyond that given the characteristics of her disease. Given my detention the patient will be seen by, Bimal Brown MD, in the future repeating laboratory assessment every 6 months CBC, CMP and CA 15-3.   She is seen back to continue therapy for her osteoporosis  with Prolia with her DEXA 12/27/2023 showing lumbar spine T-score -1.5, left hip T-score -2.0 and right hip -2.6.  We have discussed assessing her pelvic pain via CT as well as obtaining a guardant reveal particular at the 10th year of AI therapy and, if negative, allowing her to discontinue this treatment with some confidence.      2.  Chronic constipation: Slow transit has been on multiple medications.  Her chronic constipation remains an ongoing issue. She is taking Dulcolax and sometimes she needs to have an enema. She  has large bowel activities but not too much propulsive activity in her intestine. Her diet is appropriate and she drinks plenty of liquids.  Her chronic constipation remains a problem. She continues using her laxatives as previous described and she has a bowel movement every 2-3 days. She has proper hydration and she tries to eat a lot of vegetables and fruit.  On 09/29/2022 Dulcolax was advised on her twice a day if necessary and in order to try to remove medications to see if this makes a difference in regard to her bowel activity, I discontinued the Femara and moved into Aromasin at the dose of 25 mg a day. I asked her to call and notify us if she sees any changes with this modification.  On 02/01/2023 the patient will proceed with a colonoscopy on 02/02/2023 by Haley Lopez MD, Gastroenterologist.   On 06/07/2023 the patient will remain on Dulcolax. We will see if aromatase inhibition holding off for 6 weeks has an impact on this symptom at all. On 07/19/2023, we have proven the point that her aromatase inhibitor has nothing to do with her chronic idiopathic constipation. She will continue her Dulcolax that she uses at least 2 or 3 times a week to make bowel activity to happen. We have tried hydration, Linzess. We have tried other laxatives and we have tried multiple other medicines and modification in her diet, none of these have had an impact on her.  On 10/25/2023, the patient is now taking a supplement of magnesium, vitamin C and calcium. This is helping her to have a bowel movement on regular basis that has made a big difference in regard to her ability to function. I encouraged her to continue this  On 02/28/2024, the combination of magnesium and calcium supplement is helping her to go to the bathroom. I encouraged her to continue this.  On 06/18/2024 since the time of initiation of magnesium and calcium supplementation no further issues at this time.    3.  Chronic anxiety, panic attacks, depression:  Patient is on Effexor  The chronic anxiety remains an ongoing issue that is not a new problem and there is no need for medication at this time besides the continuation of her Effexor that will be utilized for depression at the same time.  Her depression is under good control using Effexor. I encouraged her to remain on this medicine for the time being. She has a lot of other stressors and anxiety triggering factors including the wedding of her daughter, buying a new house, packing and moving and quitting her job. I encouraged her to remain on this medicine.   On 09/29/2022 she feels fine with the Effexor that she takes and the doxepin that she takes for insomnia. I asked her to continue these medicines for the time being.  Her chronic anxiety and depression remains under therapy by me. These medicines will remain ongoing for the time being. These symptoms are stable at this time and she is happy because she is going to be a grandmother.  On 06/07/2023 the patient has further issues in regard to stressors. Her daughter is 29 weeks pregnant and is having premature labor with her 1st baby. She has been assisting her and taking care of her at [facility].   On 07/19/2023 in spite of having a lot of improvement in regard to her stressors including her daughter who is keeping the baby now almost 36 weeks into her pregnancy after having premature contractions and having to be in the hospital. She remains in bed but the patient, Ms. Higgins, is going to her house to help her out to clean the house and take care of business in the best of possibilities. Again her stressors are better but not completely so. She remains on medicine for insomnia and she remains on medicine for anxiety  On 10/25/2023, no issues pertinent to this at this time. A lot of her family issues are very much under control.  On 02/28/2024, her anxiety and depression are under good control with Effexor and also a low-dose  medication for insomnia. She has not encountered any side effects of the medicines and she believes there is real benefit on them.  On 06/18/2024 Effexor remains active in controlling the symptoms and she will remain on this medicine for the time being.    4.  Insomnia: Previously on gabapentin without much relief.  At that time she was also taking melatonin and ZzzQuil.  We asked her to discontinue those medications.  9/30/2021 patient started on a trial of doxepin 10 mg nightly.    10/18/2021 she reports that doxepin has helped with her sleep and is asking for refill of this.  She has found that 1 small dose of doxepin 10 mg in the evening is tremendously beneficial to help her to sleep. She has requested a new refill and it will be sent to her pharmacy.  On 05/25/2022 she has found that minimal dose of doxepin is very good for controlling her insomnia. She uses the medicine as needed. I told her to use it every night especially with the multiple stressors that she has at this time. This medicine will have no impact in regard any addiction or anything of that nature.   On 02/01/2023 her insomnia remains an issue. She is now more grateful, she is preparing to be a grandmother. Hopefully this will trigger benefits in regard to all of her life and including ability to sleep.   On 06/07/2023 she will remain on a minimal dose of doxepin for insomnia that is effective.   On 07/19/2023 we reintroduced sleep hygiene as a must including stopping watching television, iPad, and telephone at least 1-1/2 hours before she goes to bed. I asked her to use some melatonin or melatonin-containing foods including pistachios to see if that introduces her to a better night of sleep. She is welcome to use her doxepin still at the present dose before she goes to bed or at times that she wakes up at 2 a.m.  On 10/25/2023, she continues taking a minimal dose of doxepin at nighttime that is perfect to help her to sleep.  On 02/28/2024, as  posted she will remain on low-dose doxepin 10 mg at nighttime.   On 06/18/2024 low doxepin helps her with her insomnia. This medicine at this dose will remain ongoing.      5.  Osteoporosis: Continuing physical activity, vitamin D.  She also receives yearly Reclast last given 5/20/2021.  I discussed with her on 01/31/2022 that her bone density documents still further bone loss, especially in the lumbar area and on the left hip. I do believe that she will continue the Reclast and the next dose is scheduled for 05/2022. I encouraged her to continue taking her vitamin D and encouraged her to take a magnesium supplementation. I encouraged her to jump the rope that could have a positive impact in regard to her ability to further help bone formation. She is walking probably 7000 to 10,000 steps a day.  On 05/25/2022 given her osteoporosis documented through bone density in the background of aromatase inhibitor use the patient will proceed today with her Reclast that will remain on yearly basis. In anticipation of that magnesium and phosphorus levels and vitamin D levels will be done.   On 09/29/2022 doxepin will remain at a dose of 10 mg at bedtime. It is helpful in regard to her insomnia.  On 02/01/2023 the patient should be ready for her Reclast this year in 4 months from now and in anticipation of that she will proceed with a bone density test in 12 weeks.  On 06/07/2023 the patient is due to receive her Reclast today. She has been advised to take some Tylenol also in the next couple of days because of probably achiness associated with the administration of medicine. She will be due to have new bone density test in 2024.   On 07/19/2023 the patient has proceeded with Reclast in 06/2023. She will have another dose in 06/2024.  On 10/25/2023, she remains on Reclast on yearly basis.  On 02/28/2024, the patient will be due for her Reclast in 06/2024. In preparation for that therapy, she will have magnesium and phosphorus  level besides CMP. She was encouraged to continue taking her vitamin D supplement.  On 06/18/2024 given the fact that the patient no longer has a port we were not able to obtain IV site to start an IV for Reclast infusion. Given this fact the patient almost passed out from so much digging and we decided to abort the Reclast. From now on the patient will be on Prolia 60 mg IM every 6 months and the next injection will be next week. In preparation for that CMP, magnesium and phosphorus levels will be done and she will remain on her vitamin D supplementation.    6.  Previously had a left subclavian port, removed 5/21/2021.    7.  Hypercalcemia: The patient had been taking biotin for Heller growth that was thought to be causing this issue.  Biotin was discontinued and hypercalcemia resolved.  No issues related to hypercalcemia on 01/31/2022 with normal chemistry profile.  On 09/29/2022 her calcium level today is completely normal. She is not taking any other multivitamins containing calcium or biotin.  On 02/01/2023 she has not had any further hypercalcemia since discontinuation of previous supplements.   On 06/07/2023 no issues pertinent to hypercalcemia since discontinuation of supplements.   On 07/19/2023 her calcium level has remained normal after she stopped a multitude of supplements.  On 10/25/2023, no issues pertinent.  On 06/18/2024 no measurement of this.  Assessment 1/7/2025 calcium 9.2, albumin 4.5      8.On 10/25/2023, the patient has called with pelvic pain and vaginal discharge. Her gynecologist cannot see her for 2 months. Previous Pap smear in 07/2023 was normal. The symptoms that she has now is discharge and pelvic pain. The clinical examination is consistent with cervicitis and bacterial vaginosis. No candida was found. No samples were obtained. Given the present circumstances, I would like for her to utilize metronidazole 250 mg tablets, 1 tablet t.i.d. for 7 days. The patient was instructed in  regard to the 2 main side effects of this medicine including metallic taste in the mouth that could be overcome with bubble gum or small pieces of peppermint candy and the 2nd, she cannot drink any alcohol while taking this medicine. She does not drink anyway.     Also given the atrophy in the vagina, I would like for her to take Estrace, half an applicator in the vagina every other day until the tube is gone with no refills.     I think the utilization of these 2 methodologies will take care of her problem without any further need for radiological assessment.     If she wants to follow up with her gynecologist in the future she is more than welcome but I instructed her to call my nurse, Elaine Hendrickson, in the next few days to tell her how things are going.      On 02/28/2024, she will follow up with her gynecologist in regard to her ovarian cyst to decide how to proceed thereafter. She still has some element of pelvic pain. She has no vaginal discharge.    On 02/28/2024, in summary each one of the elements have been discussed in detail and that is the summary of this visit today. She will be returning to see us back in 06/2024 when she will be due for her Reclast infusion. In preparation for that magnesium and phosphorus level will be done along with a CMP.    On 06/18/2024 her ovarian cyst is being left alone by her gynecologist, it is small, has no pain, no implications at this time.  Patient seen by this physician 1 7/25  Patient seen 1/7/2025, disposition, plans to evaluate by CT of abdomen and pelvis, CA 15-3, guardant reveal and follow-up in 4 to 5 weeks.  She has gynecologic follow-up in the interval with Dr. Cotton.  A copy of this note will be sent to the gynecologist.      I spent 60 minutes caring for Diana on this date of service. This time includes time spent by me in the following activities: preparing for the visit, reviewing tests, obtaining and/or reviewing a separately obtained history,  performing a medically appropriate examination and/or evaluation, counseling and educating the patient/family/caregiver, ordering medications, tests, or procedures, referring and communicating with other health care professionals, documenting information in the medical record, independently interpreting results and communicating that information with the patient/family/caregiver, and care coordination.

## 2025-01-15 LAB — REF LAB TEST RESULTS: NORMAL

## 2025-01-20 RX ORDER — VENLAFAXINE HYDROCHLORIDE 75 MG/1
75 CAPSULE, EXTENDED RELEASE ORAL DAILY
Qty: 30 CAPSULE | Refills: 3 | Status: SHIPPED | OUTPATIENT
Start: 2025-01-20

## 2025-01-22 ENCOUNTER — TELEPHONE (OUTPATIENT)
Dept: ONCOLOGY | Facility: CLINIC | Age: 50
End: 2025-01-22
Payer: COMMERCIAL

## 2025-01-22 NOTE — TELEPHONE ENCOUNTER
Caller: Diana Higgins    Relationship to patient: Self    Best call back number: [phone]    Chief complaint: WANTED CHECK TO SEE IF DR TONEY HAD ANY TIME LATER THE SAME DAY COULD MOVE APPT TO, IF NOT WILL KEEP APPT AS IS.     Type of visit: LAB AND FOLLOW UP 1    Requested date: 02/11 WANTED TO SEE IF HAD A LATER TIME AVAILABLE COULD MOVE TO     If rescheduling, when is the original appointment: 02/11

## 2025-02-05 ENCOUNTER — LAB (OUTPATIENT)
Facility: HOSPITAL | Age: 50
End: 2025-02-05
Payer: COMMERCIAL

## 2025-02-05 ENCOUNTER — HOSPITAL ENCOUNTER (OUTPATIENT)
Facility: HOSPITAL | Age: 50
Discharge: HOME | End: 2025-02-05
Payer: COMMERCIAL

## 2025-02-05 DIAGNOSIS — M81.8 OTHER OSTEOPOROSIS WITHOUT CURRENT PATHOLOGICAL FRACTURE: ICD-10-CM

## 2025-02-05 DIAGNOSIS — Z17.0 MALIGNANT NEOPLASM OF UPPER-OUTER QUADRANT OF RIGHT BREAST IN FEMALE, ESTROGEN RECEPTOR POSITIVE: ICD-10-CM

## 2025-02-05 DIAGNOSIS — C50.411 MALIGNANT NEOPLASM OF UPPER-OUTER QUADRANT OF RIGHT BREAST IN FEMALE, ESTROGEN RECEPTOR POSITIVE: ICD-10-CM

## 2025-02-05 LAB
ALBUMIN SERPL-MCNC: 4.2 G/DL (ref 3.5–5.2)
ALBUMIN/GLOB SERPL: 1.6 G/DL
ALP SERPL-CCNC: 57 U/L (ref 39–117)
ALT SERPL W P-5'-P-CCNC: 19 U/L (ref 1–33)
ANION GAP SERPL CALCULATED.3IONS-SCNC: 11 MMOL/L (ref 5–15)
AST SERPL-CCNC: 28 U/L (ref 1–32)
BASOPHILS # BLD AUTO: 0.05 10*3/MM3 (ref 0–0.2)
BASOPHILS NFR BLD AUTO: 0.8 % (ref 0–1.5)
BILIRUB SERPL-MCNC: 0.2 MG/DL (ref 0–1.2)
BUN SERPL-MCNC: 19 MG/DL (ref 6–20)
BUN/CREAT SERPL: 27.9 (ref 7–25)
CALCIUM SPEC-SCNC: 9.4 MG/DL (ref 8.6–10.5)
CANCER AG15-3 SERPL-ACNC: 17.5 U/ML
CHLORIDE SERPL-SCNC: 100 MMOL/L (ref 98–107)
CO2 SERPL-SCNC: 27 MMOL/L (ref 22–29)
CREAT SERPL-MCNC: 0.68 MG/DL (ref 0.57–1)
DEPRECATED RDW RBC AUTO: 39.3 FL (ref 37–54)
EGFRCR SERPLBLD CKD-EPI 2021: 105.6 ML/MIN/1.73
EOSINOPHIL # BLD AUTO: 0.17 10*3/MM3 (ref 0–0.4)
EOSINOPHIL NFR BLD AUTO: 2.7 % (ref 0.3–6.2)
ERYTHROCYTE [DISTWIDTH] IN BLOOD BY AUTOMATED COUNT: 12.2 % (ref 12.3–15.4)
GLOBULIN UR ELPH-MCNC: 2.6 GM/DL
GLUCOSE SERPL-MCNC: 78 MG/DL (ref 65–99)
HCT VFR BLD AUTO: 38.1 % (ref 34–46.6)
HGB BLD-MCNC: 13 G/DL (ref 12–15.9)
IMM GRANULOCYTES # BLD AUTO: 0.02 10*3/MM3 (ref 0–0.05)
IMM GRANULOCYTES NFR BLD AUTO: 0.3 % (ref 0–0.5)
LYMPHOCYTES # BLD AUTO: 1.66 10*3/MM3 (ref 0.7–3.1)
LYMPHOCYTES NFR BLD AUTO: 26.4 % (ref 19.6–45.3)
MCH RBC QN AUTO: 30.3 PG (ref 26.6–33)
MCHC RBC AUTO-ENTMCNC: 34.1 G/DL (ref 31.5–35.7)
MCV RBC AUTO: 88.8 FL (ref 79–97)
MONOCYTES # BLD AUTO: 0.74 10*3/MM3 (ref 0.1–0.9)
MONOCYTES NFR BLD AUTO: 11.8 % (ref 5–12)
NEUTROPHILS NFR BLD AUTO: 3.64 10*3/MM3 (ref 1.7–7)
NEUTROPHILS NFR BLD AUTO: 58 % (ref 42.7–76)
NRBC BLD AUTO-RTO: 0 /100 WBC (ref 0–0.2)
PLATELET # BLD AUTO: 382 10*3/MM3 (ref 140–450)
PMV BLD AUTO: 9.2 FL (ref 6–12)
POTASSIUM SERPL-SCNC: 4.1 MMOL/L (ref 3.5–5.2)
PROT SERPL-MCNC: 6.8 G/DL (ref 6–8.5)
RBC # BLD AUTO: 4.29 10*6/MM3 (ref 3.77–5.28)
SODIUM SERPL-SCNC: 138 MMOL/L (ref 136–145)
WBC NRBC COR # BLD AUTO: 6.28 10*3/MM3 (ref 3.4–10.8)

## 2025-02-05 PROCEDURE — 25510000002 DIATRIZOATE MEGLUMINE & SODIUM PER 1 ML: Performed by: INTERNAL MEDICINE

## 2025-02-05 PROCEDURE — 86300 IMMUNOASSAY TUMOR CA 15-3: CPT | Performed by: INTERNAL MEDICINE

## 2025-02-05 PROCEDURE — 74177 CT ABD & PELVIS W/CONTRAST: CPT

## 2025-02-05 PROCEDURE — 80053 COMPREHEN METABOLIC PANEL: CPT

## 2025-02-05 PROCEDURE — 25510000001 IOPAMIDOL 61 % SOLUTION: Performed by: INTERNAL MEDICINE

## 2025-02-05 PROCEDURE — 85025 COMPLETE CBC W/AUTO DIFF WBC: CPT | Performed by: INTERNAL MEDICINE

## 2025-02-05 RX ORDER — IOPAMIDOL 612 MG/ML
100 INJECTION, SOLUTION INTRAVASCULAR
Status: COMPLETED | OUTPATIENT
Start: 2025-02-05 | End: 2025-02-05

## 2025-02-05 RX ORDER — DIATRIZOATE MEGLUMINE AND DIATRIZOATE SODIUM 660; 100 MG/ML; MG/ML
30 SOLUTION ORAL; RECTAL
Status: COMPLETED | OUTPATIENT
Start: 2025-02-05 | End: 2025-02-05

## 2025-02-05 RX ADMIN — DIATRIZOATE MEGLUMINE AND DIATRIZOATE SODIUM 30 ML: 600; 100 SOLUTION ORAL; RECTAL at 07:45

## 2025-02-05 RX ADMIN — IOPAMIDOL 85 ML: 612 INJECTION, SOLUTION INTRAVENOUS at 09:10

## 2025-02-10 NOTE — PROGRESS NOTES
.        REASONS FOR FOLLOWUP:    History of stage IIA, [id number], ER weakly-positive, RI negative, HER2-negative breast cancer, status post bilateral mastectomies. The patient completed her adjuvant chemotherapy under NSABP B49 clinical trial.  On 12/14/2015 she has no clinical evidence of recurrence of breast cancer and she remains on Femara for the time being. She also has completed her breast reconstruction with bilateral implants and she is very satisfied in regard to the cosmetic result of this. She will remain on Femara for the time being. She receiving at this time Effexor 37.5 mg a day for hot flashes and anxiety. IMPLANTS REMOVED AS PER PT REQUEST IN 2023.  Bone health. The patient has osteoporosis. She  GETS Reclast infusion  on yearly bases, LAST ONE MAY 2023    HISTORY OF PRESENT ILLNESS:      Patient is a 49-year-old female last seen by Dr. Hadley 6/18/2024.    She has a history of breast cancer stage IIA in 2015 returns to the office for follow up. She is now in her 10 th year of Femara adjuvant therapy.  Thus she again describes right pelvic, right lower quadrant pain far the patient has not had any evidence of recurrent disease. Her appetite is good, her weight is stable, her bowel activity surprising enough remains good and her magnesium and calcium supplementation. She has proper urination. She has aches and pains here and there but nothing permanent that has been present for many years slowly escalating.  She has been advised by gynecology that this is related, potentially, to ovarian cysts and she plans new gynecologic follow-up in the next several weeks to months.         She is seen back to continue therapy for her osteoporosis  with Prolia with her DEXA 12/27/2023 showing lumbar spine T-score -1.5, left hip T-score -2.0 and right hip -2.6.  We have discussed assessing her pelvic pain via CT as well as obtaining a guardant reveal particular at the 10th year of AI therapy and, if negative,  allowing her to discontinue this treatment with some confidence.    The patient underwent guardant reveal testing that was negative for ctDNA and 0% tumor fraction, 2/5/2025-CA 15-3 found to be 17.5, normal CMP, normal CBC.  CT abdomen pelvis 2/5/2025 revealed no evidence of bowel obstruction, no abdominal pelvic lymphadenopathy by size, hypodense lesion in the left paracolic gutter measuring 3.4 x 2.3 cm unchanged from 2020.  Patient seen by gynecology 1/15/2025 with plans for laparoscopic hysterectomy and salpingectomy.  This plan is discussed with her 2/11/2025 and she is hopeful for pain relief that is been followed for several years without successful treatment.    Past Medical History:   Diagnosis Date    Anxiety     Breast cancer     Right, stage IIA, s/p bilateral mastectomy, XRT, and chemotherapy, followed by Dr. Hadley    Colon polyp 08/2019    COVID-19 virus infection 09/2020    C/O back pain and loss of taste and smell    Disease of thyroid gland     history no meds now    Hemorrhoid     History of cancer chemotherapy     History of foot fracture     as a child  right    History of mononucleosis     History of radiation therapy     Osteoporosis     Paroxysmal supraventricular tachycardia     CS ostium atrial tachycardia, ablated in 2002 by Dr. Ryann MILLER (postoperative nausea and vomiting)     Sinus headache     But no history of migraines    Skin cancer     Sleep apnea     mild no machine    Wears glasses        ONCOLOGIC HISTORY:  (History from previous dates can be found in the separate document.)    Current Outpatient Medications on File Prior to Visit   Medication Sig Dispense Refill    betamethasone dipropionate 0.05 % lotion APPLY ONCE DAILY TO AFFECTED AREA ON SCALP AND EARS AS NEEDED      Biotin 300 MCG tablet Daily.      Cholecalciferol (VITAMIN D-3) 1000 UNITS capsule Take 1,000 Units by mouth Daily.      denosumab (PROLIA) 60 MG/ML solution prefilled syringe syringe Inject  under the  "skin into the appropriate area as directed.      doxepin (SINEquan) 10 MG capsule TAKE 1 CAPSULE BY MOUTH EVERY NIGHT 30 capsule 6    multivitamin with minerals tablet tablet Take 1 tablet by mouth Daily.      venlafaxine XR (EFFEXOR-XR) 75 MG 24 hr capsule TAKE 1 CAPSULE BY MOUTH EVERY DAY 30 capsule 3    [DISCONTINUED] Zoledronic Acid (RECLAST IV) Infuse  into a venous catheter. Once a year last dose 5/12/2021      exemestane (AROMASIN) 25 MG tablet TAKE 1 TABLET BY MOUTH EVERY DAY 30 tablet 6     No current facility-administered medications on file prior to visit.       ALLERGIES:   No Known Allergies    Social History     Socioeconomic History    Marital status:      Spouse name: Misha    Years of education: College   Tobacco Use    Smoking status: Never    Smokeless tobacco: Never   Vaping Use    Vaping status: Never Used   Substance and Sexual Activity    Alcohol use: Not Currently     Comment: 10 drinks yearly    Drug use: Never    Sexual activity: Defer         Cancer-related family history includes Breast cancer in her maternal aunt; Cancer (age of onset: 42) in her maternal grandmother; Liver cancer in her father and mother.         Objective      Vitals:    02/11/25 1100   BP: 104/71   Pulse: 93   Resp: 16   Temp: 98.6 °F (37 °C)   TempSrc: Oral   SpO2: 100%   Weight: 62.9 kg (138 lb 9.6 oz)   Height: 170 cm (66.93\")   PainSc: 0-No pain               2/11/2025    11:08 AM   Current Status   ECOG score 0       EXAM:  GENERAL:  Well-developed, Patient  in no acute distress.   SKIN:  Warm, dry ,NO purpura ,no rash.  HEENT:  Pupils were equal and reactive to light and accomodation, conjunctivae noninjected,  normal visual acuity.   NECK:  Supple with good range of motion; no thyromegaly , no JVD or bruits,.No carotid artery pain, no carotid abnormal pulsation   LYMPHATICS:  No cervical, NO supraclavicular, NO axillary, NO inguinal adenopathies.  CARDIAC   normal rate , regular rhythm, without " murmur,NO rubs NO S3 NO S4   LUNGS: normal breath sounds bilateral, no wheezing, NO rhonchi, NO crackles ,NO rubs.  VASCULAR VENOUS: no cyanosis, NO collateral circulation, NO varicosities, NO edema, NO palpable cords, NO pain,NO erythema, NO pigmentation of the skin.  ABDOMEN:  Soft, NO pain,no hepatomegaly, no splenomegaly,no masses, no ascites, no collateral circulation,no distention.  EXTREMITIES  AND SPINE:  No clubbing, no cyanosis ,no deformities , no pain .No kyphosis,  no pain in spine, no pain in ribs , no pain in pelvic bone.  NEUROLOGICAL:  Patient was awake, alert, oriented to time, person and place.      RECENT LABS:  Hematology WBC   Date Value Ref Range Status   02/05/2025 6.28 3.40 - 10.80 10*3/mm3 Final     RBC   Date Value Ref Range Status   02/05/2025 4.29 3.77 - 5.28 10*6/mm3 Final     Hemoglobin   Date Value Ref Range Status   02/05/2025 13.0 12.0 - 15.9 g/dL Final     Hematocrit   Date Value Ref Range Status   02/05/2025 38.1 34.0 - 46.6 % Final     Platelets   Date Value Ref Range Status   02/05/2025 382 140 - 450 10*3/mm3 Final                  Assessment & Plan     1. History of stage IIA, [id number], ER weakly-positive, OR negative, HER2-negative right upper outer quadrant breast cancer, status post bilateral mastectomies. The patient completed her adjuvant chemotherapy under NSABP B49 clinical trial.Patient continues on Femara, 2.5 mg a day. 1. Upon review on 05/08/2020 the patient looks free of cancer recurrence, her clinical examination is normal and she feels substantially better.   As per today I find no evidence of recurrent disease about her breast cancer on her breast examination that remains unchanged with bilateral breast implants and no skeletal pain. She remains on 100% compliance Femara with minimal side effects with the exception of constipation.    07/07/2021 no symptoms or signs of breast cancer recurrence. She remains on Femara with 100% compliance and I advised her to remain  on this medicine for the time being with no plans to stop this medicine in the next 5 years or so. She is almost accomplishing 5 years with this medication utilization.   09/30/2021 the patient has no symptoms or signs of breast cancer recurrence. Her clinical examination is negative normal. In the interim of time she has had her breast prosthesis removed. This was feeling like a belt tying her chest. She has felt dramatically better since then. Healing is taking place. She remains on her Femara and I advised her to remain on this medicine for the time being.   The patient was reviewed on 01/31/2022. As far as I can tell on clinical grounds, I do not find any evidence of cancer recurrence. Her clinical examination is normal and she remains on letrozole adjuvant therapy for the time being. We discussed the possibility of eventually removing letrozole out of her of medicines. She feels terrified if we stop this medicine and I pointed out to her that need to.   I pointed out that could be technology that could be using the original tumor in order to determine the need of continuation of hormonal therapy. She believes that it will be better off for her to stay on the medicine for the time being.  On 05/25/2022 the patient has no clinical symptoms or signs of breast cancer recurrence. Her clinical examination is normal. Her white count, hemoglobin and platelets are normal, her chemistry profile is normal. We advised her to go ahead and continue her Femara for the time being.   On 09/29/2022, given the persistency of the constipation and having nothing else to blame besides the Femara I advised the patient to discontinue this medicine and move into Aromasin at the dose of 25 mg a day. I asked her to give this medicine at least 3 or 4 weeks to see what happens in regard to bowel activity and if everything works okay I asked her to call my nurse, Elaine Hendrickson, to notify us about the status of this. In the meantime it will  be okay for her to remain on her Dulcolax for the time being. On clinical grounds she has no symptoms or signs of breast cancer recurrence. Her implants have been removed. Her chest wall is unremarkable. She has occasional kind of phantom pain in the right chest wall but there is no nodularity or alterations otherwise. There is no lymphedema in upper extremities. She will be watched in absence of any other intervention, returning to see us back every 4 months with a CBC, CMP and a CA 15-3.  On 02/01/2023 the patient was further reviewed. Since the previous visit she has not had any changes clinically besides her chronic constipation. Her clinical examination today is negative in regard to breast cancer. Her breast implants have been removed, the patient is not having any issues there whatsoever and the rest of her clinical examination is benign, negative, normal. Her white count, hemoglobin and platelets are normal, her chemistry profile is normal. I advised the patient to remain on Aromasin for the time being not planning to this medication anytime soon.   She will return to see us back in 4 months with a CBC, CMP and a CA 15-3.   On 06/07/2023 the patient has no symptoms or signs of breast cancer recurrence. Her clinical examination is negative normal in this regard. Her white count, hemoglobin and platelets are normal. Her chemistry profile is normal. Given these facts I do believe that it is prudent to give her 6 weeks of aromatase inhibitor to see if this has an impact in regard to her bowel situation. Her bowel situation is getting to the point that it is unsupportable for her in regard to her constipation.therefore I do not believe that this will have a negative impact in regard protection in regard to her breast cancer risk of recurrence and 6 weeks will not have too much to do in this regard. I would like to review her back at that time. The patient will continue her diet in the best way possible rich in  fiber and liquids.   On 07/19/2023 the patient has requested permission to resume her aromatase inhibitor and I have no problems with that. Stopping the medicine for 6 weeks had no impact whatsoever on her bowel activity proving the point that this is a completely independent phenomenon of her situation. She has chronic idiopathic constipation. Therefore I went ahead and advised her to proceed with her aromatase inhibitor and we will review her back in 4 months with a CBC, CMP and a CA 15-3. She has no symptoms or signs of breast cancer recurrence at this time.  The patient will remain on Aromasin for the time being. She will have follow-up appointment as per previously scheduled.  On 02/28/2024, the patient has no symptoms or signs of breast cancer recurrence. She will remain on Aromasin 25 mg for the time being. No plans to stop this medication for the time being.  On 06/18/2024 the patient was returning to see us for visit with no new symptoms of any nature. She has occasional aches and pains here and there but nothing permanent. Her clinical examination is very much benign, normal. Her white count, hemoglobin and platelets are normal.   My advice to her is to remain on Femara. She will be in 2025 on 10 years of the medicine and maybe the patient would like to take the medication beyond that given the characteristics of her disease. Given my correction the patient will be seen by, Bimal Brown MD, in the future repeating laboratory assessment every 6 months CBC, CMP and CA 15-3.   She is seen back to continue therapy for her osteoporosis  with Prolia with her DEXA 12/27/2023 showing lumbar spine T-score -1.5, left hip T-score -2.0 and right hip -2.6.  We have discussed assessing her pelvic pain via CT as well as obtaining a guardant reveal particular at the 10th year of AI therapy and, if negative, allowing her to discontinue this treatment with some confidence.  The patient underwent guardant reveal testing  that was negative for ctDNA and 0% tumor fraction, 2/5/2025-CA 15-3 found to be 17.5, normal CMP, normal CBC.  CT abdomen pelvis 2/5/2025 revealed no evidence of bowel obstruction, no abdominal pelvic lymphadenopathy by size, hypodense lesion in the left paracolic gutter measuring 3.4 x 2.3 cm unchanged from 2020.  Patient seen by gynecology 1/15/2025 with plans for laparoscopic hysterectomy and salpingectomy.  This plan is discussed with her 2/11/2025 and she is hopeful for pain relief that is been followed for several years without successful treatment.      2.  Chronic constipation: Slow transit has been on multiple medications.  Her chronic constipation remains an ongoing issue. She is taking Dulcolax and sometimes she needs to have an enema. She has large bowel activities but not too much propulsive activity in her intestine. Her diet is appropriate and she drinks plenty of liquids.  Her chronic constipation remains a problem. She continues using her laxatives as previous described and she has a bowel movement every 2-3 days. She has proper hydration and she tries to eat a lot of vegetables and fruit.  On 09/29/2022 Dulcolax was advised on her twice a day if necessary and in order to try to remove medications to see if this makes a difference in regard to her bowel activity, I discontinued the Femara and moved into Aromasin at the dose of 25 mg a day. I asked her to call and notify us if she sees any changes with this modification.  On 02/01/2023 the patient will proceed with a colonoscopy on 02/02/2023 by Haley Lopez MD, Gastroenterologist.   On 06/07/2023 the patient will remain on Dulcolax. We will see if aromatase inhibition holding off for 6 weeks has an impact on this symptom at all. On 07/19/2023, we have proven the point that her aromatase inhibitor has nothing to do with her chronic idiopathic constipation. She will continue her Dulcolax that she uses at least 2 or 3 times a week to make bowel  activity to happen. We have tried hydration, Linzess. We have tried other laxatives and we have tried multiple other medicines and modification in her diet, none of these have had an impact on her.  On 10/25/2023, the patient is now taking a supplement of magnesium, vitamin C and calcium. This is helping her to have a bowel movement on regular basis that has made a big difference in regard to her ability to function. I encouraged her to continue this  On 02/28/2024, the combination of magnesium and calcium supplement is helping her to go to the bathroom. I encouraged her to continue this.  On 06/18/2024 since the time of initiation of magnesium and calcium supplementation no further issues at this time.    3.  Chronic anxiety, panic attacks, depression: Patient is on Effexor  The chronic anxiety remains an ongoing issue that is not a new problem and there is no need for medication at this time besides the continuation of her Effexor that will be utilized for depression at the same time.  Her depression is under good control using Effexor. I encouraged her to remain on this medicine for the time being. She has a lot of other stressors and anxiety triggering factors including the wedding of her daughter, buying a new house, packing and moving and quitting her job. I encouraged her to remain on this medicine.   On 09/29/2022 she feels fine with the Effexor that she takes and the doxepin that she takes for insomnia. I asked her to continue these medicines for the time being.  Her chronic anxiety and depression remains under therapy by me. These medicines will remain ongoing for the time being. These symptoms are stable at this time and she is happy because she is going to be a grandmother.  On 06/07/2023 the patient has further issues in regard to stressors. Her daughter is 29 weeks pregnant and is having premature labor with her 1st baby. She has been assisting her and taking care of her at [facility] and  [facility].   On 07/19/2023 in spite of having a lot of improvement in regard to her stressors including her daughter who is keeping the baby now almost 36 weeks into her pregnancy after having premature contractions and having to be in the hospital. She remains in bed but the patient, Ms. Higgins, is going to her house to help her out to clean the house and take care of business in the best of possibilities. Again her stressors are better but not completely so. She remains on medicine for insomnia and she remains on medicine for anxiety  On 10/25/2023, no issues pertinent to this at this time. A lot of her family issues are very much under control.  On 02/28/2024, her anxiety and depression are under good control with Effexor and also a low-dose medication for insomnia. She has not encountered any side effects of the medicines and she believes there is real benefit on them.  On 06/18/2024 Effexor remains active in controlling the symptoms and she will remain on this medicine for the time being.    4.  Insomnia: Previously on gabapentin without much relief.  At that time she was also taking melatonin and ZzzQuil.  We asked her to discontinue those medications.  9/30/2021 patient started on a trial of doxepin 10 mg nightly.    10/18/2021 she reports that doxepin has helped with her sleep and is asking for refill of this.  She has found that 1 small dose of doxepin 10 mg in the evening is tremendously beneficial to help her to sleep. She has requested a new refill and it will be sent to her pharmacy.  On 05/25/2022 she has found that minimal dose of doxepin is very good for controlling her insomnia. She uses the medicine as needed. I told her to use it every night especially with the multiple stressors that she has at this time. This medicine will have no impact in regard any addiction or anything of that nature.   On 02/01/2023 her insomnia remains an issue. She is now more grateful, she is preparing to be a  grandmother. Hopefully this will trigger benefits in regard to all of her life and including ability to sleep.   On 06/07/2023 she will remain on a minimal dose of doxepin for insomnia that is effective.   On 07/19/2023 we reintroduced sleep hygiene as a must including stopping watching television, iPad, and telephone at least 1-1/2 hours before she goes to bed. I asked her to use some melatonin or melatonin-containing foods including pistachios to see if that introduces her to a better night of sleep. She is welcome to use her doxepin still at the present dose before she goes to bed or at times that she wakes up at 2 a.m.  On 10/25/2023, she continues taking a minimal dose of doxepin at nighttime that is perfect to help her to sleep.  On 02/28/2024, as posted she will remain on low-dose doxepin 10 mg at nighttime.   On 06/18/2024 low doxepin helps her with her insomnia. This medicine at this dose will remain ongoing.  This will be continued when seen 2/11/2025 particularly as she is approaching her hysterectomy      5.  Osteoporosis: Continuing physical activity, vitamin D.  She also receives yearly Reclast last given 5/20/2021.  I discussed with her on 01/31/2022 that her bone density documents still further bone loss, especially in the lumbar area and on the left hip. I do believe that she will continue the Reclast and the next dose is scheduled for 05/2022. I encouraged her to continue taking her vitamin D and encouraged her to take a magnesium supplementation. I encouraged her to jump the rope that could have a positive impact in regard to her ability to further help bone formation. She is walking probably 7000 to 10,000 steps a day.  On 05/25/2022 given her osteoporosis documented through bone density in the background of aromatase inhibitor use the patient will proceed today with her Reclast that will remain on yearly basis. In anticipation of that magnesium and phosphorus levels and vitamin D levels will be  done.   On 09/29/2022 doxepin will remain at a dose of 10 mg at bedtime. It is helpful in regard to her insomnia.  On 02/01/2023 the patient should be ready for her Reclast this year in 4 months from now and in anticipation of that she will proceed with a bone density test in 12 weeks.  On 06/07/2023 the patient is due to receive her Reclast today. She has been advised to take some Tylenol also in the next couple of days because of probably achiness associated with the administration of medicine. She will be due to have new bone density test in 2024.   On 07/19/2023 the patient has proceeded with Reclast in 06/2023. She will have another dose in 06/2024.  On 10/25/2023, she remains on Reclast on yearly basis.  On 02/28/2024, the patient will be due for her Reclast in 06/2024. In preparation for that therapy, she will have magnesium and phosphorus level besides CMP. She was encouraged to continue taking her vitamin D supplement.  On 06/18/2024 given the fact that the patient no longer has a port we were not able to obtain IV site to start an IV for Reclast infusion. Given this fact the patient almost passed out from so much digging and we decided to abort the Reclast. From now on the patient will be on Prolia 60 mg IM every 6 months and the next injection will be next week. In preparation for that CMP, magnesium and phosphorus levels will be done and she will remain on her vitamin D supplementation.  Patient/2/11/2025 and follow-up bone density will be rescheduled until January 2026 with the patient seen in mid July 2025 for continued Prolia    6.  Previously had a left subclavian port, removed 5/21/2021.    7.  Hypercalcemia: The patient had been taking biotin for hair growth that was thought to be causing this issue.  Biotin was discontinued and hypercalcemia resolved.  No issues related to hypercalcemia on 01/31/2022 with normal chemistry profile.  On 09/29/2022 her calcium level today is completely normal. She is  not taking any other multivitamins containing calcium or biotin.  On 02/01/2023 she has not had any further hypercalcemia since discontinuation of previous supplements.   On 06/07/2023 no issues pertinent to hypercalcemia since discontinuation of supplements.   On 07/19/2023 her calcium level has remained normal after she stopped a multitude of supplements.  On 10/25/2023, no issues pertinent.  On 06/18/2024 no measurement of this.  Assessment 1/7/2025 calcium 9.2, albumin 4.5      8.On 10/25/2023, the patient has called with pelvic pain and vaginal discharge. Her gynecologist cannot see her for 2 months. Previous Pap smear in 07/2023 was normal. The symptoms that she has now is discharge and pelvic pain. The clinical examination is consistent with cervicitis and bacterial vaginosis. No candida was found. No samples were obtained. Given the present circumstances, I would like for her to utilize metronidazole 250 mg tablets, 1 tablet t.i.d. for 7 days. The patient was instructed in regard to the 2 main side effects of this medicine including metallic taste in the mouth that could be overcome with bubble gum or small pieces of peppermint candy and the 2nd, she cannot drink any alcohol while taking this medicine. She does not drink anyway.     Also given the atrophy in the vagina, I would like for her to take Estrace, half an applicator in the vagina every other day until the tube is gone with no refills.     I think the utilization of these 2 methodologies will take care of her problem without any further need for radiological assessment.     If she wants to follow up with her gynecologist in the future she is more than welcome but I instructed her to call my nurse, Elaine Hendrickson, in the next few days to tell her how things are going.      On 02/28/2024, she will follow up with her gynecologist in regard to her ovarian cyst to decide how to proceed thereafter. She still has some element of pelvic pain. She has no  vaginal discharge.    On 02/28/2024, in summary each one of the elements have been discussed in detail and that is the summary of this visit today. She will be returning to see us back in 06/2024 when she will be due for her Reclast infusion. In preparation for that magnesium and phosphorus level will be done along with a CMP.    On 06/18/2024 her ovarian cyst is being left alone by her gynecologist, it is small, has no pain, no implications at this time.  Patient seen by this physician 1 7/25  Patient seen 1/7/2025, disposition, plans to evaluate by CT of abdomen and pelvis, CA 15-3, guardant reveal and follow-up in 4 to 5 weeks.  She has gynecologic follow-up in the interval with Dr. Cotton.  A copy of this note will be sent to the gynecologist.  Patient seen 2/11/2025 having been reviewed by Dr. Cotton and with plans to proceed to hysterectomy 3/27/2025.      Plan  *Again patient to proceed as above per gynecologic surgery  *Follow-up MD mid July 2025 for continued Prolia  *Schedule DEXA early January 2026

## 2025-02-11 ENCOUNTER — OFFICE VISIT (OUTPATIENT)
Dept: ONCOLOGY | Facility: CLINIC | Age: 50
End: 2025-02-11
Payer: COMMERCIAL

## 2025-02-11 VITALS
OXYGEN SATURATION: 100 % | HEART RATE: 93 BPM | HEIGHT: 67 IN | BODY MASS INDEX: 21.75 KG/M2 | WEIGHT: 138.6 LBS | RESPIRATION RATE: 16 BRPM | TEMPERATURE: 98.6 F | SYSTOLIC BLOOD PRESSURE: 104 MMHG | DIASTOLIC BLOOD PRESSURE: 71 MMHG

## 2025-02-11 DIAGNOSIS — M81.8 OTHER OSTEOPOROSIS WITHOUT CURRENT PATHOLOGICAL FRACTURE: Primary | ICD-10-CM

## 2025-02-11 DIAGNOSIS — Z17.0 MALIGNANT NEOPLASM OF UPPER-OUTER QUADRANT OF RIGHT BREAST IN FEMALE, ESTROGEN RECEPTOR POSITIVE: ICD-10-CM

## 2025-02-11 DIAGNOSIS — C50.411 MALIGNANT NEOPLASM OF UPPER-OUTER QUADRANT OF RIGHT BREAST IN FEMALE, ESTROGEN RECEPTOR POSITIVE: ICD-10-CM

## 2025-02-11 PROCEDURE — 99214 OFFICE O/P EST MOD 30 MIN: CPT | Performed by: INTERNAL MEDICINE

## 2025-02-11 NOTE — LETTER
February 11, 2025     HERMINIA Lewis  [address]  [address]  [facility] [address]    Patient: Diana Higgins   YOB: 1975   Date of Visit: 2/11/2025     Dear HERMINIA Lewis:       Thank you for referring Diana Higgins to me for evaluation. Below are the relevant portions of my assessment and plan of care.    If you have questions, please do not hesitate to call me. I look forward to following Diana along with you.         Sincerely,        Bimal Brown MD        CC: DO Kevin Cole Michael D., MD  02/11/25 1306  Sign when Signing Visit  .        REASONS FOR FOLLOWUP:    History of stage IIA, [id number], ER weakly-positive, CA negative, HER2-negative breast cancer, status post bilateral mastectomies. The patient completed her adjuvant chemotherapy under NSABP B49 clinical trial.  On 12/14/2015 she has no clinical evidence of recurrence of breast cancer and she remains on Femara for the time being. She also has completed her breast reconstruction with bilateral implants and she is very satisfied in regard to the cosmetic result of this. She will remain on Femara for the time being. She receiving at this time Effexor 37.5 mg a day for hot flashes and anxiety. IMPLANTS REMOVED AS PER PT REQUEST IN 2023.  Bone health. The patient has osteoporosis. She  GETS Reclast infusion  on yearly bases, LAST ONE MAY 2023    HISTORY OF PRESENT ILLNESS:      Patient is a 49-year-old female last seen by Dr. Hadley 6/18/2024.    She has a history of breast cancer stage IIA in 2015 returns to the office for follow up. She is now in her 10 th year of Femara adjuvant therapy.  Thus she again describes right pelvic, right lower quadrant pain far the patient has not had any evidence of recurrent disease. Her appetite is good, her weight is stable, her bowel activity surprising enough remains good and her magnesium and calcium supplementation. She has proper urination. She has aches and  pains here and there but nothing permanent that has been present for many years slowly escalating.  She has been advised by gynecology that this is related, potentially, to ovarian cysts and she plans new gynecologic follow-up in the next several weeks to months.         She is seen back to continue therapy for her osteoporosis  with Prolia with her DEXA 12/27/2023 showing lumbar spine T-score -1.5, left hip T-score -2.0 and right hip -2.6.  We have discussed assessing her pelvic pain via CT as well as obtaining a guardant reveal particular at the 10th year of AI therapy and, if negative, allowing her to discontinue this treatment with some confidence.    The patient underwent guardant reveal testing that was negative for ctDNA and 0% tumor fraction, 2/5/2025-CA 15-3 found to be 17.5, normal CMP, normal CBC.  CT abdomen pelvis 2/5/2025 revealed no evidence of bowel obstruction, no abdominal pelvic lymphadenopathy by size, hypodense lesion in the left paracolic gutter measuring 3.4 x 2.3 cm unchanged from 2020.  Patient seen by gynecology 1/15/2025 with plans for laparoscopic hysterectomy and salpingectomy.  This plan is discussed with her 2/11/2025 and she is hopeful for pain relief that is been followed for several years without successful treatment.    Past Medical History:   Diagnosis Date   • Anxiety    • Breast cancer     Right, stage IIA, s/p bilateral mastectomy, XRT, and chemotherapy, followed by Dr. Hadley   • Colon polyp 08/2019   • COVID-19 virus infection 09/2020    C/O back pain and loss of taste and smell   • Disease of thyroid gland     history no meds now   • Hemorrhoid    • History of cancer chemotherapy    • History of foot fracture     as a child  right   • History of mononucleosis    • History of radiation therapy    • Osteoporosis    • Paroxysmal supraventricular tachycardia     CS ostium atrial tachycardia, ablated in 2002 by Dr. Garzon   • PONV (postoperative nausea and vomiting)    • Sinus  headache     But no history of migraines   • Skin cancer    • Sleep apnea     mild no machine   • Wears glasses        ONCOLOGIC HISTORY:  (History from previous dates can be found in the separate document.)    Current Outpatient Medications on File Prior to Visit   Medication Sig Dispense Refill   • betamethasone dipropionate 0.05 % lotion APPLY ONCE DAILY TO AFFECTED AREA ON SCALP AND EARS AS NEEDED     • Biotin 300 MCG tablet Daily.     • Cholecalciferol (VITAMIN D-3) 1000 UNITS capsule Take 1,000 Units by mouth Daily.     • denosumab (PROLIA) 60 MG/ML solution prefilled syringe syringe Inject  under the skin into the appropriate area as directed.     • doxepin (SINEquan) 10 MG capsule TAKE 1 CAPSULE BY MOUTH EVERY NIGHT 30 capsule 6   • multivitamin with minerals tablet tablet Take 1 tablet by mouth Daily.     • venlafaxine XR (EFFEXOR-XR) 75 MG 24 hr capsule TAKE 1 CAPSULE BY MOUTH EVERY DAY 30 capsule 3   • [DISCONTINUED] Zoledronic Acid (RECLAST IV) Infuse  into a venous catheter. Once a year last dose 5/12/2021     • exemestane (AROMASIN) 25 MG tablet TAKE 1 TABLET BY MOUTH EVERY DAY 30 tablet 6     No current facility-administered medications on file prior to visit.       ALLERGIES:   No Known Allergies    Social History     Socioeconomic History   • Marital status:      Spouse name: Misha   • Years of education: College   Tobacco Use   • Smoking status: Never   • Smokeless tobacco: Never   Vaping Use   • Vaping status: Never Used   Substance and Sexual Activity   • Alcohol use: Not Currently     Comment: 10 drinks yearly   • Drug use: Never   • Sexual activity: Defer         Cancer-related family history includes Breast cancer in her maternal aunt; Cancer (age of onset: 42) in her maternal grandmother; Liver cancer in her father and mother.         Objective     Vitals:    02/11/25 1100   BP: 104/71   Pulse: 93   Resp: 16   Temp: 98.6 °F (37 °C)   TempSrc: Oral   SpO2: 100%   Weight: 62.9 kg (138  "lb 9.6 oz)   Height: 170 cm (66.93\")   PainSc: 0-No pain               2/11/2025    11:08 AM   Current Status   ECOG score 0       EXAM:  GENERAL:  Well-developed, Patient  in no acute distress.   SKIN:  Warm, dry ,NO purpura ,no rash.  HEENT:  Pupils were equal and reactive to light and accomodation, conjunctivae noninjected,  normal visual acuity.   NECK:  Supple with good range of motion; no thyromegaly , no JVD or bruits,.No carotid artery pain, no carotid abnormal pulsation   LYMPHATICS:  No cervical, NO supraclavicular, NO axillary, NO inguinal adenopathies.  CARDIAC   normal rate , regular rhythm, without murmur,NO rubs NO S3 NO S4   LUNGS: normal breath sounds bilateral, no wheezing, NO rhonchi, NO crackles ,NO rubs.  VASCULAR VENOUS: no cyanosis, NO collateral circulation, NO varicosities, NO edema, NO palpable cords, NO pain,NO erythema, NO pigmentation of the skin.  ABDOMEN:  Soft, NO pain,no hepatomegaly, no splenomegaly,no masses, no ascites, no collateral circulation,no distention.  EXTREMITIES  AND SPINE:  No clubbing, no cyanosis ,no deformities , no pain .No kyphosis,  no pain in spine, no pain in ribs , no pain in pelvic bone.  NEUROLOGICAL:  Patient was awake, alert, oriented to time, person and place.      RECENT LABS:  Hematology WBC   Date Value Ref Range Status   02/05/2025 6.28 3.40 - 10.80 10*3/mm3 Final     RBC   Date Value Ref Range Status   02/05/2025 4.29 3.77 - 5.28 10*6/mm3 Final     Hemoglobin   Date Value Ref Range Status   02/05/2025 13.0 12.0 - 15.9 g/dL Final     Hematocrit   Date Value Ref Range Status   02/05/2025 38.1 34.0 - 46.6 % Final     Platelets   Date Value Ref Range Status   02/05/2025 382 140 - 450 10*3/mm3 Final                  Assessment & Plan    1. History of stage IIA, [id number], ER weakly-positive, ME negative, HER2-negative right upper outer quadrant breast cancer, status post bilateral mastectomies. The patient completed her adjuvant chemotherapy under NSABP B49 " clinical trial.Patient continues on Femara, 2.5 mg a day. 1. Upon review on 05/08/2020 the patient looks free of cancer recurrence, her clinical examination is normal and she feels substantially better.   As per today I find no evidence of recurrent disease about her breast cancer on her breast examination that remains unchanged with bilateral breast implants and no skeletal pain. She remains on 100% compliance Femara with minimal side effects with the exception of constipation.    07/07/2021 no symptoms or signs of breast cancer recurrence. She remains on Femara with 100% compliance and I advised her to remain on this medicine for the time being with no plans to stop this medicine in the next 5 years or so. She is almost accomplishing 5 years with this medication utilization.   09/30/2021 the patient has no symptoms or signs of breast cancer recurrence. Her clinical examination is negative normal. In the interim of time she has had her breast prosthesis removed. This was feeling like a belt tying her chest. She has felt dramatically better since then. Healing is taking place. She remains on her Femara and I advised her to remain on this medicine for the time being.   The patient was reviewed on 01/31/2022. As far as I can tell on clinical grounds, I do not find any evidence of cancer recurrence. Her clinical examination is normal and she remains on letrozole adjuvant therapy for the time being. We discussed the possibility of eventually removing letrozole out of her of medicines. She feels terrified if we stop this medicine and I pointed out to her that need to.   I pointed out that could be technology that could be using the original tumor in order to determine the need of continuation of hormonal therapy. She believes that it will be better off for her to stay on the medicine for the time being.  On 05/25/2022 the patient has no clinical symptoms or signs of breast cancer recurrence. Her clinical examination is  normal. Her white count, hemoglobin and platelets are normal, her chemistry profile is normal. We advised her to go ahead and continue her Femara for the time being.   On 09/29/2022, given the persistency of the constipation and having nothing else to blame besides the Femara I advised the patient to discontinue this medicine and move into Aromasin at the dose of 25 mg a day. I asked her to give this medicine at least 3 or 4 weeks to see what happens in regard to bowel activity and if everything works okay I asked her to call my nurse, Elaine Hendrickson, to notify us about the status of this. In the meantime it will be okay for her to remain on her Dulcolax for the time being. On clinical grounds she has no symptoms or signs of breast cancer recurrence. Her implants have been removed. Her chest wall is unremarkable. She has occasional kind of phantom pain in the right chest wall but there is no nodularity or alterations otherwise. There is no lymphedema in upper extremities. She will be watched in absence of any other intervention, returning to see us back every 4 months with a CBC, CMP and a CA 15-3.  On 02/01/2023 the patient was further reviewed. Since the previous visit she has not had any changes clinically besides her chronic constipation. Her clinical examination today is negative in regard to breast cancer. Her breast implants have been removed, the patient is not having any issues there whatsoever and the rest of her clinical examination is benign, negative, normal. Her white count, hemoglobin and platelets are normal, her chemistry profile is normal. I advised the patient to remain on Aromasin for the time being not planning to this medication anytime soon.   She will return to see us back in 4 months with a CBC, CMP and a CA 15-3.   On 06/07/2023 the patient has no symptoms or signs of breast cancer recurrence. Her clinical examination is negative normal in this regard. Her white count, hemoglobin and  platelets are normal. Her chemistry profile is normal. Given these facts I do believe that it is prudent to give her 6 weeks of aromatase inhibitor to see if this has an impact in regard to her bowel situation. Her bowel situation is getting to the point that it is unsupportable for her in regard to her constipation.therefore I do not believe that this will have a negative impact in regard protection in regard to her breast cancer risk of recurrence and 6 weeks will not have too much to do in this regard. I would like to review her back at that time. The patient will continue her diet in the best way possible rich in fiber and liquids.   On 07/19/2023 the patient has requested permission to resume her aromatase inhibitor and I have no problems with that. Stopping the medicine for 6 weeks had no impact whatsoever on her bowel activity proving the point that this is a completely independent phenomenon of her situation. She has chronic idiopathic constipation. Therefore I went ahead and advised her to proceed with her aromatase inhibitor and we will review her back in 4 months with a CBC, CMP and a CA 15-3. She has no symptoms or signs of breast cancer recurrence at this time.  The patient will remain on Aromasin for the time being. She will have follow-up appointment as per previously scheduled.  On 02/28/2024, the patient has no symptoms or signs of breast cancer recurrence. She will remain on Aromasin 25 mg for the time being. No plans to stop this medication for the time being.  On 06/18/2024 the patient was returning to see us for visit with no new symptoms of any nature. She has occasional aches and pains here and there but nothing permanent. Her clinical examination is very much benign, normal. Her white count, hemoglobin and platelets are normal.   My advice to her is to remain on Femara. She will be in 2025 on 10 years of the medicine and maybe the patient would like to take the medication beyond that given  the characteristics of her disease. Given my group home the patient will be seen by, Bimal Brown MD, in the future repeating laboratory assessment every 6 months CBC, CMP and CA 15-3.   She is seen back to continue therapy for her osteoporosis  with Prolia with her DEXA 12/27/2023 showing lumbar spine T-score -1.5, left hip T-score -2.0 and right hip -2.6.  We have discussed assessing her pelvic pain via CT as well as obtaining a guardant reveal particular at the 10th year of AI therapy and, if negative, allowing her to discontinue this treatment with some confidence.  The patient underwent guardant reveal testing that was negative for ctDNA and 0% tumor fraction, 2/5/2025-CA 15-3 found to be 17.5, normal CMP, normal CBC.  CT abdomen pelvis 2/5/2025 revealed no evidence of bowel obstruction, no abdominal pelvic lymphadenopathy by size, hypodense lesion in the left paracolic gutter measuring 3.4 x 2.3 cm unchanged from 2020.  Patient seen by gynecology 1/15/2025 with plans for laparoscopic hysterectomy and salpingectomy.  This plan is discussed with her 2/11/2025 and she is hopeful for pain relief that is been followed for several years without successful treatment.      2.  Chronic constipation: Slow transit has been on multiple medications.  Her chronic constipation remains an ongoing issue. She is taking Dulcolax and sometimes she needs to have an enema. She has large bowel activities but not too much propulsive activity in her intestine. Her diet is appropriate and she drinks plenty of liquids.  Her chronic constipation remains a problem. She continues using her laxatives as previous described and she has a bowel movement every 2-3 days. She has proper hydration and she tries to eat a lot of vegetables and fruit.  On 09/29/2022 Dulcolax was advised on her twice a day if necessary and in order to try to remove medications to see if this makes a difference in regard to her bowel activity, I discontinued the  Femara and moved into Aromasin at the dose of 25 mg a day. I asked her to call and notify us if she sees any changes with this modification.  On 02/01/2023 the patient will proceed with a colonoscopy on 02/02/2023 by Haley Lopez MD, Gastroenterologist.   On 06/07/2023 the patient will remain on Dulcolax. We will see if aromatase inhibition holding off for 6 weeks has an impact on this symptom at all. On 07/19/2023, we have proven the point that her aromatase inhibitor has nothing to do with her chronic idiopathic constipation. She will continue her Dulcolax that she uses at least 2 or 3 times a week to make bowel activity to happen. We have tried hydration, Linzess. We have tried other laxatives and we have tried multiple other medicines and modification in her diet, none of these have had an impact on her.  On 10/25/2023, the patient is now taking a supplement of magnesium, vitamin C and calcium. This is helping her to have a bowel movement on regular basis that has made a big difference in regard to her ability to function. I encouraged her to continue this  On 02/28/2024, the combination of magnesium and calcium supplement is helping her to go to the bathroom. I encouraged her to continue this.  On 06/18/2024 since the time of initiation of magnesium and calcium supplementation no further issues at this time.    3.  Chronic anxiety, panic attacks, depression: Patient is on Effexor  The chronic anxiety remains an ongoing issue that is not a new problem and there is no need for medication at this time besides the continuation of her Effexor that will be utilized for depression at the same time.  Her depression is under good control using Effexor. I encouraged her to remain on this medicine for the time being. She has a lot of other stressors and anxiety triggering factors including the wedding of her daughter, buying a new house, packing and moving and quitting her job. I encouraged her to remain on this  medicine.   On 09/29/2022 she feels fine with the Effexor that she takes and the doxepin that she takes for insomnia. I asked her to continue these medicines for the time being.  Her chronic anxiety and depression remains under therapy by me. These medicines will remain ongoing for the time being. These symptoms are stable at this time and she is happy because she is going to be a grandmother.  On 06/07/2023 the patient has further issues in regard to stressors. Her daughter is 29 weeks pregnant and is having premature labor with her 1st baby. She has been assisting her and taking care of her at [facility].   On 07/19/2023 in spite of having a lot of improvement in regard to her stressors including her daughter who is keeping the baby now almost 36 weeks into her pregnancy after having premature contractions and having to be in the hospital. She remains in bed but the patient, Ms. Higgins, is going to her house to help her out to clean the house and take care of business in the best of possibilities. Again her stressors are better but not completely so. She remains on medicine for insomnia and she remains on medicine for anxiety  On 10/25/2023, no issues pertinent to this at this time. A lot of her family issues are very much under control.  On 02/28/2024, her anxiety and depression are under good control with Effexor and also a low-dose medication for insomnia. She has not encountered any side effects of the medicines and she believes there is real benefit on them.  On 06/18/2024 Effexor remains active in controlling the symptoms and she will remain on this medicine for the time being.    4.  Insomnia: Previously on gabapentin without much relief.  At that time she was also taking melatonin and ZzzQuil.  We asked her to discontinue those medications.  9/30/2021 patient started on a trial of doxepin 10 mg nightly.    10/18/2021 she reports that doxepin has helped with her sleep and is  asking for refill of this.  She has found that 1 small dose of doxepin 10 mg in the evening is tremendously beneficial to help her to sleep. She has requested a new refill and it will be sent to her pharmacy.  On 05/25/2022 she has found that minimal dose of doxepin is very good for controlling her insomnia. She uses the medicine as needed. I told her to use it every night especially with the multiple stressors that she has at this time. This medicine will have no impact in regard any addiction or anything of that nature.   On 02/01/2023 her insomnia remains an issue. She is now more grateful, she is preparing to be a grandmother. Hopefully this will trigger benefits in regard to all of her life and including ability to sleep.   On 06/07/2023 she will remain on a minimal dose of doxepin for insomnia that is effective.   On 07/19/2023 we reintroduced sleep hygiene as a must including stopping watching television, iPad, and telephone at least 1-1/2 hours before she goes to bed. I asked her to use some melatonin or melatonin-containing foods including pistachios to see if that introduces her to a better night of sleep. She is welcome to use her doxepin still at the present dose before she goes to bed or at times that she wakes up at 2 a.m.  On 10/25/2023, she continues taking a minimal dose of doxepin at nighttime that is perfect to help her to sleep.  On 02/28/2024, as posted she will remain on low-dose doxepin 10 mg at nighttime.   On 06/18/2024 low doxepin helps her with her insomnia. This medicine at this dose will remain ongoing.  This will be continued when seen 2/11/2025 particularly as she is approaching her hysterectomy      5.  Osteoporosis: Continuing physical activity, vitamin D.  She also receives yearly Reclast last given 5/20/2021.  I discussed with her on 01/31/2022 that her bone density documents still further bone loss, especially in the lumbar area and on the left hip. I do believe that she will  continue the Reclast and the next dose is scheduled for 05/2022. I encouraged her to continue taking her vitamin D and encouraged her to take a magnesium supplementation. I encouraged her to jump the rope that could have a positive impact in regard to her ability to further help bone formation. She is walking probably 7000 to 10,000 steps a day.  On 05/25/2022 given her osteoporosis documented through bone density in the background of aromatase inhibitor use the patient will proceed today with her Reclast that will remain on yearly basis. In anticipation of that magnesium and phosphorus levels and vitamin D levels will be done.   On 09/29/2022 doxepin will remain at a dose of 10 mg at bedtime. It is helpful in regard to her insomnia.  On 02/01/2023 the patient should be ready for her Reclast this year in 4 months from now and in anticipation of that she will proceed with a bone density test in 12 weeks.  On 06/07/2023 the patient is due to receive her Reclast today. She has been advised to take some Tylenol also in the next couple of days because of probably achiness associated with the administration of medicine. She will be due to have new bone density test in 2024.   On 07/19/2023 the patient has proceeded with Reclast in 06/2023. She will have another dose in 06/2024.  On 10/25/2023, she remains on Reclast on yearly basis.  On 02/28/2024, the patient will be due for her Reclast in 06/2024. In preparation for that therapy, she will have magnesium and phosphorus level besides CMP. She was encouraged to continue taking her vitamin D supplement.  On 06/18/2024 given the fact that the patient no longer has a port we were not able to obtain IV site to start an IV for Reclast infusion. Given this fact the patient almost passed out from so much digging and we decided to abort the Reclast. From now on the patient will be on Prolia 60 mg IM every 6 months and the next injection will be next week. In preparation for that  CMP, magnesium and phosphorus levels will be done and she will remain on her vitamin D supplementation.  Patient/2/11/2025 and follow-up bone density will be rescheduled until January 2026 with the patient seen in mid July 2025 for continued Prolia    6.  Previously had a left subclavian port, removed 5/21/2021.    7.  Hypercalcemia: The patient had been taking biotin for hair growth that was thought to be causing this issue.  Biotin was discontinued and hypercalcemia resolved.  No issues related to hypercalcemia on 01/31/2022 with normal chemistry profile.  On 09/29/2022 her calcium level today is completely normal. She is not taking any other multivitamins containing calcium or biotin.  On 02/01/2023 she has not had any further hypercalcemia since discontinuation of previous supplements.   On 06/07/2023 no issues pertinent to hypercalcemia since discontinuation of supplements.   On 07/19/2023 her calcium level has remained normal after she stopped a multitude of supplements.  On 10/25/2023, no issues pertinent.  On 06/18/2024 no measurement of this.  Assessment 1/7/2025 calcium 9.2, albumin 4.5      8.On 10/25/2023, the patient has called with pelvic pain and vaginal discharge. Her gynecologist cannot see her for 2 months. Previous Pap smear in 07/2023 was normal. The symptoms that she has now is discharge and pelvic pain. The clinical examination is consistent with cervicitis and bacterial vaginosis. No candida was found. No samples were obtained. Given the present circumstances, I would like for her to utilize metronidazole 250 mg tablets, 1 tablet t.i.d. for 7 days. The patient was instructed in regard to the 2 main side effects of this medicine including metallic taste in the mouth that could be overcome with bubble gum or small pieces of peppermint candy and the 2nd, she cannot drink any alcohol while taking this medicine. She does not drink anyway.     Also given the atrophy in the vagina, I would like for  her to take Estrace, half an applicator in the vagina every other day until the tube is gone with no refills.     I think the utilization of these 2 methodologies will take care of her problem without any further need for radiological assessment.     If she wants to follow up with her gynecologist in the future she is more than welcome but I instructed her to call my nurse, Elaine Hendrickson, in the next few days to tell her how things are going.      On 02/28/2024, she will follow up with her gynecologist in regard to her ovarian cyst to decide how to proceed thereafter. She still has some element of pelvic pain. She has no vaginal discharge.    On 02/28/2024, in summary each one of the elements have been discussed in detail and that is the summary of this visit today. She will be returning to see us back in 06/2024 when she will be due for her Reclast infusion. In preparation for that magnesium and phosphorus level will be done along with a CMP.    On 06/18/2024 her ovarian cyst is being left alone by her gynecologist, it is small, has no pain, no implications at this time.  Patient seen by this physician 1 7/25  Patient seen 1/7/2025, disposition, plans to evaluate by CT of abdomen and pelvis, CA 15-3, guardant reveal and follow-up in 4 to 5 weeks.  She has gynecologic follow-up in the interval with Dr. Cotton.  A copy of this note will be sent to the gynecologist.  Patient seen 2/11/2025 having been reviewed by Dr. Cotton and with plans to proceed to hysterectomy 3/27/2025.      Plan  *Again patient to proceed as above per gynecologic surgery  *Follow-up MD mid July 2025 for continued Prolia  *Schedule DEXA early January 2026

## 2025-02-17 RX ORDER — DOXEPIN HYDROCHLORIDE 10 MG/1
10 CAPSULE ORAL NIGHTLY
Qty: 30 CAPSULE | Refills: 6 | Status: SHIPPED | OUTPATIENT
Start: 2025-02-17

## 2025-03-14 RX ORDER — DOXEPIN HYDROCHLORIDE 10 MG/1
10 CAPSULE ORAL NIGHTLY
Qty: 90 CAPSULE | Refills: 0 | Status: SHIPPED | OUTPATIENT
Start: 2025-03-14

## 2025-04-15 ENCOUNTER — PATIENT ROUNDING (BHMG ONLY) (OUTPATIENT)
Dept: URGENT CARE | Facility: CLINIC | Age: 50
End: 2025-04-15
Payer: COMMERCIAL

## 2025-04-15 NOTE — ED NOTES
Thank you for letting us care for you during your recent visit at [facility]. We would love to hear about your experience with us.     We’re always looking for ways to make our patients’ experiences even better. Do you have any recommendations on ways we may improve?    I appreciate you taking the time to respond. Please be on the lookout for a survey about your recent visit from [facility] via text or email. We would greatly appreciate if you could fill that out and turn it back in. We want your voice to be heard and we value your feedback.     Thank you for choosing [facility] for your healthcare needs.

## 2025-05-27 RX ORDER — VENLAFAXINE HYDROCHLORIDE 75 MG/1
75 CAPSULE, EXTENDED RELEASE ORAL DAILY
Qty: 30 CAPSULE | Refills: 3 | Status: SHIPPED | OUTPATIENT
Start: 2025-05-27

## 2025-06-27 RX ORDER — VENLAFAXINE HYDROCHLORIDE 75 MG/1
75 CAPSULE, EXTENDED RELEASE ORAL DAILY
Qty: 90 CAPSULE | Refills: 0 | Status: SHIPPED | OUTPATIENT
Start: 2025-06-27

## 2025-07-07 NOTE — PROGRESS NOTES
REASONS FOR FOLLOWUP:    History of stage IIA, [id number], ER weakly-positive, NE negative, HER2-negative breast cancer, status post bilateral mastectomies. The patient completed her adjuvant chemotherapy under NSABP B49 clinical trial.  On 12/14/2015 she has no clinical evidence of recurrence of breast cancer and she remains on Femara for the time being. She also has completed her breast reconstruction with bilateral implants and she is very satisfied in regard to the cosmetic result of this. She will remain on Femara for the time being. She receiving at this time Effexor 37.5 mg a day for hot flashes and anxiety. IMPLANTS REMOVED AS PER PT REQUEST IN 2023.  Bone health. The patient has osteoporosis.  She has been treated with Reclast on a yearly basis the last May 2023.  Prolia initiated 6/27/2024 and planned 7/8/2025    HISTORY OF PRESENT ILLNESS:      Patient is a 49-year-old female last seen by Dr. Hadley 6/18/2024.    She has a history of breast cancer stage IIA in 2015 returns to the office for follow up. She is now in her 10 th year of Femara adjuvant therapy.  Thus she again describes right pelvic, right lower quadrant pain far the patient has not had any evidence of recurrent disease. Her appetite is good, her weight is stable, her bowel activity surprising enough remains good and her magnesium and calcium supplementation. She has proper urination. She has aches and pains here and there but nothing permanent that has been present for many years slowly escalating.  She has been advised by gynecology that this is related, potentially, to ovarian cysts and she plans new gynecologic follow-up in the next several weeks to months.         She is seen back to continue therapy for her osteoporosis  with Prolia with her DEXA 12/27/2023 showing lumbar spine T-score -1.5, left hip T-score -2.0 and right hip -2.6.  We have discussed assessing her pelvic pain via CT as well as obtaining a guardant reveal particular at  the 10th year of AI therapy and, if negative, allowing her to discontinue this treatment with some confidence.    The patient underwent guardant reveal testing that was negative for ctDNA and 0% tumor fraction, 2/5/2025-CA 15-3 found to be 17.5, normal CMP, normal CBC.  CT abdomen pelvis 2/5/2025 revealed no evidence of bowel obstruction, no abdominal pelvic lymphadenopathy by size, hypodense lesion in the left paracolic gutter measuring 3.4 x 2.3 cm unchanged from 2020.  Patient seen by gynecology 1/15/2025 with plans for laparoscopic hysterectomy and salpingectomy.  This plan is discussed with her 2/11/2025 and she is hopeful for pain relief that is been followed for several years without successful treatment.    The patient was able to proceed with total laparoscopic hysterectomy, BSO and cystoscopy 3/27/2025.  Review of her pathology specimen revealed evidence of a previous endometrial ablation, cervix with no histopathology, benign right ovary with inclusion cyst and involuting corpus luteum, benign right fallopian tube, benign left ovary and fallopian tube.  The patient proceeded to recovery, having issues with sinusitis that responded to therapy.    She is next seen 7/8/2025.  She has recovered from surgery and is very pleased about her current lack of gynecologic symptoms.  Her overall stamina and performance status remains excellent.      Past Medical History:   Diagnosis Date    Anxiety     Breast cancer     Right, stage IIA, s/p bilateral mastectomy, XRT, and chemotherapy, followed by Dr. Hadley    Colon polyp 08/2019    COVID-19 virus infection 09/2020    C/O back pain and loss of taste and smell    Disease of thyroid gland     history no meds now    Hemorrhoid     History of cancer chemotherapy     History of foot fracture     as a child  right    History of mononucleosis     History of radiation therapy     Osteoporosis     Paroxysmal supraventricular tachycardia     CS ostium atrial tachycardia,  ablated in 2002 by Dr. Ryann MILLER (postoperative nausea and vomiting)     Sinus headache     But no history of migraines    Skin cancer     Sleep apnea     mild no machine    Wears glasses        ONCOLOGIC HISTORY:  (History from previous dates can be found in the separate document.)    Current Outpatient Medications on File Prior to Visit   Medication Sig Dispense Refill    betamethasone dipropionate 0.05 % lotion APPLY ONCE DAILY TO AFFECTED AREA ON SCALP AND EARS AS NEEDED      Biotin 300 MCG tablet Daily.      Cholecalciferol (VITAMIN D-3) 1000 UNITS capsule Take 1,000 Units by mouth Daily.      denosumab (PROLIA) 60 MG/ML solution prefilled syringe syringe Inject  under the skin into the appropriate area as directed.      doxepin (SINEquan) 10 MG capsule Take 1 capsule by mouth Every Night. 90 capsule 0    multivitamin with minerals tablet tablet Take 1 tablet by mouth Daily.      venlafaxine XR (EFFEXOR-XR) 75 MG 24 hr capsule Take 1 capsule by mouth Daily. 90 capsule 0    amoxicillin-clavulanate (AUGMENTIN) 875-125 MG per tablet Take 1 tablet by mouth Every 12 (Twelve) Hours. 20 tablet 0    exemestane (AROMASIN) 25 MG tablet TAKE 1 TABLET BY MOUTH EVERY DAY 30 tablet 6    fluticasone (FLONASE) 50 MCG/ACT nasal spray Administer 1 spray into the nostril(s) as directed by provider Daily for 10 days. 2 puffs each nostril 1 g 0    methylPREDNISolone (MEDROL) 4 MG dose pack Take as directed on package instructions. 21 tablet 0     No current facility-administered medications on file prior to visit.       ALLERGIES:   No Known Allergies    Social History     Socioeconomic History    Marital status:      Spouse name: Misha    Years of education: College   Tobacco Use    Smoking status: Never    Smokeless tobacco: Never   Vaping Use    Vaping status: Never Used   Substance and Sexual Activity    Alcohol use: Not Currently     Comment: 10 drinks yearly    Drug use: Never    Sexual activity: Defer      "    Cancer-related family history includes Breast cancer in her maternal aunt; Cancer (age of onset: 42) in her maternal grandmother; Liver cancer in her father and mother.         Objective      Vitals:    07/08/25 1042   BP: 101/66   Pulse: 85   Temp: 96.9 °F (36.1 °C)   TempSrc: Skin   SpO2: 99%   Weight: 61.8 kg (136 lb 3.2 oz)   Height: 170.2 cm (67.01\")   PainSc: 0-No pain                 7/8/2025    10:44 AM   Current Status   ECOG score 0       EXAM:  GENERAL:  Well-developed, Patient  in no acute distress.   SKIN:  Warm, dry ,NO purpura ,no rash.  HEENT:  Pupils were equal and reactive to light and accomodation, conjunctivae noninjected,  normal visual acuity.   NECK:  Supple with good range of motion; no thyromegaly , no JVD or bruits,.No carotid artery pain, no carotid abnormal pulsation   LYMPHATICS:  No cervical, NO supraclavicular, NO axillary, NO inguinal adenopathies.  CARDIAC   normal rate , regular rhythm, without murmur,NO rubs NO S3 NO S4   LUNGS: normal breath sounds bilateral, no wheezing, NO rhonchi, NO crackles ,NO rubs.  VASCULAR VENOUS: no cyanosis, NO collateral circulation, NO varicosities, NO edema, NO palpable cords, NO pain,NO erythema, NO pigmentation of the skin.  ABDOMEN:  Soft, NO pain,no hepatomegaly, no splenomegaly,no masses, no ascites, no collateral circulation,no distention.  EXTREMITIES  AND SPINE:  No clubbing, no cyanosis ,no deformities , no pain .No kyphosis,  no pain in spine, no pain in ribs , no pain in pelvic bone.  NEUROLOGICAL:  Patient was awake, alert, oriented to time, person and place.      RECENT LABS:  Hematology WBC   Date Value Ref Range Status   07/08/2025 6.68 3.40 - 10.80 10*3/mm3 Final     RBC   Date Value Ref Range Status   07/08/2025 4.58 3.77 - 5.28 10*6/mm3 Final     Hemoglobin   Date Value Ref Range Status   07/08/2025 13.8 12.0 - 15.9 g/dL Final     Hematocrit   Date Value Ref Range Status   07/08/2025 41.8 34.0 - 46.6 % Final     Platelets   Date " Value Ref Range Status   07/08/2025 405 140 - 450 10*3/mm3 Final                  Assessment & Plan     1. History of stage IIA, [id number], ER weakly-positive, MT negative, HER2-negative right upper outer quadrant breast cancer, status post bilateral mastectomies. The patient completed her adjuvant chemotherapy under NSABP B49 clinical trial.Patient continues on Femara, 2.5 mg a day. 1. Upon review on 05/08/2020 the patient looks free of cancer recurrence, her clinical examination is normal and she feels substantially better.   As per today I find no evidence of recurrent disease about her breast cancer on her breast examination that remains unchanged with bilateral breast implants and no skeletal pain. She remains on 100% compliance Femara with minimal side effects with the exception of constipation.    07/07/2021 no symptoms or signs of breast cancer recurrence. She remains on Femara with 100% compliance and I advised her to remain on this medicine for the time being with no plans to stop this medicine in the next 5 years or so. She is almost accomplishing 5 years with this medication utilization.   09/30/2021 the patient has no symptoms or signs of breast cancer recurrence. Her clinical examination is negative normal. In the interim of time she has had her breast prosthesis removed. This was feeling like a belt tying her chest. She has felt dramatically better since then. Healing is taking place. She remains on her Femara and I advised her to remain on this medicine for the time being.   The patient was reviewed on 01/31/2022. As far as I can tell on clinical grounds, I do not find any evidence of cancer recurrence. Her clinical examination is normal and she remains on letrozole adjuvant therapy for the time being. We discussed the possibility of eventually removing letrozole out of her of medicines. She feels terrified if we stop this medicine and I pointed out to her that need to.   I pointed out that could be  technology that could be using the original tumor in order to determine the need of continuation of hormonal therapy. She believes that it will be better off for her to stay on the medicine for the time being.  On 05/25/2022 the patient has no clinical symptoms or signs of breast cancer recurrence. Her clinical examination is normal. Her white count, hemoglobin and platelets are normal, her chemistry profile is normal. We advised her to go ahead and continue her Femara for the time being.   On 09/29/2022, given the persistency of the constipation and having nothing else to blame besides the Femara I advised the patient to discontinue this medicine and move into Aromasin at the dose of 25 mg a day. I asked her to give this medicine at least 3 or 4 weeks to see what happens in regard to bowel activity and if everything works okay I asked her to call my nurse, Elaine Hendrickson, to notify us about the status of this. In the meantime it will be okay for her to remain on her Dulcolax for the time being. On clinical grounds she has no symptoms or signs of breast cancer recurrence. Her implants have been removed. Her chest wall is unremarkable. She has occasional kind of phantom pain in the right chest wall but there is no nodularity or alterations otherwise. There is no lymphedema in upper extremities. She will be watched in absence of any other intervention, returning to see us back every 4 months with a CBC, CMP and a CA 15-3.  On 02/01/2023 the patient was further reviewed. Since the previous visit she has not had any changes clinically besides her chronic constipation. Her clinical examination today is negative in regard to breast cancer. Her breast implants have been removed, the patient is not having any issues there whatsoever and the rest of her clinical examination is benign, negative, normal. Her white count, hemoglobin and platelets are normal, her chemistry profile is normal. I advised the patient to remain on  Aromasin for the time being not planning to this medication anytime soon.   She will return to see us back in 4 months with a CBC, CMP and a CA 15-3.   On 06/07/2023 the patient has no symptoms or signs of breast cancer recurrence. Her clinical examination is negative normal in this regard. Her white count, hemoglobin and platelets are normal. Her chemistry profile is normal. Given these facts I do believe that it is prudent to give her 6 weeks of aromatase inhibitor to see if this has an impact in regard to her bowel situation. Her bowel situation is getting to the point that it is unsupportable for her in regard to her constipation.therefore I do not believe that this will have a negative impact in regard protection in regard to her breast cancer risk of recurrence and 6 weeks will not have too much to do in this regard. I would like to review her back at that time. The patient will continue her diet in the best way possible rich in fiber and liquids.   On 07/19/2023 the patient has requested permission to resume her aromatase inhibitor and I have no problems with that. Stopping the medicine for 6 weeks had no impact whatsoever on her bowel activity proving the point that this is a completely independent phenomenon of her situation. She has chronic idiopathic constipation. Therefore I went ahead and advised her to proceed with her aromatase inhibitor and we will review her back in 4 months with a CBC, CMP and a CA 15-3. She has no symptoms or signs of breast cancer recurrence at this time.  The patient will remain on Aromasin for the time being. She will have follow-up appointment as per previously scheduled.  On 02/28/2024, the patient has no symptoms or signs of breast cancer recurrence. She will remain on Aromasin 25 mg for the time being. No plans to stop this medication for the time being.  On 06/18/2024 the patient was returning to see us for visit with no new symptoms of any nature. She has occasional aches  and pains here and there but nothing permanent. Her clinical examination is very much benign, normal. Her white count, hemoglobin and platelets are normal.   My advice to her is to remain on Femara. She will be in 2025 on 10 years of the medicine and maybe the patient would like to take the medication beyond that given the characteristics of her disease. Given my FPC the patient will be seen by, Bimal Brown MD, in the future repeating laboratory assessment every 6 months CBC, CMP and CA 15-3.   She is seen back to continue therapy for her osteoporosis  with Prolia with her DEXA 12/27/2023 showing lumbar spine T-score -1.5, left hip T-score -2.0 and right hip -2.6.  We have discussed assessing her pelvic pain via CT as well as obtaining a guardant reveal particular at the 10th year of AI therapy and, if negative, allowing her to discontinue this treatment with some confidence.  The patient underwent guardant reveal testing that was negative for ctDNA and 0% tumor fraction, 2/5/2025-CA 15-3 found to be 17.5, normal CMP, normal CBC.  CT abdomen pelvis 2/5/2025 revealed no evidence of bowel obstruction, no abdominal pelvic lymphadenopathy by size, hypodense lesion in the left paracolic gutter measuring 3.4 x 2.3 cm unchanged from 2020.  Patient seen by gynecology 1/15/2025 with plans for laparoscopic hysterectomy and salpingectomy.  This plan is discussed with her 2/11/2025 and she is hopeful for pain relief that is been followed for several years without successful treatment.  The patient was able to proceed with total laparoscopic hysterectomy, BSO and cystoscopy 3/27/2025.  Review of her pathology specimen revealed evidence of a previous endometrial ablation, cervix with no histopathology, benign right ovary with inclusion cyst and involuting corpus luteum, benign right fallopian tube, benign left ovary and fallopian tube.  The patient proceeded to recovery, having issues with sinusitis that responded to  therapy.  She is next seen 7/8/2025.  She has recovered from surgery and is very pleased about her current lack of gynecologic symptoms.  Her overall stamina and performance status remains excellent.      2.  Chronic constipation: Slow transit has been on multiple medications.  Her chronic constipation remains an ongoing issue. She is taking Dulcolax and sometimes she needs to have an enema. She has large bowel activities but not too much propulsive activity in her intestine. Her diet is appropriate and she drinks plenty of liquids.  Her chronic constipation remains a problem. She continues using her laxatives as previous described and she has a bowel movement every 2-3 days. She has proper hydration and she tries to eat a lot of vegetables and fruit.  On 09/29/2022 Dulcolax was advised on her twice a day if necessary and in order to try to remove medications to see if this makes a difference in regard to her bowel activity, I discontinued the Femara and moved into Aromasin at the dose of 25 mg a day. I asked her to call and notify us if she sees any changes with this modification.  On 02/01/2023 the patient will proceed with a colonoscopy on 02/02/2023 by Haley Lopez MD, Gastroenterologist.   On 06/07/2023 the patient will remain on Dulcolax. We will see if aromatase inhibition holding off for 6 weeks has an impact on this symptom at all. On 07/19/2023, we have proven the point that her aromatase inhibitor has nothing to do with her chronic idiopathic constipation. She will continue her Dulcolax that she uses at least 2 or 3 times a week to make bowel activity to happen. We have tried hydration, Linzess. We have tried other laxatives and we have tried multiple other medicines and modification in her diet, none of these have had an impact on her.  On 10/25/2023, the patient is now taking a supplement of magnesium, vitamin C and calcium. This is helping her to have a bowel movement on regular basis that has made a  big difference in regard to her ability to function. I encouraged her to continue this  On 02/28/2024, the combination of magnesium and calcium supplement is helping her to go to the bathroom. I encouraged her to continue this.  On 06/18/2024 since the time of initiation of magnesium and calcium supplementation no further issues at this time.  This is not a symptom when seen 7/8/2025.    3.  Chronic anxiety, panic attacks, depression: Patient is on Effexor  The chronic anxiety remains an ongoing issue that is not a new problem and there is no need for medication at this time besides the continuation of her Effexor that will be utilized for depression at the same time.  Her depression is under good control using Effexor. I encouraged her to remain on this medicine for the time being. She has a lot of other stressors and anxiety triggering factors including the wedding of her daughter, buying a new house, packing and moving and quitting her job. I encouraged her to remain on this medicine.   On 09/29/2022 she feels fine with the Effexor that she takes and the doxepin that she takes for insomnia. I asked her to continue these medicines for the time being.  Her chronic anxiety and depression remains under therapy by me. These medicines will remain ongoing for the time being. These symptoms are stable at this time and she is happy because she is going to be a grandmother.  On 06/07/2023 the patient has further issues in regard to stressors. Her daughter is 29 weeks pregnant and is having premature labor with her 1st baby. She has been assisting her and taking care of her at [facility].   On 07/19/2023 in spite of having a lot of improvement in regard to her stressors including her daughter who is keeping the baby now almost 36 weeks into her pregnancy after having premature contractions and having to be in the hospital. She remains in bed but the patient, Ms. Higgins, is going to her house to  help her out to clean the house and take care of business in the best of possibilities. Again her stressors are better but not completely so. She remains on medicine for insomnia and she remains on medicine for anxiety  On 10/25/2023, no issues pertinent to this at this time. A lot of her family issues are very much under control.  On 02/28/2024, her anxiety and depression are under good control with Effexor and also a low-dose medication for insomnia. She has not encountered any side effects of the medicines and she believes there is real benefit on them.  On 06/18/2024 Effexor remains active in controlling the symptoms and she will remain on this medicine for the time being.    4.  Insomnia: Previously on gabapentin without much relief.  At that time she was also taking melatonin and ZzzQuil.  We asked her to discontinue those medications.  9/30/2021 patient started on a trial of doxepin 10 mg nightly.    10/18/2021 she reports that doxepin has helped with her sleep and is asking for refill of this.  She has found that 1 small dose of doxepin 10 mg in the evening is tremendously beneficial to help her to sleep. She has requested a new refill and it will be sent to her pharmacy.  On 05/25/2022 she has found that minimal dose of doxepin is very good for controlling her insomnia. She uses the medicine as needed. I told her to use it every night especially with the multiple stressors that she has at this time. This medicine will have no impact in regard any addiction or anything of that nature.   On 02/01/2023 her insomnia remains an issue. She is now more grateful, she is preparing to be a grandmother. Hopefully this will trigger benefits in regard to all of her life and including ability to sleep.   On 06/07/2023 she will remain on a minimal dose of doxepin for insomnia that is effective.   On 07/19/2023 we reintroduced sleep hygiene as a must including stopping watching television, iPad, and telephone at least  1-1/2 hours before she goes to bed. I asked her to use some melatonin or melatonin-containing foods including pistachios to see if that introduces her to a better night of sleep. She is welcome to use her doxepin still at the present dose before she goes to bed or at times that she wakes up at 2 a.m.  On 10/25/2023, she continues taking a minimal dose of doxepin at nighttime that is perfect to help her to sleep.  On 02/28/2024, as posted she will remain on low-dose doxepin 10 mg at nighttime.   On 06/18/2024 low doxepin helps her with her insomnia. This medicine at this dose will remain ongoing.  This will be continued.      5.  Osteoporosis: Continuing physical activity, vitamin D.  She also receives yearly Reclast last given 5/20/2021.  I discussed with her on 01/31/2022 that her bone density documents still further bone loss, especially in the lumbar area and on the left hip. I do believe that she will continue the Reclast and the next dose is scheduled for 05/2022. I encouraged her to continue taking her vitamin D and encouraged her to take a magnesium supplementation. I encouraged her to jump the rope that could have a positive impact in regard to her ability to further help bone formation. She is walking probably 7000 to 10,000 steps a day.  On 05/25/2022 given her osteoporosis documented through bone density in the background of aromatase inhibitor use the patient will proceed today with her Reclast that will remain on yearly basis. In anticipation of that magnesium and phosphorus levels and vitamin D levels will be done.   On 09/29/2022 doxepin will remain at a dose of 10 mg at bedtime. It is helpful in regard to her insomnia.  On 02/01/2023 the patient should be ready for her Reclast this year in 4 months from now and in anticipation of that she will proceed with a bone density test in 12 weeks.  On 06/07/2023 the patient is due to receive her Reclast today. She has been advised to take some Tylenol also in  the next couple of days because of probably achiness associated with the administration of medicine. She will be due to have new bone density test in 2024.   On 07/19/2023 the patient has proceeded with Reclast in 06/2023. She will have another dose in 06/2024.  On 10/25/2023, she remains on Reclast on yearly basis.  On 02/28/2024, the patient will be due for her Reclast in 06/2024. In preparation for that therapy, she will have magnesium and phosphorus level besides CMP. She was encouraged to continue taking her vitamin D supplement.  On 06/18/2024 given the fact that the patient no longer has a port we were not able to obtain IV site to start an IV for Reclast infusion. Given this fact the patient almost passed out from so much digging and we decided to abort the Reclast. From now on the patient will be on Prolia 60 mg IM every 6 months and the next injection will be next week. In preparation for that CMP, magnesium and phosphorus levels will be done and she will remain on her vitamin D supplementation.  Patient/2/11/2025 and follow-up bone density will be rescheduled until January 2026 with the patient seen in mid July 2025 for continued Prolia.  Patient assessed 7/8/2025 for continued Prolia, follow-up bone density scheduled January 2026 with follow-up Prolia    6.  Previously had a left subclavian port, removed 5/21/2021.    7.  Hypercalcemia: The patient had been taking biotin for hair growth that was thought to be causing this issue.  Biotin was discontinued and hypercalcemia resolved.  No issues related to hypercalcemia on 01/31/2022 with normal chemistry profile.  On 09/29/2022 her calcium level today is completely normal. She is not taking any other multivitamins containing calcium or biotin.  On 02/01/2023 she has not had any further hypercalcemia since discontinuation of previous supplements.   On 06/07/2023 no issues pertinent to hypercalcemia since discontinuation of supplements.   On 07/19/2023 her  calcium level has remained normal after she stopped a multitude of supplements.  On 10/25/2023, no issues pertinent.  On 06/18/2024 no measurement of this.  Assessment 1/7/2025 calcium 9.2, albumin 4.5      8.On 10/25/2023, the patient has called with pelvic pain and vaginal discharge. Her gynecologist cannot see her for 2 months. Previous Pap smear in 07/2023 was normal. The symptoms that she has now is discharge and pelvic pain. The clinical examination is consistent with cervicitis and bacterial vaginosis. No candida was found. No samples were obtained. Given the present circumstances, I would like for her to utilize metronidazole 250 mg tablets, 1 tablet t.i.d. for 7 days. The patient was instructed in regard to the 2 main side effects of this medicine including metallic taste in the mouth that could be overcome with bubble gum or small pieces of peppermint candy and the 2nd, she cannot drink any alcohol while taking this medicine. She does not drink anyway.     Also given the atrophy in the vagina, I would like for her to take Estrace, half an applicator in the vagina every other day until the tube is gone with no refills.       On 02/28/2024, she will follow up with her gynecologist in regard to her ovarian cyst to decide how to proceed thereafter. She still has some element of pelvic pain. She has no vaginal discharge.    On 02/28/2024, in summary each one of the elements have been discussed in detail and that is the summary of this visit today. She will be returning to see us back in 06/2024 when she will be due for her Reclast infusion. In preparation for that magnesium and phosphorus level will be done along with a CMP.    On 06/18/2024 her ovarian cyst is being left alone by her gynecologist, it is small, has no pain, no implications at this time.  Patient seen by this physician 1 7/25  Patient seen 1/7/2025, disposition, plans to evaluate by CT of abdomen and pelvis, CA 15-3, guardant reveal and  follow-up in 4 to 5 weeks.  She has gynecologic follow-up in the interval with Dr. Cotton.  A copy of this note will be sent to the gynecologist.  Patient seen 2/11/2025 having been reviewed by Dr. Cotton and with plans to proceed to hysterectomy 3/27/2025.  The patient was able to proceed with total laparoscopic hysterectomy, BSO and cystoscopy 3/27/2025.  Review of her pathology specimen revealed evidence of a previous endometrial ablation, cervix with no histopathology, benign right ovary with inclusion cyst and involuting corpus luteum, benign right fallopian tube, benign left ovary and fallopian tube.  The patient proceeded to recovery, having issues with sinusitis that responded to therapy.  She is next seen 7/8/2025 much improved post her surgery.      Plan  *DEXA scan rescheduled mid January 2026  *Follow-up assessment MD or NP in late January 2026 for Prolia

## 2025-07-08 ENCOUNTER — INFUSION (OUTPATIENT)
Dept: ONCOLOGY | Facility: HOSPITAL | Age: 50
End: 2025-07-08
Payer: COMMERCIAL

## 2025-07-08 ENCOUNTER — LAB (OUTPATIENT)
Dept: OTHER | Facility: HOSPITAL | Age: 50
End: 2025-07-08
Payer: COMMERCIAL

## 2025-07-08 ENCOUNTER — OFFICE VISIT (OUTPATIENT)
Dept: ONCOLOGY | Facility: CLINIC | Age: 50
End: 2025-07-08
Payer: COMMERCIAL

## 2025-07-08 VITALS
SYSTOLIC BLOOD PRESSURE: 101 MMHG | DIASTOLIC BLOOD PRESSURE: 66 MMHG | HEIGHT: 67 IN | HEART RATE: 85 BPM | WEIGHT: 136.2 LBS | BODY MASS INDEX: 21.38 KG/M2 | OXYGEN SATURATION: 99 % | TEMPERATURE: 96.9 F

## 2025-07-08 DIAGNOSIS — M81.8 OTHER OSTEOPOROSIS WITHOUT CURRENT PATHOLOGICAL FRACTURE: ICD-10-CM

## 2025-07-08 DIAGNOSIS — Z13.220 SCREENING CHOLESTEROL LEVEL: ICD-10-CM

## 2025-07-08 DIAGNOSIS — Z00.00 ROUTINE GENERAL MEDICAL EXAMINATION AT A HEALTH CARE FACILITY: Primary | ICD-10-CM

## 2025-07-08 DIAGNOSIS — N95.1 SYMPTOMATIC MENOPAUSAL OR FEMALE CLIMACTERIC STATES: ICD-10-CM

## 2025-07-08 DIAGNOSIS — Z17.0 MALIGNANT NEOPLASM OF UPPER-OUTER QUADRANT OF RIGHT BREAST IN FEMALE, ESTROGEN RECEPTOR POSITIVE: Primary | ICD-10-CM

## 2025-07-08 DIAGNOSIS — C50.411 MALIGNANT NEOPLASM OF UPPER-OUTER QUADRANT OF RIGHT BREAST IN FEMALE, ESTROGEN RECEPTOR POSITIVE: Primary | ICD-10-CM

## 2025-07-08 DIAGNOSIS — C50.411 MALIGNANT NEOPLASM OF UPPER-OUTER QUADRANT OF RIGHT BREAST IN FEMALE, ESTROGEN RECEPTOR POSITIVE: ICD-10-CM

## 2025-07-08 DIAGNOSIS — Z17.0 MALIGNANT NEOPLASM OF UPPER-OUTER QUADRANT OF RIGHT BREAST IN FEMALE, ESTROGEN RECEPTOR POSITIVE: ICD-10-CM

## 2025-07-08 LAB
25(OH)D3 SERPL-MCNC: 94 NG/ML (ref 30–100)
ALBUMIN SERPL-MCNC: 4.5 G/DL (ref 3.5–5.2)
ALBUMIN/GLOB SERPL: 1.3 G/DL
ALP SERPL-CCNC: 67 U/L (ref 39–117)
ALT SERPL W P-5'-P-CCNC: 20 U/L (ref 1–33)
ANION GAP SERPL CALCULATED.3IONS-SCNC: 10.9 MMOL/L (ref 5–15)
AST SERPL-CCNC: 28 U/L (ref 1–32)
BASOPHILS # BLD AUTO: 0.05 10*3/MM3 (ref 0–0.2)
BASOPHILS NFR BLD AUTO: 0.7 % (ref 0–1.5)
BILIRUB SERPL-MCNC: 0.2 MG/DL (ref 0–1.2)
BUN SERPL-MCNC: 14 MG/DL (ref 6–20)
BUN/CREAT SERPL: 21.5 (ref 7–25)
CALCIUM SPEC-SCNC: 9.5 MG/DL (ref 8.6–10.5)
CHLORIDE SERPL-SCNC: 99 MMOL/L (ref 98–107)
CHOLEST SERPL-MCNC: 256 MG/DL (ref 0–200)
CO2 SERPL-SCNC: 27.1 MMOL/L (ref 22–29)
CORTIS SERPL-MCNC: 19.6 MCG/DL
CREAT SERPL-MCNC: 0.65 MG/DL (ref 0.57–1)
DEPRECATED RDW RBC AUTO: 41.1 FL (ref 37–54)
EGFRCR SERPLBLD CKD-EPI 2021: 106.7 ML/MIN/1.73
EOSINOPHIL # BLD AUTO: 0.11 10*3/MM3 (ref 0–0.4)
EOSINOPHIL NFR BLD AUTO: 1.6 % (ref 0.3–6.2)
ERYTHROCYTE [DISTWIDTH] IN BLOOD BY AUTOMATED COUNT: 12.4 % (ref 12.3–15.4)
ESTRADIOL SERPL HS-MCNC: <5 PG/ML
FERRITIN SERPL-MCNC: 39.3 NG/ML (ref 13–150)
FOLATE SERPL-MCNC: 19.31 NG/ML (ref 4.78–24.2)
GLOBULIN UR ELPH-MCNC: 3.4 GM/DL
GLUCOSE SERPL-MCNC: 67 MG/DL (ref 65–99)
HBA1C MFR BLD: 5.4 % (ref 4.8–5.6)
HCT VFR BLD AUTO: 41.8 % (ref 34–46.6)
HDLC SERPL QL: 3.37
HDLC SERPL-MCNC: 76 MG/DL (ref 40–60)
HGB BLD-MCNC: 13.8 G/DL (ref 12–15.9)
IMM GRANULOCYTES # BLD AUTO: 0.02 10*3/MM3 (ref 0–0.05)
IMM GRANULOCYTES NFR BLD AUTO: 0.3 % (ref 0–0.5)
IRON 24H UR-MRATE: 107 MCG/DL (ref 37–145)
IRON SATN MFR SERPL: 22 % (ref 20–50)
LDLC SERPL CALC-MCNC: 166 MG/DL (ref 0–100)
LYMPHOCYTES # BLD AUTO: 1.22 10*3/MM3 (ref 0.7–3.1)
LYMPHOCYTES NFR BLD AUTO: 18.3 % (ref 19.6–45.3)
MAGNESIUM SERPL-MCNC: 2.3 MG/DL (ref 1.6–2.6)
MCH RBC QN AUTO: 30.1 PG (ref 26.6–33)
MCHC RBC AUTO-ENTMCNC: 33 G/DL (ref 31.5–35.7)
MCV RBC AUTO: 91.3 FL (ref 79–97)
MONOCYTES # BLD AUTO: 0.57 10*3/MM3 (ref 0.1–0.9)
MONOCYTES NFR BLD AUTO: 8.5 % (ref 5–12)
NEUTROPHILS NFR BLD AUTO: 4.71 10*3/MM3 (ref 1.7–7)
NEUTROPHILS NFR BLD AUTO: 70.6 % (ref 42.7–76)
NRBC BLD AUTO-RTO: 0 /100 WBC (ref 0–0.2)
PHOSPHATE SERPL-MCNC: 2.8 MG/DL (ref 2.5–4.5)
PLATELET # BLD AUTO: 405 10*3/MM3 (ref 140–450)
PMV BLD AUTO: 8.8 FL (ref 6–12)
POTASSIUM SERPL-SCNC: 4.2 MMOL/L (ref 3.5–5.2)
PROGEST SERPL-MCNC: 0.14 NG/ML
PROT SERPL-MCNC: 7.9 G/DL (ref 6–8.5)
RBC # BLD AUTO: 4.58 10*6/MM3 (ref 3.77–5.28)
SODIUM SERPL-SCNC: 137 MMOL/L (ref 136–145)
T3FREE SERPL-MCNC: 2.8 PG/ML (ref 2–4.4)
T4 FREE SERPL-MCNC: 1.13 NG/DL (ref 0.92–1.68)
TIBC SERPL-MCNC: 477 MCG/DL (ref 298–536)
TRANSFERRIN SERPL-MCNC: 320 MG/DL (ref 200–360)
TRIGL SERPL-MCNC: 84 MG/DL (ref 0–150)
TSH SERPL DL<=0.05 MIU/L-ACNC: 3.79 UIU/ML (ref 0.27–4.2)
VIT B12 BLD-MCNC: 742 PG/ML (ref 211–946)
VLDLC SERPL-MCNC: 14 MG/DL (ref 5–40)
WBC NRBC COR # BLD AUTO: 6.68 10*3/MM3 (ref 3.4–10.8)

## 2025-07-08 PROCEDURE — 86003 ALLG SPEC IGE CRUDE XTRC EA: CPT | Performed by: PHYSICIAN ASSISTANT

## 2025-07-08 PROCEDURE — 25010000002 DENOSUMAB 60 MG/ML SOLUTION PREFILLED SYRINGE: Performed by: INTERNAL MEDICINE

## 2025-07-08 PROCEDURE — 83540 ASSAY OF IRON: CPT | Performed by: PHYSICIAN ASSISTANT

## 2025-07-08 PROCEDURE — 82627 DEHYDROEPIANDROSTERONE: CPT | Performed by: PHYSICIAN ASSISTANT

## 2025-07-08 PROCEDURE — 83735 ASSAY OF MAGNESIUM: CPT | Performed by: PHYSICIAN ASSISTANT

## 2025-07-08 PROCEDURE — 84481 FREE ASSAY (FT-3): CPT | Performed by: PHYSICIAN ASSISTANT

## 2025-07-08 PROCEDURE — 82746 ASSAY OF FOLIC ACID SERUM: CPT | Performed by: PHYSICIAN ASSISTANT

## 2025-07-08 PROCEDURE — 83525 ASSAY OF INSULIN: CPT | Performed by: PHYSICIAN ASSISTANT

## 2025-07-08 PROCEDURE — 84402 ASSAY OF FREE TESTOSTERONE: CPT | Performed by: PHYSICIAN ASSISTANT

## 2025-07-08 PROCEDURE — 80061 LIPID PANEL: CPT | Performed by: PHYSICIAN ASSISTANT

## 2025-07-08 PROCEDURE — 83789 MASS SPECTROMETRY QUAL/QUAN: CPT | Performed by: PHYSICIAN ASSISTANT

## 2025-07-08 PROCEDURE — 84100 ASSAY OF PHOSPHORUS: CPT | Performed by: INTERNAL MEDICINE

## 2025-07-08 PROCEDURE — 84482 T3 REVERSE: CPT | Performed by: PHYSICIAN ASSISTANT

## 2025-07-08 PROCEDURE — 82306 VITAMIN D 25 HYDROXY: CPT | Performed by: PHYSICIAN ASSISTANT

## 2025-07-08 PROCEDURE — 82607 VITAMIN B-12: CPT | Performed by: PHYSICIAN ASSISTANT

## 2025-07-08 PROCEDURE — 82785 ASSAY OF IGE: CPT | Performed by: PHYSICIAN ASSISTANT

## 2025-07-08 PROCEDURE — 96372 THER/PROPH/DIAG INJ SC/IM: CPT

## 2025-07-08 PROCEDURE — 84466 ASSAY OF TRANSFERRIN: CPT | Performed by: PHYSICIAN ASSISTANT

## 2025-07-08 PROCEDURE — 83735 ASSAY OF MAGNESIUM: CPT | Performed by: INTERNAL MEDICINE

## 2025-07-08 PROCEDURE — 86376 MICROSOMAL ANTIBODY EACH: CPT | Performed by: PHYSICIAN ASSISTANT

## 2025-07-08 PROCEDURE — 99214 OFFICE O/P EST MOD 30 MIN: CPT | Performed by: INTERNAL MEDICINE

## 2025-07-08 PROCEDURE — 36415 COLL VENOUS BLD VENIPUNCTURE: CPT

## 2025-07-08 PROCEDURE — 80050 GENERAL HEALTH PANEL: CPT | Performed by: INTERNAL MEDICINE

## 2025-07-08 PROCEDURE — 84403 ASSAY OF TOTAL TESTOSTERONE: CPT | Performed by: PHYSICIAN ASSISTANT

## 2025-07-08 PROCEDURE — 84439 ASSAY OF FREE THYROXINE: CPT | Performed by: PHYSICIAN ASSISTANT

## 2025-07-08 PROCEDURE — 82533 TOTAL CORTISOL: CPT | Performed by: PHYSICIAN ASSISTANT

## 2025-07-08 PROCEDURE — 82670 ASSAY OF TOTAL ESTRADIOL: CPT | Performed by: PHYSICIAN ASSISTANT

## 2025-07-08 PROCEDURE — 83036 HEMOGLOBIN GLYCOSYLATED A1C: CPT | Performed by: PHYSICIAN ASSISTANT

## 2025-07-08 PROCEDURE — 86800 THYROGLOBULIN ANTIBODY: CPT | Performed by: PHYSICIAN ASSISTANT

## 2025-07-08 PROCEDURE — 82728 ASSAY OF FERRITIN: CPT | Performed by: PHYSICIAN ASSISTANT

## 2025-07-08 PROCEDURE — 84144 ASSAY OF PROGESTERONE: CPT | Performed by: PHYSICIAN ASSISTANT

## 2025-07-08 RX ADMIN — DENOSUMAB 60 MG: 60 INJECTION SUBCUTANEOUS at 11:16

## 2025-07-08 NOTE — NURSING NOTE
Pt arrived for prolia injection after seeing Dr Brown with no complaints or concerns voiced at this time.  Injection administered without incidence. Pt sent to scheduling desk to make F/u appt. Pt  instructed to call the office for any concerns prior to next appt. Pt vu and discharged in stable condition.

## 2025-07-09 LAB
DHEA-S SERPL-MCNC: 48.9 UG/DL (ref 41.2–243.7)
INSULIN SERPL-ACNC: 29.2 UIU/ML (ref 2.6–24.9)
THYROGLOB AB SERPL-ACNC: <1 IU/ML (ref 0–0.9)
THYROPEROXIDASE AB SERPL-ACNC: 11 IU/ML (ref 0–34)

## 2025-07-11 LAB — IODINE SERPL-MCNC: 72.1 UG/L (ref 31.1–64.6)

## 2025-07-12 LAB — T3REVERSE SERPL-MCNC: 20.8 NG/DL (ref 9.2–24.1)

## 2025-07-13 LAB
CODFISH IGE QN: <0.1 KU/L
COW MILK IGE QN: 0.13 KU/L
EGG WHITE IGE QN: <0.1 KU/L
GLUTEN IGE QN: <0.1 KU/L
IGE SERPL-ACNC: 15 IU/ML (ref 6–495)
PEANUT IGE QN: <0.1 KU/L
SERVICE CMNT-IMP: ABNORMAL
SOYBEAN IGE QN: <0.1 KU/L
TESTOST FREE SERPL-MCNC: 0.6 PG/ML (ref 0–4.2)
TESTOST SERPL-MCNC: 6.8 NG/DL
WHEAT IGE QN: <0.1 KU/L

## 2025-07-14 LAB — MAGNESIUM RBC-MCNC: 6.7 MG/DL (ref 3.7–7)

## (undated) DEVICE — THE SINGLE USE ETRAP – POLYP TRAP IS USED FOR SUCTION RETRIEVAL OF ENDOSCOPICALLY REMOVED POLYPS.: Brand: ETRAP

## (undated) DEVICE — ELECTRD BLD EZ CLN MOD 6.5IN

## (undated) DEVICE — GLV SURG PREMIERPRO ORTHO LTX PF SZ8.5 BRN

## (undated) DEVICE — Device: Brand: DEFENDO AIR/WATER/SUCTION AND BIOPSY VALVE

## (undated) DEVICE — SUT PROLN 4/0 P3 18IN 8699G

## (undated) DEVICE — PAD,ABDOMINAL,8"X10",ST,LF: Brand: MEDLINE

## (undated) DEVICE — ANTIBACTERIAL UNDYED BRAIDED (POLYGLACTIN 910), SYNTHETIC ABSORBABLE SUTURE: Brand: COATED VICRYL

## (undated) DEVICE — DRSNG SURESITE WNDW 4X4.5

## (undated) DEVICE — NEEDLE, QUINCKE, 20GX3.5": Brand: MEDLINE

## (undated) DEVICE — SOL ISO/ALC RUB 70PCT 4OZ

## (undated) DEVICE — ADHS SKIN SURG TISS VISC PREMIERPRO EXOFIN HI/VISC FAST/DRY

## (undated) DEVICE — THE TORRENT IRRIGATION SCOPE CONNECTOR IS USED WITH THE TORRENT IRRIGATION TUBING TO PROVIDE IRRIGATION FLUIDS SUCH AS STERILE WATER DURING GASTROINTESTINAL ENDOSCOPIC PROCEDURES WHEN USED IN CONJUNCTION WITH AN IRRIGATION PUMP (OR ELECTROSURGICAL UNIT).: Brand: TORRENT

## (undated) DEVICE — 1010 S-DRAPE TOWEL DRAPE 10/BX: Brand: STERI-DRAPE™

## (undated) DEVICE — SPNG LAP 18X18IN LF STRL PK/5

## (undated) DEVICE — PK CHST BRST 40

## (undated) DEVICE — TUBING, SUCTION, 1/4" X 10', STRAIGHT: Brand: MEDLINE

## (undated) DEVICE — 3M™ STERI-STRIP™ REINFORCED ADHESIVE SKIN CLOSURES, R1547, 1/2 IN X 4 IN (12 MM X 100 MM), 6 STRIPS/ENVELOPE: Brand: 3M™ STERI-STRIP™

## (undated) DEVICE — GLV SURG PREMIERPRO ORTHO LTX PF SZ7.5 BRN

## (undated) DEVICE — SPK PIN NSR FLUID NONVNT 2WY MINI LL LF

## (undated) DEVICE — SUT VIC 3/0 SH 27IN J416H

## (undated) DEVICE — SNAR POLYP SENSATION STDOVL 27 240 BX40

## (undated) DEVICE — CANN NASL CO2 TRULINK W/O2 A/

## (undated) DEVICE — PK PROC MINOR TOWER 40

## (undated) DEVICE — IRRIGATOR BULB ASEPTO 60CC STRL

## (undated) DEVICE — SKIN PREP TRAY W/CHG: Brand: MEDLINE INDUSTRIES, INC.

## (undated) DEVICE — ELECTRD BLD EZ CLN MOD XLNG 2.75IN

## (undated) DEVICE — PATIENT RETURN ELECTRODE, SINGLE-USE, CONTACT QUALITY MONITORING, ADULT, WITH 9FT CORD, FOR PATIENTS WEIGING OVER 33LBS. (15KG): Brand: MEGADYNE

## (undated) DEVICE — SENSR O2 OXIMAX FNGR A/ 18IN NONSTR

## (undated) DEVICE — SOL NACL 0.9PCT 1000ML

## (undated) DEVICE — SYS ENSING FLUID M/ ADD MAC1001

## (undated) DEVICE — PENCL E/S HNDSWTCH SMOKEEVAC HOLSTR 10FT

## (undated) DEVICE — Device: Brand: FABCO

## (undated) DEVICE — BNDG ELAS ELITE V/CLOSE 6IN 5YD LF STRL

## (undated) DEVICE — APPL CHLORAPREP HI/LITE 26ML ORNG

## (undated) DEVICE — SUT ETHLN 3/0 PS1 18IN 1663H

## (undated) DEVICE — SUT VIC 5/0 PS2 18IN J495H

## (undated) DEVICE — SPNG GZ WOVN 4X4IN 12PLY 10/BX STRL

## (undated) DEVICE — STPLR SKIN VISISTAT WD 35CT

## (undated) DEVICE — UNDYED MONOFILAMENT (POLYDIOXANONE), ABSORBABLE SYNTHETIC SURGICAL SUTURE: Brand: PDS

## (undated) DEVICE — ASEPTIC FLUID TRANSFER SET: Brand: ASEPTIC FLUID TRANSFER SET

## (undated) DEVICE — TUBING, SUCTION, 1/4" X 20', STRAIGHT: Brand: MEDLINE INDUSTRIES, INC.